# Patient Record
Sex: MALE | Race: BLACK OR AFRICAN AMERICAN | Employment: OTHER | URBAN - METROPOLITAN AREA
[De-identification: names, ages, dates, MRNs, and addresses within clinical notes are randomized per-mention and may not be internally consistent; named-entity substitution may affect disease eponyms.]

---

## 2017-02-03 PROBLEM — R56.9 NEW ONSET SEIZURE (HCC): Status: ACTIVE | Noted: 2017-02-03

## 2018-08-11 PROBLEM — E87.70 VOLUME OVERLOAD: Status: ACTIVE | Noted: 2018-08-11

## 2018-08-20 ENCOUNTER — TELEPHONE (OUTPATIENT)
Dept: OTHER | Age: 78
End: 2018-08-20

## 2018-08-21 ENCOUNTER — OFFICE VISIT (OUTPATIENT)
Dept: CARDIOLOGY CLINIC | Age: 78
End: 2018-08-21

## 2018-08-21 ENCOUNTER — HOSPITAL ENCOUNTER (OUTPATIENT)
Dept: OTHER | Age: 78
Discharge: HOME | End: 2018-08-21
Attending: CLINICAL NURSE SPECIALIST | Admitting: CLINICAL NURSE SPECIALIST

## 2018-08-21 VITALS
DIASTOLIC BLOOD PRESSURE: 60 MMHG | RESPIRATION RATE: 16 BRPM | SYSTOLIC BLOOD PRESSURE: 110 MMHG | WEIGHT: 211.7 LBS | HEIGHT: 76 IN | OXYGEN SATURATION: 94 % | HEART RATE: 48 BPM | BODY MASS INDEX: 25.78 KG/M2

## 2018-08-21 DIAGNOSIS — N28.9 RENAL INSUFFICIENCY: ICD-10-CM

## 2018-08-21 DIAGNOSIS — I50.22 CHRONIC SYSTOLIC HEART FAILURE (HCC): ICD-10-CM

## 2018-08-21 DIAGNOSIS — I10 ESSENTIAL HYPERTENSION: ICD-10-CM

## 2018-08-21 DIAGNOSIS — I50.42 CHRONIC COMBINED SYSTOLIC AND DIASTOLIC HEART FAILURE (HCC): Primary | ICD-10-CM

## 2018-08-21 LAB
ANION GAP SERPL CALCULATED.3IONS-SCNC: 8 MMOL/L (ref 3–16)
BUN BLDV-MCNC: 31 MG/DL (ref 7–20)
CALCIUM SERPL-MCNC: 8.4 MG/DL (ref 8.3–10.6)
CHLORIDE BLD-SCNC: 105 MMOL/L (ref 99–110)
CO2: 30 MMOL/L (ref 21–32)
CREAT SERPL-MCNC: 1.3 MG/DL (ref 0.8–1.3)
GFR AFRICAN AMERICAN: >60
GFR NON-AFRICAN AMERICAN: 53
GLUCOSE BLD-MCNC: 110 MG/DL (ref 70–99)
POTASSIUM SERPL-SCNC: 4.9 MMOL/L (ref 3.5–5.1)
PRO-BNP: 6430 PG/ML (ref 0–449)
SODIUM BLD-SCNC: 143 MMOL/L (ref 136–145)

## 2018-08-21 PROCEDURE — 1123F ACP DISCUSS/DSCN MKR DOCD: CPT | Performed by: CLINICAL NURSE SPECIALIST

## 2018-08-21 PROCEDURE — 1101F PT FALLS ASSESS-DOCD LE1/YR: CPT | Performed by: CLINICAL NURSE SPECIALIST

## 2018-08-21 PROCEDURE — 99214 OFFICE O/P EST MOD 30 MIN: CPT | Performed by: CLINICAL NURSE SPECIALIST

## 2018-08-21 PROCEDURE — G8427 DOCREV CUR MEDS BY ELIG CLIN: HCPCS | Performed by: CLINICAL NURSE SPECIALIST

## 2018-08-21 PROCEDURE — 1111F DSCHRG MED/CURRENT MED MERGE: CPT | Performed by: CLINICAL NURSE SPECIALIST

## 2018-08-21 PROCEDURE — 1036F TOBACCO NON-USER: CPT | Performed by: CLINICAL NURSE SPECIALIST

## 2018-08-21 PROCEDURE — 4040F PNEUMOC VAC/ADMIN/RCVD: CPT | Performed by: CLINICAL NURSE SPECIALIST

## 2018-08-21 PROCEDURE — G8419 CALC BMI OUT NRM PARAM NOF/U: HCPCS | Performed by: CLINICAL NURSE SPECIALIST

## 2018-08-21 NOTE — PATIENT INSTRUCTIONS
1. Please take all medications as prescribed  2. Check blood work (BNP and BMP) today  3. RTO in 1 month  4. Call if weight up 3 pounds in a day or 5 pounds in a week  5.  Continue low sodium (<4000 mg) a day, 64 ounces fluid day and cardiac diet

## 2018-08-21 NOTE — PROGRESS NOTES
sleep pattern, or activity level. · Eyes: No visual changes or diplopia. No scleral icterus. · ENT: No Headaches, hearing loss or vertigo. No mouth sores or sore throat. · Cardiovascular: Reviewed in HPI  · Respiratory: No cough or wheezing, no sputum production. No hematemesis. · Gastrointestinal: No abdominal pain, appetite loss, blood in stools. No change in bowel or bladder habits. · Genitourinary: No dysuria, trouble voiding, or hematuria. · Musculoskeletal:  No gait disturbance, weakness or joint complaints. · Integumentary: No rash or pruritis. · Neurological: No headache, diplopia, change in muscle strength, numbness or tingling. No change in gait, balance, coordination, mood, affect, memory, mentation, behavior. · Psychiatric: No anxiety, no depression. · Endocrine: No malaise, fatigue or temperature intolerance. No excessive thirst, fluid intake, or urination. No tremor. · Hematologic/Lymphatic: No abnormal bruising or bleeding, blood clots or swollen lymph nodes. · Allergic/Immunologic: No nasal congestion or hives. Physical Examination:    Vitals:    08/21/18 1011   BP: 110/60   Site: Right Arm   Position: Sitting   Cuff Size: Medium Adult   Pulse: (!) 48   Resp: 16   SpO2: 94%   Weight: 211 lb 11.2 oz (96 kg)   Height: 6' 4\" (1.93 m)        Constitutional and General Appearance: Warm and dry, no apparent distress, normal coloration  HEENT:  Normocephalic, atraumatic  Respiratory:  · Normal excursion and expansion without use of accessory muscles  · Resp Auscultation: Normal breath sounds without dullness  Cardiovascular:  · The apical impulses not displaced  · Heart tones are crisp and normal  · JVP normal cm H2O  · Regular rate and rhythm  · Peripheral pulses are symmetrical and full  · There is no clubbing, cyanosis of the extremities.   · maria c ankle to mid calf edema  · Pedal Pulses: 2+ and equal   Abdomen:  · No masses or tenderness  · Liver/Spleen: No Abnormalities Noted  Neurological/Psychiatric:  · Alert and oriented in all spheres  · Moves all extremities well  · Exhibits normal gait balance and coordination  · No abnormalities of mood, affect, memory, mentation, or behavior are noted    Lab Data:    CBC:   Lab Results   Component Value Date    WBC 3.5 08/15/2018    WBC 3.7 08/14/2018    WBC 4.6 08/13/2018    RBC 4.56 08/15/2018    RBC 4.90 08/14/2018    RBC 5.11 08/13/2018    HGB 12.9 08/15/2018    HGB 14.0 08/14/2018    HGB 14.5 08/13/2018    HCT 40.0 08/15/2018    HCT 43.3 08/14/2018    HCT 45.0 08/13/2018    MCV 87.7 08/15/2018    MCV 88.3 08/14/2018    MCV 88.1 08/13/2018    RDW 14.9 08/15/2018    RDW 15.3 08/14/2018    RDW 15.5 08/13/2018     08/15/2018     08/14/2018     08/13/2018     BMP:  Lab Results   Component Value Date     08/15/2018     08/14/2018     08/13/2018    K 4.5 08/15/2018    K 4.4 08/14/2018    K 4.6 08/13/2018    K 4.7 08/12/2018     08/15/2018     08/14/2018     08/13/2018    CO2 29 08/15/2018    CO2 33 08/14/2018    CO2 33 08/13/2018    PHOS 3.1 08/15/2018    PHOS 3.4 08/14/2018    PHOS 3.5 08/13/2018    BUN 27 08/15/2018    BUN 20 08/14/2018    BUN 20 08/13/2018    CREATININE 1.1 08/15/2018    CREATININE 1.1 08/14/2018    CREATININE 1.2 08/13/2018     BNP:   Lab Results   Component Value Date    PROBNP 10,917 08/14/2018    PROBNP 15,265 08/11/2018     Cardiac Imaging:  Echo 8/13/18  The left ventricle is mildly dilated.   -Severely reduced global systolic function with an ejection fraction   estimated at 15-20%.    -Severe global hypokinesis noted.   -RV systolic function appears to be reduced.   -Biatrial enlargement.   -Aortic valve appears sclerotic but opens adequately.   -Mild aortic regurgitation.   -Severe mitral regurgitation.   -There is severe tricuspid regurgitation with a RVSP estimation of 63 mmHg.   -Mild-to-moderate pulmonic regurgitation present.   -Mild circumferential

## 2018-08-22 ENCOUNTER — TELEPHONE (OUTPATIENT)
Dept: CARDIOLOGY CLINIC | Age: 78
End: 2018-08-22

## 2018-08-29 ENCOUNTER — OFFICE VISIT (OUTPATIENT)
Dept: FAMILY MEDICINE CLINIC | Age: 78
End: 2018-08-29

## 2018-08-29 VITALS
WEIGHT: 218.8 LBS | BODY MASS INDEX: 26.65 KG/M2 | SYSTOLIC BLOOD PRESSURE: 138 MMHG | DIASTOLIC BLOOD PRESSURE: 66 MMHG | HEIGHT: 76 IN | HEART RATE: 63 BPM | OXYGEN SATURATION: 93 %

## 2018-08-29 DIAGNOSIS — I10 HYPERTENSION, ESSENTIAL: ICD-10-CM

## 2018-08-29 DIAGNOSIS — Z23 NEED FOR TDAP VACCINATION: ICD-10-CM

## 2018-08-29 DIAGNOSIS — I48.91 ATRIAL FIBRILLATION, UNSPECIFIED TYPE (HCC): ICD-10-CM

## 2018-08-29 DIAGNOSIS — Z00.00 ANNUAL PHYSICAL EXAM: Primary | ICD-10-CM

## 2018-08-29 DIAGNOSIS — I49.9 IRREGULAR HEART BEAT: ICD-10-CM

## 2018-08-29 DIAGNOSIS — Z23 NEED FOR PNEUMOCOCCAL VACCINE: ICD-10-CM

## 2018-08-29 DIAGNOSIS — I50.41 ACUTE COMBINED SYSTOLIC AND DIASTOLIC (CONGESTIVE) HRT FAIL (HCC): ICD-10-CM

## 2018-08-29 PROCEDURE — G8427 DOCREV CUR MEDS BY ELIG CLIN: HCPCS | Performed by: FAMILY MEDICINE

## 2018-08-29 PROCEDURE — 1111F DSCHRG MED/CURRENT MED MERGE: CPT | Performed by: FAMILY MEDICINE

## 2018-08-29 PROCEDURE — 4040F PNEUMOC VAC/ADMIN/RCVD: CPT | Performed by: FAMILY MEDICINE

## 2018-08-29 PROCEDURE — 1123F ACP DISCUSS/DSCN MKR DOCD: CPT | Performed by: FAMILY MEDICINE

## 2018-08-29 PROCEDURE — 99204 OFFICE O/P NEW MOD 45 MIN: CPT | Performed by: FAMILY MEDICINE

## 2018-08-29 PROCEDURE — 1101F PT FALLS ASSESS-DOCD LE1/YR: CPT | Performed by: FAMILY MEDICINE

## 2018-08-29 PROCEDURE — 1036F TOBACCO NON-USER: CPT | Performed by: FAMILY MEDICINE

## 2018-08-29 PROCEDURE — G8419 CALC BMI OUT NRM PARAM NOF/U: HCPCS | Performed by: FAMILY MEDICINE

## 2018-08-29 PROCEDURE — 93000 ELECTROCARDIOGRAM COMPLETE: CPT | Performed by: FAMILY MEDICINE

## 2018-08-29 RX ORDER — ASPIRIN 81 MG/1
81 TABLET, CHEWABLE ORAL DAILY
Qty: 90 TABLET | Refills: 3 | Status: SHIPPED | OUTPATIENT
Start: 2018-08-29 | End: 2019-10-09 | Stop reason: SDUPTHER

## 2018-08-29 ASSESSMENT — PATIENT HEALTH QUESTIONNAIRE - PHQ9
1. LITTLE INTEREST OR PLEASURE IN DOING THINGS: 0
SUM OF ALL RESPONSES TO PHQ9 QUESTIONS 1 & 2: 0
SUM OF ALL RESPONSES TO PHQ QUESTIONS 1-9: 0
SUM OF ALL RESPONSES TO PHQ QUESTIONS 1-9: 0
2. FEELING DOWN, DEPRESSED OR HOPELESS: 0

## 2018-08-29 NOTE — PATIENT INSTRUCTIONS
Patient Education        Atrial Fibrillation: Care Instructions  Your Care Instructions    Atrial fibrillation is an irregular and often fast heartbeat. Treating this condition is important for several reasons. It can cause blood clots, which can travel from your heart to your brain and cause a stroke. If you have a fast heartbeat, you may feel lightheaded, dizzy, and weak. An irregular heartbeat can also increase your risk for heart failure. Atrial fibrillation is often the result of another heart condition, such as high blood pressure or coronary artery disease. Making changes to improve your heart condition will help you stay healthy and active. Follow-up care is a key part of your treatment and safety. Be sure to make and go to all appointments, and call your doctor if you are having problems. It's also a good idea to know your test results and keep a list of the medicines you take. How can you care for yourself at home? Medicines    · Take your medicines exactly as prescribed. Call your doctor if you think you are having a problem with your medicine. You will get more details on the specific medicines your doctor prescribes.     · If your doctor has given you a blood thinner to prevent a stroke, be sure you get instructions about how to take your medicine safely. Blood thinners can cause serious bleeding problems.     · Do not take any vitamins, over-the-counter drugs, or herbal products without talking to your doctor first.    Lifestyle changes    · Do not smoke. Smoking can increase your chance of a stroke and heart attack. If you need help quitting, talk to your doctor about stop-smoking programs and medicines. These can increase your chances of quitting for good.     · Eat a heart-healthy diet.     · Stay at a healthy weight. Lose weight if you need to.     · Limit alcohol to 2 drinks a day for men and 1 drink a day for women. Too much alcohol can cause health problems.     · Avoid colds and flu.  Get a pneumococcal vaccine shot. If you have had one before, ask your doctor whether you need another dose. Get a flu shot every year. If you must be around people with colds or flu, wash your hands often. Activity    · If your doctor recommends it, get more exercise. Walking is a good choice. Bit by bit, increase the amount you walk every day. Try for at least 30 minutes on most days of the week. You also may want to swim, bike, or do other activities. Your doctor may suggest that you join a cardiac rehabilitation program so that you can have help increasing your physical activity safely.     · Start light exercise if your doctor says it is okay. Even a small amount will help you get stronger, have more energy, and manage stress. Walking is an easy way to get exercise. Start out by walking a little more than you did in the hospital. Gradually increase the amount you walk.     · When you exercise, watch for signs that your heart is working too hard. You are pushing too hard if you cannot talk while you are exercising. If you become short of breath or dizzy or have chest pain, sit down and rest immediately.     · Check your pulse regularly. Place two fingers on the artery at the palm side of your wrist, in line with your thumb. If your heartbeat seems uneven or fast, talk to your doctor. When should you call for help? Call 911 anytime you think you may need emergency care. For example, call if:    · You have symptoms of a heart attack. These may include:  ¨ Chest pain or pressure, or a strange feeling in the chest.  ¨ Sweating. ¨ Shortness of breath. ¨ Nausea or vomiting. ¨ Pain, pressure, or a strange feeling in the back, neck, jaw, or upper belly or in one or both shoulders or arms. ¨ Lightheadedness or sudden weakness. ¨ A fast or irregular heartbeat. After you call 911, the  may tell you to chew 1 adult-strength or 2 to 4 low-dose aspirin. Wait for an ambulance.  Do not try to drive yourself.

## 2018-08-29 NOTE — PROGRESS NOTES
[address] Note    Date: 8/29/2018                                               Subjective/Objective:     Chief Complaint   Patient presents with    New Patient    Fatigue     in legs, no pain     Leg Swelling     Right       HPI   Pt here to establish care. Pt hadn't seen a doctor for 20 years until when he was in [facility] for 4 days for CHF and started on Lisinopril and Metoprolol and Lasix. Also takes 81mg ASA. Since leaving hospital pt feels well with no SOB or CP. No HA. Denies fatigue. Pt has seen cardiology for f/u. He will be followed monthly for now. Last tdap unknown. Has never had a pneumonia vaccine. 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 ([facility]     Essential hypertension     Volume overload 08/11/2018    New onset seizure ([facility] 02/03/2017    Delirium     Acute encephalopathy        Past Medical History:   Diagnosis Date    Hypertension     TB (pulmonary tuberculosis)     he says the doctors told him he had TB when he was 15 and he had to have surgery       Current Outpatient Prescriptions   Medication Sig Dispense Refill    aspirin 81 MG chewable tablet Take 1 tablet by mouth daily 90 tablet 3    lisinopril (PRINIVIL;ZESTRIL) 5 MG tablet Take 1 tablet by mouth daily 30 tablet 0    carvedilol (COREG) 6.25 MG tablet Take 1 tablet by mouth 2 times daily (with meals) 60 tablet 0    furosemide (LASIX) 40 MG tablet Take 1 tablet by mouth daily 30 tablet 0     No current facility-administered medications for this visit.         No Known Allergies    Review of Systems   No CP, no SOB, no rash, no bruise, no HA, no vision change, no ankle swelling, no hearing problems, no LAD      Vitals:  /66   Pulse 63   Ht 6' 4\" (1.93 m)   Wt 218 lb 12.8 oz (99.2 kg)   SpO2 93%   BMI 26.63 kg/m²     Physical Exam   General: Standing Status:   Future     Number of Occurrences:   1     Standing Expiration Date:   10/28/2018     Order Specific Question:   Reason for Exam?     Answer:   Irregular heart rate       No Follow-up on file.     Jroy De La Paz MD    8/29/2018  10:28 AM

## 2018-09-07 DIAGNOSIS — I10 ESSENTIAL HYPERTENSION: Primary | ICD-10-CM

## 2018-09-07 RX ORDER — LISINOPRIL 5 MG/1
5 TABLET ORAL DAILY
Qty: 30 TABLET | Refills: 5 | Status: SHIPPED | OUTPATIENT
Start: 2018-09-07 | End: 2018-12-21 | Stop reason: SDUPTHER

## 2018-09-07 RX ORDER — CARVEDILOL 6.25 MG/1
6.25 TABLET ORAL 2 TIMES DAILY WITH MEALS
Qty: 60 TABLET | Refills: 5 | Status: SHIPPED | OUTPATIENT
Start: 2018-09-07 | End: 2019-10-09 | Stop reason: SDUPTHER

## 2018-09-07 RX ORDER — FUROSEMIDE 40 MG/1
40 TABLET ORAL DAILY
Qty: 30 TABLET | Refills: 5 | Status: SHIPPED | OUTPATIENT
Start: 2018-09-07 | End: 2018-12-21 | Stop reason: SDUPTHER

## 2018-09-25 ENCOUNTER — APPOINTMENT (OUTPATIENT)
Dept: GENERAL RADIOLOGY | Age: 78
DRG: 208 | End: 2018-09-25
Payer: MEDICARE

## 2018-09-25 ENCOUNTER — APPOINTMENT (OUTPATIENT)
Dept: CT IMAGING | Age: 78
DRG: 208 | End: 2018-09-25
Payer: MEDICARE

## 2018-09-25 ENCOUNTER — HOSPITAL ENCOUNTER (INPATIENT)
Age: 78
LOS: 5 days | Discharge: HOME HEALTH | DRG: 208 | End: 2018-09-30
Attending: EMERGENCY MEDICINE | Admitting: HOSPITALIST
Payer: MEDICARE

## 2018-09-25 DIAGNOSIS — G40.901 STATUS EPILEPTICUS (HCC): Primary | ICD-10-CM

## 2018-09-25 PROBLEM — R56.9 SEIZURE (HCC): Status: ACTIVE | Noted: 2018-09-25

## 2018-09-25 LAB
A/G RATIO: 1.2 (ref 1.1–2.2)
ALBUMIN SERPL-MCNC: 3.5 G/DL (ref 3.4–5)
ALP BLD-CCNC: 76 U/L (ref 40–129)
ALT SERPL-CCNC: 15 U/L (ref 10–40)
ANION GAP SERPL CALCULATED.3IONS-SCNC: 16 MMOL/L (ref 3–16)
APTT: 22.4 SEC (ref 26–36)
AST SERPL-CCNC: 23 U/L (ref 15–37)
BASE EXCESS ARTERIAL: 5.9 MMOL/L (ref -3–3)
BASE EXCESS VENOUS: 4.5 MMOL/L (ref -3–3)
BILIRUB SERPL-MCNC: <0.2 MG/DL (ref 0–1)
BILIRUBIN URINE: NEGATIVE
BLOOD, URINE: NEGATIVE
BUN BLDV-MCNC: 35 MG/DL (ref 7–20)
CALCIUM SERPL-MCNC: 8.7 MG/DL (ref 8.3–10.6)
CARBOXYHEMOGLOBIN ARTERIAL: 0.6 % (ref 0–1.5)
CARBOXYHEMOGLOBIN: 2.2 % (ref 0–1.5)
CHLORIDE BLD-SCNC: 100 MMOL/L (ref 99–110)
CLARITY: CLEAR
CO2: 26 MMOL/L (ref 21–32)
COLOR: YELLOW
CREAT SERPL-MCNC: 1.5 MG/DL (ref 0.8–1.3)
GFR AFRICAN AMERICAN: 55
GFR NON-AFRICAN AMERICAN: 45
GLOBULIN: 2.9 G/DL
GLUCOSE BLD-MCNC: 139 MG/DL (ref 70–99)
GLUCOSE URINE: NEGATIVE MG/DL
HCO3 ARTERIAL: 31.3 MMOL/L (ref 21–29)
HCO3 VENOUS: 31.7 MMOL/L (ref 23–29)
HCT VFR BLD CALC: 36.9 % (ref 40.5–52.5)
HEMOGLOBIN, ART, EXTENDED: 12.6 G/DL (ref 13.5–17.5)
HEMOGLOBIN, VEN, REDUCED: 7 %
HEMOGLOBIN: 11.6 G/DL (ref 13.5–17.5)
INR BLD: 1.01 (ref 0.86–1.14)
KEPPRA DOSE AMT: NORMAL
KEPPRA: 10.5 UG/ML (ref 6–46)
KETONES, URINE: NEGATIVE MG/DL
LEUKOCYTE ESTERASE, URINE: NEGATIVE
LIPASE: 86 U/L (ref 13–60)
MCH RBC QN AUTO: 27.8 PG (ref 26–34)
MCHC RBC AUTO-ENTMCNC: 31.5 G/DL (ref 31–36)
MCV RBC AUTO: 88.3 FL (ref 80–100)
METHEMOGLOBIN ARTERIAL: 0.4 %
METHEMOGLOBIN VENOUS: 0.5 %
MICROSCOPIC EXAMINATION: NORMAL
NITRITE, URINE: NEGATIVE
O2 CONTENT ARTERIAL: 18 ML/DL
O2 CONTENT, VEN: 16 VOL %
O2 SAT, ARTERIAL: 99.8 %
O2 SAT, VEN: 93 %
O2 THERAPY: ABNORMAL
O2 THERAPY: ABNORMAL
PCO2 ARTERIAL: 47.8 MMHG (ref 35–45)
PCO2, VEN: 58.4 MMHG (ref 40–50)
PDW BLD-RTO: 15.2 % (ref 12.4–15.4)
PH ARTERIAL: 7.42 (ref 7.35–7.45)
PH UA: 6.5
PH VENOUS: 7.34 (ref 7.35–7.45)
PLATELET # BLD: 181 K/UL (ref 135–450)
PMV BLD AUTO: 9.7 FL (ref 5–10.5)
PO2 ARTERIAL: 184 MMHG (ref 75–108)
PO2, VEN: 69.1 MMHG (ref 25–40)
POTASSIUM SERPL-SCNC: 4.4 MMOL/L (ref 3.5–5.1)
PROTEIN UA: NEGATIVE MG/DL
PROTHROMBIN TIME: 11.5 SEC (ref 9.8–13)
RBC # BLD: 4.18 M/UL (ref 4.2–5.9)
SODIUM BLD-SCNC: 142 MMOL/L (ref 136–145)
SPECIFIC GRAVITY UA: 1.01
TCO2 ARTERIAL: 73.4 MMOL/L
TCO2 CALC VENOUS: 75 MMOL/L
TOTAL PROTEIN: 6.4 G/DL (ref 6.4–8.2)
TROPONIN: <0.01 NG/ML
URINE TYPE: NORMAL
UROBILINOGEN, URINE: 1 E.U./DL
WBC # BLD: 4.3 K/UL (ref 4–11)

## 2018-09-25 PROCEDURE — 94770 HC ETCO2 MONITOR DAILY: CPT

## 2018-09-25 PROCEDURE — 81003 URINALYSIS AUTO W/O SCOPE: CPT

## 2018-09-25 PROCEDURE — 85730 THROMBOPLASTIN TIME PARTIAL: CPT

## 2018-09-25 PROCEDURE — 2000000000 HC ICU R&B

## 2018-09-25 PROCEDURE — 93005 ELECTROCARDIOGRAM TRACING: CPT | Performed by: EMERGENCY MEDICINE

## 2018-09-25 PROCEDURE — 71045 X-RAY EXAM CHEST 1 VIEW: CPT

## 2018-09-25 PROCEDURE — 0BH17EZ INSERTION OF ENDOTRACHEAL AIRWAY INTO TRACHEA, VIA NATURAL OR ARTIFICIAL OPENING: ICD-10-PCS | Performed by: EMERGENCY MEDICINE

## 2018-09-25 PROCEDURE — 2580000003 HC RX 258: Performed by: HOSPITALIST

## 2018-09-25 PROCEDURE — 6360000002 HC RX W HCPCS: Performed by: EMERGENCY MEDICINE

## 2018-09-25 PROCEDURE — 82803 BLOOD GASES ANY COMBINATION: CPT

## 2018-09-25 PROCEDURE — 93010 ELECTROCARDIOGRAM REPORT: CPT | Performed by: INTERNAL MEDICINE

## 2018-09-25 PROCEDURE — 6360000002 HC RX W HCPCS: Performed by: HOSPITALIST

## 2018-09-25 PROCEDURE — 2580000003 HC RX 258: Performed by: EMERGENCY MEDICINE

## 2018-09-25 PROCEDURE — 96361 HYDRATE IV INFUSION ADD-ON: CPT

## 2018-09-25 PROCEDURE — 96365 THER/PROPH/DIAG IV INF INIT: CPT

## 2018-09-25 PROCEDURE — 85610 PROTHROMBIN TIME: CPT

## 2018-09-25 PROCEDURE — 84484 ASSAY OF TROPONIN QUANT: CPT

## 2018-09-25 PROCEDURE — 94002 VENT MGMT INPAT INIT DAY: CPT

## 2018-09-25 PROCEDURE — 99291 CRITICAL CARE FIRST HOUR: CPT

## 2018-09-25 PROCEDURE — 96375 TX/PRO/DX INJ NEW DRUG ADDON: CPT

## 2018-09-25 PROCEDURE — 2500000003 HC RX 250 WO HCPCS

## 2018-09-25 PROCEDURE — 80053 COMPREHEN METABOLIC PANEL: CPT

## 2018-09-25 PROCEDURE — 70450 CT HEAD/BRAIN W/O DYE: CPT

## 2018-09-25 PROCEDURE — 83690 ASSAY OF LIPASE: CPT

## 2018-09-25 PROCEDURE — 51702 INSERT TEMP BLADDER CATH: CPT

## 2018-09-25 PROCEDURE — 85027 COMPLETE CBC AUTOMATED: CPT

## 2018-09-25 PROCEDURE — 5A1945Z RESPIRATORY VENTILATION, 24-96 CONSECUTIVE HOURS: ICD-10-PCS | Performed by: INTERNAL MEDICINE

## 2018-09-25 PROCEDURE — 80177 DRUG SCRN QUAN LEVETIRACETAM: CPT

## 2018-09-25 PROCEDURE — 2700000000 HC OXYGEN THERAPY PER DAY

## 2018-09-25 PROCEDURE — 4500000026 HC ED CRITICAL CARE PROCEDURE

## 2018-09-25 PROCEDURE — 96372 THER/PROPH/DIAG INJ SC/IM: CPT

## 2018-09-25 PROCEDURE — 94761 N-INVAS EAR/PLS OXIMETRY MLT: CPT

## 2018-09-25 RX ORDER — LORAZEPAM 2 MG/ML
INJECTION INTRAMUSCULAR
Status: DISPENSED
Start: 2018-09-25 | End: 2018-09-25

## 2018-09-25 RX ORDER — LEVETIRACETAM 5 MG/ML
500 INJECTION INTRAVASCULAR EVERY 12 HOURS
Status: DISCONTINUED | OUTPATIENT
Start: 2018-09-26 | End: 2018-09-30 | Stop reason: HOSPADM

## 2018-09-25 RX ORDER — 0.9 % SODIUM CHLORIDE 0.9 %
1000 INTRAVENOUS SOLUTION INTRAVENOUS ONCE
Status: COMPLETED | OUTPATIENT
Start: 2018-09-25 | End: 2018-09-25

## 2018-09-25 RX ORDER — ETOMIDATE 2 MG/ML
30 INJECTION INTRAVENOUS ONCE
Status: COMPLETED | OUTPATIENT
Start: 2018-09-25 | End: 2018-09-25

## 2018-09-25 RX ORDER — LEVETIRACETAM 500 MG/5ML
1000 INJECTION, SOLUTION, CONCENTRATE INTRAVENOUS ONCE
Status: DISCONTINUED | OUTPATIENT
Start: 2018-09-25 | End: 2018-09-25 | Stop reason: SDUPTHER

## 2018-09-25 RX ORDER — ONDANSETRON 2 MG/ML
4 INJECTION INTRAMUSCULAR; INTRAVENOUS EVERY 6 HOURS PRN
Status: DISCONTINUED | OUTPATIENT
Start: 2018-09-25 | End: 2018-09-30 | Stop reason: HOSPADM

## 2018-09-25 RX ORDER — SUCCINYLCHOLINE/SOD CL,ISO/PF 200MG/10ML
120 SYRINGE (ML) INTRAVENOUS ONCE
Status: COMPLETED | OUTPATIENT
Start: 2018-09-25 | End: 2018-09-25

## 2018-09-25 RX ORDER — PROPOFOL 10 MG/ML
25 INJECTION, EMULSION INTRAVENOUS
Status: DISCONTINUED | OUTPATIENT
Start: 2018-09-25 | End: 2018-09-30 | Stop reason: HOSPADM

## 2018-09-25 RX ORDER — SODIUM CHLORIDE 9 MG/ML
INJECTION, SOLUTION INTRAVENOUS CONTINUOUS
Status: DISCONTINUED | OUTPATIENT
Start: 2018-09-25 | End: 2018-09-26

## 2018-09-25 RX ADMIN — SODIUM CHLORIDE 1.5 G: 900 INJECTION INTRAVENOUS at 20:05

## 2018-09-25 RX ADMIN — PROPOFOL 25 MCG/KG/MIN: 10 INJECTION, EMULSION INTRAVENOUS at 10:53

## 2018-09-25 RX ADMIN — SODIUM CHLORIDE: 9 INJECTION, SOLUTION INTRAVENOUS at 15:02

## 2018-09-25 RX ADMIN — Medication 120 MG: at 10:52

## 2018-09-25 RX ADMIN — PROPOFOL 30 MCG/KG/MIN: 10 INJECTION, EMULSION INTRAVENOUS at 14:57

## 2018-09-25 RX ADMIN — FENTANYL CITRATE 0.5 MCG/KG/HR: 50 INJECTION INTRAVENOUS at 20:05

## 2018-09-25 RX ADMIN — ETOMIDATE 30 MG: 20 INJECTION, SOLUTION INTRAVENOUS at 10:52

## 2018-09-25 RX ADMIN — SODIUM CHLORIDE 1.5 G: 900 INJECTION INTRAVENOUS at 15:28

## 2018-09-25 RX ADMIN — PROPOFOL 30 MCG/KG/MIN: 10 INJECTION, EMULSION INTRAVENOUS at 20:58

## 2018-09-25 RX ADMIN — LEVETIRACETAM 1000 MG: 10 INJECTION INTRAVENOUS at 11:14

## 2018-09-25 RX ADMIN — ENOXAPARIN SODIUM 40 MG: 40 INJECTION SUBCUTANEOUS at 15:02

## 2018-09-25 RX ADMIN — SODIUM CHLORIDE 1000 ML: 9 INJECTION, SOLUTION INTRAVENOUS at 11:38

## 2018-09-25 RX ADMIN — PROPOFOL 30 MCG/KG/MIN: 10 INJECTION, EMULSION INTRAVENOUS at 11:11

## 2018-09-25 ASSESSMENT — PAIN SCALES - GENERAL
PAINLEVEL_OUTOF10: 0

## 2018-09-25 ASSESSMENT — PULMONARY FUNCTION TESTS
PIF_VALUE: 20
PIF_VALUE: 18
PIF_VALUE: 15

## 2018-09-25 NOTE — ED NOTES
Seizure precautions on place with rails padded and suction and O2 available in room.      Mehrdad Vieyra, LAN  09/25/18 0126

## 2018-09-25 NOTE — ED PROVIDER NOTES
[address] ICU  eMERGENCY dEPARTMENT eNCOUnter      Pt Name: Gustavo García  MRN: [id number]  [place] 1940  Date of evaluation: 9/25/2018  Provider: Elsie Wiseman MD    CHIEF COMPLAINT       Chief Complaint   Patient presents with    Seizures     Pt to Er via [address] [place] [address] [place] from home. Pt has has seizure Hx, squad called for seizure. Squad arrived adn pt was having focal seizure but then went into full seizure. Pt given 5mg of versed by squad. Pt is unresponsive at this time, pt is breathing WNL, pt on O2 at 2L, 95%. HISTORY OF PRESENT ILLNESS   (Location/Symptom, Timing/Onset, Context/Setting, Quality, Duration, Modifying Factors, Severity)  Note limiting factors. Gustavo García is a 77 y.o. male who presents to the emergency department For seizure activity     HPI  79-year-old male presents after seizure activity. Per EMS the patient had a focal seizure followed by a generalized tonic-clonic seizure and was given 5 mg of Versed. History is limited secondary to patient's condition. Patient is postictal on arrival.    Nursing Notes were reviewed. REVIEW OF SYSTEMS    (2-9 systems for level 4, 10 or more for level 5)     Review of Systems   Unable to perform ROS: Acuity of condition       Except as noted above the remainder of the review of systems was reviewed and negative. PAST MEDICAL HISTORY     Past Medical History:   Diagnosis Date    Hypertension     TB (pulmonary tuberculosis)     he says the doctors told him he had TB when he was 15 and he had to have surgery         SURGICAL HISTORY     No past surgical history on file.       CURRENT MEDICATIONS       Discharge Medication List as of 10/1/2018  7:41 AM      CONTINUE these medications which have NOT CHANGED    Details   lisinopril (PRINIVIL;ZESTRIL) 5 MG tablet Take 1 tablet by mouth daily, Disp-30 tablet, R-5Normal      carvedilol (COREG) 6.25 MG tablet Take 1 tablet by mouth 2 times daily (with meals), Disp-60 tablet, R-5Normal Laboratory  [address] [facility], [facility] [place] [employer]   Phone [phone]   BASIC METABOLIC PANEL - Abnormal; Notable for the following:     BUN 21 (*)     All other components within normal limits    Narrative:     Performed at:  [facility] [address], [facility], [address] [place] [employer]   Phone [phone]   CBC - Abnormal; Notable for the following:     Hemoglobin 12.8 (*)     All other components within normal limits    Narrative:     Performed at:  [facility] [address], [facility], [address] [place] [employer]   Phone [phone]   POCT GLUCOSE - Abnormal; Notable for the following:     POC Glucose 58 (*)     All other components within normal limits    Narrative:     Performed at:  [facility] [address], [facility], [facility] "[employer]   Phone [phone]   POCT GLUCOSE - Abnormal; Notable for the following:     POC Glucose 53 (*)     All other components within normal limits    Narrative:     Performed at:  [facility] [address], [facility], [address] "[employer]   Phone [phone]   POCT GLUCOSE - Abnormal; Notable for the following:     POC Glucose 20 (*)     All other components within normal limits    Narrative:     Performed at:  [facility] [address], [facility], [address] "[employer]   Phone [phone]   POCT GLUCOSE - Abnormal; Notable for the following:     POC Glucose 56 (*)     All other components within normal limits    Narrative:     Performed at:  [facility] [address], [facility], [address] "[employer]   Phone [phone]   POCT GLUCOSE - Abnormal; Notable for the following:     POC Glucose 211 (*)     All other components within normal limits    Narrative:     Performed at:  [facility] [address], [facility], [address] "[employer]   Phone [phone]   TROPONIN    Narrative:     Performed at:  [facility] Admitted 09/25/2018 01:26:19 PM      PATIENT REFERRED TO:  Saran Monaco MD  [address]  [phone]    Schedule an appointment as soon as possible for a visit      [address] [facility] [address]  [facility] , [address] [address] [facility]  [phone]    Schedule an appointment as soon as possible for a visit in 1 month  Follow up with this Neurology doctor concerning your seizures within 1 month. Call this number!       DISCHARGE MEDICATIONS:  Discharge Medication List as of 10/1/2018  7:41 AM      START taking these medications    Details   levETIRAcetam (KEPPRA) 500 MG tablet Take 1 tablet by mouth 2 times daily, Disp-60 tablet, R-1Print      amoxicillin-clavulanate (AUGMENTIN) 875-125 MG per tablet Take 1 tablet by mouth 2 times daily for 7 days, Disp-14 tablet, R-0Print                (Please note that portions of this note were completed with a voice recognition program.  Efforts were made to edit the dictations but occasionally words are mis-transcribed.)    Elsie Wiseman MD (electronically signed)  Attending Emergency Physician         Elsie Wiseman MD  09/25/18 9048       Elsie Wiseman MD  09/25/18 1129 [facility]       Elsie Wiseman MD  10/06/18 1260

## 2018-09-25 NOTE — H&P
Hospital Medicine History & Physical      PCP: Colten Jeong MD    Date of Admission: 9/25/2018    Date of Service: Pt seen/examined on 9/25/2018 and Admitted to Inpatient with expected LOS greater than two midnights due to medical therapy. Chief Complaint:  Seizure      History Of Present Illness:     77 y.o. male who has history of acute combined systolic and diastolic heart failure, uncontrolled hypertension, chronic kidney disease presented to [facility] with seizure activity. According to wife the patient woke up early around 6 AM this morning watching However, wife feels like something wrong with him because TV sound very loud and this is unusual that's what brought her to check him, he was  staring at TV and not much response. She sees remote control on hand not making much movement, he just responded yes  to her question then  his head slumped over and he started to have shaking both hands and arms. She called 911 and patient brought to the emergency department. ER physician also witnessed to generalized tonic clonic seizure activity, actually the patient vomited at that time, he was unable to protect airway,  patient got intubated in the emergency room,  loading dose of Keppra given 1 g IV. On chart review, he seemed to be admitted on February 4, 2017 with new onset seizure and it was no infectious source identified at the time, head CT was negative for acute findings and no alcohol use noted however patient adamantly declined further workup,psych consulted with subsequent clearance for [place] discharge, despite this, pt chose to leave AMA. He admitted again on August 2018 with the bilateral leg swelling dizziness and having falls. He is found to have severe combined systolic and diastolic heart failure with ejection fraction 15-20% in cardiology sees him at that time, he said he will take only Lasix and then he was agreeable to other medications.   Per notes, he wishes to be a DNR [facility] questions or concerns please feel free to contact me at [phone].

## 2018-09-25 NOTE — ED NOTES
Pharmacy Medication History Note      List of current medications patient is taking is complete. Source of information: outpatient pharmacy    Changes made to medication list:  Medications flagged for removal (include reason, ex. noncompliance):  · N/A    Medications removed (include reason, ex. therapy complete or physician discontinued):  · N/A    Medications added/doses adjusted:  · N/A    Other notes (ex. Recent course of antibiotics, Coumadin dosing):  · Denies use of other OTC or herbal medications. Last dose times updated.    Yulisa

## 2018-09-25 NOTE — ED NOTES
Pt started seizing again coming back from CT. Seizure lasted about 2 min. Pt bit tongue, has secreations pt is having issues clearing, pt suctioned and dr. Mata Nur in room. Pt given 2mg of ativan and that stopped seizure. Decision made to intubate pt.      Leonor Jain RN  09/25/18 0643

## 2018-09-26 ENCOUNTER — APPOINTMENT (OUTPATIENT)
Dept: GENERAL RADIOLOGY | Age: 78
DRG: 208 | End: 2018-09-26
Payer: MEDICARE

## 2018-09-26 PROBLEM — T17.908A ASPIRATION INTO AIRWAY: Status: ACTIVE | Noted: 2018-09-26

## 2018-09-26 LAB
ANION GAP SERPL CALCULATED.3IONS-SCNC: 13 MMOL/L (ref 3–16)
BUN BLDV-MCNC: 24 MG/DL (ref 7–20)
CALCIUM SERPL-MCNC: 8.6 MG/DL (ref 8.3–10.6)
CHLORIDE BLD-SCNC: 104 MMOL/L (ref 99–110)
CO2: 27 MMOL/L (ref 21–32)
CREAT SERPL-MCNC: 1.1 MG/DL (ref 0.8–1.3)
GFR AFRICAN AMERICAN: >60
GFR NON-AFRICAN AMERICAN: >60
GLUCOSE BLD-MCNC: 20 MG/DL (ref 70–99)
GLUCOSE BLD-MCNC: 53 MG/DL (ref 70–99)
GLUCOSE BLD-MCNC: 58 MG/DL (ref 70–99)
GLUCOSE BLD-MCNC: 79 MG/DL (ref 70–99)
GLUCOSE BLD-MCNC: 82 MG/DL (ref 70–99)
GLUCOSE BLD-MCNC: 85 MG/DL (ref 70–99)
HCT VFR BLD CALC: 40.7 % (ref 40.5–52.5)
HEMOGLOBIN: 12.8 G/DL (ref 13.5–17.5)
MAGNESIUM: 1.9 MG/DL (ref 1.8–2.4)
MCH RBC QN AUTO: 27.8 PG (ref 26–34)
MCHC RBC AUTO-ENTMCNC: 31.3 G/DL (ref 31–36)
MCV RBC AUTO: 88.8 FL (ref 80–100)
PDW BLD-RTO: 15.3 % (ref 12.4–15.4)
PERFORMED ON: ABNORMAL
PERFORMED ON: NORMAL
PERFORMED ON: NORMAL
PHOSPHORUS: 3.2 MG/DL (ref 2.5–4.9)
PLATELET # BLD: 152 K/UL (ref 135–450)
PMV BLD AUTO: 9.3 FL (ref 5–10.5)
POTASSIUM SERPL-SCNC: 4 MMOL/L (ref 3.5–5.1)
RBC # BLD: 4.59 M/UL (ref 4.2–5.9)
SODIUM BLD-SCNC: 144 MMOL/L (ref 136–145)
WBC # BLD: 7.3 K/UL (ref 4–11)

## 2018-09-26 PROCEDURE — 84100 ASSAY OF PHOSPHORUS: CPT

## 2018-09-26 PROCEDURE — 99223 1ST HOSP IP/OBS HIGH 75: CPT | Performed by: PSYCHIATRY & NEUROLOGY

## 2018-09-26 PROCEDURE — 2000000000 HC ICU R&B

## 2018-09-26 PROCEDURE — 94750 HC PULMONARY COMPLIANCE STUDY: CPT

## 2018-09-26 PROCEDURE — 6360000002 HC RX W HCPCS: Performed by: EMERGENCY MEDICINE

## 2018-09-26 PROCEDURE — 6360000002 HC RX W HCPCS: Performed by: HOSPITALIST

## 2018-09-26 PROCEDURE — 99291 CRITICAL CARE FIRST HOUR: CPT | Performed by: INTERNAL MEDICINE

## 2018-09-26 PROCEDURE — 95819 EEG AWAKE AND ASLEEP: CPT

## 2018-09-26 PROCEDURE — 95822 EEG COMA OR SLEEP ONLY: CPT | Performed by: PSYCHIATRY & NEUROLOGY

## 2018-09-26 PROCEDURE — 2700000000 HC OXYGEN THERAPY PER DAY

## 2018-09-26 PROCEDURE — 83735 ASSAY OF MAGNESIUM: CPT

## 2018-09-26 PROCEDURE — 80048 BASIC METABOLIC PNL TOTAL CA: CPT

## 2018-09-26 PROCEDURE — 94762 N-INVAS EAR/PLS OXIMTRY CONT: CPT

## 2018-09-26 PROCEDURE — 94770 HC ETCO2 MONITOR DAILY: CPT

## 2018-09-26 PROCEDURE — 2580000003 HC RX 258

## 2018-09-26 PROCEDURE — 2580000003 HC RX 258: Performed by: HOSPITALIST

## 2018-09-26 PROCEDURE — 36600 WITHDRAWAL OF ARTERIAL BLOOD: CPT

## 2018-09-26 PROCEDURE — 94003 VENT MGMT INPAT SUBQ DAY: CPT

## 2018-09-26 PROCEDURE — 85027 COMPLETE CBC AUTOMATED: CPT

## 2018-09-26 PROCEDURE — 36415 COLL VENOUS BLD VENIPUNCTURE: CPT

## 2018-09-26 PROCEDURE — 71045 X-RAY EXAM CHEST 1 VIEW: CPT

## 2018-09-26 RX ORDER — DEXTROSE MONOHYDRATE 50 MG/ML
100 INJECTION, SOLUTION INTRAVENOUS PRN
Status: DISCONTINUED | OUTPATIENT
Start: 2018-09-26 | End: 2018-09-30 | Stop reason: HOSPADM

## 2018-09-26 RX ORDER — NICOTINE POLACRILEX 4 MG
15 LOZENGE BUCCAL PRN
Status: DISCONTINUED | OUTPATIENT
Start: 2018-09-26 | End: 2018-09-30 | Stop reason: HOSPADM

## 2018-09-26 RX ORDER — SODIUM CHLORIDE 0.9 % (FLUSH) 0.9 %
SYRINGE (ML) INJECTION
Status: COMPLETED
Start: 2018-09-26 | End: 2018-09-26

## 2018-09-26 RX ORDER — SODIUM CHLORIDE 9 MG/ML
INJECTION, SOLUTION INTRAVENOUS
Status: DISPENSED
Start: 2018-09-26 | End: 2018-09-26

## 2018-09-26 RX ORDER — DEXTROSE AND SODIUM CHLORIDE 5; .45 G/100ML; G/100ML
INJECTION, SOLUTION INTRAVENOUS CONTINUOUS
Status: DISCONTINUED | OUTPATIENT
Start: 2018-09-26 | End: 2018-09-27 | Stop reason: DRUGHIGH

## 2018-09-26 RX ORDER — DEXTROSE MONOHYDRATE 25 G/50ML
12.5 INJECTION, SOLUTION INTRAVENOUS PRN
Status: DISCONTINUED | OUTPATIENT
Start: 2018-09-26 | End: 2018-09-30 | Stop reason: HOSPADM

## 2018-09-26 RX ADMIN — LEVETIRACETAM 500 MG: 5 INJECTION INTRAVENOUS at 20:38

## 2018-09-26 RX ADMIN — DEXTROSE MONOHYDRATE 12.5 G: 25 INJECTION, SOLUTION INTRAVENOUS at 20:52

## 2018-09-26 RX ADMIN — PROPOFOL 15 MCG/KG/MIN: 10 INJECTION, EMULSION INTRAVENOUS at 15:30

## 2018-09-26 RX ADMIN — PROPOFOL 20 MCG/KG/MIN: 10 INJECTION, EMULSION INTRAVENOUS at 00:48

## 2018-09-26 RX ADMIN — SODIUM CHLORIDE 1.5 G: 900 INJECTION INTRAVENOUS at 20:56

## 2018-09-26 RX ADMIN — SODIUM CHLORIDE 1.5 G: 900 INJECTION INTRAVENOUS at 09:12

## 2018-09-26 RX ADMIN — SODIUM CHLORIDE 1.5 G: 900 INJECTION INTRAVENOUS at 15:29

## 2018-09-26 RX ADMIN — Medication 10 ML: at 20:57

## 2018-09-26 RX ADMIN — FENTANYL CITRATE 0.5 MCG/KG/HR: 50 INJECTION INTRAVENOUS at 13:56

## 2018-09-26 RX ADMIN — SODIUM CHLORIDE 1.5 G: 900 INJECTION INTRAVENOUS at 02:35

## 2018-09-26 RX ADMIN — ENOXAPARIN SODIUM 40 MG: 40 INJECTION SUBCUTANEOUS at 09:12

## 2018-09-26 RX ADMIN — DEXTROSE AND SODIUM CHLORIDE: 5; 450 INJECTION, SOLUTION INTRAVENOUS at 19:15

## 2018-09-26 RX ADMIN — LEVETIRACETAM 500 MG: 5 INJECTION INTRAVENOUS at 08:21

## 2018-09-26 ASSESSMENT — PAIN SCALES - GENERAL
PAINLEVEL_OUTOF10: 0

## 2018-09-26 ASSESSMENT — PULMONARY FUNCTION TESTS
PIF_VALUE: 15
PIF_VALUE: 17
PIF_VALUE: 22
PIF_VALUE: 16
PIF_VALUE: 21
PIF_VALUE: 21

## 2018-09-26 NOTE — PROGRESS NOTES
fluctuations in weight hx per EMR, however pt recently dx with CHF and suspect this is d/t fluid gains/losses. Dr. Valerie Mendez discussing possible extubation. · Nutrition-Focused Physical Findings: No edema noted   · Wound Type: None  · Current Nutrition Therapies:  · Oral Diet Orders: NPO   · Oral Diet intake: NPO  · Oral Nutrition Supplement (ONS) Orders: None  · ONS intake: NPO  · Anthropometric Measures:  · Ht: 6' 4\" (193 cm)   · Current Body Wt: 188 lb (85.3 kg)  · Admission Body Wt: 190 lb (86.2 kg)  · Ideal Body Wt: 202 lb (91.6 kg)   · BMI Classification: BMI 18.5 - 24.9 Normal Weight  · Comparative Standards (Estimated Nutrition Needs):  · Estimated Daily Total Kcal: 0854-1738  · Estimated Daily Protein (g): 103-172 grams    Estimated Intake vs Estimated Needs: Intake Less Than Needs    Nutrition Risk Level: High    Nutrition Interventions:   Continue NPO  Continued Inpatient Monitoring, Education not appropriate at this time    Nutrition Evaluation:   · Evaluation: Goals set   · Goals: Diet advancement vs nutrition support     · Monitoring: NPO Status, Weight, Comparative Standards, Pertinent Labs    See Adult Nutrition Doc Flowsheet for more detail.      Electronically signed by Akanksha Hatch RD, CHARLI on 9/26/18 at 9:19 AM    Contact Number: [phone]

## 2018-09-26 NOTE — PLAN OF CARE
Problem: Nutrition  Goal: Optimal nutrition therapy  Outcome: Ongoing  Nutrition Problem: Inadequate oral intake  Intervention: Food and/or Nutrient Delivery: Continue NPO  Nutritional Goals: Diet advancement vs nutrition support

## 2018-09-26 NOTE — CONSULTS
injection 0.5 mL, 0.5 mL, Intramuscular, Prior to discharge  fentaNYL (SUBLIMAZE) 1,000 mcg in sodium chloride 0.9 % 100 mL infusion, 0.5 mcg/kg/hr, Intravenous, Continuous  levetiracetam (KEPPRA) 500 mg/100 mL IVPB, 500 mg, Intravenous, Q12H    Ventilator Settings: Mode: A/C Rate: 20 VT: 500 FiO2: 50%    CBC:  Recent Labs      09/25/18   0944  09/26/18   0422   WBC  4.3  7.3   RBC  4.18*  4.59   HGB  11.6*  12.8*   HCT  36.9*  40.7   PLT  181  152   MCV  88.3  88.8   MCH  27.8  27.8   MCHC  31.5  31.3   RDW  15.2  15.3      BMP:  Recent Labs      09/25/18   0944  09/26/18 0422   NA  142  144   K  4.4  4.0   CL  100  104   CO2  26  27   BUN  35*  24*   CREATININE  1.5*  1.1   CALCIUM  8.7  8.6   GLUCOSE  139*  79      ABG:  Recent Labs      09/25/18 2015   PHART  7.425   LQY8THJ  47.8*   PO2ART  184.0*   [id number]  31.3*   [id number]  99.8   BEART  5.9*       Radiology Review:  Pertinent images / reports were reviewed as a part of this visit. Physical Exam   Constitutional: sedated  HENT: No oropharyngeal exudate. Eyes: Pupils are equal, round, and reactive to light. Neck: No JVD present. No tracheal deviation present. Cardiovascular: regular rhythm, S1&S2 and intact distal pulses. Pulmonary/Chest: bilateral breath sounds. No wheezes, rhonchi, rales or tenderness. Abdominal: Soft. Bowel sounds are normal, no distension and no tenderness to palpation. Musculoskeletal: No edema and no tenderness. Neurological: sedated    Assessment:   1. Status epilepticus  2. Respiratory failure  3. Aspiration pneumonia  4. CMP     Plan:     VS reviewed as above. Stress ulcer, DVT, and line protocols are being followed if not contraindicated.   EEG and MRI ordered  Change vent support to [facility] 15/5  Continue abx for aspiration pneumonia  Repeat CXR  Will start SBT once OK with neurology  Discussed with ICU team      Total critical care time caring for this patient with life threatening illness, including direct patient

## 2018-09-26 NOTE — PROGRESS NOTES
Sedation vacation performed. Sedation off for 5-10 minutes- pt became anxious, breathing over ventilator. Moves all extremities but unable to follow commands and does not open eyes. Not tolerating ventilator- sedation resumed for comfort. Assessment otherwise unchanged. Seizure precautions in place. ODILIA.  Hannah Courtney

## 2018-09-27 ENCOUNTER — APPOINTMENT (OUTPATIENT)
Dept: MRI IMAGING | Age: 78
DRG: 208 | End: 2018-09-27
Payer: MEDICARE

## 2018-09-27 ENCOUNTER — APPOINTMENT (OUTPATIENT)
Dept: GENERAL RADIOLOGY | Age: 78
DRG: 208 | End: 2018-09-27
Payer: MEDICARE

## 2018-09-27 LAB
ANION GAP SERPL CALCULATED.3IONS-SCNC: 13 MMOL/L (ref 3–16)
BASE EXCESS ARTERIAL: 5 MMOL/L (ref -3–3)
BUN BLDV-MCNC: 21 MG/DL (ref 7–20)
CALCIUM SERPL-MCNC: 8.4 MG/DL (ref 8.3–10.6)
CARBOXYHEMOGLOBIN ARTERIAL: 0.4 % (ref 0–1.5)
CHLORIDE BLD-SCNC: 106 MMOL/L (ref 99–110)
CO2: 24 MMOL/L (ref 21–32)
CREAT SERPL-MCNC: 1 MG/DL (ref 0.8–1.3)
GFR AFRICAN AMERICAN: >60
GFR NON-AFRICAN AMERICAN: >60
GLUCOSE BLD-MCNC: 56 MG/DL (ref 70–99)
GLUCOSE BLD-MCNC: 73 MG/DL (ref 70–99)
GLUCOSE BLD-MCNC: 79 MG/DL (ref 70–99)
GLUCOSE BLD-MCNC: 81 MG/DL (ref 70–99)
HCO3 ARTERIAL: 25.6 MMOL/L (ref 21–29)
HCT VFR BLD CALC: 40.5 % (ref 40.5–52.5)
HEMOGLOBIN, ART, EXTENDED: 12.4 G/DL (ref 13.5–17.5)
HEMOGLOBIN: 12.8 G/DL (ref 13.5–17.5)
MCH RBC QN AUTO: 28 PG (ref 26–34)
MCHC RBC AUTO-ENTMCNC: 31.7 G/DL (ref 31–36)
MCV RBC AUTO: 88.3 FL (ref 80–100)
METHEMOGLOBIN ARTERIAL: 0.3 %
O2 CONTENT ARTERIAL: 18 ML/DL
O2 SAT, ARTERIAL: 99.7 %
O2 THERAPY: ABNORMAL
PCO2 ARTERIAL: 25.6 MMHG (ref 35–45)
PDW BLD-RTO: 15 % (ref 12.4–15.4)
PERFORMED ON: ABNORMAL
PERFORMED ON: NORMAL
PERFORMED ON: NORMAL
PH ARTERIAL: 7.61 (ref 7.35–7.45)
PLATELET # BLD: 142 K/UL (ref 135–450)
PMV BLD AUTO: 9 FL (ref 5–10.5)
PO2 ARTERIAL: 172 MMHG (ref 75–108)
POTASSIUM SERPL-SCNC: 3.5 MMOL/L (ref 3.5–5.1)
RBC # BLD: 4.58 M/UL (ref 4.2–5.9)
SODIUM BLD-SCNC: 143 MMOL/L (ref 136–145)
TCO2 ARTERIAL: 59.1 MMOL/L
WBC # BLD: 6.9 K/UL (ref 4–11)

## 2018-09-27 PROCEDURE — 94770 HC ETCO2 MONITOR DAILY: CPT

## 2018-09-27 PROCEDURE — 80048 BASIC METABOLIC PNL TOTAL CA: CPT

## 2018-09-27 PROCEDURE — 6360000002 HC RX W HCPCS: Performed by: HOSPITALIST

## 2018-09-27 PROCEDURE — 71045 X-RAY EXAM CHEST 1 VIEW: CPT

## 2018-09-27 PROCEDURE — 94762 N-INVAS EAR/PLS OXIMTRY CONT: CPT

## 2018-09-27 PROCEDURE — 6360000002 HC RX W HCPCS: Performed by: EMERGENCY MEDICINE

## 2018-09-27 PROCEDURE — 36415 COLL VENOUS BLD VENIPUNCTURE: CPT

## 2018-09-27 PROCEDURE — 82803 BLOOD GASES ANY COMBINATION: CPT

## 2018-09-27 PROCEDURE — 99291 CRITICAL CARE FIRST HOUR: CPT | Performed by: INTERNAL MEDICINE

## 2018-09-27 PROCEDURE — 85027 COMPLETE CBC AUTOMATED: CPT

## 2018-09-27 PROCEDURE — 2700000000 HC OXYGEN THERAPY PER DAY

## 2018-09-27 PROCEDURE — 2580000003 HC RX 258: Performed by: INTERNAL MEDICINE

## 2018-09-27 PROCEDURE — 94003 VENT MGMT INPAT SUBQ DAY: CPT

## 2018-09-27 PROCEDURE — 2580000003 HC RX 258: Performed by: HOSPITALIST

## 2018-09-27 PROCEDURE — 94750 HC PULMONARY COMPLIANCE STUDY: CPT

## 2018-09-27 PROCEDURE — 36600 WITHDRAWAL OF ARTERIAL BLOOD: CPT

## 2018-09-27 PROCEDURE — 2000000000 HC ICU R&B

## 2018-09-27 PROCEDURE — 99233 SBSQ HOSP IP/OBS HIGH 50: CPT | Performed by: PSYCHIATRY & NEUROLOGY

## 2018-09-27 RX ORDER — POTASSIUM CHLORIDE 7.45 MG/ML
10 INJECTION INTRAVENOUS PRN
Status: DISCONTINUED | OUTPATIENT
Start: 2018-09-27 | End: 2018-09-30 | Stop reason: HOSPADM

## 2018-09-27 RX ORDER — DEXTROSE MONOHYDRATE 100 MG/ML
INJECTION, SOLUTION INTRAVENOUS CONTINUOUS
Status: DISCONTINUED | OUTPATIENT
Start: 2018-09-27 | End: 2018-09-30 | Stop reason: HOSPADM

## 2018-09-27 RX ORDER — POTASSIUM CHLORIDE 7.45 MG/ML
INJECTION INTRAVENOUS
Status: DISPENSED
Start: 2018-09-27 | End: 2018-09-27

## 2018-09-27 RX ADMIN — ENOXAPARIN SODIUM 40 MG: 40 INJECTION SUBCUTANEOUS at 08:23

## 2018-09-27 RX ADMIN — POTASSIUM CHLORIDE 10 MEQ: 10 INJECTION, SOLUTION INTRAVENOUS at 07:01

## 2018-09-27 RX ADMIN — SODIUM CHLORIDE 1.5 G: 900 INJECTION INTRAVENOUS at 03:02

## 2018-09-27 RX ADMIN — SODIUM CHLORIDE 1.5 G: 900 INJECTION INTRAVENOUS at 16:54

## 2018-09-27 RX ADMIN — LEVETIRACETAM 500 MG: 5 INJECTION INTRAVENOUS at 08:22

## 2018-09-27 RX ADMIN — POTASSIUM CHLORIDE 10 MEQ: 10 INJECTION, SOLUTION INTRAVENOUS at 05:48

## 2018-09-27 RX ADMIN — SODIUM CHLORIDE 1.5 G: 900 INJECTION INTRAVENOUS at 08:44

## 2018-09-27 RX ADMIN — DEXTROSE MONOHYDRATE: 100 INJECTION, SOLUTION INTRAVENOUS at 10:35

## 2018-09-27 RX ADMIN — DEXTROSE MONOHYDRATE 12.5 G: 25 INJECTION, SOLUTION INTRAVENOUS at 08:37

## 2018-09-27 RX ADMIN — PROPOFOL 20 MCG/KG/MIN: 10 INJECTION, EMULSION INTRAVENOUS at 00:22

## 2018-09-27 RX ADMIN — SODIUM CHLORIDE 1.5 G: 900 INJECTION INTRAVENOUS at 22:26

## 2018-09-27 RX ADMIN — LEVETIRACETAM 500 MG: 5 INJECTION INTRAVENOUS at 21:39

## 2018-09-27 RX ADMIN — PROPOFOL 15 MCG/KG/MIN: 10 INJECTION, EMULSION INTRAVENOUS at 06:59

## 2018-09-27 RX ADMIN — POTASSIUM CHLORIDE 10 MEQ: 10 INJECTION, SOLUTION INTRAVENOUS at 05:08

## 2018-09-27 ASSESSMENT — PULMONARY FUNCTION TESTS
PIF_VALUE: 26
PIF_VALUE: 16
PIF_VALUE: 22

## 2018-09-27 ASSESSMENT — PAIN SCALES - GENERAL
PAINLEVEL_OUTOF10: 0

## 2018-09-27 NOTE — PROGRESS NOTES
Dr. Keenan Bryant with return call- ventilator settings changed to PS 10/5. RT notified and changes made.  Marko Nam

## 2018-09-27 NOTE — PROGRESS NOTES
Prior to extubation, RT and RN at bedside. Pt had a RSBI of 18,  Audible leak test of 100 % ,sats were 100%. Pt was alert, and sitting up in bed. Pt now on 3L NC sats 100%. No adverse effects.  Will continue to monitor

## 2018-09-28 ENCOUNTER — APPOINTMENT (OUTPATIENT)
Dept: MRI IMAGING | Age: 78
DRG: 208 | End: 2018-09-28
Payer: MEDICARE

## 2018-09-28 PROCEDURE — G8987 SELF CARE CURRENT STATUS: HCPCS

## 2018-09-28 PROCEDURE — 6360000002 HC RX W HCPCS: Performed by: HOSPITALIST

## 2018-09-28 PROCEDURE — 97165 OT EVAL LOW COMPLEX 30 MIN: CPT

## 2018-09-28 PROCEDURE — 97116 GAIT TRAINING THERAPY: CPT

## 2018-09-28 PROCEDURE — 99232 SBSQ HOSP IP/OBS MODERATE 35: CPT | Performed by: PSYCHIATRY & NEUROLOGY

## 2018-09-28 PROCEDURE — 6360000002 HC RX W HCPCS: Performed by: PSYCHIATRY & NEUROLOGY

## 2018-09-28 PROCEDURE — 70551 MRI BRAIN STEM W/O DYE: CPT

## 2018-09-28 PROCEDURE — 97161 PT EVAL LOW COMPLEX 20 MIN: CPT

## 2018-09-28 PROCEDURE — G8979 MOBILITY GOAL STATUS: HCPCS

## 2018-09-28 PROCEDURE — 99233 SBSQ HOSP IP/OBS HIGH 50: CPT | Performed by: INTERNAL MEDICINE

## 2018-09-28 PROCEDURE — 94762 N-INVAS EAR/PLS OXIMTRY CONT: CPT

## 2018-09-28 PROCEDURE — G8988 SELF CARE GOAL STATUS: HCPCS

## 2018-09-28 PROCEDURE — 97530 THERAPEUTIC ACTIVITIES: CPT

## 2018-09-28 PROCEDURE — 2580000003 HC RX 258: Performed by: HOSPITALIST

## 2018-09-28 PROCEDURE — G8978 MOBILITY CURRENT STATUS: HCPCS

## 2018-09-28 PROCEDURE — 2000000000 HC ICU R&B

## 2018-09-28 RX ORDER — LORAZEPAM 2 MG/ML
1 INJECTION INTRAMUSCULAR ONCE
Status: COMPLETED | OUTPATIENT
Start: 2018-09-28 | End: 2018-09-28

## 2018-09-28 RX ORDER — SODIUM CHLORIDE 9 MG/ML
INJECTION, SOLUTION INTRAVENOUS
Status: DISPENSED
Start: 2018-09-28 | End: 2018-09-28

## 2018-09-28 RX ADMIN — ENOXAPARIN SODIUM 40 MG: 40 INJECTION SUBCUTANEOUS at 08:50

## 2018-09-28 RX ADMIN — LORAZEPAM 1 MG: 2 INJECTION INTRAMUSCULAR; INTRAVENOUS at 20:42

## 2018-09-28 RX ADMIN — LEVETIRACETAM 500 MG: 5 INJECTION INTRAVENOUS at 08:50

## 2018-09-28 RX ADMIN — LEVETIRACETAM 500 MG: 5 INJECTION INTRAVENOUS at 21:40

## 2018-09-28 RX ADMIN — SODIUM CHLORIDE 1.5 G: 900 INJECTION INTRAVENOUS at 03:52

## 2018-09-28 ASSESSMENT — PAIN SCALES - GENERAL
PAINLEVEL_OUTOF10: 0

## 2018-09-28 NOTE — PROGRESS NOTES
time;Oriented to situation    Social/Functional History  Social/Functional History  Lives With: Spouse  Type of Home: House  Home Layout: Two level, Bed/Bath upstairs (full bath on main floor)  Home Access: Stairs to enter with rails (~10 GERMAINE to enter)  Bathroom Shower/Tub: Tub/Shower unit  Bathroom Toilet: Standard  ADL Assistance: Independent  Homemaking Assistance: Independent  Ambulation Assistance: Independent  Transfer Assistance: Independent  Active : Yes  Occupation: Retired  Type of occupation: 31 years at the [employer] office  [address]: Riding bicycle  Additional Comments: pt reports no falls in the last 6 months  Objective     Observation/Palpation  Posture: Fair  Observation: cervical/thoraic flexion with functional mobility  Scar: R LE posterior wound-covered    AROM RLE (degrees)  RLE AROM: WFL  AROM LLE (degrees)  LLE AROM : WFL  Strength RLE  Strength RLE: WFL  Strength LLE  Strength LLE: WFL  Tone RLE  RLE Tone: Normotonic  Tone LLE  LLE Tone: Normotonic  Motor Control  Gross Motor?: WFL  Sensation  Overall Sensation Status: WFL  Bed mobility  Supine to Sit: Stand by assistance  Sit to Supine: Stand by assistance  Scooting: Modified independent  Comment: SBa for safety  Transfers  Sit to Stand: Contact guard assistance  Stand to sit: Contact guard assistance  Ambulation  Ambulation?: Yes  Ambulation 1  Surface: level tile  Device: Rolling Walker  Assistance: Minimal assistance (Min A for safety)  Quality of Gait: Pt ambulates with a rapid marcus, forward flexed posture, narrow DOMENICO, decreased step length, almost \"jogging\"-like quality to gait, despite cues for upright posture, and appropriate walker use, no LOB. Distance: ~540 ft  Comments: Pt appearing to ambulate as above with decreased awareness of safety. Pt may benefit from ambulation without RW, though initially appearing unsteady during inital stand without RW, leaning against bed with B knees bent.    Stairs/Curb  Stairs?: No Balance  Posture: Fair  Sitting - Static: Good;-  Sitting - Dynamic: Good;-  Standing - Static: Fair;+  Standing - Dynamic: Fair        Assessment   Body structures, Functions, Activity limitations: Decreased functional mobility ; Decreased ROM; Decreased ADL status; Decreased strength;Decreased safe awareness;Decreased endurance;Decreased balance  Assessment: Pt presents as below his baseline function, s/p hospitalization for seizure activity and subsequent intubation. Pt would benefit from skilled PT services to promote safe return to PLOF. Prognosis: Good  Decision Making: Low Complexity  Patient Education: POC, role of acute PT, discharge recommendations  REQUIRES PT FOLLOW UP: Yes  Activity Tolerance  Activity Tolerance: Patient Tolerated treatment well;Patient limited by cognitive status         Plan   Plan  Times per week: 2-5x  Times per day: Daily  Current Treatment Recommendations: Strengthening, ROM, Balance Training, Functional Mobility Training, Transfer Training, Gait Training, Endurance Training, Stair training, Home Exercise Program, Safety Education & Training, Patient/Caregiver Education & Training, Equipment Evaluation, Education, & procurement  Safety Devices  Type of devices: All fall risk precautions in place, Call light within reach, Bed alarm in place, Gait belt, Patient at risk for falls, Left in bed, Nurse notified  Restraints  Initially in place: No    G-Code  PT G-Codes  Functional Limitation: Mobility: Walking and moving around  Mobility: Walking and Moving Around Current Status (): At least 40 percent but less than 60 percent impaired, limited or restricted  Mobility: Walking and Moving Around Goal Status ():  At least 20 percent but less than 40 percent impaired, limited or restricted  OutComes Score                                           AM-PAC Score  AM-PAC Inpatient Mobility Raw Score : 18  AM-PAC Inpatient T-Scale Score : 43.63  Mobility Inpatient CMS 0-100% Score: 46.58  Mobility Inpatient CMS G-Code Modifier : CK          Goals  Short term goals  Time Frame for Short term goals:  To be met prior to discharge  Short term goal 1: Pt will complete bed mobility with mod I  Short term goal 2: Pt will complete sit to/from stand with mod I  Short term goal 3: Pt will ambulate 250 ft with LRAD and supervision  Short term goal 4: Pt will navigate up/down 5 steps with HR and supervision       Therapy Time   Individual Concurrent Group Co-treatment   Time In 1007         Time Out 1045         Minutes 38         Timed Code Treatment Minutes: [address], PT   Presley Daniels, PT, DPT, [id number]

## 2018-09-28 NOTE — PLAN OF CARE
Problem: Falls - Risk of:  Goal: Will remain free from falls  Will remain free from falls   Outcome: Ongoing    Goal: Absence of physical injury  Absence of physical injury   Outcome: Ongoing      Problem: Risk for Impaired Skin Integrity  Goal: Tissue integrity - skin and mucous membranes  Structural intactness and normal physiological function of skin and  mucous membranes.    Outcome: Ongoing      Problem: Physical Regulation:  Goal: Signs of adequate cerebral perfusion will increase  Signs of adequate cerebral perfusion will increase   Outcome: Ongoing    Goal: Ability to maintain a stable neurologic state will improve  Ability to maintain a stable neurologic state will improve   Outcome: Ongoing      Problem: OXYGENATION/RESPIRATORY FUNCTION  Goal: Patient will maintain patent airway  Outcome: Ongoing    Goal: Patient will achieve/maintain normal respiratory rate/effort  Respiratory rate and effort will be within normal limits for the patient   Outcome: Ongoing      Problem: Nutrition  Goal: Optimal nutrition therapy  Outcome: Ongoing

## 2018-09-28 NOTE — PROGRESS NOTES
tenderness to palpation. Musculoskeletal: No edema and no tenderness. Neurological: non focal    Assessment:   1. Status epilepticus  2. Respiratory failure  3. Aspiration pneumonia  4. CMP     Plan:     VS reviewed as above. Stress ulcer, DVT, and line protocols are being followed if not contraindicated.   He tolerated liberation from vent support  OOB, IS if OK with neurology  Change abx to oral Augmentin for 7 days  OK to transfer out of ICU service, will sign off

## 2018-09-28 NOTE — PROGRESS NOTES
[facility] PROGRESS NOTE    9/28/2018 1:07 PM        Name: Simi Espinoza . Admitted: 9/25/2018  Primary Care Provider: Diana Martínez MD (Tel: [phone])          CC:    Subjective: Fernando Martinez Pt extubated    Back to [facility]  Denies any new issues  Ambulating      Reviewed interval ancillary notes    Current Medications    sodium chloride 0.9 % infusion    potassium chloride 10 mEq/100 mL IVPB (Peripheral Line) PRN   dextrose 10 % injection Continuous   glucose (GLUTOSE) 40 % oral gel 15 g PRN   dextrose 50 % solution 12.5 g PRN   glucagon (rDNA) injection 1 mg PRN   dextrose 5 % solution PRN   propofol 1000 MG/100ML injection Titrated   magnesium hydroxide (MILK OF MAGNESIA) 400 MG/5ML suspension 30 mL Daily PRN   ondansetron (ZOFRAN) injection 4 mg Q6H PRN   enoxaparin (LOVENOX) injection 40 mg Daily   pneumococcal 13-valent conjugate (PREVNAR) injection 0.5 mL Prior to discharge   influenza quadrivalent split vaccine (FLUZONE;FLUARIX;FLULAVAL;AFLURIA) injection 0.5 mL Prior to discharge   fentaNYL (SUBLIMAZE) 1,000 mcg in sodium chloride 0.9 % 100 mL infusion Continuous   levetiracetam (KEPPRA) 500 mg/100 mL IVPB Q12H       Objective:  BP (!) 141/81   Pulse 83   Temp 98 °F (36.7 °C) (Temporal)   Resp 14   Ht 6' 4\" (1.93 m)   Wt 190 lb 7.6 oz (86.4 kg)   SpO2 93%   BMI 23.19 kg/m²     Intake/Output Summary (Last 24 hours) at 09/28/18 1307  Last data filed at 09/28/18 0630   Gross per 24 hour   Intake           1788.1 ml   Output             1750 ml   Net             38.1 ml           General appearance:  Appears comfortable  Eyes: Sclera clear. Pupils equal.  ENT: Moist oral mucosa. Trachea midline, no adenopathy. Cardiovascular: Regular rhythm, normal S1, S2. No murmur. No edema in lower extremities  Respiratory: Not using accessory muscles. Good inspiratory effort.  Clear to auscultation bilaterally, no

## 2018-09-29 LAB
GLUCOSE BLD-MCNC: 211 MG/DL (ref 70–99)
PERFORMED ON: ABNORMAL

## 2018-09-29 PROCEDURE — 99232 SBSQ HOSP IP/OBS MODERATE 35: CPT | Performed by: PSYCHIATRY & NEUROLOGY

## 2018-09-29 PROCEDURE — 6360000002 HC RX W HCPCS: Performed by: HOSPITALIST

## 2018-09-29 PROCEDURE — 6370000000 HC RX 637 (ALT 250 FOR IP): Performed by: INTERNAL MEDICINE

## 2018-09-29 PROCEDURE — 1200000000 HC SEMI PRIVATE

## 2018-09-29 RX ORDER — CARVEDILOL 6.25 MG/1
6.25 TABLET ORAL 2 TIMES DAILY WITH MEALS
Status: DISCONTINUED | OUTPATIENT
Start: 2018-09-29 | End: 2018-09-30 | Stop reason: HOSPADM

## 2018-09-29 RX ORDER — ASPIRIN 81 MG/1
81 TABLET, CHEWABLE ORAL DAILY
Status: DISCONTINUED | OUTPATIENT
Start: 2018-09-29 | End: 2018-09-30 | Stop reason: HOSPADM

## 2018-09-29 RX ORDER — FUROSEMIDE 40 MG/1
40 TABLET ORAL DAILY
Status: DISCONTINUED | OUTPATIENT
Start: 2018-09-29 | End: 2018-09-30 | Stop reason: HOSPADM

## 2018-09-29 RX ORDER — LISINOPRIL 5 MG/1
5 TABLET ORAL DAILY
Status: DISCONTINUED | OUTPATIENT
Start: 2018-09-29 | End: 2018-09-30 | Stop reason: HOSPADM

## 2018-09-29 RX ADMIN — LISINOPRIL 5 MG: 5 TABLET ORAL at 12:59

## 2018-09-29 RX ADMIN — ENOXAPARIN SODIUM 40 MG: 40 INJECTION SUBCUTANEOUS at 07:55

## 2018-09-29 RX ADMIN — LEVETIRACETAM 500 MG: 5 INJECTION INTRAVENOUS at 07:55

## 2018-09-29 RX ADMIN — ASPIRIN 81 MG 81 MG: 81 TABLET ORAL at 12:59

## 2018-09-29 RX ADMIN — FUROSEMIDE 40 MG: 40 TABLET ORAL at 12:59

## 2018-09-29 RX ADMIN — LEVETIRACETAM 500 MG: 5 INJECTION INTRAVENOUS at 20:45

## 2018-09-29 RX ADMIN — CARVEDILOL 6.25 MG: 6.25 TABLET, FILM COATED ORAL at 16:35

## 2018-09-29 ASSESSMENT — PAIN SCALES - GENERAL
PAINLEVEL_OUTOF10: 0

## 2018-09-29 NOTE — PROGRESS NOTES
[facility] PROGRESS NOTE    9/28/2018 1:07 PM        Name: Jewel Blackman . Admitted: 9/25/2018  Primary Care Provider: Cherise Tavera MD (Tel: [phone])          CC:    Subjective:  Pt is feeling better and has no new complaints. Reviewed interval ancillary notes    Current Medications    sodium chloride 0.9 % infusion    potassium chloride 10 mEq/100 mL IVPB (Peripheral Line) PRN   dextrose 10 % injection Continuous   glucose (GLUTOSE) 40 % oral gel 15 g PRN   dextrose 50 % solution 12.5 g PRN   glucagon (rDNA) injection 1 mg PRN   dextrose 5 % solution PRN   propofol 1000 MG/100ML injection Titrated   magnesium hydroxide (MILK OF MAGNESIA) 400 MG/5ML suspension 30 mL Daily PRN   ondansetron (ZOFRAN) injection 4 mg Q6H PRN   enoxaparin (LOVENOX) injection 40 mg Daily   pneumococcal 13-valent conjugate (PREVNAR) injection 0.5 mL Prior to discharge   influenza quadrivalent split vaccine (FLUZONE;FLUARIX;FLULAVAL;AFLURIA) injection 0.5 mL Prior to discharge   fentaNYL (SUBLIMAZE) 1,000 mcg in sodium chloride 0.9 % 100 mL infusion Continuous   levetiracetam (KEPPRA) 500 mg/100 mL IVPB Q12H       Objective:  Vitals: BP (!) 146/96   Pulse 97   Temp 98.1 °F (36.7 °C) (Temporal)   Resp 22   Ht 6' 4\" (1.93 m)   Wt 189 lb 6 oz (85.9 kg)   SpO2 92%   BMI 23.05 kg/m²   General appearance: WD/WN 77y.o. year-old AA male who is alert, appears stated age and is cooperative  HEENT: Head: Normocephalic, no lesions, without obvious abnormality. Eye: Normal external eye, conjunctiva, lids cornea, ZAY. Ears: Normal TM's bilaterally. Normal auditory canals and external ears. Non-tender. Nose: Normal external nose, mucus membranes and septum. Pharynx: Dental Hygiene adequate. Normal buccal mucosa. Normal pharynx.   Neck: no adenopathy, no carotid bruit, no JVD, supple, symmetrical, trachea midline and thyroid not

## 2018-09-29 NOTE — PROGRESS NOTES
Called to speak with  and asked if there was anything nursing needed to do to prepare pt for DC tomorrow concerning home care. Perfect serve sent to hosp about home care order needed.  Waiting for response

## 2018-09-30 VITALS
SYSTOLIC BLOOD PRESSURE: 116 MMHG | BODY MASS INDEX: 23.06 KG/M2 | DIASTOLIC BLOOD PRESSURE: 99 MMHG | RESPIRATION RATE: 18 BRPM | HEART RATE: 89 BPM | TEMPERATURE: 98 F | WEIGHT: 189.38 LBS | OXYGEN SATURATION: 97 % | HEIGHT: 76 IN

## 2018-09-30 PROCEDURE — 6370000000 HC RX 637 (ALT 250 FOR IP): Performed by: HOSPITALIST

## 2018-09-30 PROCEDURE — 6370000000 HC RX 637 (ALT 250 FOR IP): Performed by: INTERNAL MEDICINE

## 2018-09-30 PROCEDURE — 6360000002 HC RX W HCPCS: Performed by: HOSPITALIST

## 2018-09-30 RX ORDER — LACTULOSE 10 G/15ML
60 SOLUTION ORAL ONCE
Status: COMPLETED | OUTPATIENT
Start: 2018-09-30 | End: 2018-09-30

## 2018-09-30 RX ORDER — AMOXICILLIN AND CLAVULANATE POTASSIUM 875; 125 MG/1; MG/1
1 TABLET, FILM COATED ORAL 2 TIMES DAILY
Qty: 14 TABLET | Refills: 0 | Status: SHIPPED | OUTPATIENT
Start: 2018-09-30 | End: 2018-10-07

## 2018-09-30 RX ORDER — LEVETIRACETAM 500 MG/1
500 TABLET ORAL 2 TIMES DAILY
Qty: 60 TABLET | Refills: 1 | Status: SHIPPED | OUTPATIENT
Start: 2018-09-30 | End: 2018-11-09 | Stop reason: SDUPTHER

## 2018-09-30 RX ADMIN — LEVETIRACETAM 500 MG: 5 INJECTION INTRAVENOUS at 10:48

## 2018-09-30 RX ADMIN — ASPIRIN 81 MG 81 MG: 81 TABLET ORAL at 10:45

## 2018-09-30 RX ADMIN — LISINOPRIL 5 MG: 5 TABLET ORAL at 10:46

## 2018-09-30 RX ADMIN — LACTULOSE 60 G: 20 SOLUTION ORAL at 12:08

## 2018-09-30 RX ADMIN — FUROSEMIDE 40 MG: 40 TABLET ORAL at 10:45

## 2018-09-30 RX ADMIN — CARVEDILOL 6.25 MG: 6.25 TABLET, FILM COATED ORAL at 10:45

## 2018-09-30 RX ADMIN — MAGNESIUM HYDROXIDE 30 ML: 400 SUSPENSION ORAL at 02:07

## 2018-09-30 ASSESSMENT — PAIN SCALES - GENERAL
PAINLEVEL_OUTOF10: 0
PAINLEVEL_OUTOF10: 0

## 2018-09-30 NOTE — PROGRESS NOTES
Pt had large BM. Daughter and wife called. They are on the way to pick pt up for DC. Will talk about instructions with them at bedside.

## 2018-10-01 ENCOUNTER — TELEPHONE (OUTPATIENT)
Dept: FAMILY MEDICINE CLINIC | Age: 78
End: 2018-10-01

## 2018-10-01 NOTE — TELEPHONE ENCOUNTER
Will Dr. Sophia Surseh sign orders for [facility]? If so, they will need the patient to see Dr. Sophia Suresh for a face to face visit and will speak to him about scheduling that.

## 2018-10-01 NOTE — TELEPHONE ENCOUNTER
Left message on [address] wife not on HIPPA got verbal consent to speak to wife.  Wife informed appt made

## 2018-10-02 ENCOUNTER — TELEPHONE (OUTPATIENT)
Dept: FAMILY MEDICINE CLINIC | Age: 78
End: 2018-10-02

## 2018-10-02 LAB
EKG ATRIAL RATE: 89 BPM
EKG DIAGNOSIS: NORMAL
EKG P AXIS: 71 DEGREES
EKG P-R INTERVAL: 138 MS
EKG Q-T INTERVAL: 402 MS
EKG QRS DURATION: 100 MS
EKG QTC CALCULATION (BAZETT): 489 MS
EKG R AXIS: -34 DEGREES
EKG T AXIS: 125 DEGREES
EKG VENTRICULAR RATE: 89 BPM

## 2018-10-02 NOTE — TELEPHONE ENCOUNTER
Mala Lopez from [facility] states pt has severe cognitive issues and is continuing to drive. Mala Lopez recommends Dr. Hong Oliver submitting orders to have pt participate in a driving eval and OV may be needed to discuss with pt.

## 2018-10-04 NOTE — TELEPHONE ENCOUNTER
It is important for this patient to have a driving evaluation, as he has a history of acute encephalopathy in addition to atrial fibrillation and other heart problems. On top of this, he also has poor insight into his condition. Based on what his family has told us, it seems that he can be dangerous if he continues to drive.

## 2018-10-05 ENCOUNTER — TELEPHONE (OUTPATIENT)
Dept: FAMILY MEDICINE CLINIC | Age: 78
End: 2018-10-05

## 2018-10-08 ENCOUNTER — TELEPHONE (OUTPATIENT)
Dept: FAMILY MEDICINE CLINIC | Age: 78
End: 2018-10-08

## 2018-10-08 PROBLEM — F03.90 SENILE DEMENTIA WITHOUT BEHAVIORAL DISTURBANCE (HCC): Status: ACTIVE | Noted: 2018-10-08

## 2018-10-08 NOTE — TELEPHONE ENCOUNTER
Per Makenna order for driving eval not in epic just needs to be written on paper script and sent to [facility].  However I know Tierney Grossman does these evals as well

## 2018-11-09 ENCOUNTER — OFFICE VISIT (OUTPATIENT)
Dept: FAMILY MEDICINE CLINIC | Age: 78
End: 2018-11-09
Payer: MEDICARE

## 2018-11-09 VITALS
OXYGEN SATURATION: 92 % | DIASTOLIC BLOOD PRESSURE: 78 MMHG | BODY MASS INDEX: 23.25 KG/M2 | HEART RATE: 83 BPM | WEIGHT: 191 LBS | SYSTOLIC BLOOD PRESSURE: 140 MMHG

## 2018-11-09 DIAGNOSIS — R56.9 SEIZURE (HCC): Primary | ICD-10-CM

## 2018-11-09 DIAGNOSIS — I50.9 CONGESTIVE HEART FAILURE, UNSPECIFIED HF CHRONICITY, UNSPECIFIED HEART FAILURE TYPE (HCC): ICD-10-CM

## 2018-11-09 PROCEDURE — 1101F PT FALLS ASSESS-DOCD LE1/YR: CPT | Performed by: FAMILY MEDICINE

## 2018-11-09 PROCEDURE — G8427 DOCREV CUR MEDS BY ELIG CLIN: HCPCS | Performed by: FAMILY MEDICINE

## 2018-11-09 PROCEDURE — 1036F TOBACCO NON-USER: CPT | Performed by: FAMILY MEDICINE

## 2018-11-09 PROCEDURE — 1123F ACP DISCUSS/DSCN MKR DOCD: CPT | Performed by: FAMILY MEDICINE

## 2018-11-09 PROCEDURE — 99214 OFFICE O/P EST MOD 30 MIN: CPT | Performed by: FAMILY MEDICINE

## 2018-11-09 PROCEDURE — G8484 FLU IMMUNIZE NO ADMIN: HCPCS | Performed by: FAMILY MEDICINE

## 2018-11-09 PROCEDURE — G8420 CALC BMI NORM PARAMETERS: HCPCS | Performed by: FAMILY MEDICINE

## 2018-11-09 PROCEDURE — 4040F PNEUMOC VAC/ADMIN/RCVD: CPT | Performed by: FAMILY MEDICINE

## 2018-11-09 RX ORDER — LEVETIRACETAM 500 MG/1
500 TABLET ORAL 2 TIMES DAILY
Qty: 60 TABLET | Refills: 5 | Status: SHIPPED | OUTPATIENT
Start: 2018-11-09 | End: 2018-12-21 | Stop reason: SDUPTHER

## 2018-11-09 NOTE — PROGRESS NOTES
[address] Note    Date: 11/9/2018                                               Subjective/Objective:     Chief Complaint   Patient presents with    Medication Check     He needs his seziure medication. Kylie ZAPATA   Patient is here for med check. He was seen in the hospital about 6 weeks ago, admitted on 9/25/18 and discharged on 9/30/18. He initially presented to the ED via squad from home following a seizure. It was a tonic-clonic seizure. He was given 5 mg of Versed by the paramedics, and was postictal upon arriving in the ED. His EKG showed him to be in normal sinus rhythm with T-wave inversions in leads V5 and V6. This was unchanged from his previous EKG. He had an MRI of his brain that showed no evidence of acute intracranial abnormality. Patient was loaded with Keppra. He was seen by neurology, who performed an EEG, and continued Keppra. The EEG showed moderate diffuse encephalopathy. His home medications were resumed for CHF. After patient was extubated and taking by mouth, he was discharged home with driving restrictions. Since leaving the hospital, pt feels \"great\". Hasn't been to see neurology yet. Taking Keppra. Is also taking Lisinopril and Coreg and Lasix. No SOB. Hasn't seen cardiology. Pt declines all vaccines.          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

## 2018-12-21 DIAGNOSIS — R56.9 SEIZURE (HCC): ICD-10-CM

## 2018-12-21 DIAGNOSIS — I10 ESSENTIAL HYPERTENSION: ICD-10-CM

## 2018-12-21 RX ORDER — LEVETIRACETAM 500 MG/1
500 TABLET ORAL 2 TIMES DAILY
Qty: 180 TABLET | Refills: 3 | Status: SHIPPED | OUTPATIENT
Start: 2018-12-21 | End: 2019-10-09 | Stop reason: SDUPTHER

## 2018-12-21 RX ORDER — LISINOPRIL 5 MG/1
5 TABLET ORAL DAILY
Qty: 90 TABLET | Refills: 3 | Status: SHIPPED | OUTPATIENT
Start: 2018-12-21 | End: 2019-10-09 | Stop reason: SDUPTHER

## 2018-12-21 RX ORDER — FUROSEMIDE 40 MG/1
40 TABLET ORAL DAILY
Qty: 90 TABLET | Refills: 3 | Status: SHIPPED | OUTPATIENT
Start: 2018-12-21 | End: 2019-10-09 | Stop reason: SDUPTHER

## 2019-01-24 ENCOUNTER — CARE COORDINATION (OUTPATIENT)
Dept: FAMILY MEDICINE CLINIC | Age: 79
End: 2019-01-24

## 2019-02-07 ENCOUNTER — CARE COORDINATION (OUTPATIENT)
Dept: CARE COORDINATION | Age: 79
End: 2019-02-07

## 2019-02-17 ENCOUNTER — HOSPITAL ENCOUNTER (INPATIENT)
Age: 79
LOS: 1 days | Discharge: HOME | DRG: 101 | End: 2019-02-19
Attending: EMERGENCY MEDICINE | Admitting: INTERNAL MEDICINE
Payer: MEDICARE

## 2019-02-17 ENCOUNTER — APPOINTMENT (OUTPATIENT)
Dept: GENERAL RADIOLOGY | Age: 79
DRG: 101 | End: 2019-02-17
Payer: MEDICARE

## 2019-02-17 ENCOUNTER — APPOINTMENT (OUTPATIENT)
Dept: CT IMAGING | Age: 79
DRG: 101 | End: 2019-02-17
Payer: MEDICARE

## 2019-02-17 DIAGNOSIS — R77.8 ELEVATED TROPONIN: ICD-10-CM
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
## 2019-02-17 DIAGNOSIS — S09.90XA CLOSED HEAD INJURY, INITIAL ENCOUNTER: ICD-10-CM

## 2019-02-17 PROBLEM — R55 SYNCOPE AND COLLAPSE: Status: ACTIVE | Noted: 2019-02-17

## 2019-02-17 PROBLEM — R56.9 SEIZURES (HCC): Status: ACTIVE | Noted: 2019-02-17

## 2019-02-17 LAB
A/G RATIO: 1.3 (ref 1.1–2.2)
ALBUMIN SERPL-MCNC: 3.9 G/DL (ref 3.4–5)
ALP BLD-CCNC: 71 U/L (ref 40–129)
ALT SERPL-CCNC: 8 U/L (ref 10–40)
ANION GAP SERPL CALCULATED.3IONS-SCNC: 11 MMOL/L (ref 3–16)
AST SERPL-CCNC: 18 U/L (ref 15–37)
BASOPHILS ABSOLUTE: 0 K/UL (ref 0–0.2)
BASOPHILS RELATIVE PERCENT: 0.4 %
BILIRUB SERPL-MCNC: 0.3 MG/DL (ref 0–1)
BILIRUBIN URINE: NEGATIVE
BLOOD, URINE: ABNORMAL
BUN BLDV-MCNC: 15 MG/DL (ref 7–20)
CALCIUM SERPL-MCNC: 8.9 MG/DL (ref 8.3–10.6)
CHLORIDE BLD-SCNC: 103 MMOL/L (ref 99–110)
CLARITY: ABNORMAL
CO2: 26 MMOL/L (ref 21–32)
COLOR: YELLOW
CREAT SERPL-MCNC: 1 MG/DL (ref 0.8–1.3)
EOSINOPHILS ABSOLUTE: 0 K/UL (ref 0–0.6)
EOSINOPHILS RELATIVE PERCENT: 0 %
EPITHELIAL CELLS, UA: 0 /HPF (ref 0–5)
GFR AFRICAN AMERICAN: >60
GFR NON-AFRICAN AMERICAN: >60
GLOBULIN: 3.1 G/DL
GLUCOSE BLD-MCNC: 123 MG/DL (ref 70–99)
GLUCOSE URINE: NEGATIVE MG/DL
HCT VFR BLD CALC: 40.4 % (ref 40.5–52.5)
HEMOGLOBIN: 12.9 G/DL (ref 13.5–17.5)
HYALINE CASTS: 0 /LPF (ref 0–8)
INR BLD: 1.06 (ref 0.86–1.14)
KETONES, URINE: NEGATIVE MG/DL
LEUKOCYTE ESTERASE, URINE: NEGATIVE
LIPASE: 41 U/L (ref 13–60)
LYMPHOCYTES ABSOLUTE: 0.9 K/UL (ref 1–5.1)
LYMPHOCYTES RELATIVE PERCENT: 12.6 %
MCH RBC QN AUTO: 27.9 PG (ref 26–34)
MCHC RBC AUTO-ENTMCNC: 31.9 G/DL (ref 31–36)
MCV RBC AUTO: 87.5 FL (ref 80–100)
MICROSCOPIC EXAMINATION: YES
MONOCYTES ABSOLUTE: 0.7 K/UL (ref 0–1.3)
MONOCYTES RELATIVE PERCENT: 9.7 %
NEUTROPHILS ABSOLUTE: 5.6 K/UL (ref 1.7–7.7)
NEUTROPHILS RELATIVE PERCENT: 77.3 %
NITRITE, URINE: NEGATIVE
PDW BLD-RTO: 14.3 % (ref 12.4–15.4)
PH UA: 7
PLATELET # BLD: 159 K/UL (ref 135–450)
PMV BLD AUTO: 10 FL (ref 5–10.5)
POTASSIUM REFLEX MAGNESIUM: 4.4 MMOL/L (ref 3.5–5.1)
PRO-BNP: ABNORMAL PG/ML (ref 0–449)
PROTEIN UA: NEGATIVE MG/DL
PROTHROMBIN TIME: 12.1 SEC (ref 9.8–13)
RBC # BLD: 4.62 M/UL (ref 4.2–5.9)
RBC UA: 5 /HPF (ref 0–4)
SODIUM BLD-SCNC: 140 MMOL/L (ref 136–145)
SPECIFIC GRAVITY UA: 1.01
TOTAL PROTEIN: 7 G/DL (ref 6.4–8.2)
TROPONIN: 0.02 NG/ML
URINE TYPE: ABNORMAL
UROBILINOGEN, URINE: 0.2 E.U./DL
WBC # BLD: 7.2 K/UL (ref 4–11)
WBC UA: 1 /HPF (ref 0–5)

## 2019-02-17 PROCEDURE — 99285 EMERGENCY DEPT VISIT HI MDM: CPT

## 2019-02-17 PROCEDURE — 6360000002 HC RX W HCPCS: Performed by: EMERGENCY MEDICINE

## 2019-02-17 PROCEDURE — 85025 COMPLETE CBC W/AUTO DIFF WBC: CPT

## 2019-02-17 PROCEDURE — 83880 ASSAY OF NATRIURETIC PEPTIDE: CPT

## 2019-02-17 PROCEDURE — 96374 THER/PROPH/DIAG INJ IV PUSH: CPT

## 2019-02-17 PROCEDURE — 85610 PROTHROMBIN TIME: CPT

## 2019-02-17 PROCEDURE — 80053 COMPREHEN METABOLIC PANEL: CPT

## 2019-02-17 PROCEDURE — 71045 X-RAY EXAM CHEST 1 VIEW: CPT

## 2019-02-17 PROCEDURE — 70450 CT HEAD/BRAIN W/O DYE: CPT

## 2019-02-17 PROCEDURE — 81001 URINALYSIS AUTO W/SCOPE: CPT

## 2019-02-17 PROCEDURE — 83690 ASSAY OF LIPASE: CPT

## 2019-02-17 PROCEDURE — 84484 ASSAY OF TROPONIN QUANT: CPT

## 2019-02-17 PROCEDURE — 70486 CT MAXILLOFACIAL W/O DYE: CPT

## 2019-02-17 PROCEDURE — 72125 CT NECK SPINE W/O DYE: CPT

## 2019-02-17 PROCEDURE — 80177 DRUG SCRN QUAN LEVETIRACETAM: CPT

## 2019-02-17 PROCEDURE — 93005 ELECTROCARDIOGRAM TRACING: CPT | Performed by: EMERGENCY MEDICINE

## 2019-02-17 RX ORDER — LORAZEPAM 2 MG/ML
0.5 INJECTION INTRAMUSCULAR ONCE
Status: COMPLETED | OUTPATIENT
Start: 2019-02-17 | End: 2019-02-17

## 2019-02-17 RX ORDER — ONDANSETRON 2 MG/ML
4 INJECTION INTRAMUSCULAR; INTRAVENOUS EVERY 6 HOURS PRN
Status: DISCONTINUED | OUTPATIENT
Start: 2019-02-17 | End: 2019-02-17

## 2019-02-17 RX ORDER — POTASSIUM CHLORIDE 20 MEQ/1
40 TABLET, EXTENDED RELEASE ORAL PRN
Status: DISCONTINUED | OUTPATIENT
Start: 2019-02-17 | End: 2019-02-17

## 2019-02-17 RX ORDER — POTASSIUM CHLORIDE 20 MEQ/1
40 TABLET, EXTENDED RELEASE ORAL PRN
Status: DISCONTINUED | OUTPATIENT
Start: 2019-02-17 | End: 2019-02-19 | Stop reason: HOSPADM

## 2019-02-17 RX ORDER — POTASSIUM CHLORIDE 7.45 MG/ML
10 INJECTION INTRAVENOUS PRN
Status: DISCONTINUED | OUTPATIENT
Start: 2019-02-17 | End: 2019-02-17

## 2019-02-17 RX ORDER — SODIUM CHLORIDE 9 MG/ML
INJECTION, SOLUTION INTRAVENOUS CONTINUOUS
Status: DISCONTINUED | OUTPATIENT
Start: 2019-02-17 | End: 2019-02-17

## 2019-02-17 RX ORDER — LEVETIRACETAM 500 MG/5ML
500 INJECTION, SOLUTION, CONCENTRATE INTRAVENOUS ONCE
Status: DISCONTINUED | OUTPATIENT
Start: 2019-02-17 | End: 2019-02-17

## 2019-02-17 RX ORDER — SODIUM CHLORIDE 0.9 % (FLUSH) 0.9 %
10 SYRINGE (ML) INJECTION EVERY 12 HOURS SCHEDULED
Status: DISCONTINUED | OUTPATIENT
Start: 2019-02-17 | End: 2019-02-17

## 2019-02-17 RX ORDER — FAMOTIDINE 20 MG/1
20 TABLET, FILM COATED ORAL 2 TIMES DAILY PRN
Status: DISCONTINUED | OUTPATIENT
Start: 2019-02-17 | End: 2019-02-17

## 2019-02-17 RX ORDER — ONDANSETRON 2 MG/ML
4 INJECTION INTRAMUSCULAR; INTRAVENOUS EVERY 6 HOURS PRN
Status: DISCONTINUED | OUTPATIENT
Start: 2019-02-17 | End: 2019-02-19 | Stop reason: HOSPADM

## 2019-02-17 RX ORDER — POTASSIUM CHLORIDE 7.45 MG/ML
10 INJECTION INTRAVENOUS PRN
Status: DISCONTINUED | OUTPATIENT
Start: 2019-02-17 | End: 2019-02-19 | Stop reason: HOSPADM

## 2019-02-17 RX ORDER — LORAZEPAM 2 MG/ML
1 INJECTION INTRAMUSCULAR
Status: DISCONTINUED | OUTPATIENT
Start: 2019-02-17 | End: 2019-02-17

## 2019-02-17 RX ORDER — LORAZEPAM 2 MG/ML
1 INJECTION INTRAMUSCULAR
Status: DISCONTINUED | OUTPATIENT
Start: 2019-02-18 | End: 2019-02-18

## 2019-02-17 RX ORDER — LORAZEPAM 2 MG/ML
INJECTION INTRAMUSCULAR
Status: DISPENSED
Start: 2019-02-17 | End: 2019-02-18

## 2019-02-17 RX ORDER — POTASSIUM CHLORIDE 20MEQ/15ML
40 LIQUID (ML) ORAL PRN
Status: DISCONTINUED | OUTPATIENT
Start: 2019-02-17 | End: 2019-02-17

## 2019-02-17 RX ORDER — SODIUM CHLORIDE 9 MG/ML
INJECTION, SOLUTION INTRAVENOUS CONTINUOUS
Status: DISCONTINUED | OUTPATIENT
Start: 2019-02-17 | End: 2019-02-19 | Stop reason: HOSPADM

## 2019-02-17 RX ORDER — SODIUM CHLORIDE 0.9 % (FLUSH) 0.9 %
10 SYRINGE (ML) INJECTION PRN
Status: DISCONTINUED | OUTPATIENT
Start: 2019-02-17 | End: 2019-02-17

## 2019-02-17 RX ORDER — SODIUM CHLORIDE 0.9 % (FLUSH) 0.9 %
10 SYRINGE (ML) INJECTION PRN
Status: DISCONTINUED | OUTPATIENT
Start: 2019-02-17 | End: 2019-02-19 | Stop reason: HOSPADM

## 2019-02-17 RX ORDER — SODIUM CHLORIDE 0.9 % (FLUSH) 0.9 %
10 SYRINGE (ML) INJECTION EVERY 12 HOURS SCHEDULED
Status: DISCONTINUED | OUTPATIENT
Start: 2019-02-17 | End: 2019-02-19 | Stop reason: HOSPADM

## 2019-02-17 RX ADMIN — LORAZEPAM 0.5 MG: 2 INJECTION, SOLUTION INTRAMUSCULAR; INTRAVENOUS at 20:35

## 2019-02-18 PROBLEM — D64.9 ANEMIA: Status: ACTIVE | Noted: 2019-02-18

## 2019-02-18 LAB
BASOPHILS ABSOLUTE: 0 K/UL (ref 0–0.2)
BASOPHILS RELATIVE PERCENT: 0.4 %
EKG ATRIAL RATE: 119 BPM
EKG DIAGNOSIS: NORMAL
EKG P AXIS: 72 DEGREES
EKG P-R INTERVAL: 144 MS
EKG Q-T INTERVAL: 352 MS
EKG QRS DURATION: 96 MS
EKG QTC CALCULATION (BAZETT): 495 MS
EKG R AXIS: -8 DEGREES
EKG T AXIS: 87 DEGREES
EKG VENTRICULAR RATE: 119 BPM
EOSINOPHILS ABSOLUTE: 0 K/UL (ref 0–0.6)
EOSINOPHILS RELATIVE PERCENT: 0.1 %
HCT VFR BLD CALC: 41.5 % (ref 40.5–52.5)
HEMOGLOBIN: 13.1 G/DL (ref 13.5–17.5)
KEPPRA DOSE AMT: ABNORMAL
KEPPRA: <2 UG/ML (ref 6–46)
LYMPHOCYTES ABSOLUTE: 1.3 K/UL (ref 1–5.1)
LYMPHOCYTES RELATIVE PERCENT: 15.4 %
MCH RBC QN AUTO: 27.5 PG (ref 26–34)
MCHC RBC AUTO-ENTMCNC: 31.5 G/DL (ref 31–36)
MCV RBC AUTO: 87.5 FL (ref 80–100)
MONOCYTES ABSOLUTE: 0.7 K/UL (ref 0–1.3)
MONOCYTES RELATIVE PERCENT: 8.4 %
NEUTROPHILS ABSOLUTE: 6.2 K/UL (ref 1.7–7.7)
NEUTROPHILS RELATIVE PERCENT: 75.7 %
PDW BLD-RTO: 14.2 % (ref 12.4–15.4)
PLATELET # BLD: 165 K/UL (ref 135–450)
PMV BLD AUTO: 9.8 FL (ref 5–10.5)
RBC # BLD: 4.74 M/UL (ref 4.2–5.9)
WBC # BLD: 8.2 K/UL (ref 4–11)

## 2019-02-18 PROCEDURE — 2580000003 HC RX 258: Performed by: INTERNAL MEDICINE

## 2019-02-18 PROCEDURE — 6370000000 HC RX 637 (ALT 250 FOR IP): Performed by: INTERNAL MEDICINE

## 2019-02-18 PROCEDURE — 96372 THER/PROPH/DIAG INJ SC/IM: CPT

## 2019-02-18 PROCEDURE — 97161 PT EVAL LOW COMPLEX 20 MIN: CPT

## 2019-02-18 PROCEDURE — 97530 THERAPEUTIC ACTIVITIES: CPT

## 2019-02-18 PROCEDURE — 93010 ELECTROCARDIOGRAM REPORT: CPT | Performed by: INTERNAL MEDICINE

## 2019-02-18 PROCEDURE — 97165 OT EVAL LOW COMPLEX 30 MIN: CPT

## 2019-02-18 PROCEDURE — 96361 HYDRATE IV INFUSION ADD-ON: CPT

## 2019-02-18 PROCEDURE — 1200000000 HC SEMI PRIVATE

## 2019-02-18 PROCEDURE — 99222 1ST HOSP IP/OBS MODERATE 55: CPT | Performed by: PSYCHIATRY & NEUROLOGY

## 2019-02-18 PROCEDURE — 6360000002 HC RX W HCPCS: Performed by: INTERNAL MEDICINE

## 2019-02-18 PROCEDURE — 85025 COMPLETE CBC W/AUTO DIFF WBC: CPT

## 2019-02-18 RX ORDER — LORAZEPAM 2 MG/ML
1 INJECTION INTRAMUSCULAR
Status: DISCONTINUED | OUTPATIENT
Start: 2019-02-18 | End: 2019-02-19 | Stop reason: HOSPADM

## 2019-02-18 RX ORDER — LEVETIRACETAM 500 MG/1
500 TABLET ORAL 2 TIMES DAILY
Status: DISCONTINUED | OUTPATIENT
Start: 2019-02-18 | End: 2019-02-19

## 2019-02-18 RX ADMIN — SODIUM CHLORIDE 75 ML/HR: 9 INJECTION, SOLUTION INTRAVENOUS at 16:36

## 2019-02-18 RX ADMIN — SODIUM CHLORIDE: 9 INJECTION, SOLUTION INTRAVENOUS at 02:00

## 2019-02-18 RX ADMIN — LEVETIRACETAM 500 MG: 500 TABLET, FILM COATED ORAL at 21:38

## 2019-02-18 RX ADMIN — Medication 10 ML: at 21:38

## 2019-02-18 RX ADMIN — LEVETIRACETAM 500 MG: 500 TABLET, FILM COATED ORAL at 14:46

## 2019-02-18 RX ADMIN — ENOXAPARIN SODIUM 40 MG: 40 INJECTION SUBCUTANEOUS at 08:59

## 2019-02-18 RX ADMIN — Medication 10 ML: at 02:01

## 2019-02-18 ASSESSMENT — PAIN SCALES - GENERAL
PAINLEVEL_OUTOF10: 0
PAINLEVEL_OUTOF10: 0

## 2019-02-19 ENCOUNTER — HOSPITAL ENCOUNTER (EMERGENCY)
Age: 79
Discharge: LEFT BEFORE DISCHARGE | End: 2019-02-19
Payer: MEDICARE

## 2019-02-19 VITALS
OXYGEN SATURATION: 96 % | BODY MASS INDEX: 23.73 KG/M2 | TEMPERATURE: 97.7 F | HEART RATE: 78 BPM | WEIGHT: 194.89 LBS | RESPIRATION RATE: 18 BRPM | SYSTOLIC BLOOD PRESSURE: 154 MMHG | DIASTOLIC BLOOD PRESSURE: 92 MMHG | HEIGHT: 76 IN

## 2019-02-19 LAB
EKG ATRIAL RATE: 69 BPM
EKG DIAGNOSIS: NORMAL
EKG P AXIS: 72 DEGREES
EKG P-R INTERVAL: 138 MS
EKG Q-T INTERVAL: 512 MS
EKG QRS DURATION: 104 MS
EKG QTC CALCULATION (BAZETT): 548 MS
EKG R AXIS: -35 DEGREES
EKG T AXIS: -71 DEGREES
EKG VENTRICULAR RATE: 69 BPM
LEFT VENTRICULAR EJECTION FRACTION HIGH VALUE: 30 %
LEFT VENTRICULAR EJECTION FRACTION MODE: NORMAL
LV EF: 30 %
LVEF MODALITY: NORMAL

## 2019-02-19 PROCEDURE — 6370000000 HC RX 637 (ALT 250 FOR IP): Performed by: INTERNAL MEDICINE

## 2019-02-19 PROCEDURE — 2580000003 HC RX 258: Performed by: INTERNAL MEDICINE

## 2019-02-19 PROCEDURE — 93306 TTE W/DOPPLER COMPLETE: CPT

## 2019-02-19 PROCEDURE — 93005 ELECTROCARDIOGRAM TRACING: CPT | Performed by: INTERNAL MEDICINE

## 2019-02-19 PROCEDURE — 99232 SBSQ HOSP IP/OBS MODERATE 35: CPT | Performed by: PSYCHIATRY & NEUROLOGY

## 2019-02-19 PROCEDURE — 93010 ELECTROCARDIOGRAM REPORT: CPT | Performed by: INTERNAL MEDICINE

## 2019-02-19 PROCEDURE — 4500000002 HC ER NO CHARGE

## 2019-02-19 RX ORDER — LEVETIRACETAM 100 MG/ML
500 SOLUTION ORAL 2 TIMES DAILY
Qty: 1 BOTTLE | Refills: 3 | Status: SHIPPED | OUTPATIENT
Start: 2019-02-19 | End: 2019-10-09

## 2019-02-19 RX ORDER — LEVETIRACETAM 100 MG/ML
500 SOLUTION ORAL 2 TIMES DAILY
Status: DISCONTINUED | OUTPATIENT
Start: 2019-02-19 | End: 2019-02-19 | Stop reason: HOSPADM

## 2019-02-19 RX ADMIN — LEVETIRACETAM 500 MG: 500 TABLET, FILM COATED ORAL at 09:05

## 2019-02-19 RX ADMIN — SODIUM CHLORIDE: 9 INJECTION, SOLUTION INTRAVENOUS at 06:56

## 2019-02-19 ASSESSMENT — PAIN SCALES - GENERAL
PAINLEVEL_OUTOF10: 0

## 2019-02-20 ENCOUNTER — CARE COORDINATION (OUTPATIENT)
Dept: CASE MANAGEMENT | Age: 79
End: 2019-02-20

## 2019-02-20 ENCOUNTER — CARE COORDINATION (OUTPATIENT)
Dept: CARE COORDINATION | Age: 79
End: 2019-02-20

## 2019-02-22 ENCOUNTER — CARE COORDINATION (OUTPATIENT)
Dept: CARE COORDINATION | Age: 79
End: 2019-02-22

## 2019-06-25 ENCOUNTER — HOSPITAL ENCOUNTER (EMERGENCY)
Age: 79
Discharge: HOME | End: 2019-06-25
Attending: EMERGENCY MEDICINE
Payer: MEDICARE

## 2019-06-25 VITALS
HEIGHT: 76 IN | RESPIRATION RATE: 16 BRPM | BODY MASS INDEX: 23.14 KG/M2 | OXYGEN SATURATION: 100 % | HEART RATE: 80 BPM | TEMPERATURE: 99.1 F | SYSTOLIC BLOOD PRESSURE: 150 MMHG | WEIGHT: 190 LBS | DIASTOLIC BLOOD PRESSURE: 90 MMHG

## 2019-06-25 DIAGNOSIS — R56.9 SEIZURE (HCC): Primary | ICD-10-CM

## 2019-06-25 DIAGNOSIS — G40.919 BREAKTHROUGH SEIZURE (HCC): ICD-10-CM

## 2019-06-25 DIAGNOSIS — Z91.199 MEDICALLY NONCOMPLIANT: ICD-10-CM

## 2019-06-25 LAB
A/G RATIO: 1.6 (ref 1.1–2.2)
ALBUMIN SERPL-MCNC: 3.9 G/DL (ref 3.4–5)
ALP BLD-CCNC: 68 U/L (ref 40–129)
ALT SERPL-CCNC: 19 U/L (ref 10–40)
ANION GAP SERPL CALCULATED.3IONS-SCNC: 11 MMOL/L (ref 3–16)
AST SERPL-CCNC: 28 U/L (ref 15–37)
BASOPHILS ABSOLUTE: 0 K/UL (ref 0–0.2)
BASOPHILS RELATIVE PERCENT: 0.1 %
BILIRUB SERPL-MCNC: <0.2 MG/DL (ref 0–1)
BILIRUBIN URINE: NEGATIVE
BLOOD, URINE: NEGATIVE
BUN BLDV-MCNC: 15 MG/DL (ref 7–20)
CALCIUM SERPL-MCNC: 8.5 MG/DL (ref 8.3–10.6)
CHLORIDE BLD-SCNC: 105 MMOL/L (ref 99–110)
CLARITY: CLEAR
CO2: 24 MMOL/L (ref 21–32)
COLOR: YELLOW
CREAT SERPL-MCNC: 1.1 MG/DL (ref 0.8–1.3)
EOSINOPHILS ABSOLUTE: 0 K/UL (ref 0–0.6)
EOSINOPHILS RELATIVE PERCENT: 0 %
EPITHELIAL CELLS, UA: 1 /HPF (ref 0–5)
GFR AFRICAN AMERICAN: >60
GFR NON-AFRICAN AMERICAN: >60
GLOBULIN: 2.5 G/DL
GLUCOSE BLD-MCNC: 106 MG/DL (ref 70–99)
GLUCOSE URINE: NEGATIVE MG/DL
HCT VFR BLD CALC: 40.7 % (ref 40.5–52.5)
HEMOGLOBIN: 12.8 G/DL (ref 13.5–17.5)
HYALINE CASTS: 2 /LPF (ref 0–8)
KETONES, URINE: NEGATIVE MG/DL
LEUKOCYTE ESTERASE, URINE: NEGATIVE
LYMPHOCYTES ABSOLUTE: 1.2 K/UL (ref 1–5.1)
LYMPHOCYTES RELATIVE PERCENT: 17.8 %
MCH RBC QN AUTO: 27.7 PG (ref 26–34)
MCHC RBC AUTO-ENTMCNC: 31.4 G/DL (ref 31–36)
MCV RBC AUTO: 88.1 FL (ref 80–100)
MICROSCOPIC EXAMINATION: YES
MONOCYTES ABSOLUTE: 0.9 K/UL (ref 0–1.3)
MONOCYTES RELATIVE PERCENT: 14.3 %
NEUTROPHILS ABSOLUTE: 4.5 K/UL (ref 1.7–7.7)
NEUTROPHILS RELATIVE PERCENT: 67.8 %
NITRITE, URINE: NEGATIVE
PDW BLD-RTO: 15.4 % (ref 12.4–15.4)
PH UA: 7.5 (ref 5–8)
PLATELET # BLD: 135 K/UL (ref 135–450)
PMV BLD AUTO: 9.7 FL (ref 5–10.5)
POTASSIUM REFLEX MAGNESIUM: 4.1 MMOL/L (ref 3.5–5.1)
PROTEIN UA: 30 MG/DL
RBC # BLD: 4.62 M/UL (ref 4.2–5.9)
RBC UA: 4 /HPF (ref 0–4)
SODIUM BLD-SCNC: 140 MMOL/L (ref 136–145)
SPECIFIC GRAVITY UA: 1.01 (ref 1–1.03)
TOTAL PROTEIN: 6.4 G/DL (ref 6.4–8.2)
URINE REFLEX TO CULTURE: ABNORMAL
URINE TYPE: ABNORMAL
UROBILINOGEN, URINE: 0.2 E.U./DL
WBC # BLD: 6.6 K/UL (ref 4–11)
WBC UA: 2 /HPF (ref 0–5)

## 2019-06-25 PROCEDURE — 99284 EMERGENCY DEPT VISIT MOD MDM: CPT

## 2019-06-25 PROCEDURE — 85025 COMPLETE CBC W/AUTO DIFF WBC: CPT

## 2019-06-25 PROCEDURE — 96360 HYDRATION IV INFUSION INIT: CPT

## 2019-06-25 PROCEDURE — 6360000002 HC RX W HCPCS

## 2019-06-25 PROCEDURE — 96361 HYDRATE IV INFUSION ADD-ON: CPT

## 2019-06-25 PROCEDURE — 81001 URINALYSIS AUTO W/SCOPE: CPT

## 2019-06-25 PROCEDURE — 2580000003 HC RX 258: Performed by: PHYSICIAN ASSISTANT

## 2019-06-25 PROCEDURE — 80053 COMPREHEN METABOLIC PANEL: CPT

## 2019-06-25 RX ORDER — LEVETIRACETAM 10 MG/ML
INJECTION INTRAVASCULAR
Status: COMPLETED
Start: 2019-06-25 | End: 2019-06-25

## 2019-06-25 RX ORDER — 0.9 % SODIUM CHLORIDE 0.9 %
1000 INTRAVENOUS SOLUTION INTRAVENOUS ONCE
Status: COMPLETED | OUTPATIENT
Start: 2019-06-25 | End: 2019-06-25

## 2019-06-25 RX ORDER — LEVETIRACETAM 10 MG/ML
1000 INJECTION INTRAVASCULAR ONCE
Status: COMPLETED | OUTPATIENT
Start: 2019-06-25 | End: 2019-06-25

## 2019-06-25 RX ADMIN — LEVETIRACETAM 1000 MG: 10 INJECTION INTRAVASCULAR at 20:03

## 2019-06-25 RX ADMIN — SODIUM CHLORIDE 1000 ML: 9 INJECTION, SOLUTION INTRAVENOUS at 20:02

## 2019-06-25 RX ADMIN — LEVETIRACETAM 1000 MG: 10 INJECTION INTRAVENOUS at 20:03

## 2019-06-25 ASSESSMENT — ENCOUNTER SYMPTOMS
VOMITING: 0
BACK PAIN: 0
DIARRHEA: 0
SHORTNESS OF BREATH: 0
CONSTIPATION: 0
ABDOMINAL PAIN: 0
NAUSEA: 0

## 2019-06-25 NOTE — ED NOTES
Bed: 07  Expected date:   Expected time:   Means of arrival: [facility]  Comments:  DIRTY;  [facility]  06/25/19 9882

## 2019-06-26 NOTE — ED PROVIDER NOTES
I independently performed a history and physical on David Waters. All diagnostic, treatment, and disposition decisions were made by myself in conjunction with the advanced practice provider. Briefly, this is a [de-identified] y.o. male here for apparently a breakthrough seizure today. Patient is not the most compliant person when it comes to seizure but he has all medications at home. Denies any injury or trauma. Family is wondering about a UTI. He relates only mild tongue irritation at this time and no other pain or discomfort. See AJITH note      On exam:  Constitutional:  Well developed, well nourished, non-toxic appearance   Eyes:  PERRL, conjunctiva normal   HENT:  Atraumatic, external ears normal, nosenormal, oropharynx moist, no pharyngeal exudates. Abrasion to the left lateral tongue quite mild  Neck- normal range of motion, supple   Respiratory:  No respiratory distress, normal breath sounds, no rales, no wheezing   Cardiovascular:  Normal rate, normal rhythm, no murmurs,   GI:  Soft, nondistended, nontender, no obvious organomegaly, no mass, no rebound, no guarding   Musculoskeletal:  No tenderness, no deformities. Integument:  no rashes on exposed surfaces  Neurologic:  Alert & oriented x 3,  normalmotor function, normal sensory function, no focal deficits noted   Psychiatric:  Speech and behavior appropriate. No agitation. MDM    Patient was very ambitious on going home. He did not want to stay in the ER any longer. Work-up looks good today. He needs to be more compliant with his medications. Keppra given in the ER and recommended continuation of meds  SEE AJITH NOTE      Patient Referrals:  Nick Vora MD  [address]  [phone]    In 2 days        Discharge Medications:  New Prescriptions    No medications on file       FINAL IMPRESSION  1. Seizure ([address]    2. Medically noncompliant    3.  Breakthrough seizure ([address]        Blood pressure (!) 152/94,

## 2019-06-26 NOTE — ED PROVIDER NOTES
[address] [facility]  eMERGENCY dEPARTMENT eNCOUnter        Pt Name: Cecelia Pete  MRN: [id number]  [place] 1940  Date of evaluation: 6/25/2019  Provider: MALIKA Beard  PCP: Severa Amato, MD  ED Attending: Scotty Ogden DO    CHIEF COMPLAINT       Chief Complaint   Patient presents with    Seizures     pt brought in by [address] [facility] from home c/o pt had seizure, non compliant with seizure meds. pt alert and oriented at this time. small lac to tongue       HISTORY OF PRESENT ILLNESS   (Location/Symptom, Timing/Onset, Context/Setting, Quality, Duration, Modifying Factors, Severity)  Note limiting factors. Cecelia Pete is a [de-identified] y.o. male who presents to the emergency department with complaints of brought in for evaluation of seizure. Patient was found at home on his bed with blood on the left side of his mouth and a small laceration to the tongue. Patient does have history of seizure. Family states that this was not witnessed. Patient states that he is currently feeling fine. He does have prior history of seizure but is noncompliant with his  seizure medication. Patient states that he does not understand why he has to take this medication on most the times he feels fine. Currently alert and oriented to person  and place but does not know the year. Denies any headache, lightheadedness, dizziness, numbness, tingling or focal weakness. No chest pain, shortness of breath, fevers, chills or recent illnesses. Patient's wife is at bedside who states that patient does not want to take his home medications. This is happened in the past as well. Nursing Notes were all reviewed and agreed with or any disagreements were addressed  in the HPI. REVIEW OF SYSTEMS    (2-9 systems for level 4, 10 or more for level 5)     Review of Systems   Constitutional: Negative for chills and fever. Eyes: Negative for visual disturbance.    Respiratory: Negative for shortness of breath. Cardiovascular: Negative for chest pain. Gastrointestinal: Negative for abdominal pain, constipation, diarrhea, nausea and vomiting. Musculoskeletal: Negative for back pain and neck pain. Neurological: Positive for seizures. Negative for dizziness, syncope, speech difficulty, weakness, light-headedness and headaches. All other systems reviewed and are negative. Positives and pertinent negatives as per HPI. All other systems were reviewed and are negative. PHYSICAL EXAM    (up to 7 for level 4, 8 or more for level 5)     ED Triage Vitals [06/25/19 1906]   BP Temp Temp Source Pulse Resp SpO2 Height Weight   (!) 152/94 99.1 °F (37.3 °C) Oral 84 18 98 % 6' 4\" (1.93 m) 190 lb (86.2 kg)       Physical Exam   Constitutional: He appears well-developed and well-nourished. He is active and cooperative. Non-toxic appearance. HENT:   Head: Normocephalic. Right Ear: External ear normal.   Left Ear: External ear normal.   Nose: Nose normal.   Small abrasion to left side tongue   Eyes: Conjunctivae are normal. Right eye exhibits no discharge. Left eye exhibits no discharge. Neck: Normal range of motion. Neck supple. Cardiovascular: Normal rate, regular rhythm and normal heart sounds. Exam reveals no gallop and no friction rub. No murmur heard. Pulmonary/Chest: Effort normal and breath sounds normal. No stridor. No respiratory distress. He has no wheezes. He has no rales. Abdominal: Soft. Bowel sounds are normal. He exhibits no distension and no mass. There is no tenderness. There is no guarding. Musculoskeletal: Normal range of motion. Neurological: He is alert. No cranial nerve deficit. Coordination normal.   Skin: Skin is warm and dry. He is not diaphoretic. No pallor. Psychiatric: He has a normal mood and affect. His behavior is normal.   Nursing note and vitals reviewed.       PAST MEDICAL HISTORY     Past Medical History:   Diagnosis Date    CHF (congestive heart failure) ([facility]     Hypertension     Seizures ([facility]     TB (pulmonary tuberculosis)     he says the doctors told him he had TB when he was 15 and he had to have surgery       SURGICAL HISTORY     History reviewed. No pertinent surgical history. CURRENT MEDICATIONS       Previous Medications    ASPIRIN 81 MG CHEWABLE TABLET    Take 1 tablet by mouth daily    CARVEDILOL (COREG) 6.25 MG TABLET    Take 1 tablet by mouth 2 times daily (with meals)    FUROSEMIDE (LASIX) 40 MG TABLET    Take 1 tablet by mouth daily    LEVETIRACETAM (KEPPRA) 100 MG/ML SOLUTION    Take 5 mLs by mouth 2 times daily    LEVETIRACETAM (KEPPRA) 500 MG TABLET    Take 1 tablet by mouth 2 times daily    LISINOPRIL (PRINIVIL;ZESTRIL) 5 MG TABLET    Take 1 tablet by mouth daily       ALLERGIES     Patient has no known allergies. FAMILY HISTORY     History reviewed. No pertinent family history.      SOCIAL HISTORY       Social History     Socioeconomic History    Marital status:      Spouse name: Kishor Cali Number of children: None    Years of education: None    Highest education level: None   Occupational History    Occupation:       Comment: 32 years    Occupation: mail train   Social Needs    Financial resource strain: None    Food insecurity:     Worry: None     Inability: None    Transportation needs:     Medical: None     Non-medical: None   Tobacco Use    Smoking status: Never Smoker    Smokeless tobacco: Never Used   Substance and Sexual Activity    Alcohol use: Not Currently    Drug use: None    Sexual activity: Yes     Partners: Female   Lifestyle    Physical activity:     Days per week: None     Minutes per session: None    Stress: None   Relationships    Social connections:     Talks on phone: None     Gets together: None     Attends Sabianist service: None     Active member of club or organization: None     Attends meetings of clubs or organizations: None     Relationship status: None    Intimate partner violence:     Fear of current or ex partner: None     Emotionally abused: None     Physically abused: None     Forced sexual activity: None   Other Topics Concern    None   Social History Narrative    None       SCREENINGS           DIAGNOSTIC RESULTS   LABS:    Labs Reviewed   CBC WITH AUTO DIFFERENTIAL - Abnormal; Notable for the following components:       Result Value    Hemoglobin 12.8 (*)     All other components within normal limits    Narrative:     Performed at:  [facility]  [address],  [place], [address]   Phone [phone]   COMPREHENSIVE METABOLIC PANEL W/ REFLEX TO MG FOR LOW K - Abnormal; Notable for the following components:    Glucose 106 (*)     All other components within normal limits    Narrative:     Performed at:  [facility]  [address],  [place], [address]   Phone [phone]   URINE RT REFLEX TO CULTURE       All other labs were within normal range or not returned as of this dictation. EKG: All EKG's areinterpreted by the Emergency Department Physician who either signs or Co-signs this chart in the absence of a cardiologist.    RADIOLOGY:   Non-plain film images such as CT, Ultrasound and MRI are read by the radiologist. Chavo Irons radiographicimages are visualized and preliminarily interpreted by the  ED Provider with the below findings:    Interpretation per the Radiologist below, if available at the time of this note:    No orders to display     No results found.     PROCEDURES   Unless otherwisenoted below, none     Procedures    CRITICAL CARE TIME   N/A    CONSULTS:  None    EMERGENCY DEPARTMENT COURSE andDIFFERENTIAL DIAGNOSIS/MDM:   Vitals:    Vitals:    06/25/19 1906   BP: (!) 152/94   Pulse: 84   Resp: 18   Temp: 99.1 °F (37.3 °C)   TempSrc: Oral   SpO2: 98%   Weight: 190 lb (86.2 kg)   Height: 6' 4\" (1.93 m)       Patient wasgiven the following medications:  Medications   0.9 % sodium chloride bolus (1,000 mLs Intravenous New Bag 6/25/19 2002)   levetiracetam (KEPPRA) 1000 mg/100 mL IVPB (0 mg Intravenous Stopped 6/25/19 2017)   Robin Castro is a [de-identified] y.o. male who presents to the emergency department with complaints of brought in for evaluation of seizure. Patient was found at home on his bed with blood on the left side of his mouth and a small laceration to the tongue. Patient does have history of seizure. Family states that this was not witnessed. Patient states that he is currently feeling fine. He does have prior history of seizure but is noncompliant with his  seizure medication. Patient states that he does not understand why he has to take this medication on most the times he feels fine. Currently alert and oriented to person  and place but does not know the year. Denies any headache, lightheadedness, dizziness, numbness, tingling or focal weakness. No chest pain, shortness of breath, fevers, chills or recent illnesses. Patient's wife is at bedside who states that patient does not want to take his home medications. This is happened in the past as well. On exam patient is well-appearing. Patient is alert to person and place. Patient is not having any complaints at this time. He does have a small abrasion to the left side of the tongue and dried blood to the left side of the mouth. He is noncompliant with his home medications. He is given a gram of Keppra here in the ER and IV fluids. We will check CBC, chemistry and urinalysis. CBC and chemistry are normal, currently awaiting urinalysis. Please see attending physician note for final disposition of this patient. The patient tolerated their visit well. They were seen and evaluated by the attending physician, who agreed with the assessment and plan. I have discussed the findings of today's workup with the patient and addressed the patient's questions and concerns. FINAL IMPRESSION      1. Seizure ([address]    2. Medically noncompliant        DISPOSITION/PLAN   DISPOSITION        PATIENT REFERRED TO:  No follow-up provider specified.     DISCHARGE MEDICATIONS:  New Prescriptions    No medications on file       DISCONTINUED MEDICATIONS:  Discontinued Medications    No medications on file            (Please note that portions of this note were completed with a voice recognition program.  Efforts were made to edit the dictations but occasionally words aremis-transcribed.)    MALIKA Hagen (electronically signed)            MALIKA Arana  06/25/19 [id number]

## 2019-10-09 ENCOUNTER — OFFICE VISIT (OUTPATIENT)
Dept: FAMILY MEDICINE CLINIC | Age: 79
End: 2019-10-09
Payer: MEDICARE

## 2019-10-09 VITALS
OXYGEN SATURATION: 98 % | WEIGHT: 222.4 LBS | HEART RATE: 79 BPM | SYSTOLIC BLOOD PRESSURE: 130 MMHG | BODY MASS INDEX: 27.07 KG/M2 | DIASTOLIC BLOOD PRESSURE: 90 MMHG

## 2019-10-09 DIAGNOSIS — I10 HYPERTENSION, ESSENTIAL: ICD-10-CM

## 2019-10-09 DIAGNOSIS — I10 ESSENTIAL HYPERTENSION: ICD-10-CM

## 2019-10-09 DIAGNOSIS — I50.9 CONGESTIVE HEART FAILURE, UNSPECIFIED HF CHRONICITY, UNSPECIFIED HEART FAILURE TYPE (HCC): Primary | ICD-10-CM

## 2019-10-09 DIAGNOSIS — R56.9 SEIZURE (HCC): ICD-10-CM

## 2019-10-09 DIAGNOSIS — M79.89 LEG SWELLING: ICD-10-CM

## 2019-10-09 PROCEDURE — G8427 DOCREV CUR MEDS BY ELIG CLIN: HCPCS | Performed by: FAMILY MEDICINE

## 2019-10-09 PROCEDURE — 99214 OFFICE O/P EST MOD 30 MIN: CPT | Performed by: FAMILY MEDICINE

## 2019-10-09 PROCEDURE — G8419 CALC BMI OUT NRM PARAM NOF/U: HCPCS | Performed by: FAMILY MEDICINE

## 2019-10-09 PROCEDURE — 1036F TOBACCO NON-USER: CPT | Performed by: FAMILY MEDICINE

## 2019-10-09 PROCEDURE — 4040F PNEUMOC VAC/ADMIN/RCVD: CPT | Performed by: FAMILY MEDICINE

## 2019-10-09 PROCEDURE — G8484 FLU IMMUNIZE NO ADMIN: HCPCS | Performed by: FAMILY MEDICINE

## 2019-10-09 PROCEDURE — 1123F ACP DISCUSS/DSCN MKR DOCD: CPT | Performed by: FAMILY MEDICINE

## 2019-10-09 RX ORDER — CARVEDILOL 6.25 MG/1
6.25 TABLET ORAL 2 TIMES DAILY WITH MEALS
Qty: 60 TABLET | Refills: 5 | Status: SHIPPED | OUTPATIENT
Start: 2019-10-09 | End: 2020-07-02 | Stop reason: SDUPTHER

## 2019-10-09 RX ORDER — FUROSEMIDE 40 MG/1
40 TABLET ORAL DAILY
Qty: 90 TABLET | Refills: 3 | Status: SHIPPED | OUTPATIENT
Start: 2019-10-09 | End: 2020-06-24 | Stop reason: SDUPTHER

## 2019-10-09 RX ORDER — ASPIRIN 81 MG/1
81 TABLET, CHEWABLE ORAL DAILY
Qty: 90 TABLET | Refills: 3 | Status: SHIPPED | OUTPATIENT
Start: 2019-10-09 | End: 2020-08-05 | Stop reason: SDUPTHER

## 2019-10-09 RX ORDER — LEVETIRACETAM 500 MG/1
500 TABLET ORAL 2 TIMES DAILY
Qty: 180 TABLET | Refills: 3 | Status: SHIPPED | OUTPATIENT
Start: 2019-10-09 | End: 2020-08-05 | Stop reason: SDUPTHER

## 2019-10-09 RX ORDER — LISINOPRIL 5 MG/1
5 TABLET ORAL DAILY
Qty: 90 TABLET | Refills: 1 | Status: SHIPPED | OUTPATIENT
Start: 2019-10-09 | End: 2020-07-02 | Stop reason: SDUPTHER

## 2019-10-09 ASSESSMENT — PATIENT HEALTH QUESTIONNAIRE - PHQ9
1. LITTLE INTEREST OR PLEASURE IN DOING THINGS: 0
SUM OF ALL RESPONSES TO PHQ QUESTIONS 1-9: 0
2. FEELING DOWN, DEPRESSED OR HOPELESS: 0
SUM OF ALL RESPONSES TO PHQ QUESTIONS 1-9: 0
SUM OF ALL RESPONSES TO PHQ9 QUESTIONS 1 & 2: 0

## 2019-10-15 ENCOUNTER — HOSPITAL ENCOUNTER (OUTPATIENT)
Dept: VASCULAR LAB | Age: 79
Discharge: HOME | End: 2019-10-15
Payer: MEDICARE

## 2019-10-15 DIAGNOSIS — M79.89 LEG SWELLING: ICD-10-CM

## 2019-10-15 PROCEDURE — 93971 EXTREMITY STUDY: CPT

## 2019-11-11 ENCOUNTER — TELEPHONE (OUTPATIENT)
Dept: FAMILY MEDICINE CLINIC | Age: 79
End: 2019-11-11

## 2020-01-01 ENCOUNTER — TELEPHONE (OUTPATIENT)
Dept: FAMILY MEDICINE CLINIC | Age: 80
End: 2020-01-01

## 2020-01-01 ENCOUNTER — OFFICE VISIT (OUTPATIENT)
Dept: FAMILY MEDICINE CLINIC | Age: 80
End: 2020-01-01
Payer: MEDICARE

## 2020-01-01 ENCOUNTER — HOSPITAL ENCOUNTER (OUTPATIENT)
Dept: WOUND CARE | Age: 80
Discharge: HOME | End: 2020-12-08
Payer: MEDICARE

## 2020-01-01 VITALS
HEIGHT: 76 IN | OXYGEN SATURATION: 98 % | WEIGHT: 219 LBS | DIASTOLIC BLOOD PRESSURE: 82 MMHG | SYSTOLIC BLOOD PRESSURE: 130 MMHG | BODY MASS INDEX: 26.67 KG/M2 | HEART RATE: 100 BPM

## 2020-01-01 VITALS
DIASTOLIC BLOOD PRESSURE: 70 MMHG | OXYGEN SATURATION: 97 % | SYSTOLIC BLOOD PRESSURE: 110 MMHG | HEART RATE: 88 BPM | WEIGHT: 219 LBS | TEMPERATURE: 96.1 F | BODY MASS INDEX: 26.66 KG/M2

## 2020-01-01 VITALS
WEIGHT: 202 LBS | HEART RATE: 94 BPM | SYSTOLIC BLOOD PRESSURE: 108 MMHG | BODY MASS INDEX: 24.59 KG/M2 | DIASTOLIC BLOOD PRESSURE: 68 MMHG | OXYGEN SATURATION: 97 %

## 2020-01-01 DIAGNOSIS — L03.115 CELLULITIS OF RIGHT LOWER EXTREMITY: ICD-10-CM

## 2020-01-01 DIAGNOSIS — I50.41 ACUTE COMBINED SYSTOLIC AND DIASTOLIC (CONGESTIVE) HRT FAIL (HCC): ICD-10-CM

## 2020-01-01 DIAGNOSIS — N17.9 ACUTE RENAL FAILURE, UNSPECIFIED ACUTE RENAL FAILURE TYPE (HCC): ICD-10-CM

## 2020-01-01 LAB
ANION GAP SERPL CALCULATED.3IONS-SCNC: 9 MMOL/L (ref 3–16)
BASOPHILS ABSOLUTE: 0 K/UL (ref 0–0.2)
BASOPHILS RELATIVE PERCENT: 0.5 %
BUN BLDV-MCNC: 29 MG/DL (ref 7–20)
CALCIUM SERPL-MCNC: 8.8 MG/DL (ref 8.3–10.6)
CHLORIDE BLD-SCNC: 99 MMOL/L (ref 99–110)
CO2: 31 MMOL/L (ref 21–32)
CREAT SERPL-MCNC: 1.4 MG/DL (ref 0.8–1.3)
EOSINOPHILS ABSOLUTE: 0 K/UL (ref 0–0.6)
EOSINOPHILS RELATIVE PERCENT: 1 %
GFR AFRICAN AMERICAN: 59
GFR NON-AFRICAN AMERICAN: 49
GLUCOSE BLD-MCNC: 97 MG/DL (ref 70–99)
HCT VFR BLD CALC: 41.6 % (ref 40.5–52.5)
HEMOGLOBIN: 13 G/DL (ref 13.5–17.5)
LYMPHOCYTES ABSOLUTE: 1 K/UL (ref 1–5.1)
LYMPHOCYTES RELATIVE PERCENT: 27.2 %
MCH RBC QN AUTO: 27.8 PG (ref 26–34)
MCHC RBC AUTO-ENTMCNC: 31.3 G/DL (ref 31–36)
MCV RBC AUTO: 88.8 FL (ref 80–100)
MONOCYTES ABSOLUTE: 0.5 K/UL (ref 0–1.3)
MONOCYTES RELATIVE PERCENT: 13.8 %
NEUTROPHILS ABSOLUTE: 2.2 K/UL (ref 1.7–7.7)
NEUTROPHILS RELATIVE PERCENT: 57.5 %
PDW BLD-RTO: 16.1 % (ref 12.4–15.4)
PLATELET # BLD: 146 K/UL (ref 135–450)
PMV BLD AUTO: 11.1 FL (ref 5–10.5)
POTASSIUM SERPL-SCNC: 4.7 MMOL/L (ref 3.5–5.1)
PRO-BNP: ABNORMAL PG/ML (ref 0–449)
RBC # BLD: 4.68 M/UL (ref 4.2–5.9)
SODIUM BLD-SCNC: 139 MMOL/L (ref 136–145)
WBC # BLD: 3.8 K/UL (ref 4–11)

## 2020-01-01 PROCEDURE — 4040F PNEUMOC VAC/ADMIN/RCVD: CPT | Performed by: FAMILY MEDICINE

## 2020-01-01 PROCEDURE — G8484 FLU IMMUNIZE NO ADMIN: HCPCS | Performed by: NURSE PRACTITIONER

## 2020-01-01 PROCEDURE — 1123F ACP DISCUSS/DSCN MKR DOCD: CPT | Performed by: FAMILY MEDICINE

## 2020-01-01 PROCEDURE — 1036F TOBACCO NON-USER: CPT | Performed by: FAMILY MEDICINE

## 2020-01-01 PROCEDURE — 99213 OFFICE O/P EST LOW 20 MIN: CPT | Performed by: FAMILY MEDICINE

## 2020-01-01 PROCEDURE — G8510 SCR DEP NEG, NO PLAN REQD: HCPCS | Performed by: NURSE PRACTITIONER

## 2020-01-01 PROCEDURE — 1036F TOBACCO NON-USER: CPT | Performed by: NURSE PRACTITIONER

## 2020-01-01 PROCEDURE — G8417 CALC BMI ABV UP PARAM F/U: HCPCS | Performed by: FAMILY MEDICINE

## 2020-01-01 PROCEDURE — 4040F PNEUMOC VAC/ADMIN/RCVD: CPT | Performed by: NURSE PRACTITIONER

## 2020-01-01 PROCEDURE — G8484 FLU IMMUNIZE NO ADMIN: HCPCS | Performed by: FAMILY MEDICINE

## 2020-01-01 PROCEDURE — G0438 PPPS, INITIAL VISIT: HCPCS | Performed by: FAMILY MEDICINE

## 2020-01-01 PROCEDURE — G8427 DOCREV CUR MEDS BY ELIG CLIN: HCPCS | Performed by: FAMILY MEDICINE

## 2020-01-01 PROCEDURE — 1123F ACP DISCUSS/DSCN MKR DOCD: CPT | Performed by: NURSE PRACTITIONER

## 2020-01-01 PROCEDURE — G8427 DOCREV CUR MEDS BY ELIG CLIN: HCPCS | Performed by: NURSE PRACTITIONER

## 2020-01-01 PROCEDURE — 99214 OFFICE O/P EST MOD 30 MIN: CPT | Performed by: NURSE PRACTITIONER

## 2020-01-01 PROCEDURE — G8420 CALC BMI NORM PARAMETERS: HCPCS | Performed by: NURSE PRACTITIONER

## 2020-01-01 PROCEDURE — 3288F FALL RISK ASSESSMENT DOCD: CPT | Performed by: NURSE PRACTITIONER

## 2020-01-01 RX ORDER — CLINDAMYCIN HYDROCHLORIDE 300 MG/1
300 CAPSULE ORAL 3 TIMES DAILY
Qty: 30 CAPSULE | Refills: 0 | Status: SHIPPED | OUTPATIENT
Start: 2020-01-01 | End: 2020-01-01

## 2020-01-01 RX ORDER — FUROSEMIDE 40 MG/1
40 TABLET ORAL 2 TIMES DAILY
Qty: 180 TABLET | Refills: 0 | Status: SHIPPED | OUTPATIENT
Start: 2020-01-01 | End: 2020-01-01 | Stop reason: SDUPTHER

## 2020-01-01 RX ORDER — FUROSEMIDE 40 MG/1
TABLET ORAL
Qty: 90 TABLET | Refills: 0 | Status: SHIPPED | OUTPATIENT
Start: 2020-01-01 | End: 2020-01-01 | Stop reason: SDUPTHER

## 2020-01-01 RX ORDER — FUROSEMIDE 40 MG/1
40 TABLET ORAL 2 TIMES DAILY
Qty: 180 TABLET | Refills: 0 | Status: SHIPPED | OUTPATIENT
Start: 2020-01-01 | End: 2021-01-01

## 2020-01-01 RX ORDER — SULFAMETHOXAZOLE AND TRIMETHOPRIM 800; 160 MG/1; MG/1
1 TABLET ORAL 2 TIMES DAILY
Qty: 20 TABLET | Refills: 0 | Status: SHIPPED | OUTPATIENT
Start: 2020-01-01 | End: 2020-01-01 | Stop reason: SDUPTHER

## 2020-01-01 RX ORDER — SULFAMETHOXAZOLE AND TRIMETHOPRIM 800; 160 MG/1; MG/1
1 TABLET ORAL 2 TIMES DAILY
Qty: 20 TABLET | Refills: 0 | Status: SHIPPED | OUTPATIENT
Start: 2020-01-01 | End: 2020-01-01

## 2020-01-01 RX ORDER — CARVEDILOL 6.25 MG/1
6.25 TABLET ORAL 2 TIMES DAILY WITH MEALS
Qty: 60 TABLET | Refills: 5 | Status: SHIPPED | OUTPATIENT
Start: 2020-01-01 | End: 2021-01-01 | Stop reason: SDUPTHER

## 2020-01-01 RX ORDER — LISINOPRIL 5 MG/1
5 TABLET ORAL DAILY
Qty: 90 TABLET | Refills: 1 | Status: SHIPPED | OUTPATIENT
Start: 2020-01-01 | End: 2021-01-01

## 2020-01-01 ASSESSMENT — PATIENT HEALTH QUESTIONNAIRE - PHQ9
SUM OF ALL RESPONSES TO PHQ9 QUESTIONS 1 & 2: 0
SUM OF ALL RESPONSES TO PHQ9 QUESTIONS 1 & 2: 0
SUM OF ALL RESPONSES TO PHQ QUESTIONS 1-9: 0
1. LITTLE INTEREST OR PLEASURE IN DOING THINGS: 0
2. FEELING DOWN, DEPRESSED OR HOPELESS: 0
2. FEELING DOWN, DEPRESSED OR HOPELESS: 0
SUM OF ALL RESPONSES TO PHQ QUESTIONS 1-9: 0
1. LITTLE INTEREST OR PLEASURE IN DOING THINGS: 0

## 2020-01-01 ASSESSMENT — ENCOUNTER SYMPTOMS
COLOR CHANGE: 1
SHORTNESS OF BREATH: 0
COUGH: 0

## 2020-01-01 ASSESSMENT — LIFESTYLE VARIABLES: HOW OFTEN DO YOU HAVE A DRINK CONTAINING ALCOHOL: 0

## 2020-03-05 ENCOUNTER — TELEPHONE (OUTPATIENT)
Dept: FAMILY MEDICINE CLINIC | Age: 80
End: 2020-03-05

## 2020-03-05 NOTE — TELEPHONE ENCOUNTER
Called and notified pt that he is due for a bp check. . he said he feels good and does not want to come in at this time,.

## 2020-06-24 RX ORDER — FUROSEMIDE 40 MG/1
40 TABLET ORAL DAILY
Qty: 90 TABLET | Refills: 1 | Status: SHIPPED | OUTPATIENT
Start: 2020-06-24 | End: 2020-01-01

## 2020-06-29 ENCOUNTER — APPOINTMENT (OUTPATIENT)
Dept: GENERAL RADIOLOGY | Age: 80
End: 2020-06-29
Payer: MEDICARE

## 2020-06-29 ENCOUNTER — HOSPITAL ENCOUNTER (EMERGENCY)
Age: 80
Discharge: HOME | End: 2020-06-29
Attending: EMERGENCY MEDICINE
Payer: MEDICARE

## 2020-06-29 VITALS
BODY MASS INDEX: 26.18 KG/M2 | HEART RATE: 102 BPM | HEIGHT: 76 IN | OXYGEN SATURATION: 96 % | WEIGHT: 215 LBS | TEMPERATURE: 99.1 F | DIASTOLIC BLOOD PRESSURE: 87 MMHG | RESPIRATION RATE: 20 BRPM | SYSTOLIC BLOOD PRESSURE: 136 MMHG

## 2020-06-29 LAB
A/G RATIO: 1.2 (ref 1.1–2.2)
ALBUMIN SERPL-MCNC: 3.7 G/DL (ref 3.4–5)
ALP BLD-CCNC: 95 U/L (ref 40–129)
ALT SERPL-CCNC: 16 U/L (ref 10–40)
ANION GAP SERPL CALCULATED.3IONS-SCNC: 10 MMOL/L (ref 3–16)
APTT: 31.2 SEC (ref 24.2–36.2)
AST SERPL-CCNC: 29 U/L (ref 15–37)
BASOPHILS ABSOLUTE: 0 K/UL (ref 0–0.2)
BASOPHILS RELATIVE PERCENT: 0.5 %
BILIRUB SERPL-MCNC: 0.3 MG/DL (ref 0–1)
BUN BLDV-MCNC: 26 MG/DL (ref 7–20)
CALCIUM SERPL-MCNC: 9 MG/DL (ref 8.3–10.6)
CHLORIDE BLD-SCNC: 101 MMOL/L (ref 99–110)
CO2: 28 MMOL/L (ref 21–32)
CREAT SERPL-MCNC: 1.4 MG/DL (ref 0.8–1.3)
EOSINOPHILS ABSOLUTE: 0 K/UL (ref 0–0.6)
EOSINOPHILS RELATIVE PERCENT: 0.7 %
GFR AFRICAN AMERICAN: 59
GFR NON-AFRICAN AMERICAN: 49
GLOBULIN: 3 G/DL
GLUCOSE BLD-MCNC: 70 MG/DL (ref 70–99)
HCT VFR BLD CALC: 45.1 % (ref 40.5–52.5)
HEMOGLOBIN: 14.2 G/DL (ref 13.5–17.5)
INR BLD: 1 (ref 0.86–1.14)
LYMPHOCYTES ABSOLUTE: 1.2 K/UL (ref 1–5.1)
LYMPHOCYTES RELATIVE PERCENT: 28.6 %
MCH RBC QN AUTO: 28.2 PG (ref 26–34)
MCHC RBC AUTO-ENTMCNC: 31.4 G/DL (ref 31–36)
MCV RBC AUTO: 89.9 FL (ref 80–100)
MONOCYTES ABSOLUTE: 0.5 K/UL (ref 0–1.3)
MONOCYTES RELATIVE PERCENT: 11.9 %
NEUTROPHILS ABSOLUTE: 2.4 K/UL (ref 1.7–7.7)
NEUTROPHILS RELATIVE PERCENT: 58.3 %
PDW BLD-RTO: 16.5 % (ref 12.4–15.4)
PLATELET # BLD: 114 K/UL (ref 135–450)
PMV BLD AUTO: 9.8 FL (ref 5–10.5)
POTASSIUM SERPL-SCNC: 5.3 MMOL/L (ref 3.5–5.1)
PRO-BNP: ABNORMAL PG/ML (ref 0–449)
PROTHROMBIN TIME: 11.6 SEC (ref 10–13.2)
RBC # BLD: 5.02 M/UL (ref 4.2–5.9)
SODIUM BLD-SCNC: 139 MMOL/L (ref 136–145)
TOTAL PROTEIN: 6.7 G/DL (ref 6.4–8.2)
TROPONIN: 0.05 NG/ML
WBC # BLD: 4 K/UL (ref 4–11)

## 2020-06-29 PROCEDURE — 83880 ASSAY OF NATRIURETIC PEPTIDE: CPT

## 2020-06-29 PROCEDURE — 85025 COMPLETE CBC W/AUTO DIFF WBC: CPT

## 2020-06-29 PROCEDURE — 85730 THROMBOPLASTIN TIME PARTIAL: CPT

## 2020-06-29 PROCEDURE — 84484 ASSAY OF TROPONIN QUANT: CPT

## 2020-06-29 PROCEDURE — 93005 ELECTROCARDIOGRAM TRACING: CPT | Performed by: PHYSICIAN ASSISTANT

## 2020-06-29 PROCEDURE — 80053 COMPREHEN METABOLIC PANEL: CPT

## 2020-06-29 PROCEDURE — 85610 PROTHROMBIN TIME: CPT

## 2020-06-29 PROCEDURE — 6370000000 HC RX 637 (ALT 250 FOR IP): Performed by: PHYSICIAN ASSISTANT

## 2020-06-29 PROCEDURE — 36415 COLL VENOUS BLD VENIPUNCTURE: CPT

## 2020-06-29 PROCEDURE — 71045 X-RAY EXAM CHEST 1 VIEW: CPT

## 2020-06-29 PROCEDURE — 99284 EMERGENCY DEPT VISIT MOD MDM: CPT

## 2020-06-29 PROCEDURE — 93971 EXTREMITY STUDY: CPT

## 2020-06-29 RX ORDER — METOLAZONE 2.5 MG/1
2.5 TABLET ORAL ONCE
Status: COMPLETED | OUTPATIENT
Start: 2020-06-29 | End: 2020-06-29

## 2020-06-29 RX ADMIN — METOLAZONE 2.5 MG: 2.5 TABLET ORAL at 18:42

## 2020-06-29 ASSESSMENT — ENCOUNTER SYMPTOMS
VOMITING: 0
WHEEZING: 0
COUGH: 0
ABDOMINAL PAIN: 0
RESPIRATORY NEGATIVE: 1
SHORTNESS OF BREATH: 0
DIARRHEA: 0
BACK PAIN: 0
COLOR CHANGE: 0
NAUSEA: 0
STRIDOR: 0
CHEST TIGHTNESS: 0
CONSTIPATION: 0

## 2020-06-29 NOTE — ED PROVIDER NOTES
weight change. Respiratory: Negative. Negative for cough, chest tightness, shortness of breath, wheezing and stridor. Cardiovascular: Positive for leg swelling. Negative for chest pain and palpitations. Gastrointestinal: Negative for abdominal pain, constipation, diarrhea, nausea and vomiting. Genitourinary: Negative for decreased urine volume, difficulty urinating, dysuria, flank pain, frequency, hematuria and urgency. Musculoskeletal: Negative for arthralgias, back pain, myalgias, neck pain and neck stiffness. Skin: Negative for color change, pallor, rash and wound. Neurological: Negative for dizziness, light-headedness and headaches. Positives and Pertinent negatives as per HPI. Except as noted above in the ROS, all other systems were reviewed and negative. PAST MEDICAL HISTORY     Past Medical History:   Diagnosis Date    CHF (congestive heart failure) (HCC)     Hypertension     Seizures (HCC)     TB (pulmonary tuberculosis)     he says the doctors told him he had TB when he was 15 and he had to have surgery         SURGICAL HISTORY   History reviewed. No pertinent surgical history. [address]       Discharge Medication List as of 6/29/2020  6:57 PM      CONTINUE these medications which have NOT CHANGED    Details   furosemide (LASIX) 40 MG tablet Take 1 tablet by mouth daily, Disp-90 tablet, R-1Normal      lisinopril (PRINIVIL;ZESTRIL) 5 MG tablet Take 1 tablet by mouth daily, Disp-90 tablet, R-1Normal      aspirin 81 MG chewable tablet Take 1 tablet by mouth daily, Disp-90 tablet, R-3Normal      carvedilol (COREG) 6.25 MG tablet Take 1 tablet by mouth 2 times daily (with meals), Disp-60 tablet, R-5Normal      levETIRAcetam (KEPPRA) 500 MG tablet Take 1 tablet by mouth 2 times daily, Disp-180 tablet, R-3Normal               ALLERGIES     Patient has no known allergies. FAMILYHISTORY     History reviewed. No pertinent family history.        SOCIAL HISTORY Social History     Tobacco Use    Smoking status: Never Smoker    Smokeless tobacco: Never Used   Substance Use Topics    Alcohol use: Not Currently    Drug use: Not on file       SCREENINGS             PHYSICAL EXAM    (up to 7 for level 4, 8 or more for level 5)     ED Triage Vitals [06/29/20 1444]   BP Temp Temp Source Pulse Resp SpO2 Height Weight   136/87 99.1 °F (37.3 °C) Oral 102 20 96 % 6' 4\" (1.93 m) 215 lb (97.5 kg)       Physical Exam  Constitutional:       General: He is not in acute distress. Appearance: Normal appearance. He is well-developed. He is not ill-appearing, toxic-appearing or diaphoretic. HENT:      Head: Normocephalic and atraumatic. Right Ear: External ear normal.      Left Ear: External ear normal.   Eyes:      General:         Right eye: No discharge. Left eye: No discharge. Neck:      Musculoskeletal: Normal range of motion and neck supple. No neck rigidity or muscular tenderness. Cardiovascular:      Rate and Rhythm: Normal rate and regular rhythm. Pulses: Normal pulses. Heart sounds: Normal heart sounds. No murmur. No friction rub. No gallop. Comments: 2+ radial pulses bilaterally. No calf tenderness. No JVD. Patient does have 2+ pitting edema to the bilateral lower extremities with some slight slight asymmetric swelling to the right lower extremity with comparison to the left. No palpable cords. Negative Homans sign. No warmth. No rashes or lesions. No erythema. Pulmonary:      Effort: Pulmonary effort is normal. No respiratory distress. Breath sounds: Normal breath sounds. No stridor. No wheezing, rhonchi or rales. Chest:      Chest wall: No tenderness. Abdominal:      General: Abdomen is flat. Bowel sounds are normal. There is no distension. Palpations: Abdomen is soft. There is no mass. Tenderness: There is no abdominal tenderness. There is no right CVA tenderness, left CVA tenderness, guarding or rebound. Hernia: No hernia is present. Musculoskeletal: Normal range of motion. Lymphadenopathy:      Cervical: No cervical adenopathy. Skin:     General: Skin is warm and dry. Coloration: Skin is not pale. Findings: No erythema. Neurological:      Mental Status: He is alert and oriented to person, place, and time.    Psychiatric:         Behavior: Behavior normal.         DIAGNOSTIC RESULTS   LABS:    Labs Reviewed   CBC WITH AUTO DIFFERENTIAL - Abnormal; Notable for the following components:       Result Value    RDW 16.5 (*)     Platelets 774 (*)     All other components within normal limits    Narrative:     Performed at:  [facility] [address]   Phone [phone]   COMPREHENSIVE METABOLIC PANEL - Abnormal; Notable for the following components:    Potassium 5.3 (*)     BUN 26 (*)     CREATININE 1.4 (*)     GFR Non- 49 (*)     GFR  59 (*)     All other components within normal limits    Narrative:     Performed at:  [facility] [address]   Phone [phone]   TROPONIN - Abnormal; Notable for the following components:    Troponin 0.05 (*)     All other components within normal limits    Narrative:     Performed at:  [facility] [address], [address]   Phone 21  - Abnormal; Notable for the following components:    Pro-BNP 13,896 (*)     All other components within normal limits    Narrative:     Performed at:  [facility] [address], [address]   Phone [phone]   APTT    Narrative:     Performed at:  [facility] [address], [address]   Phone [phone]   PROTIME-INR    Narrative:     Performed at:  [facility]  [facility] [place] [facility] [address]   [address], [address]   Phone [phone]       All other labs were within normal range or not returned as of this dictation. EKG: All EKG's are interpreted by the Emergency Department Physician in the absence of a cardiologist.  Please see their note for interpretation of EKG. RADIOLOGY:   Non-plain film images such as CT, Ultrasound and MRI are read by the radiologist. Plain radiographic images are visualized and preliminarily interpreted by the ED Provider with the below findings:        Interpretation per the Radiologist below, if available at the time of this note:    XR CHEST PORTABLE   Final Result   Cardiomegaly with no evidence of pulmonary edema         VL Extremity Venous Right   Final Result        Xr Chest Portable    Result Date: 6/29/2020  EXAMINATION: ONE XRAY VIEW OF THE CHEST 6/29/2020 3:13 pm COMPARISON: 02/17/2019 HISTORY: ORDERING SYSTEM PROVIDED HISTORY: SOB TECHNOLOGIST PROVIDED HISTORY: Reason for exam:->SOB Reason for Exam: Leg Swelling (Pt to Er with c/o swelling in right leg, present for a week, denies injury. states was started on lasix, denies pain, states \"its hard to look at. and hard as a brick when you touch it. \" kelly blood clots. pt chatty) Acuity: Unknown Type of Exam: Unknown FINDINGS: Cardiomegaly. Pulmonary vasculature within normal limits. Lungs clear.  Costophrenic angles sharp     Cardiomegaly with no evidence of pulmonary edema     Vl Extremity Venous Right    Result Date: 6/29/2020  Lower Extremities DVT Study  Demographics   Patient Name       Kaylyn Dixon   Date of Study      06/29/2020         Gender              Male   Patient Number     [id number]         Date of Birth       1940   Visit Number       [id number]          Age                 79 year(s)   Accession Number   [id number]          Room Number         0016   Corporate ID       [id number]           Sonographer         Davion Méndez RVS   Ordering Physician Angy Parada      Interpreting        [facility]                     [facility]     Physician           Mell Taylor MD,                                                            [facility]  Procedure Type of Study:   Veins:Lower Extremities DVT Study, VL EXTREMITY VENOUS DUPLEX RIGHT. Vascular Sonographer Report  Additional Indications:Pain and swelling Impressions Right Impression No evidence of deep vein or superficial vein thrombosis involving the right lower extremity and the left common femoral vein. Conclusions   Signature   ------------------------------------------------------------------  Electronically signed by Mell Taylor MD, [facility] (Interpreting  physician) on 06/29/2020 at 04:36 PM  ------------------------------------------------------------------  Patient Status:Routine. [address] [place] [address] Vascular Lab. Technical Quality:Adequate visualization. Velocities are measured in cm/s ; Diameters are measured in mm Right Lower Extremities DVT Study Measurements Right 2D Measurements +------------------------+----------+---------------+----------+ ! Location                ! Visualized! Compressibility! Thrombosis! +------------------------+----------+---------------+----------+ ! Sapheno Femoral Junction! Yes       ! Yes            ! None      ! +------------------------+----------+---------------+----------+ ! GSV Thigh               ! Yes       ! Yes            ! None      ! +------------------------+----------+---------------+----------+ ! Common Femoral          !Yes       ! Yes            ! None      ! +------------------------+----------+---------------+----------+ ! Prox Femoral            !Yes       ! Yes            ! None      ! +------------------------+----------+---------------+----------+ ! Mid Femoral             !Yes       ! Yes            ! None      ! +------------------------+----------+---------------+----------+ ! Dist Femoral            !Yes !Yes            !None      ! +------------------------+----------+---------------+----------+ ! Deep Femoral            !Yes       ! Yes            ! None      ! +------------------------+----------+---------------+----------+ ! Popliteal               !Yes       ! Yes            ! None      ! +------------------------+----------+---------------+----------+ ! GSV Below Knee          ! Yes       ! Yes            ! None      ! +------------------------+----------+---------------+----------+ ! Gastroc                 ! Yes       ! Yes            ! None      ! +------------------------+----------+---------------+----------+ ! PTV                     ! Yes       ! Yes            ! None      ! +------------------------+----------+---------------+----------+ ! Peroneal                !Yes       ! Yes            ! None      ! +------------------------+----------+---------------+----------+ ! SSV                     ! Yes       ! Yes            ! None      ! +------------------------+----------+---------------+----------+ Right Doppler Measurements +------------------------+------+------+------------+ ! Location                ! Signal!Reflux! Reflux (sec)! +------------------------+------+------+------------+ ! Sapheno Femoral Junction! Phasic!      !            ! +------------------------+------+------+------------+ ! Common Femoral          !Phasic!      !            ! +------------------------+------+------+------------+ ! Prox Femoral            !Phasic!      !            ! +------------------------+------+------+------------+ ! Deep Femoral            !Phasic!      !            ! +------------------------+------+------+------------+ ! Popliteal               !Phasic!      !            ! +------------------------+------+------+------------+          PROCEDURES   Unless otherwise noted below, none     Procedures    CRITICAL CARE TIME   N/A    CONSULTS:  None      EMERGENCY DEPARTMENT COURSE and DIFFERENTIAL DIAGNOSIS/MDM:   Vitals:    Vitals:    06/29/20 1444   BP: 136/87   Pulse: 102   Resp: 20   Temp: 99.1 °F (37.3 °C)   TempSrc: Oral   SpO2: 96%   Weight: 215 lb (97.5 kg)   Height: 6' 4\" (1.93 m)       Patient was given the following medications:  Medications   metOLazone (ZAROXOLYN) tablet 2.5 mg (2.5 mg Oral Given 6/29/20 1842)           Patient is a 79-year-old male who presents the ED for leg swelling. Bilateral leg swelling with right worse than left. Patient has history of CHF. Appears to have pitting pedal edema. Patient states right leg is always more swollen than left. Apparently has prescription for Lasix at home that he has been taking. Worsening swelling so came to the ED. Not hypoxic. No tachypnea. No respiratory distress. Lungs clear to auscultation. CBC showed normal white count, hemoglobin and platelets 621. CMP showed potassium of 5.3 but was hemolyzed. Creatinine 1.4. Troponin 0 0.05. EKG interpreted by attending physician. Troponin has been elevated in the past.  Denies chest pain or shortness of breath and believe troponin elevated secondary to fluid overload status and CHF exacerbation at this time. BNP 13,000. Last echo reviewed and had ejection fraction of 30% back in 2019. Coags obtained. Chest x-ray showed cardiomegaly without evidence of pulmonary edema. Ultrasound the right lower extremity obtained given asymmetric swelling to the right lower extremity showed no evidence of DVT. Likely some from leg swelling secondary to mild CHF exacerbation at this time. Patient already has Lasix prescription for home. Will give dose of metolazone here in the emergency department and give close outpatient follow-up. Follow-up with PCP and cardiology. Return to ED for any worsening symptoms. Low suspicion for ACS, PE, dissection, AAA, pneumonia, pneumothorax, respiratory stress, GERD, anxiety, COPD, asthma, surgical abdomen or other emergent etiology at this time. FINAL IMPRESSION      1. Leg swelling    2.  Acute on chronic congestive heart failure, unspecified heart failure type [facility])          DISPOSITION/PLAN   DISPOSITION Decision To Discharge 06/29/2020 06:57:51 PM      PATIENT REFERREDTO:  Raf Mcneal MD  [address]  [phone]    Schedule an appointment as soon as possible for a visit   For a Re-check in 1-2      days.     [facility] Emergency Department  [address] [place] [address]  [phone]  Go to   As needed, If symptoms worsen      DISCHARGE MEDICATIONS:  Discharge Medication List as of 6/29/2020  6:57 PM          DISCONTINUED MEDICATIONS:  Discharge Medication List as of 6/29/2020  6:57 PM                 (Please note that portions of this note were completed with a voice recognition program.  Efforts were made to edit the dictations but occasionally words are mis-transcribed.)    MALIKA Jones (electronically signed)          MALIKA Posey  06/29/20 2028

## 2020-06-29 NOTE — ED NOTES
Bed: 16  Expected date:   Expected time:   Means of arrival:   Comments:  Lisa Jon when clean     Randa Hernadez RN  06/29/20 8435

## 2020-06-30 ENCOUNTER — CARE COORDINATION (OUTPATIENT)
Dept: CARE COORDINATION | Age: 80
End: 2020-06-30

## 2020-06-30 LAB
EKG ATRIAL RATE: 92 BPM
EKG DIAGNOSIS: NORMAL
EKG P AXIS: 61 DEGREES
EKG P-R INTERVAL: 152 MS
EKG Q-T INTERVAL: 402 MS
EKG QRS DURATION: 104 MS
EKG QTC CALCULATION (BAZETT): 497 MS
EKG R AXIS: -41 DEGREES
EKG T AXIS: 119 DEGREES
EKG VENTRICULAR RATE: 92 BPM

## 2020-06-30 PROCEDURE — 93010 ELECTROCARDIOGRAM REPORT: CPT | Performed by: INTERNAL MEDICINE

## 2020-06-30 NOTE — ED PROVIDER NOTES
As physician-in-triage, I performed a medical screening history and physical exam on David Waters. I also cared for and evaluated the patient in conjunction with the ED Advanced Practice Provider. All diagnostic, treatment, and disposition decisions were made by myself in conjunction with the advanced practice provider. For all further details of the patient's emergency department visit, please see the advanced practice provider's documentation. Patient presents the ER for evaluation of acute on subacute edema of his leg, there is no active acute DVT no pulse deficit, no evidence of arterial venous insufficiency mild asymmetric edema, lymphedema versus mild diastolic congestive heart failure continue diuretics single dose of diuretic was given here in the ER.   Outpatient follow-up with his PCP and is to return if he is worse or new symptoms    Impression: Leg edema      Tk Parra MD  05/93/65 5159

## 2020-07-02 RX ORDER — CARVEDILOL 6.25 MG/1
6.25 TABLET ORAL 2 TIMES DAILY WITH MEALS
Qty: 60 TABLET | Refills: 5 | Status: SHIPPED | OUTPATIENT
Start: 2020-07-02 | End: 2020-08-05 | Stop reason: SDUPTHER

## 2020-07-02 RX ORDER — LISINOPRIL 5 MG/1
5 TABLET ORAL DAILY
Qty: 90 TABLET | Refills: 1 | Status: SHIPPED | OUTPATIENT
Start: 2020-07-02 | End: 2020-08-05 | Stop reason: SDUPTHER

## 2020-07-02 NOTE — TELEPHONE ENCOUNTER
lov 10/19/19  lrf 90 1 10/9/19  60 5 10/9/19  Lab Results   Component Value Date     06/29/2020    K 5.3 (H) 06/29/2020     06/29/2020    CO2 28 06/29/2020    BUN 26 (H) 06/29/2020    CREATININE 1.4 (H) 06/29/2020    GLUCOSE 70 06/29/2020    CALCIUM 9.0 06/29/2020    PROT 6.7 06/29/2020    LABALBU 3.7 06/29/2020    BILITOT 0.3 06/29/2020    ALKPHOS 95 06/29/2020    AST 29 06/29/2020    ALT 16 06/29/2020    LABGLOM 49 (A) 06/29/2020    GFRAA 59 (A) 06/29/2020    AGRATIO 1.2 06/29/2020    GLOB 3.0 06/29/2020

## 2020-07-02 NOTE — TELEPHONE ENCOUNTER
Patient requesting a medication refill.   Medication: lisinopril (PRINIVIL;ZESTRIL) 5 MG tablet  carvedilol (COREG) 6.25 MG tablet  Pharmacy: [facility] [address] [facility] [phone] [facility] [phone]  Last office visit: 10/19/19  Next office visit: Lauren Fitzpatrick

## 2020-07-14 ENCOUNTER — CARE COORDINATION (OUTPATIENT)
Dept: CARE COORDINATION | Age: 80
End: 2020-07-14

## 2020-08-04 ENCOUNTER — TELEPHONE (OUTPATIENT)
Dept: FAMILY MEDICINE CLINIC | Age: 80
End: 2020-08-04

## 2020-08-04 NOTE — TELEPHONE ENCOUNTER
Pt wife is asking if pt can be seen before 8/10/20 for bilateral leg swelling.  Please contact pt wife at [phone]

## 2020-08-05 ENCOUNTER — OFFICE VISIT (OUTPATIENT)
Dept: FAMILY MEDICINE CLINIC | Age: 80
End: 2020-08-05
Payer: MEDICARE

## 2020-08-05 VITALS
BODY MASS INDEX: 24.59 KG/M2 | DIASTOLIC BLOOD PRESSURE: 88 MMHG | SYSTOLIC BLOOD PRESSURE: 138 MMHG | HEART RATE: 98 BPM | WEIGHT: 202 LBS | TEMPERATURE: 96.6 F | OXYGEN SATURATION: 98 %

## 2020-08-05 LAB
ALBUMIN SERPL-MCNC: 4.2 G/DL (ref 3.4–5)
ANION GAP SERPL CALCULATED.3IONS-SCNC: 14 MMOL/L (ref 3–16)
BUN BLDV-MCNC: 24 MG/DL (ref 7–20)
CALCIUM SERPL-MCNC: 9.3 MG/DL (ref 8.3–10.6)
CHLORIDE BLD-SCNC: 101 MMOL/L (ref 99–110)
CO2: 28 MMOL/L (ref 21–32)
CREAT SERPL-MCNC: 1.2 MG/DL (ref 0.8–1.3)
GFR AFRICAN AMERICAN: >60
GFR NON-AFRICAN AMERICAN: 58
GLUCOSE BLD-MCNC: 103 MG/DL (ref 70–99)
PHOSPHORUS: 3.8 MG/DL (ref 2.5–4.9)
POTASSIUM SERPL-SCNC: 4.2 MMOL/L (ref 3.5–5.1)
SODIUM BLD-SCNC: 143 MMOL/L (ref 136–145)

## 2020-08-05 PROCEDURE — 1123F ACP DISCUSS/DSCN MKR DOCD: CPT | Performed by: FAMILY MEDICINE

## 2020-08-05 PROCEDURE — 36415 COLL VENOUS BLD VENIPUNCTURE: CPT | Performed by: FAMILY MEDICINE

## 2020-08-05 PROCEDURE — 99214 OFFICE O/P EST MOD 30 MIN: CPT | Performed by: FAMILY MEDICINE

## 2020-08-05 PROCEDURE — 4040F PNEUMOC VAC/ADMIN/RCVD: CPT | Performed by: FAMILY MEDICINE

## 2020-08-05 PROCEDURE — G8427 DOCREV CUR MEDS BY ELIG CLIN: HCPCS | Performed by: FAMILY MEDICINE

## 2020-08-05 PROCEDURE — G8420 CALC BMI NORM PARAMETERS: HCPCS | Performed by: FAMILY MEDICINE

## 2020-08-05 PROCEDURE — 1036F TOBACCO NON-USER: CPT | Performed by: FAMILY MEDICINE

## 2020-08-05 RX ORDER — FUROSEMIDE 40 MG/1
40 TABLET ORAL DAILY
Qty: 90 TABLET | Refills: 1 | Status: CANCELLED | OUTPATIENT
Start: 2020-08-05

## 2020-08-05 RX ORDER — LISINOPRIL 5 MG/1
5 TABLET ORAL DAILY
Qty: 90 TABLET | Refills: 1 | Status: SHIPPED | OUTPATIENT
Start: 2020-08-05 | End: 2020-01-01 | Stop reason: SDUPTHER

## 2020-08-05 RX ORDER — LEVETIRACETAM 500 MG/1
500 TABLET ORAL 2 TIMES DAILY
Qty: 180 TABLET | Refills: 3 | Status: SHIPPED | OUTPATIENT
Start: 2020-08-05

## 2020-08-05 RX ORDER — CARVEDILOL 6.25 MG/1
6.25 TABLET ORAL 2 TIMES DAILY WITH MEALS
Qty: 60 TABLET | Refills: 5 | Status: SHIPPED | OUTPATIENT
Start: 2020-08-05 | End: 2020-01-01 | Stop reason: SDUPTHER

## 2020-08-05 RX ORDER — ASPIRIN 81 MG/1
81 TABLET, CHEWABLE ORAL DAILY
Qty: 90 TABLET | Refills: 3 | Status: SHIPPED | OUTPATIENT
Start: 2020-08-05 | End: 2021-01-01 | Stop reason: SDUPTHER

## 2020-08-05 NOTE — PATIENT INSTRUCTIONS
Patient Education        Lymphedema: Care Instructions  Your Care Instructions     Lymphedema is fluid that builds up in the arms or legs. It is often caused by surgery to remove lymph nodes during cancer treatment, especially breast cancer surgery, which can cause fluid to build up in the arm. It can happen after radiation treatment to an area that involves lymph nodes. It also can be caused by a fractured bone or surgery to fix a fracture. And some medicines also can cause lymphedema. Some people get it for unknown reasons. Normally, lymph nodes trap bacteria and other substances as fluid flows through them. Then, the white cells in the body's defense, or immune, system can destroy the substances. But if there are few or no lymph nodes--or if the lymph system in an arm or leg has been damaged--fluid can build up in the affected arm or leg. You can take simple steps at home to help treat or prevent fluid buildup. Treatment may include raising the arm or leg to let gravity drain the fluid. You also can wear compression stockings or sleeves. Follow-up care is a key part of your treatment and safety. Be sure to make and go to all appointments, and call your doctor if you are having problems. It's also a good idea to know your test results and keep a list of the medicines you take. How can you care for yourself at home? · Wear a compression stocking or sleeve as your doctor suggests. It can help keep fluid from pooling in an arm or leg. Wear it during air travel. · Prop up the swollen arm or leg on a pillow anytime you sit or lie down. Try to keep it above the level of your heart. This will help reduce swelling. · Avoid crossing your legs if your legs are swollen. · Get some exercise on most days of the week. Increase the intensity of exercise slowly. Water aerobics can help reduce swelling by helping fluid move around.  Wear your compression stocking or sleeve during exercise, but not during water [email or web address] and sign in to your [employer] account. Enter V398 in the [facility] box to learn more about \"Lymphedema: Care Instructions. \"     If you do not have an account, please click on the \"Sign Up Now\" link. Current as of: August 22, 2019               Content Version: 12.5  © 5103-3425 [employer]. Care instructions adapted under license by [facility] ([facility]). If you have questions about a medical condition or this instruction, always ask your healthcare professional. [address] any warranty or liability for your use of this information.

## 2020-08-05 NOTE — PROGRESS NOTES
[address] Note    Date: 8/5/2020                                               Subjective/Objective:     Chief Complaint   Patient presents with    Leg Swelling       HPI   Pt here for R leg swelling over the last few months. Patient states that his right leg is always more swollen than his left pt has hx of this in the past. Pt has hx of CHF. Last echo showed EF of 30%, this was last year. Has been taking Lasix for the most part but not Coreg or Lisinopril or baby ASA. No SOB. No pain in the leg. Patient was seen for this in the ED 2 months ago, had negative work-up including clear chest x-ray and normal labs, except that his creatinine bumped to 1.4. Patient reports good urine output. Also had a Doppler that showed no evidence of DVT at that time.          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 ([address]     Essential hypertension     Volume overload 08/11/2018    New onset seizure ([facility] 02/03/2017    Delirium     Acute encephalopathy        Past Medical History:   Diagnosis Date    CHF (congestive heart failure) (HCC)     Hypertension     Seizures (HCC)     TB (pulmonary tuberculosis)     he says the doctors told him he had TB when he was 15 and he had to have surgery       Current Outpatient Medications   Medication Sig Dispense Refill    lisinopril (PRINIVIL;ZESTRIL) 5 MG tablet Take 1 tablet by mouth daily 90 tablet 1    carvedilol (COREG) 6.25 MG tablet Take 1 tablet by mouth 2 times daily (with meals) 60 tablet 5    furosemide (LASIX) 40 MG tablet Take 1 tablet by mouth daily 90 tablet 1    aspirin 81 MG chewable tablet Take 1 tablet by mouth daily 90 tablet 3    levETIRAcetam (KEPPRA) 500 MG tablet Take 1 tablet by mouth 2 times daily 180 tablet 3     No current facility-administered medications for this visit. No Known Allergies    Review of Systems   No fever, no cough, no HA    Vitals:  /88   Pulse 98   Temp 96.6 °F (35.9 °C)   Wt 202 lb (91.6 kg)   SpO2 98%   BMI 24.59 kg/m²     Physical Exam   General:  Well-appearing, NAD, alert, non-toxic  HEENT:  Normocephalic, atraumatic. Pupils equal and round. CHEST/LUNGS: CTAB, no crackles, no wheeze, no rhonchi. Symmetric rise  CARDIOVASCULAR: RRR,  no murmur, no rub  EXTREMETIES: Normal movement of all extremities  SKIN:  No rash, no cellulitis, no bruising, no petechiae/purpura/vesicles/pustules/abscess  PSYCH:  A+O x 3; normal affect  NEURO:  GCS 15, CN2-12 grossly intact, no focal motor/sensory deficits, no cerebellar deficits, normal gait, normal speech      Assessment/Plan     79year-old male with right leg swelling. This seems to be chronic lymphedema. Less likely related to his CHF. His lungs are clear. His vital signs are stable. He has no dyspnea or orthopnea. Recommend continuing Lasix 40 mg daily, with the option to take a second pill occasionally if the swelling is worse. We will check his kidney function since he had a creatinine bump 2 months ago. May need to adjust Lasix accordingly. Recommend resuming his lisinopril and carvedilol. Follow-up in 1 month for blood pressure check and overall checkup.     Discussed with patient medication/s dosage, instructions, potential S/E, A/R and Drug Interaction  Educational material provided regarding patient's condition  Tylenol or Motrin as needed for pain or fever  Advise to return here if worse or go to nearest ER  Encourage fluids  Pt discharged in stable condition at 10:55      1. Essential hypertension    - furosemide (LASIX) 40 MG tablet; Take 1 tablet by mouth daily  Dispense: 90 tablet; Refill: 1  - lisinopril (PRINIVIL;ZESTRIL) 5 MG tablet; Take 1 tablet by mouth daily  Dispense: 90 tablet; Refill: 1  - carvedilol (COREG) 6.25 MG tablet; Take 1 tablet by mouth 2 times daily (with meals)  Dispense: 60 tablet; Refill: 5    2. Hypertension, essential    - aspirin 81 MG chewable tablet; Take 1 tablet by mouth daily  Dispense: 90 tablet; Refill: 3    3. Seizure ([address]    - levETIRAcetam (KEPPRA) 500 MG tablet; Take 1 tablet by mouth 2 times daily  Dispense: 180 tablet; Refill: 3       No orders of the defined types were placed in this encounter. No follow-ups on file.     Sindi Markham MD    8/5/2020  10:30 AM

## 2020-10-09 NOTE — LETTER
[address] [facility]  [address]  Dept: [phone]  Dept Fax: [phone]  Loc: [address] [place] [address] [address]         10/9/2020     Dear  Suzanne Lamp:  ITY:[phone]    We were unable to reach you by phone. Please call our office at your earliest convenience. Please have your medical record number (listed above) available when you call. This message is regarding: scheduling a Medicare Wellness visit with Dr. Zackary Burks. Your last visit with Dr. Zackary Burks was on 08/05/2020. We look forward to seeing you. Have a blessed day. Please call between the hours of 8:30am and 4:00pm Monday through Friday if possible. [phone]    Thank you.

## 2020-11-03 PROBLEM — I10 ESSENTIAL HYPERTENSION: Status: RESOLVED | Noted: 2020-01-01 | Resolved: 2020-01-01

## 2020-11-12 NOTE — TELEPHONE ENCOUNTER
11/23/20 Render Post-Care Instructions In Note?: yes Post-Care Instructions: I reviewed with the patient in detail post-care instructions. Patient is to wear sunprotection, and avoid picking at any of the treated lesions. Pt may apply Vaseline to crusted or scabbing areas. Detail Level: Detailed Medical Necessity Clause: This treatment was medically necessary because the lesion was: Add 52 Modifier (Optional): no Medical Necessity Information: It is in your best interest to select a reason for this procedure from the list below. All of these items fulfill various CMS LCD requirements except the new and changing color options. Number Of Freeze-Thaw Cycles: 1 freeze-thaw cycle Consent: The patient's consent was obtained including but not limited to risks of crusting, scabbing, blistering, scarring, darker or lighter pigmentary change, recurrence, incomplete removal and infection.

## 2020-11-16 NOTE — PATIENT INSTRUCTIONS
Patient Education        Cellulitis: Care Instructions  Your Care Instructions     Cellulitis is a skin infection caused by bacteria, most often strep or staph. It often occurs after a break in the skin from a scrape, cut, bite, or puncture, or after a rash. Cellulitis may be treated without doing tests to find out what caused it. But your doctor may do tests, if needed, to look for a specific bacteria, like methicillin-resistant Staphylococcus aureus (MRSA). The doctor has checked you carefully, but problems can develop later. If you notice any problems or new symptoms, get medical treatment right away. Follow-up care is a key part of your treatment and safety. Be sure to make and go to all appointments, and call your doctor if you are having problems. It's also a good idea to know your test results and keep a list of the medicines you take. How can you care for yourself at home? · Take your antibiotics as directed. Do not stop taking them just because you feel better. You need to take the full course of antibiotics. · Prop up the infected area on pillows to reduce pain and swelling. Try to keep the area above the level of your heart as often as you can. · If your doctor told you how to care for your wound, follow your doctor's instructions. If you did not get instructions, follow this general advice:  ? Wash the wound with clean water 2 times a day. Don't use hydrogen peroxide or alcohol, which can slow healing. ? You may cover the wound with a thin layer of petroleum jelly, such as Vaseline, and a nonstick bandage. ? Apply more petroleum jelly and replace the bandage as needed. · Be safe with medicines. Take pain medicines exactly as directed. ? If the doctor gave you a prescription medicine for pain, take it as prescribed. ? If you are not taking a prescription pain medicine, ask your doctor if you can take an over-the-counter medicine.   To prevent cellulitis in the future  · Try to prevent cuts, scrapes, or other injuries to your skin. Cellulitis most often occurs where there is a break in the skin. · If you get a scrape, cut, mild burn, or bite, wash the wound with clean water as soon as you can to help avoid infection. Don't use hydrogen peroxide or alcohol, which can slow healing. · If you have swelling in your legs (edema), support stockings and good skin care may help prevent leg sores and cellulitis. · Take care of your feet, especially if you have diabetes or other conditions that increase the risk of infection. Wear shoes and socks. Do not go barefoot. If you have athlete's foot or other skin problems on your feet, talk to your doctor about how to treat them. When should you call for help? Call your doctor now or seek immediate medical care if:    · You have signs that your infection is getting worse, such as:  ? Increased pain, swelling, warmth, or redness. ? Red streaks leading from the area. ? Pus draining from the area. ? A fever.     · You get a rash. Watch closely for changes in your health, and be sure to contact your doctor if:    · You do not get better as expected. Where can you learn more? Go to https://[employer] org and sign in to your Webymaster account. Enter N585 in the [facility] box to learn more about \"Cellulitis: Care Instructions. \"     If you do not have an account, please click on the \"Sign Up Now\" link. Current as of: July 2, 2020               Content Version: 12.6  © 2006-2020 [employer]. Care instructions adapted under license by [facility] ([facility]). If you have questions about a medical condition or this instruction, always ask your healthcare professional. [place] [address] any warranty or liability for your use of this information.          Patient Education        Heart Failure: Care Instructions  Your Care Instructions     Heart failure occurs when your heart does not pump as much blood as the body needs. Failure does not mean that the heart has stopped pumping but rather that it is not pumping as well as it should. Over time, this causes fluid buildup in your lungs and other parts of your body. Fluid buildup can cause shortness of breath, fatigue, swollen ankles, and other problems. By taking medicines regularly, reducing sodium (salt) in your diet, checking your weight every day, and making lifestyle changes, you can feel better and live longer. Follow-up care is a key part of your treatment and safety. Be sure to make and go to all appointments, and call your doctor if you are having problems. It's also a good idea to know your test results and keep a list of the medicines you take. How can you care for yourself at home? Medicines    · Be safe with medicines. Take your medicines exactly as prescribed. Call your doctor if you think you are having a problem with your medicine.     · Do not take any vitamins, over-the-counter medicine, or herbal products without talking to your doctor first. Bhupendra Zaidi not take ibuprofen (Advil or Motrin) and naproxen (Aleve) without talking to your doctor first. They could make your heart failure worse.     · You may take some of the following medicine. ? Angiotensin-converting enzyme inhibitors (ACEIs) or angiotensin II receptor blockers (ARBs) reduce the heart's workload, lower blood pressure, and reduce swelling. Taking an ACEI or ARB may lower your chance of needing to be hospitalized. ? Beta-blockers can slow heart rate, decrease blood pressure, and improve your condition. Taking a beta-blocker may lower your chance of needing to be hospitalized. ? Diuretics, also called water pills, reduce swelling. You will get more details on the specific medicines your doctor prescribes. Diet    · Your doctor may suggest that you limit sodium. Your doctor can tell you how much sodium is right for you. An example is less than 3,000 mg a day.  This includes all the salt you eat in cooking or in packaged foods. People get most of their sodium from processed foods. Fast food and restaurant meals also tend to be very high in sodium.     · Ask your doctor how much liquid you can drink each day. You may have to limit liquids. Weight    · Weigh yourself without clothing at the same time each day. Record your weight. Call your doctor if you have a sudden weight gain, such as more than 2 to 3 pounds in a day or 5 pounds in a week. (Your doctor may suggest a different range of weight gain.) A sudden weight gain may mean that your heart failure is getting worse. Activity level    · Start light exercise (if your doctor says it is okay). Even if you can only do a small amount, exercise will help you get stronger, have more energy, and manage your weight and your stress. Walking is an easy way to get exercise. Start out by walking a little more than you did before. Bit by bit, increase the amount you walk.     · When you exercise, watch for signs that your heart is working too hard. You are pushing yourself too hard if you cannot talk while you are exercising. If you become short of breath or dizzy or have chest pain, stop, sit down, and rest.     · If you feel \"wiped out\" the day after you exercise, walk slower or for a shorter distance until you can work up to a better pace.     · Get enough rest at night. Sleeping with 1 or 2 pillows under your upper body and head may help you breathe easier. Lifestyle changes    · Do not smoke. Smoking can make a heart condition worse. If you need help quitting, talk to your doctor about stop-smoking programs and medicines. These can increase your chances of quitting for good. Quitting smoking may be the most important step you can take to protect your heart.     · Limit alcohol to 2 drinks a day for men and 1 drink a day for women. Too much alcohol can cause health problems.     · Avoid getting sick from colds and the flu. Get a pneumococcal vaccine shot.  If you have had one before, ask your doctor whether you need another dose. Get a flu shot each year. If you must be around people with colds or the flu, wash your hands often. When should you call for help? Call 911 if you have symptoms of sudden heart failure such as:    · You have severe trouble breathing.     · You cough up pink, foamy mucus.     · You have a new irregular or rapid heartbeat. Call your doctor now or seek immediate medical care if:    · You have new or increased shortness of breath.     · You are dizzy or lightheaded, or you feel like you may faint.     · You have sudden weight gain, such as more than 2 to 3 pounds in a day or 5 pounds in a week. (Your doctor may suggest a different range of weight gain.)     · You have increased swelling in your legs, ankles, or feet.     · You are suddenly so tired or weak that you cannot do your usual activities. Watch closely for changes in your health, and be sure to contact your doctor if you develop new symptoms. Where can you learn more? Go to https://[employer]. org and sign in to your [employer] account. Enter C473 in the [employer] box to learn more about \"Heart Failure: Care Instructions. \"     If you do not have an account, please click on the \"Sign Up Now\" link. Current as of: December 16, 2019               Content Version: 12.6  © 4154-8865 [employer]. Care instructions adapted under license by [facility] ([facility]). If you have questions about a medical condition or this instruction, always ask your healthcare professional. [address] any warranty or liability for your use of this information.

## 2020-11-16 NOTE — PROGRESS NOTES
SUBJECTIVE:  Pt is a of 79 y.o. male comes in today with   Chief Complaint   Patient presents with    Leg Swelling     Pt is having swelling from waist down to toe.  Blister     Pt has blister right leg        Patient presenting today for evaluation of swelling in legs. Present for approx 1 month,worsening x 1 week. R>L. State right leg swollen to hip. Also with blister open and weeping. Has been using rubbing alcohol to clean. Still riding stationary bike. He denies CP, chest tightness, SOB, palpitations, dizziness or lightheadedness. No orthopnea or dyspnea with activity. Has not been compliant with Furosemide. Daughter now helping and taking daily for past 7-10 days. No fevers. Normal activity and appetite. Prior to Visit Medications    Medication Sig Taking? Authorizing Provider   furosemide (LASIX) 40 MG tablet TAKE ONE TABLET BY MOUTH DAILY Yes Chastity Acuna MD   lisinopril (PRINIVIL;ZESTRIL) 5 MG tablet Take 1 tablet by mouth daily Yes Leia Suarez MD   carvedilol (COREG) 6.25 MG tablet Take 1 tablet by mouth 2 times daily (with meals) Yes Leia Suarez MD   aspirin 81 MG chewable tablet Take 1 tablet by mouth daily Yes Leia Suarez MD   levETIRAcetam (KEPPRA) 500 MG tablet Take 1 tablet by mouth 2 times daily Yes Leia Suarez MD         Patient's allergies, past medical, surgical, social and family histories werereviewed and updated as appropriate. Review of Systems   Constitutional: Positive for fatigue (per wife). Negative for activity change, appetite change and fever. Respiratory: Negative for cough and shortness of breath. Cardiovascular: Positive for leg swelling. Negative for chest pain and palpitations. Skin: Positive for color change (redness and blisters to right lower leg). Neurological: Negative for dizziness, light-headedness and headaches. Physical Exam  Constitutional:       Appearance: Normal appearance. He is well-developed.    Cardiovascular: Rate and Rhythm: Normal rate and regular rhythm. Pulses:           Radial pulses are 2+ on the right side and 2+ on the left side. Heart sounds: Normal heart sounds. No murmur. Comments:  4 + pitting edema noted lower legs. Right leg edema extends up into thigh. Lower leg skin taut , vessicles present, open wound present as well. (+) warmth and erythema  Left lower le + pitting edema. Skin taut. No vesicles, erythema or warmth  No tenderness  Pulmonary:      Effort: Pulmonary effort is normal.      Breath sounds: Normal breath sounds. Skin:     General: Skin is warm and dry. Neurological:      Mental Status: He is alert and oriented to person, place, and time. Vitals:    20 1510   BP: 108/68   Pulse: 94   SpO2: 97%   Weight: 202 lb (91.6 kg)             ASSESSMENT:  1. 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 Acute renal failure, unspecified acute renal failure type ([address]    3. Cellulitis of right lower extremity        PLAN:  1. 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 Acute renal failure, unspecified acute renal failure type ([address]  -     Basic Metabolic Panel; Future    3. Cellulitis of right lower extremity  New problem  Stop using alcohol to clean. Warm water and soap  Referral to wound clinic  -     CBC Auto Differential; Future  -     clindamycin (CLEOCIN) 300 MG capsule; Take 1 capsule by mouth 3 times daily for 10 days  -     [facility]  See pt instructions  F/u 5-7 days if no improvement, sooner if symptomsworsen. Discussed use, benefit, and side effects of prescribed medications. All patient questions answered. Pt voiced understanding.          Time spent: 26 minutes, >50% of which was spent face to face with patient

## 2020-11-17 NOTE — TELEPHONE ENCOUNTER
Tell her results look normal, except his kidney function has declined slightly. Should recheck kidneys in 2-3 months.

## 2020-11-23 PROBLEM — I48.91 ATRIAL FIBRILLATION (HCC): Status: ACTIVE | Noted: 2020-01-01

## 2020-11-23 NOTE — PATIENT INSTRUCTIONS
Personalized Preventive Plan for Markus Hernandez - 11/23/2020  Medicare offers a range of preventive health benefits. Some of the tests and screenings are paid in full while other may be subject to a deductible, co-insurance, and/or copay. Some of these benefits include a comprehensive review of your medical history including lifestyle, illnesses that may run in your family, and various assessments and screenings as appropriate. After reviewing your medical record and screening and assessments performed today your provider may have ordered immunizations, labs, imaging, and/or referrals for you. A list of these orders (if applicable) as well as your Preventive Care list are included within your After Visit Summary for your review. Other Preventive Recommendations:    · A preventive eye exam performed by an eye specialist is recommended every 1-2 years to screen for glaucoma; cataracts, macular degeneration, and other eye disorders. · A preventive dental visit is recommended every 6 months. · Try to get at least 150 minutes of exercise per week or 10,000 steps per day on a pedometer . · Order or download the FREE \"Exercise & Physical Activity: Your Everyday Guide\" from [employer]. Call 6-457.793.3357 or search [employer] online. · You need 6967-6243 mg of calcium and 1725-9521 IU of vitamin D per day. It is possible to meet your calcium requirement with diet alone, but a vitamin D supplement is usually necessary to meet this goal.  · When exposed to the sun, use a sunscreen that protects against both UVA and UVB radiation with an SPF of 30 or greater. Reapply every 2 to 3 hours or after sweating, drying off with a towel, or swimming. · Always wear a seat belt when traveling in a car. Always wear a helmet when riding a bicycle or motorcycle.

## 2020-11-23 NOTE — PROGRESS NOTES
Medicare Annual Wellness Visit  Name: Benjamin Packer Date: 2020   MRN: [id number] Sex: Male   Age: 79 y.o. Ethnicity: Non-/Non    : 1940 Race: Padmini Collazo is here for Medicare AWV    Screenings for behavioral, psychosocial and functional/safety risks, and cognitive dysfunction are all negative except as indicated below. These results, as well as other patient data from the [address] form, are documented in Flowsheets linked to this Encounter. No Known Allergies    Prior to Visit Medications    Medication Sig Taking? Authorizing Provider   sulfamethoxazole-trimethoprim (BACTRIM DS;SEPTRA DS) 800-160 MG per tablet Take 1 tablet by mouth 2 times daily for 10 days Yes Caitlyn Kirkpatrick MD   furosemide (LASIX) 40 MG tablet Take 1 tablet by mouth 2 times daily Yes RODERICK Liu CNP   lisinopril (PRINIVIL;ZESTRIL) 5 MG tablet Take 1 tablet by mouth daily Yes Caitlyn Kirkpatrick MD   carvedilol (COREG) 6.25 MG tablet Take 1 tablet by mouth 2 times daily (with meals) Yes Caitlyn Kirkpatrick MD   aspirin 81 MG chewable tablet Take 1 tablet by mouth daily Yes Caitlyn Kirkpatrick MD   levETIRAcetam (KEPPRA) 500 MG tablet Take 1 tablet by mouth 2 times daily Yes Caitlyn Kirkpatrick MD       Past Medical History:   Diagnosis Date    [facility]estive [facility] failure) ([facility]     Hypertension     Seizures ([facility]     TB (pulmonary tuberculosis)     he says the doctors told him he had TB when he was 15 and he had to have surgery       No past surgical history on file. No family history on file.     CareTeam (Including outside providers/suppliers regularly involved in providing care):   Patient Care Team:  Caitlyn Kirkpatrick MD as PCP - General (Family Medicine)  Caitlyn Kirkpatrick MD as PCP - [facility]    Wt Readings from Last 3 Encounters:   20 219 lb (99.3 kg)   20 202 lb (91.6 kg)   20 202 lb (91.6 kg)     Vitals:    20 1410   BP: 130/82 Pulse: 100   SpO2: 98%   Weight: 219 lb (99.3 kg)   Height: 6' 4\" (1.93 m)     Body mass index is 26.66 kg/m². Based upon direct observation of the patient, evaluation of cognition reveals recent and remote memory intact. General Appearance: alert and oriented to person, place and time, well developed and well- nourished, in no acute distress  Head: normocephalic and atraumatic  Eyes: pupils equal, round, and reactive to light, extraocular eye movements intact, conjunctivae normal  ENT: tympanic membrane, external ear and ear canal normal bilaterally, nose without deformity, nasal mucosa and turbinates normal without polyps  Neck: supple and non-tender without mass, no thyromegaly or thyroid nodules, no cervical lymphadenopathy  Pulmonary/Chest: clear to auscultation bilaterally- no wheezes, rales or rhonchi, normal air movement, no respiratory distress  Cardiovascular: normal rate, regular rhythm, normal S1 and S2, no murmurs, rubs, clicks, or gallops, distal pulses intact, no carotid bruits  Abdomen: soft, non-tender, non-distended, normal bowel sounds, no masses or organomegaly  Extremities: + Mild edema and erythema of right lower leg.  + Warm to touch.  + Induration.  + Ulceration of anterior shin of right lower leg  Musculoskeletal: normal range of motion, no joint swelling, deformity or tenderness  Neurologic: reflexes normal and symmetric, no cranial nerve deficit, gait, coordination and speech normal    Patient's complete Health Risk Assessment and screening values have been reviewed and are found in Flowsheets. The following problems were reviewed today and where indicated follow up appointments were made and/or referrals ordered. Positive Risk Factor Screenings with Interventions:     Cognitive:   Words recalled: 0 Words Recalled  Clock Drawing Test (CDT) Score: Normal  Total Score Interpretation: Positive Mini-Cog  Did the patient refuse to take the cognition test?: No  Cognitive Impairment Interventions:  · Patient declines any further evaluation/treatment for cognitive impairment    General Health and ACP:  General  In general, how would you say your health is?: Good  In the past 7 days, have you experienced any of the following? New or Increased Pain, New or Increased Fatigue, Loneliness, Social Isolation, Stress or Anger?: None of These  Do you get the social and emotional support that you need?: Yes  Do you have a Living Will?: (!) No  Advance Directives     Power of [facility] Will ACP-Advance Directive ACP-Power of     Not on File Not on File Filed [address] [facility] Risk Interventions:  · No Living Will: Patient declines ACP discussion/assistance    Health Habits/Nutrition:  Health Habits/Nutrition  Do you exercise for at least 20 minutes 2-3 times per week?: Yes  Have you lost any weight without trying in the past 3 months?: No  Do you eat fewer than 2 meals per day?: No  Have you seen a dentist within the past year?: (!) No  Body mass index: (!) 26.65  Health Habits/Nutrition Interventions:  · Dental exam overdue:  patient encouraged to make appointment with his/her dentist    Hearing/Vision:  No exam data present  Hearing/Vision  Do you or your family notice any trouble with your hearing?: No  Do you have difficulty driving, watching TV, or doing any of your daily activities because of your eyesight?: No  Have you had an eye exam within the past year?: (!) No  Hearing/Vision Interventions:  · Encouraged to get eye exam    ADL:  ADLs  In the past 7 days, did you need help from others to perform any of the following everyday activities? Eating, dressing, grooming, bathing, toileting, or walking/balance?: None  In the past 7 days, did you need help from others to take care of any of the following?  Laundry, housekeeping, banking/finances, shopping, telephone use, food preparation, transportation, or taking medications?: (!) Taking Medications  ADL Interventions:  · Patient declines any further evaluation/treatment for this issue    Personalized Preventive Plan   Current Health Maintenance Status  There is no immunization history for the selected administration types on file for this patient. Health Maintenance   Topic Date Due    DTaP/Tdap/Td vaccine (1 - Tdap) 04/20/1958    Shingles Vaccine (1 of 2) 04/20/1989    Pneumococcal 65+ years Vaccine (1 of 1 - PPSV23) 04/20/2004    Flu vaccine (1) 09/01/2020    Annual Wellness Visit (AWV)  03/10/2022 (Originally 6/19/2019)    Potassium monitoring  11/16/2021    Creatinine monitoring  11/16/2021    Hepatitis A vaccine  Aged Out    Hepatitis B vaccine  Aged Out    Hib vaccine  Aged Out    Meningococcal (ACWY) vaccine  Aged Out     Recommendations for [employer] Due: see orders and patient instructions/AVS.  . Recommended screening schedule for the next 5-10 years is provided to the patient in written form: see Patient Instructions/AVS.    [place] was seen today for medicare awv. Diagnoses and all orders for this visit:    Cellulitis of right lower extremity  -     sulfamethoxazole-trimethoprim (BACTRIM DS;SEPTRA DS) 800-160 MG per tablet;  Take 1 tablet by mouth 2 times daily for 10 days    Alzheimer's disease, unspecified (CODE) ([facility]    Atrial fibrillation, unspecified type [facility])    Routine general medical examination at a health care facility

## 2020-11-30 NOTE — PROGRESS NOTES
[address] Note    Date: 11/30/2020                                               Subjective/Objective:     Chief Complaint   Patient presents with    Check-Up     cellulitis        HPI   Patient is here for follow-up on cellulitis right lower leg. Has been taking Bactrim for the past week. Denies nausea or vomiting. Denies fever. Reports that it has improved for the most part, ulcerations are healing. No pain.          Patient Active Problem List    Diagnosis Date Noted    Anasarca      Priority: High    Atrial fibrillation ([facility] 11/23/2020    Anemia 02/18/2019    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[facility]     HTN (hypertension), benign     Seizures ([facility] 02/17/2019    Syncope and collapse 02/17/2019    Senile dementia without behavioral disturbance ([facility] 10/08/2018    Aspiration into airway 09/26/2018    Acute respiratory failure with hypoxia (HCC)     Seizure (HCC) 09/25/2018    Acute renal failure (HCC)     Acute combined systolic and diastolic (congestive) hrt fail ([facility]     Volume overload 08/11/2018    New onset seizure ([facility] 02/03/2017    Delirium     Acute encephalopathy        Past Medical History:   Diagnosis Date    CHF (congestive heart failure) (HCC)     Hypertension     Seizures (HCC)     TB (pulmonary tuberculosis)     he says the doctors told him he had TB when he was 15 and he had to have surgery       Current Outpatient Medications   Medication Sig Dispense Refill    sulfamethoxazole-trimethoprim (BACTRIM DS;SEPTRA DS) 800-160 MG per tablet Take 1 tablet by mouth 2 times daily for 10 days 20 tablet 0    furosemide (LASIX) 40 MG tablet Take 1 tablet by mouth 2 times daily 180 tablet 0    lisinopril (PRINIVIL;ZESTRIL) 5 MG tablet Take 1 tablet by mouth daily 90 tablet 1    carvedilol (COREG) 6.25 MG tablet Take 1 tablet by mouth 2 times daily (with meals) 60 tablet 5    aspirin 81 MG chewable tablet Take 1 tablet by mouth daily 90 tablet 3    levETIRAcetam (KEPPRA) 500 MG tablet Take 1 tablet by mouth 2 times daily 180 tablet 3     No current facility-administered medications for this visit. No Known Allergies    Review of Systems   No cough, no seizure    Vitals:  /70   Pulse 88   Temp 96.1 °F (35.6 °C)   Wt 219 lb (99.3 kg)   SpO2 97%   BMI 26.66 kg/m²     Physical Exam   General:  Well-appearing, NAD, alert, non-toxic  HEENT:  Normocephalic, atraumatic. CHEST/LUNGS: No dyspnea  EXTREMETIES: No edema of R lower extremity. +healing ulcerations of lower R leg anteriorly. No erythema. +mild induration. SKIN:  No rash, no cellulitis, no bruising, no petechiae/purpura/vesicles/pustules/abscess  PSYCH:  A+O x 3; normal affect  NEURO:  GCS 15, CN2-12 grossly intact, no focal motor/sensory deficits, normal gait, normal speech      Assessment/Plan     80yo male with cellulitis of R lower leg. Overall is improving. Finish course of bactrim. Will refer to wound care for further evaluation and treatment. Discharged in stable condition at 10:57    1. Cellulitis of right lower extremity    - [place] 1898         Orders Placed This Encounter   Procedures   [place] [address]     Referral Priority:   Routine     Referral Type:   Eval and Treat     Referral Reason:   Specialty Services Required     Requested Specialty:   Wound Care     Number of Visits Requested:   1       No follow-ups on file.     Farooq Lopez MD    11/30/2020  10:55 AM

## 2020-12-15 NOTE — TELEPHONE ENCOUNTER
Medication and Quantity requested:   lisinopril (PRINIVIL;ZESTRIL) 5 MG tablet  #90     carvedilol (COREG) 6.25 MG tablet  #90      Last Visit  11/30/20    Pharmacy and phone number updated in [facility]:  Yes, [address]

## 2020-12-15 NOTE — TELEPHONE ENCOUNTER
Pt wife states pt was told to increase furosemide from 40MG daily to 80MG daily due to swelling in [place], didn't see anything in office notes, Please verify dose and sig    Pt needs 90 day supply of medication sent to [facility]

## 2021-01-01 ENCOUNTER — HOSPITAL ENCOUNTER (OUTPATIENT)
Dept: WOUND CARE | Age: 81
Discharge: HOME | End: 2021-06-16

## 2021-01-01 ENCOUNTER — TELEPHONE (OUTPATIENT)
Dept: VASCULAR SURGERY | Age: 81
End: 2021-01-01

## 2021-01-01 ENCOUNTER — APPOINTMENT (OUTPATIENT)
Dept: GENERAL RADIOLOGY | Age: 81
DRG: 853 | End: 2021-01-01
Payer: MEDICARE

## 2021-01-01 ENCOUNTER — HOSPITAL ENCOUNTER (OUTPATIENT)
Dept: NON INVASIVE DIAGNOSTICS | Age: 81
Discharge: HOME | End: 2021-06-08
Payer: MEDICARE

## 2021-01-01 ENCOUNTER — TELEPHONE (OUTPATIENT)
Dept: FAMILY MEDICINE CLINIC | Age: 81
End: 2021-01-01

## 2021-01-01 ENCOUNTER — HOSPITAL ENCOUNTER (OUTPATIENT)
Dept: WOUND CARE | Age: 81
Discharge: HOME | End: 2021-05-04
Payer: MEDICARE

## 2021-01-01 ENCOUNTER — OFFICE VISIT (OUTPATIENT)
Dept: FAMILY MEDICINE CLINIC | Age: 81
End: 2021-01-01
Payer: MEDICARE

## 2021-01-01 ENCOUNTER — HOSPITAL ENCOUNTER (OUTPATIENT)
Dept: WOUND CARE | Age: 81
Discharge: HOME | End: 2021-06-02
Payer: MEDICARE

## 2021-01-01 ENCOUNTER — HOSPITAL ENCOUNTER (OUTPATIENT)
Dept: VASCULAR LAB | Age: 81
Discharge: HOME | DRG: 853 | End: 2021-06-28
Payer: MEDICARE

## 2021-01-01 ENCOUNTER — CARE COORDINATION (OUTPATIENT)
Dept: CARE COORDINATION | Age: 81
End: 2021-01-01

## 2021-01-01 ENCOUNTER — HOSPITAL ENCOUNTER (OUTPATIENT)
Dept: WOUND CARE | Age: 81
Discharge: HOME | End: 2021-04-13
Payer: MEDICARE

## 2021-01-01 ENCOUNTER — HOSPITAL ENCOUNTER (OUTPATIENT)
Dept: WOUND CARE | Age: 81
Discharge: HOME | End: 2021-03-23
Payer: MEDICARE

## 2021-01-01 ENCOUNTER — HOSPITAL ENCOUNTER (OUTPATIENT)
Dept: WOUND CARE | Age: 81
Discharge: HOME | End: 2021-03-30
Payer: MEDICARE

## 2021-01-01 ENCOUNTER — HOSPITAL ENCOUNTER (OUTPATIENT)
Dept: WOUND CARE | Age: 81
Discharge: HOME | End: 2021-03-16
Payer: MEDICARE

## 2021-01-01 ENCOUNTER — HOSPITAL ENCOUNTER (OUTPATIENT)
Dept: WOUND CARE | Age: 81
Discharge: HOME | End: 2021-06-18
Payer: MEDICARE

## 2021-01-01 ENCOUNTER — HOSPITAL ENCOUNTER (OUTPATIENT)
Dept: WOUND CARE | Age: 81
Discharge: HOME | End: 2021-03-02
Payer: MEDICARE

## 2021-01-01 ENCOUNTER — APPOINTMENT (OUTPATIENT)
Dept: CT IMAGING | Age: 81
DRG: 853 | End: 2021-01-01
Payer: MEDICARE

## 2021-01-01 ENCOUNTER — HOSPITAL ENCOUNTER (OUTPATIENT)
Dept: WOUND CARE | Age: 81
Discharge: HOME | End: 2021-07-22
Payer: MEDICARE

## 2021-01-01 ENCOUNTER — HOSPITAL ENCOUNTER (OUTPATIENT)
Dept: VASCULAR LAB | Age: 81
Discharge: HOME | End: 2021-04-08
Payer: MEDICARE

## 2021-01-01 ENCOUNTER — HOSPITAL ENCOUNTER (INPATIENT)
Age: 81
LOS: 6 days | Discharge: NURSING, REHAB OR LONG-TERM CARE | DRG: 853 | End: 2021-07-07
Attending: EMERGENCY MEDICINE | Admitting: HOSPITALIST
Payer: MEDICARE

## 2021-01-01 ENCOUNTER — ANESTHESIA EVENT (OUTPATIENT)
Dept: INPATIENT UNIT | Age: 81
DRG: 853 | End: 2021-01-01
Payer: MEDICARE

## 2021-01-01 ENCOUNTER — HOSPITAL ENCOUNTER (OUTPATIENT)
Dept: WOUND CARE | Age: 81
Discharge: HOME | DRG: 853 | End: 2021-06-28
Payer: MEDICARE

## 2021-01-01 ENCOUNTER — ANESTHESIA (OUTPATIENT)
Dept: INPATIENT UNIT | Age: 81
DRG: 853 | End: 2021-01-01
Payer: MEDICARE

## 2021-01-01 ENCOUNTER — NURSE TRIAGE (OUTPATIENT)
Dept: OTHER | Facility: CLINIC | Age: 81
End: 2021-01-01

## 2021-01-01 ENCOUNTER — HOSPITAL ENCOUNTER (OUTPATIENT)
Dept: WOUND CARE | Age: 81
Discharge: HOME | End: 2021-07-15
Payer: MEDICARE

## 2021-01-01 ENCOUNTER — OFFICE VISIT (OUTPATIENT)
Dept: CARDIOLOGY CLINIC | Age: 81
End: 2021-01-01
Payer: MEDICARE

## 2021-01-01 ENCOUNTER — TELEPHONE (OUTPATIENT)
Dept: CARDIOLOGY CLINIC | Age: 81
End: 2021-01-01

## 2021-01-01 ENCOUNTER — APPOINTMENT (OUTPATIENT)
Dept: MRI IMAGING | Age: 81
DRG: 853 | End: 2021-01-01
Payer: MEDICARE

## 2021-01-01 ENCOUNTER — OFFICE VISIT (OUTPATIENT)
Dept: VASCULAR SURGERY | Age: 81
End: 2021-01-01
Payer: MEDICARE

## 2021-01-01 ENCOUNTER — APPOINTMENT (OUTPATIENT)
Dept: WOUND CARE | Age: 81
DRG: 853 | End: 2021-01-01
Payer: MEDICARE

## 2021-01-01 ENCOUNTER — HOSPITAL ENCOUNTER (OUTPATIENT)
Dept: WOUND CARE | Age: 81
Discharge: HOME | End: 2021-04-27
Payer: MEDICARE

## 2021-01-01 ENCOUNTER — HOSPITAL ENCOUNTER (OUTPATIENT)
Dept: WOUND CARE | Age: 81
Discharge: HOME | End: 2021-05-11
Payer: MEDICARE

## 2021-01-01 ENCOUNTER — HOSPITAL ENCOUNTER (OUTPATIENT)
Dept: WOUND CARE | Age: 81
Discharge: HOME | End: 2021-04-20
Payer: MEDICARE

## 2021-01-01 ENCOUNTER — HOSPITAL ENCOUNTER (OUTPATIENT)
Age: 81
Discharge: HOME | End: 2021-03-10
Payer: MEDICARE

## 2021-01-01 ENCOUNTER — HOSPITAL ENCOUNTER (INPATIENT)
Age: 81
LOS: 15 days | Discharge: HOSPICE | DRG: 853 | End: 2021-08-08
Attending: EMERGENCY MEDICINE | Admitting: INTERNAL MEDICINE
Payer: MEDICARE

## 2021-01-01 ENCOUNTER — HOSPITAL ENCOUNTER (OUTPATIENT)
Dept: WOUND CARE | Age: 81
Discharge: HOME | End: 2021-03-09
Payer: MEDICARE

## 2021-01-01 VITALS
SYSTOLIC BLOOD PRESSURE: 135 MMHG | HEART RATE: 70 BPM | WEIGHT: 225 LBS | TEMPERATURE: 96.6 F | BODY MASS INDEX: 27.03 KG/M2 | DIASTOLIC BLOOD PRESSURE: 90 MMHG

## 2021-01-01 VITALS
SYSTOLIC BLOOD PRESSURE: 130 MMHG | OXYGEN SATURATION: 97 % | BODY MASS INDEX: 27.75 KG/M2 | DIASTOLIC BLOOD PRESSURE: 80 MMHG | HEART RATE: 63 BPM | WEIGHT: 228 LBS

## 2021-01-01 VITALS — SYSTOLIC BLOOD PRESSURE: 152 MMHG | RESPIRATION RATE: 16 BRPM | HEART RATE: 89 BPM | DIASTOLIC BLOOD PRESSURE: 89 MMHG

## 2021-01-01 VITALS
SYSTOLIC BLOOD PRESSURE: 120 MMHG | WEIGHT: 229 LBS | HEART RATE: 75 BPM | TEMPERATURE: 97.2 F | OXYGEN SATURATION: 96 % | DIASTOLIC BLOOD PRESSURE: 78 MMHG | BODY MASS INDEX: 27.87 KG/M2

## 2021-01-01 VITALS
BODY MASS INDEX: 25.14 KG/M2 | OXYGEN SATURATION: 94 % | RESPIRATION RATE: 16 BRPM | SYSTOLIC BLOOD PRESSURE: 105 MMHG | HEART RATE: 86 BPM | WEIGHT: 206.44 LBS | TEMPERATURE: 97.2 F | HEIGHT: 76 IN | DIASTOLIC BLOOD PRESSURE: 69 MMHG

## 2021-01-01 VITALS
TEMPERATURE: 97.5 F | DIASTOLIC BLOOD PRESSURE: 89 MMHG | HEART RATE: 95 BPM | DIASTOLIC BLOOD PRESSURE: 90 MMHG | RESPIRATION RATE: 16 BRPM | SYSTOLIC BLOOD PRESSURE: 133 MMHG | SYSTOLIC BLOOD PRESSURE: 135 MMHG | RESPIRATION RATE: 16 BRPM | SYSTOLIC BLOOD PRESSURE: 124 MMHG | TEMPERATURE: 96.8 F | HEART RATE: 105 BPM | RESPIRATION RATE: 18 BRPM | TEMPERATURE: 97.8 F | HEART RATE: 96 BPM | DIASTOLIC BLOOD PRESSURE: 84 MMHG

## 2021-01-01 VITALS
SYSTOLIC BLOOD PRESSURE: 140 MMHG | HEIGHT: 76 IN | BODY MASS INDEX: 28.25 KG/M2 | HEART RATE: 75 BPM | DIASTOLIC BLOOD PRESSURE: 80 MMHG | OXYGEN SATURATION: 95 % | WEIGHT: 232 LBS

## 2021-01-01 VITALS
OXYGEN SATURATION: 93 % | TEMPERATURE: 97.9 F | BODY MASS INDEX: 27.53 KG/M2 | HEART RATE: 83 BPM | HEIGHT: 76 IN | DIASTOLIC BLOOD PRESSURE: 87 MMHG | SYSTOLIC BLOOD PRESSURE: 152 MMHG | WEIGHT: 226.1 LBS | RESPIRATION RATE: 18 BRPM

## 2021-01-01 VITALS
SYSTOLIC BLOOD PRESSURE: 118 MMHG | OXYGEN SATURATION: 98 % | WEIGHT: 206 LBS | HEART RATE: 83 BPM | DIASTOLIC BLOOD PRESSURE: 70 MMHG | BODY MASS INDEX: 24.75 KG/M2

## 2021-01-01 VITALS
SYSTOLIC BLOOD PRESSURE: 144 MMHG | HEART RATE: 88 BPM | RESPIRATION RATE: 16 BRPM | DIASTOLIC BLOOD PRESSURE: 87 MMHG | TEMPERATURE: 97.3 F

## 2021-01-01 VITALS
HEART RATE: 48 BPM | RESPIRATION RATE: 16 BRPM | TEMPERATURE: 96.8 F | DIASTOLIC BLOOD PRESSURE: 78 MMHG | SYSTOLIC BLOOD PRESSURE: 119 MMHG

## 2021-01-01 VITALS
RESPIRATION RATE: 18 BRPM | BODY MASS INDEX: 29.17 KG/M2 | HEART RATE: 96 BPM | SYSTOLIC BLOOD PRESSURE: 134 MMHG | WEIGHT: 239.6 LBS | DIASTOLIC BLOOD PRESSURE: 91 MMHG | TEMPERATURE: 97.3 F

## 2021-01-01 VITALS — WEIGHT: 225 LBS | HEIGHT: 77 IN | BODY MASS INDEX: 26.57 KG/M2

## 2021-01-01 VITALS
HEART RATE: 50 BPM | TEMPERATURE: 97.8 F | SYSTOLIC BLOOD PRESSURE: 144 MMHG | RESPIRATION RATE: 16 BRPM | DIASTOLIC BLOOD PRESSURE: 92 MMHG

## 2021-01-01 VITALS — SYSTOLIC BLOOD PRESSURE: 140 MMHG | DIASTOLIC BLOOD PRESSURE: 92 MMHG | RESPIRATION RATE: 16 BRPM | HEART RATE: 103 BPM

## 2021-01-01 VITALS — RESPIRATION RATE: 16 BRPM | HEART RATE: 108 BPM | DIASTOLIC BLOOD PRESSURE: 94 MMHG | SYSTOLIC BLOOD PRESSURE: 141 MMHG

## 2021-01-01 VITALS
BODY MASS INDEX: 26.9 KG/M2 | WEIGHT: 221 LBS | DIASTOLIC BLOOD PRESSURE: 78 MMHG | OXYGEN SATURATION: 98 % | HEART RATE: 100 BPM | SYSTOLIC BLOOD PRESSURE: 130 MMHG

## 2021-01-01 VITALS — HEART RATE: 112 BPM | DIASTOLIC BLOOD PRESSURE: 104 MMHG | SYSTOLIC BLOOD PRESSURE: 158 MMHG | RESPIRATION RATE: 16 BRPM

## 2021-01-01 DIAGNOSIS — R79.89 ELEVATED D-DIMER: Primary | ICD-10-CM

## 2021-01-01 DIAGNOSIS — I87.2 PERIPHERAL VENOUS INSUFFICIENCY: ICD-10-CM

## 2021-01-01 DIAGNOSIS — M79.89 RIGHT LEG SWELLING: ICD-10-CM

## 2021-01-01 DIAGNOSIS — L97.912 NON-PRESSURE CHRONIC ULCER OF RIGHT LOWER LEG WITH FAT LAYER EXPOSED (HCC): ICD-10-CM

## 2021-01-01 DIAGNOSIS — R63.4 UNINTENTIONAL WEIGHT LOSS: ICD-10-CM

## 2021-01-01 DIAGNOSIS — I73.9 PERIPHERAL VASCULAR DISEASE, UNSPECIFIED (HCC): Primary | ICD-10-CM

## 2021-01-01 DIAGNOSIS — L03.115 CELLULITIS OF RIGHT LOWER EXTREMITY: Primary | ICD-10-CM

## 2021-01-01 DIAGNOSIS — R60.0 LOWER EXTREMITY EDEMA: ICD-10-CM

## 2021-01-01 DIAGNOSIS — L97.912 NON-PRESSURE CHRONIC ULCER OF RIGHT LOWER LEG WITH FAT LAYER EXPOSED (HCC): Primary | ICD-10-CM

## 2021-01-01 DIAGNOSIS — I50.22 CHRONIC SYSTOLIC HEART FAILURE (HCC): Primary | ICD-10-CM

## 2021-01-01 DIAGNOSIS — R60.0 LOWER LEG EDEMA: ICD-10-CM

## 2021-01-01 DIAGNOSIS — L98.9 SKIN LESION OF LEFT LEG: Primary | ICD-10-CM

## 2021-01-01 DIAGNOSIS — R53.83 OTHER FATIGUE: ICD-10-CM

## 2021-01-01 DIAGNOSIS — I70.248 ATHEROSCLEROSIS OF NATIVE ARTERIES OF LEFT LEG WITH ULCERATION OF OTHER PART OF LOWER LEG (HCC): ICD-10-CM

## 2021-01-01 DIAGNOSIS — I70.232 ATHEROSCLEROSIS OF NATIVE ARTERIES OF RIGHT LEG WITH ULCERATION OF CALF (HCC): ICD-10-CM

## 2021-01-01 DIAGNOSIS — I10 HYPERTENSION, ESSENTIAL: ICD-10-CM

## 2021-01-01 DIAGNOSIS — R09.89 DECREASED PULSES IN FEET: ICD-10-CM

## 2021-01-01 DIAGNOSIS — R26.81 UNSTEADY GAIT: Primary | ICD-10-CM

## 2021-01-01 DIAGNOSIS — I10 ESSENTIAL HYPERTENSION: ICD-10-CM

## 2021-01-01 DIAGNOSIS — I50.22 CHRONIC SYSTOLIC HEART FAILURE (HCC): ICD-10-CM

## 2021-01-01 DIAGNOSIS — I48.91 ATRIAL FIBRILLATION WITH RVR (HCC): Primary | ICD-10-CM

## 2021-01-01 DIAGNOSIS — L03.115 CELLULITIS OF RIGHT LOWER EXTREMITY: ICD-10-CM

## 2021-01-01 DIAGNOSIS — L97.922 NON-PRESSURE CHRONIC ULCER OF LEFT LOWER LEG WITH FAT LAYER EXPOSED (HCC): ICD-10-CM

## 2021-01-01 DIAGNOSIS — A41.9 SEPTICEMIA (HCC): ICD-10-CM

## 2021-01-01 DIAGNOSIS — I82.4Y1 ACUTE DEEP VEIN THROMBOSIS (DVT) OF PROXIMAL VEIN OF RIGHT LOWER EXTREMITY (HCC): Primary | ICD-10-CM

## 2021-01-01 DIAGNOSIS — R09.89 DECREASED PULSES IN FEET: Primary | ICD-10-CM

## 2021-01-01 DIAGNOSIS — E55.9 HYPOVITAMINOSIS D: ICD-10-CM

## 2021-01-01 DIAGNOSIS — L02.419 CELLULITIS AND ABSCESS OF LEG: ICD-10-CM

## 2021-01-01 DIAGNOSIS — I82.4Y1 ACUTE DEEP VEIN THROMBOSIS (DVT) OF PROXIMAL VEIN OF RIGHT LOWER EXTREMITY (HCC): ICD-10-CM

## 2021-01-01 DIAGNOSIS — L03.116 CELLULITIS OF LEFT LOWER EXTREMITY: ICD-10-CM

## 2021-01-01 DIAGNOSIS — I50.41 ACUTE COMBINED SYSTOLIC AND DIASTOLIC (CONGESTIVE) HRT FAIL (HCC): ICD-10-CM

## 2021-01-01 DIAGNOSIS — L98.9 SKIN LESION OF LEFT LEG: ICD-10-CM

## 2021-01-01 DIAGNOSIS — N28.9 RENAL INSUFFICIENCY: ICD-10-CM

## 2021-01-01 DIAGNOSIS — I50.41 ACUTE COMBINED SYSTOLIC AND DIASTOLIC (CONGESTIVE) HRT FAIL (HCC): Primary | ICD-10-CM

## 2021-01-01 DIAGNOSIS — R41.0 DELIRIUM: ICD-10-CM

## 2021-01-01 DIAGNOSIS — R77.8 ELEVATED TROPONIN: ICD-10-CM

## 2021-01-01 DIAGNOSIS — L98.9 SKIN LESION OF RIGHT LEG: ICD-10-CM

## 2021-01-01 DIAGNOSIS — E03.9 HYPOTHYROIDISM, UNSPECIFIED TYPE: Primary | ICD-10-CM

## 2021-01-01 DIAGNOSIS — R60.0 LOWER LEG EDEMA: Primary | ICD-10-CM

## 2021-01-01 DIAGNOSIS — I96 GANGRENE OF RIGHT FOOT (HCC): Primary | ICD-10-CM

## 2021-01-01 DIAGNOSIS — R79.89 ELEVATED D-DIMER: ICD-10-CM

## 2021-01-01 DIAGNOSIS — R60.0 LOWER EXTREMITY EDEMA: Primary | ICD-10-CM

## 2021-01-01 DIAGNOSIS — I70.248 ATHEROSCLEROSIS OF NATIVE ARTERIES OF LEFT LEG WITH ULCERATION OF OTHER PART OF LOWER LEG (HCC): Primary | ICD-10-CM

## 2021-01-01 DIAGNOSIS — I87.2 PERIPHERAL VENOUS INSUFFICIENCY: Primary | ICD-10-CM

## 2021-01-01 DIAGNOSIS — R79.89 ELEVATED SERUM CREATININE: ICD-10-CM

## 2021-01-01 DIAGNOSIS — L03.119 CELLULITIS AND ABSCESS OF LEG: ICD-10-CM

## 2021-01-01 LAB
A/G RATIO: 0.8 (ref 1.1–2.2)
A/G RATIO: 1 (ref 1.1–2.2)
A/G RATIO: 1.4 (ref 1.1–2.2)
ALBUMIN SERPL-MCNC: 2.8 G/DL (ref 3.4–5)
ALBUMIN SERPL-MCNC: 3.4 G/DL (ref 3.4–5)
ALBUMIN SERPL-MCNC: 3.9 G/DL (ref 3.4–5)
ALP BLD-CCNC: 114 U/L (ref 40–129)
ALP BLD-CCNC: 76 U/L (ref 40–129)
ALP BLD-CCNC: 84 U/L (ref 40–129)
ALT SERPL-CCNC: 11 U/L (ref 10–40)
ALT SERPL-CCNC: 11 U/L (ref 10–40)
ALT SERPL-CCNC: 9 U/L (ref 10–40)
AMMONIA: <10 UMOL/L (ref 16–60)
ANION GAP SERPL CALCULATED.3IONS-SCNC: 10 MMOL/L (ref 3–16)
ANION GAP SERPL CALCULATED.3IONS-SCNC: 11 MMOL/L (ref 3–16)
ANION GAP SERPL CALCULATED.3IONS-SCNC: 15 MMOL/L (ref 3–16)
ANION GAP SERPL CALCULATED.3IONS-SCNC: 16 MMOL/L (ref 3–16)
ANION GAP SERPL CALCULATED.3IONS-SCNC: 19 MMOL/L (ref 3–16)
ANION GAP SERPL CALCULATED.3IONS-SCNC: 5 MMOL/L (ref 3–16)
ANION GAP SERPL CALCULATED.3IONS-SCNC: 7 MMOL/L (ref 3–16)
ANION GAP SERPL CALCULATED.3IONS-SCNC: 7 MMOL/L (ref 3–16)
ANION GAP SERPL CALCULATED.3IONS-SCNC: 8 MMOL/L (ref 3–16)
ANION GAP SERPL CALCULATED.3IONS-SCNC: 9 MMOL/L (ref 3–16)
ANION GAP SERPL CALCULATED.3IONS-SCNC: 9 MMOL/L (ref 3–16)
APTT: 43.7 SEC (ref 26.2–38.6)
AST SERPL-CCNC: 21 U/L (ref 15–37)
AST SERPL-CCNC: 24 U/L (ref 15–37)
AST SERPL-CCNC: 25 U/L (ref 15–37)
BASOPHILS ABSOLUTE: 0 K/UL (ref 0–0.2)
BASOPHILS RELATIVE PERCENT: 0.1 %
BASOPHILS RELATIVE PERCENT: 0.3 %
BASOPHILS RELATIVE PERCENT: 0.4 %
BASOPHILS RELATIVE PERCENT: 0.5 %
BASOPHILS RELATIVE PERCENT: 0.5 %
BASOPHILS RELATIVE PERCENT: 0.6 %
BASOPHILS RELATIVE PERCENT: 0.6 %
BASOPHILS RELATIVE PERCENT: 0.7 %
BILIRUB SERPL-MCNC: 1 MG/DL (ref 0–1)
BILIRUB SERPL-MCNC: 1.6 MG/DL (ref 0–1)
BILIRUB SERPL-MCNC: 2.8 MG/DL (ref 0–1)
BILIRUBIN URINE: ABNORMAL
BLOOD CULTURE, ROUTINE: ABNORMAL
BLOOD CULTURE, ROUTINE: ABNORMAL
BLOOD CULTURE, ROUTINE: NORMAL
BLOOD, URINE: NEGATIVE
BUN BLDV-MCNC: 16 MG/DL (ref 7–20)
BUN BLDV-MCNC: 17 MG/DL (ref 7–20)
BUN BLDV-MCNC: 19 MG/DL (ref 7–20)
BUN BLDV-MCNC: 21 MG/DL (ref 7–20)
BUN BLDV-MCNC: 22 MG/DL (ref 7–20)
BUN BLDV-MCNC: 24 MG/DL (ref 7–20)
BUN BLDV-MCNC: 29 MG/DL (ref 7–20)
BUN BLDV-MCNC: 30 MG/DL (ref 7–20)
BUN BLDV-MCNC: 30 MG/DL (ref 7–20)
BUN BLDV-MCNC: 31 MG/DL (ref 7–20)
BUN BLDV-MCNC: 31 MG/DL (ref 7–20)
BUN BLDV-MCNC: 32 MG/DL (ref 7–20)
BUN BLDV-MCNC: 32 MG/DL (ref 7–20)
BUN BLDV-MCNC: 37 MG/DL (ref 7–20)
BUN BLDV-MCNC: 41 MG/DL (ref 7–20)
BUN BLDV-MCNC: 43 MG/DL (ref 7–20)
BUN BLDV-MCNC: 43 MG/DL (ref 7–20)
C-REACTIVE PROTEIN: 152 MG/L (ref 0–5.1)
C-REACTIVE PROTEIN: 75 MG/L (ref 0–5.1)
CALCIUM SERPL-MCNC: 10 MG/DL (ref 8.3–10.6)
CALCIUM SERPL-MCNC: 7.7 MG/DL (ref 8.3–10.6)
CALCIUM SERPL-MCNC: 7.7 MG/DL (ref 8.3–10.6)
CALCIUM SERPL-MCNC: 7.8 MG/DL (ref 8.3–10.6)
CALCIUM SERPL-MCNC: 7.9 MG/DL (ref 8.3–10.6)
CALCIUM SERPL-MCNC: 8 MG/DL (ref 8.3–10.6)
CALCIUM SERPL-MCNC: 8 MG/DL (ref 8.3–10.6)
CALCIUM SERPL-MCNC: 8.1 MG/DL (ref 8.3–10.6)
CALCIUM SERPL-MCNC: 8.2 MG/DL (ref 8.3–10.6)
CALCIUM SERPL-MCNC: 8.3 MG/DL (ref 8.3–10.6)
CALCIUM SERPL-MCNC: 8.3 MG/DL (ref 8.3–10.6)
CALCIUM SERPL-MCNC: 8.5 MG/DL (ref 8.3–10.6)
CALCIUM SERPL-MCNC: 8.9 MG/DL (ref 8.3–10.6)
CALCIUM SERPL-MCNC: 9.4 MG/DL (ref 8.3–10.6)
CHLORIDE BLD-SCNC: 100 MMOL/L (ref 99–110)
CHLORIDE BLD-SCNC: 100 MMOL/L (ref 99–110)
CHLORIDE BLD-SCNC: 101 MMOL/L (ref 99–110)
CHLORIDE BLD-SCNC: 102 MMOL/L (ref 99–110)
CHLORIDE BLD-SCNC: 103 MMOL/L (ref 99–110)
CHLORIDE BLD-SCNC: 104 MMOL/L (ref 99–110)
CHLORIDE BLD-SCNC: 104 MMOL/L (ref 99–110)
CHLORIDE BLD-SCNC: 105 MMOL/L (ref 99–110)
CHLORIDE BLD-SCNC: 106 MMOL/L (ref 99–110)
CHLORIDE BLD-SCNC: 107 MMOL/L (ref 99–110)
CHLORIDE BLD-SCNC: 98 MMOL/L (ref 99–110)
CHLORIDE BLD-SCNC: 99 MMOL/L (ref 99–110)
CLARITY: CLEAR
CO2: 22 MMOL/L (ref 21–32)
CO2: 23 MMOL/L (ref 21–32)
CO2: 23 MMOL/L (ref 21–32)
CO2: 24 MMOL/L (ref 21–32)
CO2: 25 MMOL/L (ref 21–32)
CO2: 26 MMOL/L (ref 21–32)
CO2: 27 MMOL/L (ref 21–32)
CO2: 28 MMOL/L (ref 21–32)
CO2: 29 MMOL/L (ref 21–32)
CO2: 30 MMOL/L (ref 21–32)
CO2: 31 MMOL/L (ref 21–32)
CO2: 31 MMOL/L (ref 21–32)
CO2: 32 MMOL/L (ref 21–32)
COLOR: ABNORMAL
CORTISOL - AM: 13.3 UG/DL (ref 4.3–22.4)
CREAT SERPL-MCNC: 0.7 MG/DL (ref 0.8–1.3)
CREAT SERPL-MCNC: 0.8 MG/DL (ref 0.8–1.3)
CREAT SERPL-MCNC: 0.9 MG/DL (ref 0.8–1.3)
CREAT SERPL-MCNC: 1 MG/DL (ref 0.8–1.3)
CREAT SERPL-MCNC: 1.1 MG/DL (ref 0.8–1.3)
CREAT SERPL-MCNC: 1.1 MG/DL (ref 0.8–1.3)
CREAT SERPL-MCNC: 1.2 MG/DL (ref 0.8–1.3)
CREAT SERPL-MCNC: 1.4 MG/DL (ref 0.8–1.3)
CREAT SERPL-MCNC: 1.5 MG/DL (ref 0.8–1.3)
CRENATED RBC'S: ABNORMAL
CULTURE, BLOOD 2: ABNORMAL
CULTURE, BLOOD 2: NORMAL
D DIMER: 2134 NG/ML DDU (ref 0–229)
D DIMER: 4054 NG/ML DDU (ref 0–229)
EKG ATRIAL RATE: 141 BPM
EKG ATRIAL RATE: 41 BPM
EKG ATRIAL RATE: 75 BPM
EKG DIAGNOSIS: NORMAL
EKG P AXIS: 35 DEGREES
EKG P-R INTERVAL: 170 MS
EKG Q-T INTERVAL: 288 MS
EKG Q-T INTERVAL: 396 MS
EKG Q-T INTERVAL: 418 MS
EKG QRS DURATION: 118 MS
EKG QRS DURATION: 122 MS
EKG QRS DURATION: 126 MS
EKG QTC CALCULATION (BAZETT): 430 MS
EKG QTC CALCULATION (BAZETT): 473 MS
EKG QTC CALCULATION (BAZETT): 500 MS
EKG R AXIS: -46 DEGREES
EKG R AXIS: -47 DEGREES
EKG R AXIS: -66 DEGREES
EKG T AXIS: 104 DEGREES
EKG T AXIS: 111 DEGREES
EKG T AXIS: 124 DEGREES
EKG VENTRICULAR RATE: 134 BPM
EKG VENTRICULAR RATE: 86 BPM
EKG VENTRICULAR RATE: 86 BPM
EOSINOPHILS ABSOLUTE: 0 K/UL (ref 0–0.6)
EOSINOPHILS ABSOLUTE: 0.1 K/UL (ref 0–0.6)
EOSINOPHILS RELATIVE PERCENT: 0 %
EOSINOPHILS RELATIVE PERCENT: 0.3 %
EOSINOPHILS RELATIVE PERCENT: 0.4 %
EOSINOPHILS RELATIVE PERCENT: 0.5 %
EOSINOPHILS RELATIVE PERCENT: 0.5 %
EOSINOPHILS RELATIVE PERCENT: 0.6 %
EOSINOPHILS RELATIVE PERCENT: 0.7 %
EOSINOPHILS RELATIVE PERCENT: 0.8 %
EOSINOPHILS RELATIVE PERCENT: 0.8 %
EOSINOPHILS RELATIVE PERCENT: 1.1 %
EOSINOPHILS RELATIVE PERCENT: 1.2 %
EOSINOPHILS RELATIVE PERCENT: 1.3 %
EPITHELIAL CELLS, UA: 1 /HPF (ref 0–5)
ESTIMATED AVERAGE GLUCOSE: 137 MG/DL
FOLATE: 4.9 NG/ML (ref 4.78–24.2)
GFR AFRICAN AMERICAN: 54
GFR AFRICAN AMERICAN: 59
GFR AFRICAN AMERICAN: >60
GFR NON-AFRICAN AMERICAN: 45
GFR NON-AFRICAN AMERICAN: 48
GFR NON-AFRICAN AMERICAN: 58
GFR NON-AFRICAN AMERICAN: >60
GLOBULIN: 2.7 G/DL
GLOBULIN: 3.3 G/DL
GLOBULIN: 3.6 G/DL
GLUCOSE BLD-MCNC: 101 MG/DL (ref 70–99)
GLUCOSE BLD-MCNC: 101 MG/DL (ref 70–99)
GLUCOSE BLD-MCNC: 103 MG/DL (ref 70–99)
GLUCOSE BLD-MCNC: 104 MG/DL (ref 70–99)
GLUCOSE BLD-MCNC: 105 MG/DL (ref 70–99)
GLUCOSE BLD-MCNC: 107 MG/DL (ref 70–99)
GLUCOSE BLD-MCNC: 108 MG/DL (ref 70–99)
GLUCOSE BLD-MCNC: 109 MG/DL (ref 70–99)
GLUCOSE BLD-MCNC: 111 MG/DL (ref 70–99)
GLUCOSE BLD-MCNC: 112 MG/DL (ref 70–99)
GLUCOSE BLD-MCNC: 112 MG/DL (ref 70–99)
GLUCOSE BLD-MCNC: 113 MG/DL (ref 70–99)
GLUCOSE BLD-MCNC: 114 MG/DL (ref 70–99)
GLUCOSE BLD-MCNC: 116 MG/DL (ref 70–99)
GLUCOSE BLD-MCNC: 119 MG/DL (ref 70–99)
GLUCOSE BLD-MCNC: 122 MG/DL (ref 70–99)
GLUCOSE BLD-MCNC: 124 MG/DL (ref 70–99)
GLUCOSE BLD-MCNC: 125 MG/DL (ref 70–99)
GLUCOSE BLD-MCNC: 127 MG/DL (ref 70–99)
GLUCOSE BLD-MCNC: 128 MG/DL (ref 70–99)
GLUCOSE BLD-MCNC: 140 MG/DL (ref 70–99)
GLUCOSE BLD-MCNC: 154 MG/DL (ref 70–99)
GLUCOSE BLD-MCNC: 171 MG/DL (ref 70–99)
GLUCOSE BLD-MCNC: 172 MG/DL (ref 70–99)
GLUCOSE BLD-MCNC: 187 MG/DL (ref 70–99)
GLUCOSE BLD-MCNC: 190 MG/DL (ref 70–99)
GLUCOSE BLD-MCNC: 22 MG/DL (ref 70–99)
GLUCOSE BLD-MCNC: 294 MG/DL (ref 70–99)
GLUCOSE BLD-MCNC: 34 MG/DL (ref 70–99)
GLUCOSE BLD-MCNC: 35 MG/DL (ref 70–99)
GLUCOSE BLD-MCNC: 42 MG/DL (ref 70–99)
GLUCOSE BLD-MCNC: 43 MG/DL (ref 70–99)
GLUCOSE BLD-MCNC: 51 MG/DL (ref 70–99)
GLUCOSE BLD-MCNC: 53 MG/DL (ref 70–99)
GLUCOSE BLD-MCNC: 56 MG/DL (ref 70–99)
GLUCOSE BLD-MCNC: 58 MG/DL (ref 70–99)
GLUCOSE BLD-MCNC: 59 MG/DL (ref 70–99)
GLUCOSE BLD-MCNC: 61 MG/DL (ref 70–99)
GLUCOSE BLD-MCNC: 63 MG/DL (ref 70–99)
GLUCOSE BLD-MCNC: 66 MG/DL (ref 70–99)
GLUCOSE BLD-MCNC: 69 MG/DL (ref 70–99)
GLUCOSE BLD-MCNC: 69 MG/DL (ref 70–99)
GLUCOSE BLD-MCNC: 70 MG/DL (ref 70–99)
GLUCOSE BLD-MCNC: 73 MG/DL (ref 70–99)
GLUCOSE BLD-MCNC: 73 MG/DL (ref 70–99)
GLUCOSE BLD-MCNC: 75 MG/DL (ref 70–99)
GLUCOSE BLD-MCNC: 76 MG/DL (ref 70–99)
GLUCOSE BLD-MCNC: 76 MG/DL (ref 70–99)
GLUCOSE BLD-MCNC: 78 MG/DL (ref 70–99)
GLUCOSE BLD-MCNC: 79 MG/DL (ref 70–99)
GLUCOSE BLD-MCNC: 79 MG/DL (ref 70–99)
GLUCOSE BLD-MCNC: 81 MG/DL (ref 70–99)
GLUCOSE BLD-MCNC: 83 MG/DL (ref 70–99)
GLUCOSE BLD-MCNC: 83 MG/DL (ref 70–99)
GLUCOSE BLD-MCNC: 86 MG/DL (ref 70–99)
GLUCOSE BLD-MCNC: 86 MG/DL (ref 70–99)
GLUCOSE BLD-MCNC: 87 MG/DL (ref 70–99)
GLUCOSE BLD-MCNC: 88 MG/DL (ref 70–99)
GLUCOSE BLD-MCNC: 89 MG/DL (ref 70–99)
GLUCOSE BLD-MCNC: 90 MG/DL (ref 70–99)
GLUCOSE BLD-MCNC: 90 MG/DL (ref 70–99)
GLUCOSE BLD-MCNC: 91 MG/DL (ref 70–99)
GLUCOSE BLD-MCNC: 91 MG/DL (ref 70–99)
GLUCOSE BLD-MCNC: 92 MG/DL (ref 70–99)
GLUCOSE BLD-MCNC: 94 MG/DL (ref 70–99)
GLUCOSE BLD-MCNC: 94 MG/DL (ref 70–99)
GLUCOSE BLD-MCNC: 97 MG/DL (ref 70–99)
GLUCOSE BLD-MCNC: 98 MG/DL (ref 70–99)
GLUCOSE BLD-MCNC: 99 MG/DL (ref 70–99)
GLUCOSE URINE: NEGATIVE MG/DL
HBA1C MFR BLD: 6.4 %
HCT VFR BLD CALC: 32.1 % (ref 40.5–52.5)
HCT VFR BLD CALC: 32.8 % (ref 40.5–52.5)
HCT VFR BLD CALC: 33.1 % (ref 40.5–52.5)
HCT VFR BLD CALC: 33.5 % (ref 40.5–52.5)
HCT VFR BLD CALC: 33.6 % (ref 40.5–52.5)
HCT VFR BLD CALC: 34.5 % (ref 40.5–52.5)
HCT VFR BLD CALC: 35.4 % (ref 40.5–52.5)
HCT VFR BLD CALC: 36.8 % (ref 40.5–52.5)
HCT VFR BLD CALC: 38.5 % (ref 40.5–52.5)
HCT VFR BLD CALC: 38.5 % (ref 40.5–52.5)
HCT VFR BLD CALC: 39.6 % (ref 40.5–52.5)
HCT VFR BLD CALC: 40.5 % (ref 40.5–52.5)
HCT VFR BLD CALC: 42.7 % (ref 40.5–52.5)
HCT VFR BLD CALC: 43.1 % (ref 40.5–52.5)
HCT VFR BLD CALC: 43.6 % (ref 40.5–52.5)
HCT VFR BLD CALC: 44.8 % (ref 40.5–52.5)
HCT VFR BLD CALC: 45.1 % (ref 40.5–52.5)
HCT VFR BLD CALC: 46.1 % (ref 40.5–52.5)
HCT VFR BLD CALC: 50.2 % (ref 40.5–52.5)
HCT VFR BLD CALC: 52.7 % (ref 40.5–52.5)
HEMOGLOBIN: 10.5 G/DL (ref 13.5–17.5)
HEMOGLOBIN: 10.8 G/DL (ref 13.5–17.5)
HEMOGLOBIN: 10.8 G/DL (ref 13.5–17.5)
HEMOGLOBIN: 10.9 G/DL (ref 13.5–17.5)
HEMOGLOBIN: 10.9 G/DL (ref 13.5–17.5)
HEMOGLOBIN: 11.2 G/DL (ref 13.5–17.5)
HEMOGLOBIN: 11.3 G/DL (ref 13.5–17.5)
HEMOGLOBIN: 11.8 G/DL (ref 13.5–17.5)
HEMOGLOBIN: 12.3 G/DL (ref 13.5–17.5)
HEMOGLOBIN: 12.5 G/DL (ref 13.5–17.5)
HEMOGLOBIN: 12.5 G/DL (ref 13.5–17.5)
HEMOGLOBIN: 12.7 G/DL (ref 13.5–17.5)
HEMOGLOBIN: 13.8 G/DL (ref 13.5–17.5)
HEMOGLOBIN: 14 G/DL (ref 13.5–17.5)
HEMOGLOBIN: 14 G/DL (ref 13.5–17.5)
HEMOGLOBIN: 14.3 G/DL (ref 13.5–17.5)
HEMOGLOBIN: 14.6 G/DL (ref 13.5–17.5)
HEMOGLOBIN: 14.9 G/DL (ref 13.5–17.5)
HEMOGLOBIN: 16 G/DL (ref 13.5–17.5)
HEMOGLOBIN: 16.7 G/DL (ref 13.5–17.5)
HYALINE CASTS: 9 /LPF (ref 0–8)
IGF-1 (INSULIN-LIKE GROWTH I): 49 NG/ML (ref 16–177)
INR BLD: 1.94 (ref 0.88–1.12)
INSULIN COMMENT: NORMAL
INSULIN REFERENCE RANGE:: NORMAL
INSULIN-LIKE GROWTH FACTOR-1 Z-SCORE: -0.8
INSULIN: 29.7 MU/L
IRON SATURATION: 35 % (ref 20–50)
IRON: 39 UG/DL (ref 59–158)
KETONES, URINE: NEGATIVE MG/DL
LACTIC ACID, SEPSIS: 1.2 MMOL/L (ref 0.4–1.9)
LACTIC ACID, SEPSIS: 1.2 MMOL/L (ref 0.4–1.9)
LACTIC ACID, SEPSIS: 1.6 MMOL/L (ref 0.4–1.9)
LACTIC ACID, SEPSIS: 2.6 MMOL/L (ref 0.4–1.9)
LACTIC ACID, SEPSIS: 3.7 MMOL/L (ref 0.4–1.9)
LACTIC ACID: 2.4 MMOL/L (ref 0.4–2)
LACTIC ACID: 2.5 MMOL/L (ref 0.4–2)
LEUKOCYTE ESTERASE, URINE: NEGATIVE
LV EF: 18 %
LVEF MODALITY: NORMAL
LYMPHOCYTES ABSOLUTE: 0.6 K/UL (ref 1–5.1)
LYMPHOCYTES ABSOLUTE: 0.6 K/UL (ref 1–5.1)
LYMPHOCYTES ABSOLUTE: 0.8 K/UL (ref 1–5.1)
LYMPHOCYTES ABSOLUTE: 0.9 K/UL (ref 1–5.1)
LYMPHOCYTES ABSOLUTE: 1 K/UL (ref 1–5.1)
LYMPHOCYTES ABSOLUTE: 1.1 K/UL (ref 1–5.1)
LYMPHOCYTES ABSOLUTE: 1.3 K/UL (ref 1–5.1)
LYMPHOCYTES RELATIVE PERCENT: 11.4 %
LYMPHOCYTES RELATIVE PERCENT: 12.7 %
LYMPHOCYTES RELATIVE PERCENT: 13.9 %
LYMPHOCYTES RELATIVE PERCENT: 15.8 %
LYMPHOCYTES RELATIVE PERCENT: 16.2 %
LYMPHOCYTES RELATIVE PERCENT: 17 %
LYMPHOCYTES RELATIVE PERCENT: 17.1 %
LYMPHOCYTES RELATIVE PERCENT: 18 %
LYMPHOCYTES RELATIVE PERCENT: 18.3 %
LYMPHOCYTES RELATIVE PERCENT: 19 %
LYMPHOCYTES RELATIVE PERCENT: 19.3 %
LYMPHOCYTES RELATIVE PERCENT: 35.1 %
LYMPHOCYTES RELATIVE PERCENT: 8.4 %
LYMPHOCYTES RELATIVE PERCENT: 8.5 %
MAGNESIUM: 2 MG/DL (ref 1.8–2.4)
MCH RBC QN AUTO: 28.2 PG (ref 26–34)
MCH RBC QN AUTO: 28.4 PG (ref 26–34)
MCH RBC QN AUTO: 28.5 PG (ref 26–34)
MCH RBC QN AUTO: 28.6 PG (ref 26–34)
MCH RBC QN AUTO: 28.6 PG (ref 26–34)
MCH RBC QN AUTO: 28.7 PG (ref 26–34)
MCH RBC QN AUTO: 28.8 PG (ref 26–34)
MCH RBC QN AUTO: 28.8 PG (ref 26–34)
MCH RBC QN AUTO: 28.9 PG (ref 26–34)
MCH RBC QN AUTO: 29 PG (ref 26–34)
MCH RBC QN AUTO: 29.1 PG (ref 26–34)
MCH RBC QN AUTO: 29.1 PG (ref 26–34)
MCHC RBC AUTO-ENTMCNC: 31 G/DL (ref 31–36)
MCHC RBC AUTO-ENTMCNC: 31.7 G/DL (ref 31–36)
MCHC RBC AUTO-ENTMCNC: 31.8 G/DL (ref 31–36)
MCHC RBC AUTO-ENTMCNC: 31.9 G/DL (ref 31–36)
MCHC RBC AUTO-ENTMCNC: 32 G/DL (ref 31–36)
MCHC RBC AUTO-ENTMCNC: 32.1 G/DL (ref 31–36)
MCHC RBC AUTO-ENTMCNC: 32.1 G/DL (ref 31–36)
MCHC RBC AUTO-ENTMCNC: 32.2 G/DL (ref 31–36)
MCHC RBC AUTO-ENTMCNC: 32.3 G/DL (ref 31–36)
MCHC RBC AUTO-ENTMCNC: 32.3 G/DL (ref 31–36)
MCHC RBC AUTO-ENTMCNC: 32.4 G/DL (ref 31–36)
MCHC RBC AUTO-ENTMCNC: 32.5 G/DL (ref 31–36)
MCHC RBC AUTO-ENTMCNC: 32.7 G/DL (ref 31–36)
MCHC RBC AUTO-ENTMCNC: 32.9 G/DL (ref 31–36)
MCHC RBC AUTO-ENTMCNC: 33 G/DL (ref 31–36)
MCV RBC AUTO: 87.7 FL (ref 80–100)
MCV RBC AUTO: 88.1 FL (ref 80–100)
MCV RBC AUTO: 88.1 FL (ref 80–100)
MCV RBC AUTO: 88.3 FL (ref 80–100)
MCV RBC AUTO: 88.5 FL (ref 80–100)
MCV RBC AUTO: 88.5 FL (ref 80–100)
MCV RBC AUTO: 88.6 FL (ref 80–100)
MCV RBC AUTO: 88.9 FL (ref 80–100)
MCV RBC AUTO: 88.9 FL (ref 80–100)
MCV RBC AUTO: 89.2 FL (ref 80–100)
MCV RBC AUTO: 89.3 FL (ref 80–100)
MCV RBC AUTO: 89.4 FL (ref 80–100)
MCV RBC AUTO: 89.5 FL (ref 80–100)
MCV RBC AUTO: 89.6 FL (ref 80–100)
MCV RBC AUTO: 89.8 FL (ref 80–100)
MCV RBC AUTO: 90.1 FL (ref 80–100)
MCV RBC AUTO: 90.2 FL (ref 80–100)
MCV RBC AUTO: 90.4 FL (ref 80–100)
MCV RBC AUTO: 91.1 FL (ref 80–100)
MCV RBC AUTO: 92.3 FL (ref 80–100)
MICROSCOPIC EXAMINATION: YES
MONOCYTES ABSOLUTE: 0.4 K/UL (ref 0–1.3)
MONOCYTES ABSOLUTE: 0.5 K/UL (ref 0–1.3)
MONOCYTES ABSOLUTE: 0.5 K/UL (ref 0–1.3)
MONOCYTES ABSOLUTE: 0.6 K/UL (ref 0–1.3)
MONOCYTES ABSOLUTE: 0.7 K/UL (ref 0–1.3)
MONOCYTES ABSOLUTE: 0.8 K/UL (ref 0–1.3)
MONOCYTES RELATIVE PERCENT: 10.1 %
MONOCYTES RELATIVE PERCENT: 11 %
MONOCYTES RELATIVE PERCENT: 12.4 %
MONOCYTES RELATIVE PERCENT: 12.6 %
MONOCYTES RELATIVE PERCENT: 12.8 %
MONOCYTES RELATIVE PERCENT: 13 %
MONOCYTES RELATIVE PERCENT: 7 %
MONOCYTES RELATIVE PERCENT: 7.6 %
MONOCYTES RELATIVE PERCENT: 7.7 %
MONOCYTES RELATIVE PERCENT: 7.9 %
MONOCYTES RELATIVE PERCENT: 8 %
MONOCYTES RELATIVE PERCENT: 8.2 %
MONOCYTES RELATIVE PERCENT: 8.7 %
MONOCYTES RELATIVE PERCENT: 9.6 %
NEUTROPHILS ABSOLUTE: 2 K/UL (ref 1.7–7.7)
NEUTROPHILS ABSOLUTE: 3.5 K/UL (ref 1.7–7.7)
NEUTROPHILS ABSOLUTE: 3.8 K/UL (ref 1.7–7.7)
NEUTROPHILS ABSOLUTE: 3.9 K/UL (ref 1.7–7.7)
NEUTROPHILS ABSOLUTE: 3.9 K/UL (ref 1.7–7.7)
NEUTROPHILS ABSOLUTE: 4 K/UL (ref 1.7–7.7)
NEUTROPHILS ABSOLUTE: 4 K/UL (ref 1.7–7.7)
NEUTROPHILS ABSOLUTE: 4.1 K/UL (ref 1.7–7.7)
NEUTROPHILS ABSOLUTE: 4.3 K/UL (ref 1.7–7.7)
NEUTROPHILS ABSOLUTE: 5.5 K/UL (ref 1.7–7.7)
NEUTROPHILS ABSOLUTE: 5.5 K/UL (ref 1.7–7.7)
NEUTROPHILS ABSOLUTE: 5.6 K/UL (ref 1.7–7.7)
NEUTROPHILS ABSOLUTE: 5.9 K/UL (ref 1.7–7.7)
NEUTROPHILS ABSOLUTE: 5.9 K/UL (ref 1.7–7.7)
NEUTROPHILS RELATIVE PERCENT: 54.5 %
NEUTROPHILS RELATIVE PERCENT: 66.6 %
NEUTROPHILS RELATIVE PERCENT: 68.4 %
NEUTROPHILS RELATIVE PERCENT: 68.4 %
NEUTROPHILS RELATIVE PERCENT: 69.5 %
NEUTROPHILS RELATIVE PERCENT: 70.5 %
NEUTROPHILS RELATIVE PERCENT: 71.2 %
NEUTROPHILS RELATIVE PERCENT: 72.1 %
NEUTROPHILS RELATIVE PERCENT: 73.6 %
NEUTROPHILS RELATIVE PERCENT: 76.8 %
NEUTROPHILS RELATIVE PERCENT: 78.8 %
NEUTROPHILS RELATIVE PERCENT: 79.6 %
NEUTROPHILS RELATIVE PERCENT: 83.6 %
NEUTROPHILS RELATIVE PERCENT: 84.4 %
NITRITE, URINE: NEGATIVE
ORGANISM: ABNORMAL
PDW BLD-RTO: 15.5 % (ref 12.4–15.4)
PDW BLD-RTO: 15.6 % (ref 12.4–15.4)
PDW BLD-RTO: 15.6 % (ref 12.4–15.4)
PDW BLD-RTO: 15.7 % (ref 12.4–15.4)
PDW BLD-RTO: 15.8 % (ref 12.4–15.4)
PDW BLD-RTO: 15.8 % (ref 12.4–15.4)
PDW BLD-RTO: 15.9 % (ref 12.4–15.4)
PDW BLD-RTO: 16 % (ref 12.4–15.4)
PDW BLD-RTO: 16.4 % (ref 12.4–15.4)
PDW BLD-RTO: 16.7 % (ref 12.4–15.4)
PDW BLD-RTO: 16.7 % (ref 12.4–15.4)
PDW BLD-RTO: 17.1 % (ref 12.4–15.4)
PDW BLD-RTO: 17.2 % (ref 12.4–15.4)
PDW BLD-RTO: 17.2 % (ref 12.4–15.4)
PERFORMED ON: ABNORMAL
PERFORMED ON: NORMAL
PH UA: 5.5 (ref 5–8)
PLATELET # BLD: 125 K/UL (ref 135–450)
PLATELET # BLD: 148 K/UL (ref 135–450)
PLATELET # BLD: 170 K/UL (ref 135–450)
PLATELET # BLD: 176 K/UL (ref 135–450)
PLATELET # BLD: 181 K/UL (ref 135–450)
PLATELET # BLD: 192 K/UL (ref 135–450)
PLATELET # BLD: 196 K/UL (ref 135–450)
PLATELET # BLD: 197 K/UL (ref 135–450)
PLATELET # BLD: 206 K/UL (ref 135–450)
PLATELET # BLD: 207 K/UL (ref 135–450)
PLATELET # BLD: 210 K/UL (ref 135–450)
PLATELET # BLD: 218 K/UL (ref 135–450)
PLATELET # BLD: 224 K/UL (ref 135–450)
PLATELET # BLD: 244 K/UL (ref 135–450)
PLATELET # BLD: 73 K/UL (ref 135–450)
PLATELET # BLD: 75 K/UL (ref 135–450)
PLATELET # BLD: 87 K/UL (ref 135–450)
PLATELET # BLD: 87 K/UL (ref 135–450)
PLATELET # BLD: 94 K/UL (ref 135–450)
PLATELET # BLD: 94 K/UL (ref 135–450)
PLATELET SLIDE REVIEW: ABNORMAL
PMV BLD AUTO: 10.1 FL (ref 5–10.5)
PMV BLD AUTO: 10.4 FL (ref 5–10.5)
PMV BLD AUTO: 10.5 FL (ref 5–10.5)
PMV BLD AUTO: 10.8 FL (ref 5–10.5)
PMV BLD AUTO: 11 FL (ref 5–10.5)
PMV BLD AUTO: 11.4 FL (ref 5–10.5)
PMV BLD AUTO: 11.7 FL (ref 5–10.5)
PMV BLD AUTO: 12.7 FL (ref 5–10.5)
PMV BLD AUTO: 8.3 FL (ref 5–10.5)
PMV BLD AUTO: 8.5 FL (ref 5–10.5)
PMV BLD AUTO: 8.6 FL (ref 5–10.5)
PMV BLD AUTO: 8.7 FL (ref 5–10.5)
PMV BLD AUTO: 8.7 FL (ref 5–10.5)
PMV BLD AUTO: 8.8 FL (ref 5–10.5)
PMV BLD AUTO: 8.9 FL (ref 5–10.5)
PMV BLD AUTO: 9.5 FL (ref 5–10.5)
POC ACT LR: 200 SEC
POTASSIUM REFLEX MAGNESIUM: 3.9 MMOL/L (ref 3.5–5.1)
POTASSIUM REFLEX MAGNESIUM: 4 MMOL/L (ref 3.5–5.1)
POTASSIUM REFLEX MAGNESIUM: 4.2 MMOL/L (ref 3.5–5.1)
POTASSIUM REFLEX MAGNESIUM: 4.2 MMOL/L (ref 3.5–5.1)
POTASSIUM REFLEX MAGNESIUM: 4.4 MMOL/L (ref 3.5–5.1)
POTASSIUM REFLEX MAGNESIUM: 4.5 MMOL/L (ref 3.5–5.1)
POTASSIUM REFLEX MAGNESIUM: 4.5 MMOL/L (ref 3.5–5.1)
POTASSIUM REFLEX MAGNESIUM: 4.7 MMOL/L (ref 3.5–5.1)
POTASSIUM REFLEX MAGNESIUM: 4.8 MMOL/L (ref 3.5–5.1)
POTASSIUM REFLEX MAGNESIUM: 4.9 MMOL/L (ref 3.5–5.1)
POTASSIUM REFLEX MAGNESIUM: 5.2 MMOL/L (ref 3.5–5.1)
POTASSIUM SERPL-SCNC: 3.2 MMOL/L (ref 3.5–5.1)
POTASSIUM SERPL-SCNC: 3.4 MMOL/L (ref 3.5–5.1)
POTASSIUM SERPL-SCNC: 3.7 MMOL/L (ref 3.5–5.1)
POTASSIUM SERPL-SCNC: 3.7 MMOL/L (ref 3.5–5.1)
POTASSIUM SERPL-SCNC: 3.8 MMOL/L (ref 3.5–5.1)
POTASSIUM SERPL-SCNC: 4 MMOL/L (ref 3.5–5.1)
POTASSIUM SERPL-SCNC: 4 MMOL/L (ref 3.5–5.1)
POTASSIUM SERPL-SCNC: 4.4 MMOL/L (ref 3.5–5.1)
PRO-BNP: ABNORMAL PG/ML (ref 0–449)
PRO-BNP: ABNORMAL PG/ML (ref 0–449)
PROCALCITONIN: 0.46 NG/ML (ref 0–0.15)
PROINSULIN: 10.2 PMOL/L
PROTEIN UA: ABNORMAL MG/DL
PROTHROMBIN TIME: 22.5 SEC (ref 9.9–12.7)
RBC # BLD: 3.64 M/UL (ref 4.2–5.9)
RBC # BLD: 3.72 M/UL (ref 4.2–5.9)
RBC # BLD: 3.76 M/UL (ref 4.2–5.9)
RBC # BLD: 3.78 M/UL (ref 4.2–5.9)
RBC # BLD: 3.83 M/UL (ref 4.2–5.9)
RBC # BLD: 3.87 M/UL (ref 4.2–5.9)
RBC # BLD: 4 M/UL (ref 4.2–5.9)
RBC # BLD: 4.14 M/UL (ref 4.2–5.9)
RBC # BLD: 4.31 M/UL (ref 4.2–5.9)
RBC # BLD: 4.35 M/UL (ref 4.2–5.9)
RBC # BLD: 4.39 M/UL (ref 4.2–5.9)
RBC # BLD: 4.43 M/UL (ref 4.2–5.9)
RBC # BLD: 4.81 M/UL (ref 4.2–5.9)
RBC # BLD: 4.83 M/UL (ref 4.2–5.9)
RBC # BLD: 4.84 M/UL (ref 4.2–5.9)
RBC # BLD: 4.95 M/UL (ref 4.2–5.9)
RBC # BLD: 5.03 M/UL (ref 4.2–5.9)
RBC # BLD: 5.13 M/UL (ref 4.2–5.9)
RBC # BLD: 5.56 M/UL (ref 4.2–5.9)
RBC # BLD: 5.78 M/UL (ref 4.2–5.9)
RBC UA: 4 /HPF (ref 0–4)
REASON FOR REJECTION: NORMAL
REJECTED TEST: NORMAL
REPORT: NORMAL
SARS-COV-2, NAAT: ABNORMAL
SARS-COV-2, NAAT: NOT DETECTED
SEDIMENTATION RATE, ERYTHROCYTE: 11 MM/HR (ref 0–20)
SEDIMENTATION RATE, ERYTHROCYTE: 65 MM/HR (ref 0–20)
SLIDE REVIEW: ABNORMAL
SLIDE REVIEW: ABNORMAL
SODIUM BLD-SCNC: 133 MMOL/L (ref 136–145)
SODIUM BLD-SCNC: 135 MMOL/L (ref 136–145)
SODIUM BLD-SCNC: 135 MMOL/L (ref 136–145)
SODIUM BLD-SCNC: 136 MMOL/L (ref 136–145)
SODIUM BLD-SCNC: 136 MMOL/L (ref 136–145)
SODIUM BLD-SCNC: 137 MMOL/L (ref 136–145)
SODIUM BLD-SCNC: 138 MMOL/L (ref 136–145)
SODIUM BLD-SCNC: 139 MMOL/L (ref 136–145)
SODIUM BLD-SCNC: 140 MMOL/L (ref 136–145)
SODIUM BLD-SCNC: 140 MMOL/L (ref 136–145)
SODIUM BLD-SCNC: 141 MMOL/L (ref 136–145)
SODIUM BLD-SCNC: 143 MMOL/L (ref 136–145)
SODIUM BLD-SCNC: 144 MMOL/L (ref 136–145)
SODIUM BLD-SCNC: 144 MMOL/L (ref 136–145)
SODIUM BLD-SCNC: 145 MMOL/L (ref 136–145)
SODIUM BLD-SCNC: 147 MMOL/L (ref 136–145)
SPECIFIC GRAVITY UA: 1.02 (ref 1–1.03)
T4 FREE: 0.9 NG/DL (ref 0.9–1.8)
T4 FREE: 1.1 NG/DL (ref 0.9–1.8)
TOTAL CK: 170 U/L (ref 39–308)
TOTAL IRON BINDING CAPACITY: 111 UG/DL (ref 260–445)
TOTAL PROTEIN: 6.4 G/DL (ref 6.4–8.2)
TOTAL PROTEIN: 6.6 G/DL (ref 6.4–8.2)
TOTAL PROTEIN: 6.7 G/DL (ref 6.4–8.2)
TROPONIN: 0.04 NG/ML
TROPONIN: 0.07 NG/ML
TSH REFLEX: 13.11 UIU/ML (ref 0.27–4.2)
TSH REFLEX: 5.8 UIU/ML (ref 0.27–4.2)
TSH SERPL DL<=0.05 MIU/L-ACNC: 7.42 UIU/ML (ref 0.27–4.2)
URINE REFLEX TO CULTURE: ABNORMAL
URINE TYPE: ABNORMAL
UROBILINOGEN, URINE: 1 E.U./DL
VITAMIN B-12: 1588 PG/ML (ref 211–911)
VITAMIN D 25-HYDROXY: 6.6 NG/ML
WBC # BLD: 3.7 K/UL (ref 4–11)
WBC # BLD: 4.7 K/UL (ref 4–11)
WBC # BLD: 4.8 K/UL (ref 4–11)
WBC # BLD: 4.9 K/UL (ref 4–11)
WBC # BLD: 5.1 K/UL (ref 4–11)
WBC # BLD: 5.4 K/UL (ref 4–11)
WBC # BLD: 5.5 K/UL (ref 4–11)
WBC # BLD: 5.5 K/UL (ref 4–11)
WBC # BLD: 5.7 K/UL (ref 4–11)
WBC # BLD: 5.9 K/UL (ref 4–11)
WBC # BLD: 5.9 K/UL (ref 4–11)
WBC # BLD: 6 K/UL (ref 4–11)
WBC # BLD: 6.1 K/UL (ref 4–11)
WBC # BLD: 6.6 K/UL (ref 4–11)
WBC # BLD: 6.8 K/UL (ref 4–11)
WBC # BLD: 6.9 K/UL (ref 4–11)
WBC # BLD: 7.1 K/UL (ref 4–11)
WBC # BLD: 7.1 K/UL (ref 4–11)
WBC # BLD: 7.3 K/UL (ref 4–11)
WBC # BLD: 7.5 K/UL (ref 4–11)
WBC UA: 1 /HPF (ref 0–5)

## 2021-01-01 PROCEDURE — 84443 ASSAY THYROID STIM HORMONE: CPT

## 2021-01-01 PROCEDURE — 85025 COMPLETE CBC W/AUTO DIFF WBC: CPT

## 2021-01-01 PROCEDURE — 93925 LOWER EXTREMITY STUDY: CPT

## 2021-01-01 PROCEDURE — G8417 CALC BMI ABV UP PARAM F/U: HCPCS | Performed by: SURGERY

## 2021-01-01 PROCEDURE — 36415 COLL VENOUS BLD VENIPUNCTURE: CPT

## 2021-01-01 PROCEDURE — 93971 EXTREMITY STUDY: CPT

## 2021-01-01 PROCEDURE — 6370000000 HC RX 637 (ALT 250 FOR IP): Performed by: INTERNAL MEDICINE

## 2021-01-01 PROCEDURE — 6360000002 HC RX W HCPCS: Performed by: SURGERY

## 2021-01-01 PROCEDURE — 6370000000 HC RX 637 (ALT 250 FOR IP): Performed by: SURGERY

## 2021-01-01 PROCEDURE — 99222 1ST HOSP IP/OBS MODERATE 55: CPT | Performed by: SURGERY

## 2021-01-01 PROCEDURE — G8484 FLU IMMUNIZE NO ADMIN: HCPCS | Performed by: FAMILY MEDICINE

## 2021-01-01 PROCEDURE — 1200000000 HC SEMI PRIVATE

## 2021-01-01 PROCEDURE — 2580000003 HC RX 258: Performed by: SURGERY

## 2021-01-01 PROCEDURE — 99222 1ST HOSP IP/OBS MODERATE 55: CPT | Performed by: NURSE PRACTITIONER

## 2021-01-01 PROCEDURE — 6360000002 HC RX W HCPCS

## 2021-01-01 PROCEDURE — 75625 CONTRAST EXAM ABDOMINL AORTA: CPT | Performed by: SURGERY

## 2021-01-01 PROCEDURE — 80048 BASIC METABOLIC PNL TOTAL CA: CPT

## 2021-01-01 PROCEDURE — C1887 CATHETER, GUIDING: HCPCS

## 2021-01-01 PROCEDURE — 6370000000 HC RX 637 (ALT 250 FOR IP): Performed by: NURSE PRACTITIONER

## 2021-01-01 PROCEDURE — 6360000002 HC RX W HCPCS: Performed by: HOSPITALIST

## 2021-01-01 PROCEDURE — 6360000002 HC RX W HCPCS: Performed by: INTERNAL MEDICINE

## 2021-01-01 PROCEDURE — G8417 CALC BMI ABV UP PARAM F/U: HCPCS | Performed by: FAMILY MEDICINE

## 2021-01-01 PROCEDURE — 93005 ELECTROCARDIOGRAM TRACING: CPT | Performed by: EMERGENCY MEDICINE

## 2021-01-01 PROCEDURE — G8427 DOCREV CUR MEDS BY ELIG CLIN: HCPCS | Performed by: FAMILY MEDICINE

## 2021-01-01 PROCEDURE — 2000000000 HC ICU R&B

## 2021-01-01 PROCEDURE — 2500000003 HC RX 250 WO HCPCS: Performed by: PSYCHIATRY & NEUROLOGY

## 2021-01-01 PROCEDURE — 80053 COMPREHEN METABOLIC PANEL: CPT

## 2021-01-01 PROCEDURE — APPNB30 APP NON BILLABLE TIME 0-30 MINS: Performed by: NURSE PRACTITIONER

## 2021-01-01 PROCEDURE — 6360000004 HC RX CONTRAST MEDICATION: Performed by: SURGERY

## 2021-01-01 PROCEDURE — 2580000003 HC RX 258: Performed by: EMERGENCY MEDICINE

## 2021-01-01 PROCEDURE — 99213 OFFICE O/P EST LOW 20 MIN: CPT | Performed by: EMERGENCY MEDICINE

## 2021-01-01 PROCEDURE — 2060000000 HC ICU INTERMEDIATE R&B

## 2021-01-01 PROCEDURE — 83540 ASSAY OF IRON: CPT

## 2021-01-01 PROCEDURE — 99153 MOD SED SAME PHYS/QHP EA: CPT

## 2021-01-01 PROCEDURE — G8484 FLU IMMUNIZE NO ADMIN: HCPCS | Performed by: CLINICAL NURSE SPECIALIST

## 2021-01-01 PROCEDURE — 81001 URINALYSIS AUTO W/SCOPE: CPT

## 2021-01-01 PROCEDURE — 83735 ASSAY OF MAGNESIUM: CPT

## 2021-01-01 PROCEDURE — 97530 THERAPEUTIC ACTIVITIES: CPT

## 2021-01-01 PROCEDURE — 85027 COMPLETE CBC AUTOMATED: CPT

## 2021-01-01 PROCEDURE — 99232 SBSQ HOSP IP/OBS MODERATE 35: CPT | Performed by: INTERNAL MEDICINE

## 2021-01-01 PROCEDURE — 82746 ASSAY OF FOLIC ACID SERUM: CPT

## 2021-01-01 PROCEDURE — 99204 OFFICE O/P NEW MOD 45 MIN: CPT | Performed by: EMERGENCY MEDICINE

## 2021-01-01 PROCEDURE — 99212 OFFICE O/P EST SF 10 MIN: CPT

## 2021-01-01 PROCEDURE — 37229 PR REVSC OPN/PRQ TIB/PERO W/ATHRC/ANGIOP SM VSL: CPT | Performed by: SURGERY

## 2021-01-01 PROCEDURE — 99213 OFFICE O/P EST LOW 20 MIN: CPT

## 2021-01-01 PROCEDURE — 6370000000 HC RX 637 (ALT 250 FOR IP): Performed by: HOSPITALIST

## 2021-01-01 PROCEDURE — 83036 HEMOGLOBIN GLYCOSYLATED A1C: CPT

## 2021-01-01 PROCEDURE — 6370000000 HC RX 637 (ALT 250 FOR IP): Performed by: CLINICAL NURSE SPECIALIST

## 2021-01-01 PROCEDURE — 99232 SBSQ HOSP IP/OBS MODERATE 35: CPT | Performed by: CLINICAL NURSE SPECIALIST

## 2021-01-01 PROCEDURE — 1123F ACP DISCUSS/DSCN MKR DOCD: CPT | Performed by: SURGERY

## 2021-01-01 PROCEDURE — 90715 TDAP VACCINE 7 YRS/> IM: CPT | Performed by: INTERNAL MEDICINE

## 2021-01-01 PROCEDURE — 99223 1ST HOSP IP/OBS HIGH 75: CPT | Performed by: INTERNAL MEDICINE

## 2021-01-01 PROCEDURE — 11042 DBRDMT SUBQ TIS 1ST 20SQCM/<: CPT

## 2021-01-01 PROCEDURE — 97535 SELF CARE MNGMENT TRAINING: CPT

## 2021-01-01 PROCEDURE — 82947 ASSAY GLUCOSE BLOOD QUANT: CPT

## 2021-01-01 PROCEDURE — 93005 ELECTROCARDIOGRAM TRACING: CPT | Performed by: INTERNAL MEDICINE

## 2021-01-01 PROCEDURE — 4040F PNEUMOC VAC/ADMIN/RCVD: CPT | Performed by: CLINICAL NURSE SPECIALIST

## 2021-01-01 PROCEDURE — 99214 OFFICE O/P EST MOD 30 MIN: CPT | Performed by: CLINICAL NURSE SPECIALIST

## 2021-01-01 PROCEDURE — 83525 ASSAY OF INSULIN: CPT

## 2021-01-01 PROCEDURE — 97162 PT EVAL MOD COMPLEX 30 MIN: CPT

## 2021-01-01 PROCEDURE — 83550 IRON BINDING TEST: CPT

## 2021-01-01 PROCEDURE — 73600 X-RAY EXAM OF ANKLE: CPT

## 2021-01-01 PROCEDURE — 11042 DBRDMT SUBQ TIS 1ST 20SQCM/<: CPT | Performed by: NURSE PRACTITIONER

## 2021-01-01 PROCEDURE — 85652 RBC SED RATE AUTOMATED: CPT

## 2021-01-01 PROCEDURE — 4040F PNEUMOC VAC/ADMIN/RCVD: CPT | Performed by: FAMILY MEDICINE

## 2021-01-01 PROCEDURE — 1123F ACP DISCUSS/DSCN MKR DOCD: CPT | Performed by: FAMILY MEDICINE

## 2021-01-01 PROCEDURE — 87186 SC STD MICRODIL/AGAR DIL: CPT

## 2021-01-01 PROCEDURE — 6360000002 HC RX W HCPCS: Performed by: CLINICAL NURSE SPECIALIST

## 2021-01-01 PROCEDURE — 2580000003 HC RX 258: Performed by: CLINICAL NURSE SPECIALIST

## 2021-01-01 PROCEDURE — 2500000003 HC RX 250 WO HCPCS

## 2021-01-01 PROCEDURE — 6360000002 HC RX W HCPCS: Performed by: EMERGENCY MEDICINE

## 2021-01-01 PROCEDURE — C1725 CATH, TRANSLUMIN NON-LASER: HCPCS

## 2021-01-01 PROCEDURE — 82607 VITAMIN B-12: CPT

## 2021-01-01 PROCEDURE — 2500000003 HC RX 250 WO HCPCS: Performed by: EMERGENCY MEDICINE

## 2021-01-01 PROCEDURE — 2580000003 HC RX 258: Performed by: NURSE PRACTITIONER

## 2021-01-01 PROCEDURE — 99213 OFFICE O/P EST LOW 20 MIN: CPT | Performed by: FAMILY MEDICINE

## 2021-01-01 PROCEDURE — 99233 SBSQ HOSP IP/OBS HIGH 50: CPT | Performed by: INTERNAL MEDICINE

## 2021-01-01 PROCEDURE — 75625 CONTRAST EXAM ABDOMINL AORTA: CPT

## 2021-01-01 PROCEDURE — 2500000003 HC RX 250 WO HCPCS: Performed by: ANESTHESIOLOGY

## 2021-01-01 PROCEDURE — 2580000003 HC RX 258: Performed by: INTERNAL MEDICINE

## 2021-01-01 PROCEDURE — 83880 ASSAY OF NATRIURETIC PEPTIDE: CPT

## 2021-01-01 PROCEDURE — 11042 DBRDMT SUBQ TIS 1ST 20SQCM/<: CPT | Performed by: EMERGENCY MEDICINE

## 2021-01-01 PROCEDURE — 76937 US GUIDE VASCULAR ACCESS: CPT | Performed by: SURGERY

## 2021-01-01 PROCEDURE — 82306 VITAMIN D 25 HYDROXY: CPT

## 2021-01-01 PROCEDURE — APPSS30 APP SPLIT SHARED TIME 16-30 MINUTES: Performed by: NURSE PRACTITIONER

## 2021-01-01 PROCEDURE — 1036F TOBACCO NON-USER: CPT | Performed by: FAMILY MEDICINE

## 2021-01-01 PROCEDURE — 6370000000 HC RX 637 (ALT 250 FOR IP): Performed by: PODIATRIST

## 2021-01-01 PROCEDURE — 1036F TOBACCO NON-USER: CPT | Performed by: CLINICAL NURSE SPECIALIST

## 2021-01-01 PROCEDURE — 99212 OFFICE O/P EST SF 10 MIN: CPT | Performed by: EMERGENCY MEDICINE

## 2021-01-01 PROCEDURE — 2580000003 HC RX 258: Performed by: PHYSICIAN ASSISTANT

## 2021-01-01 PROCEDURE — 11045 DBRDMT SUBQ TISS EACH ADDL: CPT

## 2021-01-01 PROCEDURE — 90471 IMMUNIZATION ADMIN: CPT | Performed by: INTERNAL MEDICINE

## 2021-01-01 PROCEDURE — B4101ZZ FLUOROSCOPY OF ABDOMINAL AORTA USING LOW OSMOLAR CONTRAST: ICD-10-PCS | Performed by: SURGERY

## 2021-01-01 PROCEDURE — 70450 CT HEAD/BRAIN W/O DYE: CPT

## 2021-01-01 PROCEDURE — 75716 ARTERY X-RAYS ARMS/LEGS: CPT | Performed by: SURGERY

## 2021-01-01 PROCEDURE — 93010 ELECTROCARDIOGRAM REPORT: CPT | Performed by: INTERNAL MEDICINE

## 2021-01-01 PROCEDURE — 84439 ASSAY OF FREE THYROXINE: CPT

## 2021-01-01 PROCEDURE — 6370000000 HC RX 637 (ALT 250 FOR IP): Performed by: EMERGENCY MEDICINE

## 2021-01-01 PROCEDURE — 87040 BLOOD CULTURE FOR BACTERIA: CPT

## 2021-01-01 PROCEDURE — 84484 ASSAY OF TROPONIN QUANT: CPT

## 2021-01-01 PROCEDURE — 85730 THROMBOPLASTIN TIME PARTIAL: CPT

## 2021-01-01 PROCEDURE — 82533 TOTAL CORTISOL: CPT

## 2021-01-01 PROCEDURE — 99233 SBSQ HOSP IP/OBS HIGH 50: CPT | Performed by: NURSE PRACTITIONER

## 2021-01-01 PROCEDURE — 73620 X-RAY EXAM OF FOOT: CPT

## 2021-01-01 PROCEDURE — G8417 CALC BMI ABV UP PARAM F/U: HCPCS | Performed by: CLINICAL NURSE SPECIALIST

## 2021-01-01 PROCEDURE — 83605 ASSAY OF LACTIC ACID: CPT

## 2021-01-01 PROCEDURE — 85379 FIBRIN DEGRADATION QUANT: CPT

## 2021-01-01 PROCEDURE — 97166 OT EVAL MOD COMPLEX 45 MIN: CPT

## 2021-01-01 PROCEDURE — 84206 ASSAY OF PROINSULIN: CPT

## 2021-01-01 PROCEDURE — C1769 GUIDE WIRE: HCPCS

## 2021-01-01 PROCEDURE — 71045 X-RAY EXAM CHEST 1 VIEW: CPT

## 2021-01-01 PROCEDURE — 94761 N-INVAS EAR/PLS OXIMETRY MLT: CPT

## 2021-01-01 PROCEDURE — 99212 OFFICE O/P EST SF 10 MIN: CPT | Performed by: SURGERY

## 2021-01-01 PROCEDURE — 29580 STRAPPING UNNA BOOT: CPT

## 2021-01-01 PROCEDURE — 96374 THER/PROPH/DIAG INJ IV PUSH: CPT

## 2021-01-01 PROCEDURE — 93306 TTE W/DOPPLER COMPLETE: CPT

## 2021-01-01 PROCEDURE — 85610 PROTHROMBIN TIME: CPT

## 2021-01-01 PROCEDURE — G8427 DOCREV CUR MEDS BY ELIG CLIN: HCPCS | Performed by: SURGERY

## 2021-01-01 PROCEDURE — C1724 CATH, TRANS ATHEREC,ROTATION: HCPCS

## 2021-01-01 PROCEDURE — 0JBN0ZZ EXCISION OF RIGHT LOWER LEG SUBCUTANEOUS TISSUE AND FASCIA, OPEN APPROACH: ICD-10-PCS | Performed by: INTERNAL MEDICINE

## 2021-01-01 PROCEDURE — 99284 EMERGENCY DEPT VISIT MOD MDM: CPT

## 2021-01-01 PROCEDURE — 4040F PNEUMOC VAC/ADMIN/RCVD: CPT | Performed by: SURGERY

## 2021-01-01 PROCEDURE — 2500000003 HC RX 250 WO HCPCS: Performed by: PHYSICIAN ASSISTANT

## 2021-01-01 PROCEDURE — C1760 CLOSURE DEV, VASC: HCPCS

## 2021-01-01 PROCEDURE — G8427 DOCREV CUR MEDS BY ELIG CLIN: HCPCS | Performed by: CLINICAL NURSE SPECIALIST

## 2021-01-01 PROCEDURE — 87150 DNA/RNA AMPLIFIED PROBE: CPT

## 2021-01-01 PROCEDURE — 99221 1ST HOSP IP/OBS SF/LOW 40: CPT | Performed by: PSYCHIATRY & NEUROLOGY

## 2021-01-01 PROCEDURE — 11721 DEBRIDE NAIL 6 OR MORE: CPT

## 2021-01-01 PROCEDURE — 94760 N-INVAS EAR/PLS OXIMETRY 1: CPT

## 2021-01-01 PROCEDURE — 99152 MOD SED SAME PHYS/QHP 5/>YRS: CPT | Performed by: SURGERY

## 2021-01-01 PROCEDURE — 99203 OFFICE O/P NEW LOW 30 MIN: CPT | Performed by: SURGERY

## 2021-01-01 PROCEDURE — 99152 MOD SED SAME PHYS/QHP 5/>YRS: CPT

## 2021-01-01 PROCEDURE — B41D1ZZ FLUOROSCOPY OF AORTA AND BILATERAL LOWER EXTREMITY ARTERIES USING LOW OSMOLAR CONTRAST: ICD-10-PCS | Performed by: SURGERY

## 2021-01-01 PROCEDURE — G8420 CALC BMI NORM PARAMETERS: HCPCS | Performed by: FAMILY MEDICINE

## 2021-01-01 PROCEDURE — 75716 ARTERY X-RAYS ARMS/LEGS: CPT

## 2021-01-01 PROCEDURE — 04CR0ZZ EXTIRPATION OF MATTER FROM RIGHT POSTERIOR TIBIAL ARTERY, OPEN APPROACH: ICD-10-PCS | Performed by: SURGERY

## 2021-01-01 PROCEDURE — 2700000000 HC OXYGEN THERAPY PER DAY

## 2021-01-01 PROCEDURE — 37229 HC TIB PER TERRITORY ATHER: CPT

## 2021-01-01 PROCEDURE — 86140 C-REACTIVE PROTEIN: CPT

## 2021-01-01 PROCEDURE — 1036F TOBACCO NON-USER: CPT | Performed by: SURGERY

## 2021-01-01 PROCEDURE — 97116 GAIT TRAINING THERAPY: CPT

## 2021-01-01 PROCEDURE — 82140 ASSAY OF AMMONIA: CPT

## 2021-01-01 PROCEDURE — 85347 COAGULATION TIME ACTIVATED: CPT

## 2021-01-01 PROCEDURE — 6370000000 HC RX 637 (ALT 250 FOR IP)

## 2021-01-01 PROCEDURE — 84145 PROCALCITONIN (PCT): CPT

## 2021-01-01 PROCEDURE — 2580000003 HC RX 258

## 2021-01-01 PROCEDURE — 87635 SARS-COV-2 COVID-19 AMP PRB: CPT

## 2021-01-01 PROCEDURE — 99222 1ST HOSP IP/OBS MODERATE 55: CPT | Performed by: INTERNAL MEDICINE

## 2021-01-01 PROCEDURE — 84681 ASSAY OF C-PEPTIDE: CPT

## 2021-01-01 PROCEDURE — 82550 ASSAY OF CK (CPK): CPT

## 2021-01-01 PROCEDURE — 84305 ASSAY OF SOMATOMEDIN: CPT

## 2021-01-01 PROCEDURE — C1894 INTRO/SHEATH, NON-LASER: HCPCS

## 2021-01-01 PROCEDURE — 75774 ARTERY X-RAY EACH VESSEL: CPT

## 2021-01-01 PROCEDURE — 1123F ACP DISCUSS/DSCN MKR DOCD: CPT | Performed by: CLINICAL NURSE SPECIALIST

## 2021-01-01 PROCEDURE — 73718 MRI LOWER EXTREMITY W/O DYE: CPT

## 2021-01-01 RX ORDER — GENTAMICIN SULFATE 1 MG/G
OINTMENT TOPICAL ONCE
Status: CANCELLED | OUTPATIENT
Start: 2021-01-01 | End: 2021-01-01

## 2021-01-01 RX ORDER — BACITRACIN, NEOMYCIN, POLYMYXIN B 400; 3.5; 5 [USP'U]/G; MG/G; [USP'U]/G
OINTMENT TOPICAL ONCE
Status: CANCELLED | OUTPATIENT
Start: 2021-01-01 | End: 2021-01-01

## 2021-01-01 RX ORDER — POTASSIUM CHLORIDE 20 MEQ/1
40 TABLET, EXTENDED RELEASE ORAL ONCE
Status: COMPLETED | OUTPATIENT
Start: 2021-01-01 | End: 2021-01-01

## 2021-01-01 RX ORDER — CLOBETASOL PROPIONATE 0.5 MG/G
OINTMENT TOPICAL ONCE
Status: CANCELLED | OUTPATIENT
Start: 2021-01-01 | End: 2021-01-01

## 2021-01-01 RX ORDER — LIDOCAINE HYDROCHLORIDE 40 MG/ML
SOLUTION TOPICAL ONCE
Status: CANCELLED | OUTPATIENT
Start: 2021-01-01 | End: 2021-01-01

## 2021-01-01 RX ORDER — LIDOCAINE HYDROCHLORIDE 20 MG/ML
JELLY TOPICAL ONCE
Status: CANCELLED | OUTPATIENT
Start: 2021-01-01 | End: 2021-01-01

## 2021-01-01 RX ORDER — BACITRACIN ZINC AND POLYMYXIN B SULFATE 500; 1000 [USP'U]/G; [USP'U]/G
OINTMENT TOPICAL ONCE
Status: CANCELLED | OUTPATIENT
Start: 2021-01-01 | End: 2021-01-01

## 2021-01-01 RX ORDER — SODIUM HYPOCHLORITE 1.25 MG/ML
SOLUTION TOPICAL DAILY
Status: DISCONTINUED | OUTPATIENT
Start: 2021-01-01 | End: 2021-01-01

## 2021-01-01 RX ORDER — LIDOCAINE 50 MG/G
OINTMENT TOPICAL ONCE
Status: CANCELLED | OUTPATIENT
Start: 2021-01-01 | End: 2021-01-01

## 2021-01-01 RX ORDER — LIDOCAINE 40 MG/G
CREAM TOPICAL ONCE
Status: CANCELLED | OUTPATIENT
Start: 2021-01-01 | End: 2021-01-01

## 2021-01-01 RX ORDER — LIDOCAINE 40 MG/G
CREAM TOPICAL ONCE
Status: DISCONTINUED | OUTPATIENT
Start: 2021-01-01 | End: 2021-01-01 | Stop reason: HOSPADM

## 2021-01-01 RX ORDER — BETAMETHASONE DIPROPIONATE 0.05 %
OINTMENT (GRAM) TOPICAL ONCE
Status: CANCELLED | OUTPATIENT
Start: 2021-01-01 | End: 2021-01-01

## 2021-01-01 RX ORDER — DEXTROSE MONOHYDRATE 25 G/50ML
12.5 INJECTION, SOLUTION INTRAVENOUS PRN
Status: DISCONTINUED | OUTPATIENT
Start: 2021-01-01 | End: 2021-01-01 | Stop reason: HOSPADM

## 2021-01-01 RX ORDER — LEVOTHYROXINE SODIUM 0.03 MG/1
25 TABLET ORAL DAILY
Qty: 90 TABLET | Refills: 1 | Status: SHIPPED | OUTPATIENT
Start: 2021-01-01

## 2021-01-01 RX ORDER — OLANZAPINE 10 MG/1
2.5 INJECTION, POWDER, LYOPHILIZED, FOR SOLUTION INTRAMUSCULAR 3 TIMES DAILY PRN
Status: DISCONTINUED | OUTPATIENT
Start: 2021-01-01 | End: 2021-01-01 | Stop reason: HOSPADM

## 2021-01-01 RX ORDER — ZIPRASIDONE MESYLATE 20 MG/ML
20 INJECTION, POWDER, LYOPHILIZED, FOR SOLUTION INTRAMUSCULAR ONCE
Status: COMPLETED | OUTPATIENT
Start: 2021-01-01 | End: 2021-01-01

## 2021-01-01 RX ORDER — CLOPIDOGREL BISULFATE 75 MG/1
75 TABLET ORAL DAILY
Status: DISCONTINUED | OUTPATIENT
Start: 2021-01-01 | End: 2021-01-01 | Stop reason: HOSPADM

## 2021-01-01 RX ORDER — CARVEDILOL 6.25 MG/1
6.25 TABLET ORAL 2 TIMES DAILY WITH MEALS
Status: DISCONTINUED | OUTPATIENT
Start: 2021-01-01 | End: 2021-01-01

## 2021-01-01 RX ORDER — GINSENG 100 MG
CAPSULE ORAL ONCE
Status: CANCELLED | OUTPATIENT
Start: 2021-01-01 | End: 2021-01-01

## 2021-01-01 RX ORDER — SODIUM CHLORIDE 9 MG/ML
25 INJECTION, SOLUTION INTRAVENOUS PRN
Status: DISCONTINUED | OUTPATIENT
Start: 2021-01-01 | End: 2021-01-01 | Stop reason: HOSPADM

## 2021-01-01 RX ORDER — SODIUM CHLORIDE 0.9 % (FLUSH) 0.9 %
5-40 SYRINGE (ML) INJECTION EVERY 12 HOURS SCHEDULED
Status: DISCONTINUED | OUTPATIENT
Start: 2021-01-01 | End: 2021-01-01 | Stop reason: HOSPADM

## 2021-01-01 RX ORDER — LEVETIRACETAM 500 MG/1
500 TABLET ORAL 2 TIMES DAILY
Status: DISCONTINUED | OUTPATIENT
Start: 2021-01-01 | End: 2021-01-01 | Stop reason: HOSPADM

## 2021-01-01 RX ORDER — ERGOCALCIFEROL 1.25 MG/1
50000 CAPSULE ORAL WEEKLY
Qty: 4 CAPSULE | Refills: 5 | Status: SHIPPED | OUTPATIENT
Start: 2021-01-01

## 2021-01-01 RX ORDER — SULFAMETHOXAZOLE AND TRIMETHOPRIM 800; 160 MG/1; MG/1
1 TABLET ORAL 2 TIMES DAILY
Qty: 20 TABLET | Refills: 0 | Status: SHIPPED | OUTPATIENT
Start: 2021-01-01 | End: 2021-01-01 | Stop reason: SDUPTHER

## 2021-01-01 RX ORDER — ACETAMINOPHEN 325 MG/1
650 TABLET ORAL ONCE
Status: COMPLETED | OUTPATIENT
Start: 2021-01-01 | End: 2021-01-01

## 2021-01-01 RX ORDER — ASPIRIN 81 MG/1
81 TABLET ORAL DAILY
COMMUNITY

## 2021-01-01 RX ORDER — FUROSEMIDE 10 MG/ML
40 INJECTION INTRAMUSCULAR; INTRAVENOUS 2 TIMES DAILY
Status: DISCONTINUED | OUTPATIENT
Start: 2021-01-01 | End: 2021-01-01

## 2021-01-01 RX ORDER — POTASSIUM CHLORIDE 750 MG/1
40 TABLET, FILM COATED, EXTENDED RELEASE ORAL ONCE
Status: COMPLETED | OUTPATIENT
Start: 2021-01-01 | End: 2021-01-01

## 2021-01-01 RX ORDER — DEXTROSE MONOHYDRATE 100 MG/ML
INJECTION, SOLUTION INTRAVENOUS CONTINUOUS
Status: DISCONTINUED | OUTPATIENT
Start: 2021-01-01 | End: 2021-01-01

## 2021-01-01 RX ORDER — ACETAMINOPHEN 325 MG/1
650 TABLET ORAL EVERY 6 HOURS PRN
Status: DISCONTINUED | OUTPATIENT
Start: 2021-01-01 | End: 2021-01-01 | Stop reason: HOSPADM

## 2021-01-01 RX ORDER — LIDOCAINE HYDROCHLORIDE 40 MG/ML
SOLUTION TOPICAL ONCE
Status: DISCONTINUED | OUTPATIENT
Start: 2021-01-01 | End: 2021-01-01 | Stop reason: HOSPADM

## 2021-01-01 RX ORDER — DIGOXIN 250 MCG
250 TABLET ORAL ONCE
Status: COMPLETED | OUTPATIENT
Start: 2021-01-01 | End: 2021-01-01

## 2021-01-01 RX ORDER — LORAZEPAM 0.5 MG/1
0.5 TABLET ORAL EVERY 6 HOURS PRN
Status: DISCONTINUED | OUTPATIENT
Start: 2021-01-01 | End: 2021-01-01

## 2021-01-01 RX ORDER — IODIXANOL 320 MG/ML
83 INJECTION, SOLUTION INTRAVASCULAR ONCE
Status: COMPLETED | OUTPATIENT
Start: 2021-01-01 | End: 2021-01-01

## 2021-01-01 RX ORDER — LEVOFLOXACIN 500 MG/1
500 TABLET, FILM COATED ORAL DAILY
Status: DISCONTINUED | OUTPATIENT
Start: 2021-01-01 | End: 2021-01-01 | Stop reason: HOSPADM

## 2021-01-01 RX ORDER — CARVEDILOL 3.12 MG/1
3.12 TABLET ORAL 2 TIMES DAILY WITH MEALS
Status: DISCONTINUED | OUTPATIENT
Start: 2021-01-01 | End: 2021-01-01 | Stop reason: HOSPADM

## 2021-01-01 RX ORDER — 0.9 % SODIUM CHLORIDE 0.9 %
500 INTRAVENOUS SOLUTION INTRAVENOUS ONCE
Status: COMPLETED | OUTPATIENT
Start: 2021-01-01 | End: 2021-01-01

## 2021-01-01 RX ORDER — AMOXICILLIN AND CLAVULANATE POTASSIUM 500; 125 MG/1; MG/1
1 TABLET, FILM COATED ORAL EVERY 8 HOURS SCHEDULED
Status: DISCONTINUED | OUTPATIENT
Start: 2021-01-01 | End: 2021-01-01 | Stop reason: HOSPADM

## 2021-01-01 RX ORDER — 0.9 % SODIUM CHLORIDE 0.9 %
1000 INTRAVENOUS SOLUTION INTRAVENOUS ONCE
Status: COMPLETED | OUTPATIENT
Start: 2021-01-01 | End: 2021-01-01

## 2021-01-01 RX ORDER — CARVEDILOL 6.25 MG/1
6.25 TABLET ORAL 2 TIMES DAILY WITH MEALS
Qty: 60 TABLET | Refills: 5 | Status: ON HOLD
Start: 2021-01-01 | End: 2021-01-01 | Stop reason: HOSPADM

## 2021-01-01 RX ORDER — DEXTROSE MONOHYDRATE 25 G/50ML
12.5 INJECTION, SOLUTION INTRAVENOUS PRN
Status: DISCONTINUED | OUTPATIENT
Start: 2021-01-01 | End: 2021-01-01

## 2021-01-01 RX ORDER — LEVOFLOXACIN 500 MG/1
500 TABLET, FILM COATED ORAL DAILY
Qty: 10 TABLET | Refills: 0 | Status: SHIPPED | OUTPATIENT
Start: 2021-01-01 | End: 2021-01-01

## 2021-01-01 RX ORDER — SULFAMETHOXAZOLE AND TRIMETHOPRIM 800; 160 MG/1; MG/1
1 TABLET ORAL 2 TIMES DAILY
Qty: 20 TABLET | Refills: 0 | Status: SHIPPED | OUTPATIENT
Start: 2021-01-01 | End: 2021-01-01

## 2021-01-01 RX ORDER — ACETAMINOPHEN 325 MG/1
650 TABLET ORAL EVERY 4 HOURS PRN
Status: DISCONTINUED | OUTPATIENT
Start: 2021-01-01 | End: 2021-01-01 | Stop reason: SDUPTHER

## 2021-01-01 RX ORDER — LACTOBACILLUS RHAMNOSUS GG 10B CELL
1 CAPSULE ORAL 2 TIMES DAILY WITH MEALS
Status: DISCONTINUED | OUTPATIENT
Start: 2021-01-01 | End: 2021-01-01 | Stop reason: HOSPADM

## 2021-01-01 RX ORDER — DILTIAZEM HYDROCHLORIDE 5 MG/ML
10 INJECTION INTRAVENOUS ONCE
Status: COMPLETED | OUTPATIENT
Start: 2021-01-01 | End: 2021-01-01

## 2021-01-01 RX ORDER — RIVAROXABAN 15 MG-20MG
KIT ORAL
Qty: 1 PACKAGE | Refills: 0 | Status: SHIPPED | OUTPATIENT
Start: 2021-01-01 | End: 2021-01-01

## 2021-01-01 RX ORDER — SODIUM CHLORIDE 9 MG/ML
INJECTION, SOLUTION INTRAVENOUS CONTINUOUS
Status: DISCONTINUED | OUTPATIENT
Start: 2021-01-01 | End: 2021-01-01

## 2021-01-01 RX ORDER — LINEZOLID 600 MG/1
600 TABLET, FILM COATED ORAL EVERY 12 HOURS SCHEDULED
Status: DISCONTINUED | OUTPATIENT
Start: 2021-01-01 | End: 2021-01-01

## 2021-01-01 RX ORDER — ASPIRIN 81 MG/1
81 TABLET ORAL DAILY
Status: DISCONTINUED | OUTPATIENT
Start: 2021-01-01 | End: 2021-01-01 | Stop reason: HOSPADM

## 2021-01-01 RX ORDER — SODIUM HYPOCHLORITE 1.25 MG/ML
SOLUTION TOPICAL NIGHTLY
Status: DISCONTINUED | OUTPATIENT
Start: 2021-01-01 | End: 2021-01-01 | Stop reason: HOSPADM

## 2021-01-01 RX ORDER — DEXTROSE MONOHYDRATE 50 MG/ML
100 INJECTION, SOLUTION INTRAVENOUS PRN
Status: DISCONTINUED | OUTPATIENT
Start: 2021-01-01 | End: 2021-01-01 | Stop reason: HOSPADM

## 2021-01-01 RX ORDER — LISINOPRIL 5 MG/1
2.5 TABLET ORAL DAILY
Status: DISCONTINUED | OUTPATIENT
Start: 2021-01-01 | End: 2021-01-01 | Stop reason: HOSPADM

## 2021-01-01 RX ORDER — VANCOMYCIN HYDROCHLORIDE 1 G/200ML
1000 INJECTION, SOLUTION INTRAVENOUS ONCE
Status: DISCONTINUED | OUTPATIENT
Start: 2021-01-01 | End: 2021-01-01

## 2021-01-01 RX ORDER — LEVOTHYROXINE SODIUM 0.03 MG/1
25 TABLET ORAL DAILY
Status: DISCONTINUED | OUTPATIENT
Start: 2021-01-01 | End: 2021-01-01 | Stop reason: HOSPADM

## 2021-01-01 RX ORDER — CARVEDILOL 3.12 MG/1
3.12 TABLET ORAL 2 TIMES DAILY WITH MEALS
Qty: 60 TABLET | Refills: 3 | Status: SHIPPED | OUTPATIENT
Start: 2021-01-01

## 2021-01-01 RX ORDER — SODIUM CHLORIDE 9 MG/ML
25 INJECTION, SOLUTION INTRAVENOUS PRN
Status: DISCONTINUED | OUTPATIENT
Start: 2021-01-01 | End: 2021-01-01 | Stop reason: SDUPTHER

## 2021-01-01 RX ORDER — DEXTROSE MONOHYDRATE 50 MG/ML
INJECTION, SOLUTION INTRAVENOUS CONTINUOUS
Status: DISCONTINUED | OUTPATIENT
Start: 2021-01-01 | End: 2021-01-01

## 2021-01-01 RX ORDER — DIGOXIN 125 MCG
125 TABLET ORAL DAILY
Status: DISCONTINUED | OUTPATIENT
Start: 2021-01-01 | End: 2021-01-01 | Stop reason: HOSPADM

## 2021-01-01 RX ORDER — SODIUM CHLORIDE 0.9 % (FLUSH) 0.9 %
5-40 SYRINGE (ML) INJECTION PRN
Status: DISCONTINUED | OUTPATIENT
Start: 2021-01-01 | End: 2021-01-01 | Stop reason: SDUPTHER

## 2021-01-01 RX ORDER — FUROSEMIDE 40 MG/1
80 TABLET ORAL 2 TIMES DAILY
Qty: 360 TABLET | Refills: 0 | Status: ON HOLD
Start: 2021-01-01 | End: 2021-01-01 | Stop reason: HOSPADM

## 2021-01-01 RX ORDER — ASPIRIN 81 MG/1
81 TABLET, CHEWABLE ORAL DAILY
Qty: 90 TABLET | Refills: 3 | Status: SHIPPED | OUTPATIENT
Start: 2021-01-01 | End: 2021-01-01

## 2021-01-01 RX ORDER — AMOXICILLIN AND CLAVULANATE POTASSIUM 500; 125 MG/1; MG/1
1 TABLET, FILM COATED ORAL EVERY 8 HOURS SCHEDULED
Qty: 9 TABLET | Refills: 0 | Status: SHIPPED | OUTPATIENT
Start: 2021-01-01 | End: 2021-01-01

## 2021-01-01 RX ORDER — SODIUM CHLORIDE 0.9 % (FLUSH) 0.9 %
5-40 SYRINGE (ML) INJECTION PRN
Status: DISCONTINUED | OUTPATIENT
Start: 2021-01-01 | End: 2021-01-01 | Stop reason: HOSPADM

## 2021-01-01 RX ORDER — SODIUM CHLORIDE 0.9 % (FLUSH) 0.9 %
5-40 SYRINGE (ML) INJECTION EVERY 12 HOURS SCHEDULED
Status: DISCONTINUED | OUTPATIENT
Start: 2021-01-01 | End: 2021-01-01 | Stop reason: SDUPTHER

## 2021-01-01 RX ORDER — NICOTINE POLACRILEX 4 MG
15 LOZENGE BUCCAL PRN
Status: DISCONTINUED | OUTPATIENT
Start: 2021-01-01 | End: 2021-01-01 | Stop reason: HOSPADM

## 2021-01-01 RX ORDER — DEXTROSE AND SODIUM CHLORIDE 5; .45 G/100ML; G/100ML
INJECTION, SOLUTION INTRAVENOUS CONTINUOUS
Status: DISCONTINUED | OUTPATIENT
Start: 2021-01-01 | End: 2021-01-01

## 2021-01-01 RX ORDER — LEVOFLOXACIN 500 MG/1
500 TABLET, FILM COATED ORAL DAILY
Qty: 9 TABLET | Refills: 0 | Status: SHIPPED | OUTPATIENT
Start: 2021-01-01 | End: 2021-01-01

## 2021-01-01 RX ORDER — LIOTHYRONINE SODIUM 5 UG/1
5 TABLET ORAL DAILY
Status: DISCONTINUED | OUTPATIENT
Start: 2021-01-01 | End: 2021-01-01 | Stop reason: HOSPADM

## 2021-01-01 RX ORDER — ACETAMINOPHEN 650 MG/1
650 SUPPOSITORY RECTAL EVERY 6 HOURS PRN
Status: DISCONTINUED | OUTPATIENT
Start: 2021-01-01 | End: 2021-01-01 | Stop reason: HOSPADM

## 2021-01-01 RX ORDER — ZIPRASIDONE MESYLATE 20 MG/ML
INJECTION, POWDER, LYOPHILIZED, FOR SOLUTION INTRAMUSCULAR
Status: COMPLETED
Start: 2021-01-01 | End: 2021-01-01

## 2021-01-01 RX ADMIN — Medication 1 CAPSULE: at 09:22

## 2021-01-01 RX ADMIN — CLOPIDOGREL 75 MG: 75 TABLET, FILM COATED ORAL at 07:44

## 2021-01-01 RX ADMIN — LINEZOLID 600 MG: 600 TABLET ORAL at 09:50

## 2021-01-01 RX ADMIN — CARVEDILOL 6.25 MG: 6.25 TABLET, FILM COATED ORAL at 17:40

## 2021-01-01 RX ADMIN — ENOXAPARIN SODIUM 90 MG: 100 INJECTION SUBCUTANEOUS at 20:15

## 2021-01-01 RX ADMIN — LEVETIRACETAM 500 MG: 500 TABLET ORAL at 21:28

## 2021-01-01 RX ADMIN — DEXTROSE MONOHYDRATE 12.5 G: 25 INJECTION, SOLUTION INTRAVENOUS at 19:08

## 2021-01-01 RX ADMIN — LEVETIRACETAM 500 MG: 500 TABLET ORAL at 21:17

## 2021-01-01 RX ADMIN — RIVAROXABAN 15 MG: 15 TABLET, FILM COATED ORAL at 21:18

## 2021-01-01 RX ADMIN — TETANUS TOXOID, REDUCED DIPHTHERIA TOXOID AND ACELLULAR PERTUSSIS VACCINE, ADSORBED 0.5 ML: 5; 2.5; 8; 8; 2.5 SUSPENSION INTRAMUSCULAR at 12:00

## 2021-01-01 RX ADMIN — Medication 1 CAPSULE: at 09:44

## 2021-01-01 RX ADMIN — CARVEDILOL 3.12 MG: 3.12 TABLET, FILM COATED ORAL at 17:20

## 2021-01-01 RX ADMIN — CLOPIDOGREL 75 MG: 75 TABLET, FILM COATED ORAL at 08:03

## 2021-01-01 RX ADMIN — LEVOTHYROXINE SODIUM 25 MCG: 0.03 TABLET ORAL at 11:07

## 2021-01-01 RX ADMIN — MEROPENEM 1000 MG: 1 INJECTION, POWDER, FOR SOLUTION INTRAVENOUS at 03:33

## 2021-01-01 RX ADMIN — LISINOPRIL 2.5 MG: 5 TABLET ORAL at 09:47

## 2021-01-01 RX ADMIN — CARVEDILOL 6.25 MG: 6.25 TABLET, FILM COATED ORAL at 09:58

## 2021-01-01 RX ADMIN — LEVETIRACETAM 500 MG: 500 TABLET ORAL at 08:19

## 2021-01-01 RX ADMIN — AMOXICILLIN AND CLAVULANATE POTASSIUM 1 TABLET: 500; 125 TABLET, FILM COATED ORAL at 14:14

## 2021-01-01 RX ADMIN — LINEZOLID 600 MG: 600 TABLET ORAL at 20:29

## 2021-01-01 RX ADMIN — MUPIROCIN: 20 OINTMENT TOPICAL at 10:56

## 2021-01-01 RX ADMIN — Medication 1000 MG: at 06:05

## 2021-01-01 RX ADMIN — MEROPENEM 1000 MG: 1 INJECTION, POWDER, FOR SOLUTION INTRAVENOUS at 10:08

## 2021-01-01 RX ADMIN — DAKIN'S SOLUTION 0.125% (QUARTER STRENGTH): 0.12 SOLUTION at 10:00

## 2021-01-01 RX ADMIN — MEROPENEM 1000 MG: 1 INJECTION, POWDER, FOR SOLUTION INTRAVENOUS at 09:37

## 2021-01-01 RX ADMIN — Medication 10 ML: at 09:05

## 2021-01-01 RX ADMIN — LEVETIRACETAM 500 MG: 500 TABLET ORAL at 21:19

## 2021-01-01 RX ADMIN — CARVEDILOL 3.12 MG: 3.12 TABLET, FILM COATED ORAL at 09:04

## 2021-01-01 RX ADMIN — LEVETIRACETAM 500 MG: 500 TABLET ORAL at 20:15

## 2021-01-01 RX ADMIN — AMOXICILLIN AND CLAVULANATE POTASSIUM 1 TABLET: 500; 125 TABLET, FILM COATED ORAL at 20:15

## 2021-01-01 RX ADMIN — ENOXAPARIN SODIUM 90 MG: 100 INJECTION SUBCUTANEOUS at 21:23

## 2021-01-01 RX ADMIN — LEVETIRACETAM 500 MG: 500 TABLET ORAL at 20:29

## 2021-01-01 RX ADMIN — PIPERACILLIN AND TAZOBACTAM 3375 MG: 3; .375 INJECTION, POWDER, LYOPHILIZED, FOR SOLUTION INTRAVENOUS at 20:19

## 2021-01-01 RX ADMIN — AMOXICILLIN AND CLAVULANATE POTASSIUM 1 TABLET: 500; 125 TABLET, FILM COATED ORAL at 14:58

## 2021-01-01 RX ADMIN — PIPERACILLIN AND TAZOBACTAM 3375 MG: 3; .375 INJECTION, POWDER, LYOPHILIZED, FOR SOLUTION INTRAVENOUS at 08:38

## 2021-01-01 RX ADMIN — CARVEDILOL 6.25 MG: 6.25 TABLET, FILM COATED ORAL at 09:35

## 2021-01-01 RX ADMIN — ASPIRIN 81 MG: 81 TABLET, COATED ORAL at 09:04

## 2021-01-01 RX ADMIN — LINEZOLID 600 MG: 600 TABLET ORAL at 20:40

## 2021-01-01 RX ADMIN — CLOPIDOGREL 75 MG: 75 TABLET, FILM COATED ORAL at 09:45

## 2021-01-01 RX ADMIN — LEVETIRACETAM 500 MG: 500 TABLET ORAL at 20:21

## 2021-01-01 RX ADMIN — FUROSEMIDE 40 MG: 10 INJECTION, SOLUTION INTRAMUSCULAR; INTRAVENOUS at 22:09

## 2021-01-01 RX ADMIN — POTASSIUM CHLORIDE 40 MEQ: 750 TABLET, FILM COATED, EXTENDED RELEASE ORAL at 13:19

## 2021-01-01 RX ADMIN — AMOXICILLIN AND CLAVULANATE POTASSIUM 1 TABLET: 500; 125 TABLET, FILM COATED ORAL at 09:43

## 2021-01-01 RX ADMIN — LEVOFLOXACIN 500 MG: 500 TABLET, FILM COATED ORAL at 09:41

## 2021-01-01 RX ADMIN — IODIXANOL 83 ML: 320 INJECTION, SOLUTION INTRAVASCULAR at 14:58

## 2021-01-01 RX ADMIN — LEVETIRACETAM 500 MG: 500 TABLET ORAL at 20:31

## 2021-01-01 RX ADMIN — MUPIROCIN: 20 OINTMENT TOPICAL at 09:53

## 2021-01-01 RX ADMIN — RIVAROXABAN 15 MG: 15 TABLET, FILM COATED ORAL at 09:41

## 2021-01-01 RX ADMIN — LEVOFLOXACIN 500 MG: 500 TABLET, FILM COATED ORAL at 09:21

## 2021-01-01 RX ADMIN — AMOXICILLIN AND CLAVULANATE POTASSIUM 1 TABLET: 500; 125 TABLET, FILM COATED ORAL at 21:26

## 2021-01-01 RX ADMIN — DEXTROSE MONOHYDRATE 12.5 G: 25 INJECTION, SOLUTION INTRAVENOUS at 17:25

## 2021-01-01 RX ADMIN — DEXTROSE MONOHYDRATE: 100 INJECTION, SOLUTION INTRAVENOUS at 21:46

## 2021-01-01 RX ADMIN — DEXTROSE MONOHYDRATE 100 ML/HR: 50 INJECTION, SOLUTION INTRAVENOUS at 08:16

## 2021-01-01 RX ADMIN — Medication 1 CAPSULE: at 10:54

## 2021-01-01 RX ADMIN — DEXTROSE MONOHYDRATE 100 ML/HR: 50 INJECTION, SOLUTION INTRAVENOUS at 23:03

## 2021-01-01 RX ADMIN — AMOXICILLIN AND CLAVULANATE POTASSIUM 1 TABLET: 500; 125 TABLET, FILM COATED ORAL at 21:17

## 2021-01-01 RX ADMIN — PIPERACILLIN AND TAZOBACTAM 3375 MG: 3; .375 INJECTION, POWDER, LYOPHILIZED, FOR SOLUTION INTRAVENOUS at 03:00

## 2021-01-01 RX ADMIN — Medication 1 CAPSULE: at 18:58

## 2021-01-01 RX ADMIN — Medication 10 ML: at 08:37

## 2021-01-01 RX ADMIN — MUPIROCIN: 20 OINTMENT TOPICAL at 09:46

## 2021-01-01 RX ADMIN — DIGOXIN 125 MCG: 125 TABLET ORAL at 07:44

## 2021-01-01 RX ADMIN — PIPERACILLIN AND TAZOBACTAM 3375 MG: 3; .375 INJECTION, POWDER, LYOPHILIZED, FOR SOLUTION INTRAVENOUS at 18:58

## 2021-01-01 RX ADMIN — ZIPRASIDONE MESYLATE 20 MG: 20 INJECTION, POWDER, LYOPHILIZED, FOR SOLUTION INTRAMUSCULAR at 00:20

## 2021-01-01 RX ADMIN — Medication 1 CAPSULE: at 09:04

## 2021-01-01 RX ADMIN — AMOXICILLIN AND CLAVULANATE POTASSIUM 1 TABLET: 500; 125 TABLET, FILM COATED ORAL at 15:29

## 2021-01-01 RX ADMIN — PIPERACILLIN AND TAZOBACTAM 3375 MG: 3; .375 INJECTION, POWDER, LYOPHILIZED, FOR SOLUTION INTRAVENOUS at 08:50

## 2021-01-01 RX ADMIN — ENOXAPARIN SODIUM 90 MG: 100 INJECTION SUBCUTANEOUS at 12:49

## 2021-01-01 RX ADMIN — ENOXAPARIN SODIUM 90 MG: 100 INJECTION SUBCUTANEOUS at 09:05

## 2021-01-01 RX ADMIN — PIPERACILLIN AND TAZOBACTAM 3375 MG: 3; .375 INJECTION, POWDER, LYOPHILIZED, FOR SOLUTION INTRAVENOUS at 14:47

## 2021-01-01 RX ADMIN — DIGOXIN 125 MCG: 125 TABLET ORAL at 09:04

## 2021-01-01 RX ADMIN — LEVETIRACETAM 500 MG: 500 TABLET ORAL at 09:04

## 2021-01-01 RX ADMIN — IRON SUCROSE 200 MG: 20 INJECTION, SOLUTION INTRAVENOUS at 10:04

## 2021-01-01 RX ADMIN — PIPERACILLIN AND TAZOBACTAM 3375 MG: 3; .375 INJECTION, POWDER, LYOPHILIZED, FOR SOLUTION INTRAVENOUS at 21:00

## 2021-01-01 RX ADMIN — AMOXICILLIN AND CLAVULANATE POTASSIUM 1 TABLET: 500; 125 TABLET, FILM COATED ORAL at 20:56

## 2021-01-01 RX ADMIN — LEVETIRACETAM 500 MG: 500 TABLET ORAL at 21:00

## 2021-01-01 RX ADMIN — Medication 10 ML: at 20:38

## 2021-01-01 RX ADMIN — SODIUM CHLORIDE 1000 ML: 9 INJECTION, SOLUTION INTRAVENOUS at 08:06

## 2021-01-01 RX ADMIN — AMOXICILLIN AND CLAVULANATE POTASSIUM 1 TABLET: 500; 125 TABLET, FILM COATED ORAL at 17:47

## 2021-01-01 RX ADMIN — DIGOXIN 125 MCG: 125 TABLET ORAL at 08:48

## 2021-01-01 RX ADMIN — LEVOTHYROXINE SODIUM 25 MCG: 0.03 TABLET ORAL at 08:03

## 2021-01-01 RX ADMIN — CARVEDILOL 3.12 MG: 3.12 TABLET, FILM COATED ORAL at 17:07

## 2021-01-01 RX ADMIN — MEROPENEM 1000 MG: 1 INJECTION, POWDER, FOR SOLUTION INTRAVENOUS at 11:54

## 2021-01-01 RX ADMIN — CLOPIDOGREL 75 MG: 75 TABLET, FILM COATED ORAL at 09:44

## 2021-01-01 RX ADMIN — PIPERACILLIN AND TAZOBACTAM 3375 MG: 3; .375 INJECTION, POWDER, LYOPHILIZED, FOR SOLUTION INTRAVENOUS at 02:33

## 2021-01-01 RX ADMIN — PIPERACILLIN AND TAZOBACTAM 3375 MG: 3; .375 INJECTION, POWDER, LYOPHILIZED, FOR SOLUTION INTRAVENOUS at 14:34

## 2021-01-01 RX ADMIN — LINEZOLID 600 MG: 600 TABLET ORAL at 08:02

## 2021-01-01 RX ADMIN — SODIUM CHLORIDE 100 ML: 9 INJECTION, SOLUTION INTRAVENOUS at 11:38

## 2021-01-01 RX ADMIN — MUPIROCIN: 20 OINTMENT TOPICAL at 09:26

## 2021-01-01 RX ADMIN — ASPIRIN 81 MG: 81 TABLET, COATED ORAL at 09:44

## 2021-01-01 RX ADMIN — LEVETIRACETAM 500 MG: 500 TABLET ORAL at 20:55

## 2021-01-01 RX ADMIN — DIGOXIN 125 MCG: 125 TABLET ORAL at 09:18

## 2021-01-01 RX ADMIN — IRON SUCROSE 200 MG: 20 INJECTION, SOLUTION INTRAVENOUS at 12:59

## 2021-01-01 RX ADMIN — RIVAROXABAN 15 MG: 15 TABLET, FILM COATED ORAL at 18:21

## 2021-01-01 RX ADMIN — DEXTROSE MONOHYDRATE 12.5 G: 25 INJECTION, SOLUTION INTRAVENOUS at 04:40

## 2021-01-01 RX ADMIN — AMOXICILLIN AND CLAVULANATE POTASSIUM 1 TABLET: 500; 125 TABLET, FILM COATED ORAL at 05:34

## 2021-01-01 RX ADMIN — DEXTROSE MONOHYDRATE 12.5 G: 25 INJECTION, SOLUTION INTRAVENOUS at 17:36

## 2021-01-01 RX ADMIN — LINEZOLID 600 MG: 600 TABLET ORAL at 08:20

## 2021-01-01 RX ADMIN — CARVEDILOL 3.12 MG: 3.12 TABLET, FILM COATED ORAL at 09:44

## 2021-01-01 RX ADMIN — OLANZAPINE 2.5 MG: 10 INJECTION, POWDER, FOR SOLUTION INTRAMUSCULAR at 10:06

## 2021-01-01 RX ADMIN — SODIUM CHLORIDE 500 ML: 9 INJECTION, SOLUTION INTRAVENOUS at 14:44

## 2021-01-01 RX ADMIN — Medication 1 CAPSULE: at 21:02

## 2021-01-01 RX ADMIN — ENOXAPARIN SODIUM 90 MG: 100 INJECTION SUBCUTANEOUS at 20:54

## 2021-01-01 RX ADMIN — DEXTROSE MONOHYDRATE 100 ML/HR: 50 INJECTION, SOLUTION INTRAVENOUS at 12:00

## 2021-01-01 RX ADMIN — PIPERACILLIN AND TAZOBACTAM 3375 MG: 3; .375 INJECTION, POWDER, LYOPHILIZED, FOR SOLUTION INTRAVENOUS at 08:41

## 2021-01-01 RX ADMIN — CARVEDILOL 3.12 MG: 3.12 TABLET, FILM COATED ORAL at 08:20

## 2021-01-01 RX ADMIN — LEVOFLOXACIN 500 MG: 500 TABLET, FILM COATED ORAL at 14:30

## 2021-01-01 RX ADMIN — PIPERACILLIN AND TAZOBACTAM 3375 MG: 3; .375 INJECTION, POWDER, LYOPHILIZED, FOR SOLUTION INTRAVENOUS at 03:10

## 2021-01-01 RX ADMIN — Medication 1 CAPSULE: at 15:35

## 2021-01-01 RX ADMIN — ENOXAPARIN SODIUM 90 MG: 100 INJECTION SUBCUTANEOUS at 09:24

## 2021-01-01 RX ADMIN — SODIUM CHLORIDE: 9 INJECTION, SOLUTION INTRAVENOUS at 11:29

## 2021-01-01 RX ADMIN — ENOXAPARIN SODIUM 90 MG: 100 INJECTION SUBCUTANEOUS at 09:47

## 2021-01-01 RX ADMIN — MUPIROCIN: 20 OINTMENT TOPICAL at 08:37

## 2021-01-01 RX ADMIN — Medication 10 ML: at 08:20

## 2021-01-01 RX ADMIN — LEVETIRACETAM 500 MG: 500 TABLET ORAL at 08:03

## 2021-01-01 RX ADMIN — LINEZOLID 600 MG: 600 TABLET ORAL at 20:15

## 2021-01-01 RX ADMIN — Medication 1 CAPSULE: at 17:47

## 2021-01-01 RX ADMIN — CARVEDILOL 3.12 MG: 3.12 TABLET, FILM COATED ORAL at 20:56

## 2021-01-01 RX ADMIN — PIPERACILLIN AND TAZOBACTAM 3375 MG: 3; .375 INJECTION, POWDER, LYOPHILIZED, FOR SOLUTION INTRAVENOUS at 18:16

## 2021-01-01 RX ADMIN — Medication 10 ML: at 22:19

## 2021-01-01 RX ADMIN — RIVAROXABAN 15 MG: 15 TABLET, FILM COATED ORAL at 16:43

## 2021-01-01 RX ADMIN — RIVAROXABAN 15 MG: 15 TABLET, FILM COATED ORAL at 11:07

## 2021-01-01 RX ADMIN — AMOXICILLIN AND CLAVULANATE POTASSIUM 1 TABLET: 500; 125 TABLET, FILM COATED ORAL at 06:13

## 2021-01-01 RX ADMIN — RIVAROXABAN 15 MG: 15 TABLET, FILM COATED ORAL at 09:20

## 2021-01-01 RX ADMIN — CARVEDILOL 6.25 MG: 6.25 TABLET, FILM COATED ORAL at 19:36

## 2021-01-01 RX ADMIN — COLLAGENASE SANTYL: 250 OINTMENT TOPICAL at 10:00

## 2021-01-01 RX ADMIN — PIPERACILLIN AND TAZOBACTAM 3375 MG: 3; .375 INJECTION, POWDER, LYOPHILIZED, FOR SOLUTION INTRAVENOUS at 08:05

## 2021-01-01 RX ADMIN — PIPERACILLIN AND TAZOBACTAM 3375 MG: 3; .375 INJECTION, POWDER, LYOPHILIZED, FOR SOLUTION INTRAVENOUS at 09:59

## 2021-01-01 RX ADMIN — CLOPIDOGREL 75 MG: 75 TABLET, FILM COATED ORAL at 09:18

## 2021-01-01 RX ADMIN — LISINOPRIL 2.5 MG: 5 TABLET ORAL at 09:04

## 2021-01-01 RX ADMIN — RIVAROXABAN 15 MG: 15 TABLET, FILM COATED ORAL at 08:03

## 2021-01-01 RX ADMIN — Medication 1 CAPSULE: at 08:48

## 2021-01-01 RX ADMIN — Medication 10 ML: at 08:48

## 2021-01-01 RX ADMIN — CARVEDILOL 6.25 MG: 6.25 TABLET, FILM COATED ORAL at 11:06

## 2021-01-01 RX ADMIN — AMOXICILLIN AND CLAVULANATE POTASSIUM 1 TABLET: 500; 125 TABLET, FILM COATED ORAL at 20:34

## 2021-01-01 RX ADMIN — LEVETIRACETAM 500 MG: 500 TABLET ORAL at 09:18

## 2021-01-01 RX ADMIN — DIGOXIN 125 MCG: 125 TABLET ORAL at 10:53

## 2021-01-01 RX ADMIN — LEVETIRACETAM 500 MG: 500 TABLET ORAL at 09:41

## 2021-01-01 RX ADMIN — DEXTROSE MONOHYDRATE 12.5 G: 25 INJECTION, SOLUTION INTRAVENOUS at 20:45

## 2021-01-01 RX ADMIN — RIVAROXABAN 15 MG: 15 TABLET, FILM COATED ORAL at 19:35

## 2021-01-01 RX ADMIN — LEVETIRACETAM 500 MG: 500 TABLET ORAL at 20:34

## 2021-01-01 RX ADMIN — CARVEDILOL 3.12 MG: 3.12 TABLET, FILM COATED ORAL at 16:43

## 2021-01-01 RX ADMIN — ENOXAPARIN SODIUM 90 MG: 100 INJECTION SUBCUTANEOUS at 20:34

## 2021-01-01 RX ADMIN — ASPIRIN 81 MG: 81 TABLET, COATED ORAL at 08:03

## 2021-01-01 RX ADMIN — MUPIROCIN: 20 OINTMENT TOPICAL at 14:17

## 2021-01-01 RX ADMIN — LIOTHYRONINE SODIUM 5 MCG: 5 TABLET ORAL at 09:21

## 2021-01-01 RX ADMIN — LEVOTHYROXINE SODIUM 25 MCG: 0.03 TABLET ORAL at 09:41

## 2021-01-01 RX ADMIN — SODIUM CHLORIDE 25 ML: 9 INJECTION, SOLUTION INTRAVENOUS at 20:17

## 2021-01-01 RX ADMIN — Medication 10 ML: at 20:16

## 2021-01-01 RX ADMIN — ASPIRIN 81 MG: 81 TABLET, COATED ORAL at 08:20

## 2021-01-01 RX ADMIN — CARVEDILOL 3.12 MG: 3.12 TABLET, FILM COATED ORAL at 08:03

## 2021-01-01 RX ADMIN — LEVETIRACETAM 500 MG: 500 TABLET ORAL at 20:20

## 2021-01-01 RX ADMIN — Medication 10 ML: at 21:03

## 2021-01-01 RX ADMIN — AMOXICILLIN AND CLAVULANATE POTASSIUM 1 TABLET: 500; 125 TABLET, FILM COATED ORAL at 20:31

## 2021-01-01 RX ADMIN — IRON SUCROSE 200 MG: 20 INJECTION, SOLUTION INTRAVENOUS at 11:33

## 2021-01-01 RX ADMIN — CLOPIDOGREL 75 MG: 75 TABLET, FILM COATED ORAL at 10:53

## 2021-01-01 RX ADMIN — PIPERACILLIN AND TAZOBACTAM 3375 MG: 3; .375 INJECTION, POWDER, LYOPHILIZED, FOR SOLUTION INTRAVENOUS at 20:32

## 2021-01-01 RX ADMIN — LEVETIRACETAM 500 MG: 500 TABLET ORAL at 11:06

## 2021-01-01 RX ADMIN — DIGOXIN 125 MCG: 125 TABLET ORAL at 08:20

## 2021-01-01 RX ADMIN — Medication 1 CAPSULE: at 18:20

## 2021-01-01 RX ADMIN — LEVETIRACETAM 500 MG: 500 TABLET ORAL at 20:40

## 2021-01-01 RX ADMIN — DEXTROSE MONOHYDRATE 12.5 G: 25 INJECTION, SOLUTION INTRAVENOUS at 21:42

## 2021-01-01 RX ADMIN — LEVETIRACETAM 500 MG: 500 TABLET ORAL at 09:49

## 2021-01-01 RX ADMIN — PIPERACILLIN AND TAZOBACTAM 3375 MG: 3; .375 INJECTION, POWDER, LYOPHILIZED, FOR SOLUTION INTRAVENOUS at 20:35

## 2021-01-01 RX ADMIN — CARVEDILOL 6.25 MG: 6.25 TABLET, FILM COATED ORAL at 21:18

## 2021-01-01 RX ADMIN — CARVEDILOL 3.12 MG: 3.12 TABLET, FILM COATED ORAL at 07:44

## 2021-01-01 RX ADMIN — LINEZOLID 600 MG: 600 TABLET ORAL at 21:00

## 2021-01-01 RX ADMIN — LINEZOLID 600 MG: 600 TABLET ORAL at 21:02

## 2021-01-01 RX ADMIN — POTASSIUM CHLORIDE 40 MEQ: 20 TABLET, EXTENDED RELEASE ORAL at 10:05

## 2021-01-01 RX ADMIN — DEXTROSE MONOHYDRATE 100 ML/HR: 50 INJECTION, SOLUTION INTRAVENOUS at 11:44

## 2021-01-01 RX ADMIN — ZIPRASIDONE MESYLATE 20 MG: 20 INJECTION, POWDER, LYOPHILIZED, FOR SOLUTION INTRAMUSCULAR at 21:42

## 2021-01-01 RX ADMIN — CLOPIDOGREL 75 MG: 75 TABLET, FILM COATED ORAL at 08:48

## 2021-01-01 RX ADMIN — MEROPENEM 1000 MG: 1 INJECTION, POWDER, FOR SOLUTION INTRAVENOUS at 17:42

## 2021-01-01 RX ADMIN — DIGOXIN 125 MCG: 125 TABLET ORAL at 08:02

## 2021-01-01 RX ADMIN — RIVAROXABAN 15 MG: 15 TABLET, FILM COATED ORAL at 09:35

## 2021-01-01 RX ADMIN — ENOXAPARIN SODIUM 90 MG: 100 INJECTION SUBCUTANEOUS at 20:40

## 2021-01-01 RX ADMIN — PIPERACILLIN AND TAZOBACTAM 3375 MG: 3; .375 INJECTION, POWDER, LYOPHILIZED, FOR SOLUTION INTRAVENOUS at 14:17

## 2021-01-01 RX ADMIN — ASPIRIN 81 MG: 81 TABLET, COATED ORAL at 08:47

## 2021-01-01 RX ADMIN — ASPIRIN 81 MG: 81 TABLET, COATED ORAL at 09:18

## 2021-01-01 RX ADMIN — AMOXICILLIN AND CLAVULANATE POTASSIUM 1 TABLET: 500; 125 TABLET, FILM COATED ORAL at 14:51

## 2021-01-01 RX ADMIN — Medication 1 CAPSULE: at 17:36

## 2021-01-01 RX ADMIN — LEVETIRACETAM 500 MG: 500 TABLET ORAL at 07:44

## 2021-01-01 RX ADMIN — LEVOTHYROXINE SODIUM 25 MCG: 0.03 TABLET ORAL at 09:57

## 2021-01-01 RX ADMIN — CARVEDILOL 3.12 MG: 3.12 TABLET, FILM COATED ORAL at 17:42

## 2021-01-01 RX ADMIN — LEVETIRACETAM 500 MG: 500 TABLET ORAL at 09:21

## 2021-01-01 RX ADMIN — LISINOPRIL 2.5 MG: 5 TABLET ORAL at 10:53

## 2021-01-01 RX ADMIN — ENOXAPARIN SODIUM 90 MG: 100 INJECTION SUBCUTANEOUS at 17:27

## 2021-01-01 RX ADMIN — LEVETIRACETAM 500 MG: 500 TABLET ORAL at 09:44

## 2021-01-01 RX ADMIN — ENOXAPARIN SODIUM 90 MG: 100 INJECTION SUBCUTANEOUS at 08:03

## 2021-01-01 RX ADMIN — CARVEDILOL 3.12 MG: 3.12 TABLET, FILM COATED ORAL at 18:58

## 2021-01-01 RX ADMIN — DIGOXIN 125 MCG: 125 TABLET ORAL at 09:44

## 2021-01-01 RX ADMIN — MUPIROCIN: 20 OINTMENT TOPICAL at 10:45

## 2021-01-01 RX ADMIN — Medication 1 EACH: at 13:16

## 2021-01-01 RX ADMIN — ENOXAPARIN SODIUM 90 MG: 100 INJECTION SUBCUTANEOUS at 21:17

## 2021-01-01 RX ADMIN — LEVETIRACETAM 500 MG: 500 TABLET ORAL at 14:38

## 2021-01-01 RX ADMIN — LIOTHYRONINE SODIUM 5 MCG: 5 TABLET ORAL at 09:44

## 2021-01-01 RX ADMIN — DEXTROSE MONOHYDRATE 100 ML/HR: 50 INJECTION, SOLUTION INTRAVENOUS at 02:00

## 2021-01-01 RX ADMIN — CARVEDILOL 3.12 MG: 3.12 TABLET, FILM COATED ORAL at 18:15

## 2021-01-01 RX ADMIN — CARVEDILOL 3.12 MG: 3.12 TABLET, FILM COATED ORAL at 09:47

## 2021-01-01 RX ADMIN — LEVOTHYROXINE SODIUM 25 MCG: 0.03 TABLET ORAL at 21:18

## 2021-01-01 RX ADMIN — CARVEDILOL 3.12 MG: 3.12 TABLET, FILM COATED ORAL at 17:47

## 2021-01-01 RX ADMIN — CARVEDILOL 3.12 MG: 3.12 TABLET, FILM COATED ORAL at 09:41

## 2021-01-01 RX ADMIN — DEXTROSE MONOHYDRATE 100 ML/HR: 50 INJECTION, SOLUTION INTRAVENOUS at 13:49

## 2021-01-01 RX ADMIN — Medication 10 ML: at 10:55

## 2021-01-01 RX ADMIN — LEVETIRACETAM 500 MG: 500 TABLET ORAL at 21:02

## 2021-01-01 RX ADMIN — ENOXAPARIN SODIUM 90 MG: 100 INJECTION SUBCUTANEOUS at 20:32

## 2021-01-01 RX ADMIN — LEVETIRACETAM 500 MG: 500 TABLET ORAL at 08:47

## 2021-01-01 RX ADMIN — MEROPENEM 1000 MG: 1 INJECTION, POWDER, FOR SOLUTION INTRAVENOUS at 03:45

## 2021-01-01 RX ADMIN — OLANZAPINE 2.5 MG: 10 INJECTION, POWDER, FOR SOLUTION INTRAMUSCULAR at 22:50

## 2021-01-01 RX ADMIN — CLOPIDOGREL 75 MG: 75 TABLET, FILM COATED ORAL at 09:04

## 2021-01-01 RX ADMIN — ASPIRIN 81 MG: 81 TABLET, COATED ORAL at 09:50

## 2021-01-01 RX ADMIN — LEVETIRACETAM 500 MG: 500 TABLET ORAL at 10:53

## 2021-01-01 RX ADMIN — OLANZAPINE 2.5 MG: 10 INJECTION, POWDER, FOR SOLUTION INTRAMUSCULAR at 18:30

## 2021-01-01 RX ADMIN — MUPIROCIN: 20 OINTMENT TOPICAL at 10:04

## 2021-01-01 RX ADMIN — Medication 1 CAPSULE: at 17:42

## 2021-01-01 RX ADMIN — Medication 10 ML: at 20:29

## 2021-01-01 RX ADMIN — LEVOFLOXACIN 500 MG: 500 TABLET, FILM COATED ORAL at 08:03

## 2021-01-01 RX ADMIN — ACETAMINOPHEN 650 MG: 325 TABLET ORAL at 08:02

## 2021-01-01 RX ADMIN — LEVETIRACETAM 500 MG: 500 TABLET ORAL at 09:57

## 2021-01-01 RX ADMIN — PIPERACILLIN AND TAZOBACTAM 3375 MG: 3; .375 INJECTION, POWDER, LYOPHILIZED, FOR SOLUTION INTRAVENOUS at 02:31

## 2021-01-01 RX ADMIN — LISINOPRIL 2.5 MG: 5 TABLET ORAL at 09:35

## 2021-01-01 RX ADMIN — DEXTROSE MONOHYDRATE 12.5 G: 25 INJECTION, SOLUTION INTRAVENOUS at 04:05

## 2021-01-01 RX ADMIN — Medication 10 ML: at 08:03

## 2021-01-01 RX ADMIN — AMOXICILLIN AND CLAVULANATE POTASSIUM 1 TABLET: 500; 125 TABLET, FILM COATED ORAL at 17:20

## 2021-01-01 RX ADMIN — CARVEDILOL 3.12 MG: 3.12 TABLET, FILM COATED ORAL at 08:49

## 2021-01-01 RX ADMIN — DIGOXIN 250 MCG: 250 TABLET ORAL at 13:34

## 2021-01-01 RX ADMIN — PIPERACILLIN AND TAZOBACTAM 3375 MG: 3; .375 INJECTION, POWDER, LYOPHILIZED, FOR SOLUTION INTRAVENOUS at 21:04

## 2021-01-01 RX ADMIN — OLANZAPINE 2.5 MG: 10 INJECTION, POWDER, FOR SOLUTION INTRAMUSCULAR at 20:50

## 2021-01-01 RX ADMIN — LEVETIRACETAM 500 MG: 500 TABLET ORAL at 21:26

## 2021-01-01 RX ADMIN — VANCOMYCIN HYDROCHLORIDE 1500 MG: 10 INJECTION, POWDER, LYOPHILIZED, FOR SOLUTION INTRAVENOUS at 08:56

## 2021-01-01 RX ADMIN — LEVETIRACETAM 500 MG: 500 TABLET ORAL at 09:45

## 2021-01-01 RX ADMIN — ASPIRIN 81 MG: 81 TABLET, COATED ORAL at 17:27

## 2021-01-01 RX ADMIN — ENOXAPARIN SODIUM 90 MG: 100 INJECTION SUBCUTANEOUS at 20:16

## 2021-01-01 RX ADMIN — OLANZAPINE 2.5 MG: 10 INJECTION, POWDER, FOR SOLUTION INTRAMUSCULAR at 08:06

## 2021-01-01 RX ADMIN — Medication 1 CAPSULE: at 08:02

## 2021-01-01 RX ADMIN — AMOXICILLIN AND CLAVULANATE POTASSIUM 1 TABLET: 500; 125 TABLET, FILM COATED ORAL at 14:43

## 2021-01-01 RX ADMIN — CARVEDILOL 3.12 MG: 3.12 TABLET, FILM COATED ORAL at 10:53

## 2021-01-01 RX ADMIN — Medication 10 ML: at 09:54

## 2021-01-01 RX ADMIN — FUROSEMIDE 40 MG: 10 INJECTION, SOLUTION INTRAMUSCULAR; INTRAVENOUS at 11:07

## 2021-01-01 RX ADMIN — MUPIROCIN: 20 OINTMENT TOPICAL at 12:48

## 2021-01-01 RX ADMIN — LINEZOLID 600 MG: 600 TABLET ORAL at 08:48

## 2021-01-01 RX ADMIN — MEROPENEM 1000 MG: 1 INJECTION, POWDER, FOR SOLUTION INTRAVENOUS at 19:40

## 2021-01-01 RX ADMIN — LEVOTHYROXINE SODIUM 25 MCG: 0.03 TABLET ORAL at 09:35

## 2021-01-01 RX ADMIN — ENOXAPARIN SODIUM 90 MG: 100 INJECTION SUBCUTANEOUS at 09:49

## 2021-01-01 RX ADMIN — DEXTROSE MONOHYDRATE: 50 INJECTION, SOLUTION INTRAVENOUS at 23:27

## 2021-01-01 RX ADMIN — LISINOPRIL 2.5 MG: 5 TABLET ORAL at 09:23

## 2021-01-01 RX ADMIN — LISINOPRIL 2.5 MG: 5 TABLET ORAL at 09:44

## 2021-01-01 RX ADMIN — LEVETIRACETAM 500 MG: 500 TABLET ORAL at 09:35

## 2021-01-01 RX ADMIN — DEXTROSE MONOHYDRATE: 100 INJECTION, SOLUTION INTRAVENOUS at 04:11

## 2021-01-01 RX ADMIN — ENOXAPARIN SODIUM 90 MG: 100 INJECTION SUBCUTANEOUS at 10:52

## 2021-01-01 RX ADMIN — RIVAROXABAN 15 MG: 15 TABLET, FILM COATED ORAL at 17:19

## 2021-01-01 RX ADMIN — LEVOTHYROXINE SODIUM 25 MCG: 0.03 TABLET ORAL at 09:21

## 2021-01-01 RX ADMIN — RIVAROXABAN 15 MG: 15 TABLET, FILM COATED ORAL at 17:40

## 2021-01-01 RX ADMIN — DEXTROSE MONOHYDRATE 12.5 G: 25 INJECTION, SOLUTION INTRAVENOUS at 11:59

## 2021-01-01 RX ADMIN — DILTIAZEM HYDROCHLORIDE 10 MG: 5 INJECTION INTRAVENOUS at 15:22

## 2021-01-01 RX ADMIN — RIVAROXABAN 15 MG: 15 TABLET, FILM COATED ORAL at 09:58

## 2021-01-01 RX ADMIN — SODIUM CHLORIDE 25 ML: 9 INJECTION, SOLUTION INTRAVENOUS at 20:36

## 2021-01-01 RX ADMIN — CARVEDILOL 3.12 MG: 3.12 TABLET, FILM COATED ORAL at 17:36

## 2021-01-01 RX ADMIN — ASPIRIN 81 MG: 81 TABLET, COATED ORAL at 10:55

## 2021-01-01 ASSESSMENT — PAIN SCALES - GENERAL
PAINLEVEL_OUTOF10: 0
PAINLEVEL_OUTOF10: 6
PAINLEVEL_OUTOF10: 0

## 2021-01-01 ASSESSMENT — ENCOUNTER SYMPTOMS
SHORTNESS OF BREATH: 0
RHINORRHEA: 0
BACK PAIN: 0
EYE REDNESS: 0
EYE REDNESS: 0
COUGH: 0
WHEEZING: 0
EYE DISCHARGE: 0
NAUSEA: 0
WHEEZING: 0
CONSTIPATION: 0
BACK PAIN: 0
SHORTNESS OF BREATH: 0
BACK PAIN: 0
NAUSEA: 0
BACK PAIN: 0
TROUBLE SWALLOWING: 0
ABDOMINAL PAIN: 0
ABDOMINAL PAIN: 0
SHORTNESS OF BREATH: 0
EYE DISCHARGE: 0
NAUSEA: 0
NAUSEA: 0
SHORTNESS OF BREATH: 0
RHINORRHEA: 0
SORE THROAT: 0
BACK PAIN: 0
COUGH: 0
EYE REDNESS: 0
DIARRHEA: 0
TROUBLE SWALLOWING: 0
EYE REDNESS: 0
SHORTNESS OF BREATH: 0
BACK PAIN: 0
TROUBLE SWALLOWING: 0
RHINORRHEA: 0
EYE DISCHARGE: 0
WHEEZING: 0
EYE REDNESS: 0
COUGH: 0
ABDOMINAL PAIN: 0
DIARRHEA: 0
WHEEZING: 0
ROS SKIN COMMENTS: RIGHT LEG WOUND
ABDOMINAL PAIN: 0
EYE REDNESS: 0
CONSTIPATION: 0
CONSTIPATION: 0
COUGH: 0
BACK PAIN: 0
EYE REDNESS: 0
CONSTIPATION: 0
EYE DISCHARGE: 0
CONSTIPATION: 0
ABDOMINAL PAIN: 0
SHORTNESS OF BREATH: 0
EYE DISCHARGE: 0
TROUBLE SWALLOWING: 0
ROS SKIN COMMENTS: ONGOING RIGHT FOOT WOUNDS
ABDOMINAL PAIN: 0
BACK PAIN: 0
DIARRHEA: 0
SORE THROAT: 0
EYE DISCHARGE: 0
NAUSEA: 0
EYE REDNESS: 0
SHORTNESS OF BREATH: 0
RHINORRHEA: 0
EYE DISCHARGE: 0
ABDOMINAL PAIN: 0
RHINORRHEA: 0
WHEEZING: 0
WHEEZING: 0
CONSTIPATION: 0
SORE THROAT: 0
ROS SKIN COMMENTS: ONGOING RIGHT FOOT WOUND
SORE THROAT: 0
DIARRHEA: 0
SHORTNESS OF BREATH: 0
TROUBLE SWALLOWING: 0
TROUBLE SWALLOWING: 0
ABDOMINAL PAIN: 0
SORE THROAT: 0
COUGH: 0
RHINORRHEA: 0
SORE THROAT: 0
SORE THROAT: 0
TROUBLE SWALLOWING: 0
DIARRHEA: 0
DIARRHEA: 0
NAUSEA: 0
SORE THROAT: 0
CONSTIPATION: 0
NAUSEA: 0
WHEEZING: 0
CONSTIPATION: 0
RHINORRHEA: 0
DIARRHEA: 0
RHINORRHEA: 0
WHEEZING: 0
TROUBLE SWALLOWING: 0
DIARRHEA: 0
COUGH: 0
EYE DISCHARGE: 0
NAUSEA: 0

## 2021-01-01 ASSESSMENT — PAIN SCALES - WONG BAKER

## 2021-01-01 ASSESSMENT — PAIN DESCRIPTION - DESCRIPTORS: DESCRIPTORS: ACHING

## 2021-01-01 ASSESSMENT — PAIN DESCRIPTION - ORIENTATION: ORIENTATION: RIGHT

## 2021-01-01 ASSESSMENT — PAIN - FUNCTIONAL ASSESSMENT: PAIN_FUNCTIONAL_ASSESSMENT: 0-10

## 2021-01-01 ASSESSMENT — PAIN DESCRIPTION - LOCATION: LOCATION: SHOULDER

## 2021-01-01 ASSESSMENT — PAIN DESCRIPTION - PAIN TYPE: TYPE: ACUTE PAIN

## 2021-02-15 NOTE — TELEPHONE ENCOUNTER
----- Message from [facility] sent at 2/15/2021  9:30 AM EST -----  Subject: Refill Request    QUESTIONS  Name of Medication? aspirin 81 MG chewable tablet  Patient-reported dosage and instructions? Take 1 tablet by mouth daily  How many days do you have left? 7  Preferred Pharmacy? [facility] phone number (if available)? [phone]  ---------------------------------------------------------------------------  --------------    Name of Medication? sulfamethoxazole-trimethoprim (BACTRIM DS;SEPTRA DS)   800-160 MG per tablet  Patient-reported dosage and instructions? Take 1 tablet by mouth 2 times   daily for 10 days  How many days do you have left? 0  Preferred Pharmacy? [facility] phone number (if available)? [phone]  ---------------------------------------------------------------------------  --------------    Name of Medication? carvedilol (COREG) 6.25 MG tablet  Patient-reported dosage and instructions? Take 1 tablet by mouth 2 times   daily (with meals)  How many days do you have left? 0  Preferred Pharmacy? [facility] phone number (if available)? [phone]  ---------------------------------------------------------------------------  --------------    Name of Medication? lisinopril (PRINIVIL;ZESTRIL) 5 MG tablet  Patient-reported dosage and instructions? Take 1 tablet by mouth daily  How many days do you have left? 5  Preferred Pharmacy? [facility] phone number (if available)? [phone]  ---------------------------------------------------------------------------  --------------    Name of Medication? furosemide (LASIX) 40 MG tablet  Patient-reported dosage and instructions? Take 1 tablet by mouth 2 times   daily  How many days do you have left? 10  Preferred Pharmacy? [facility] phone number (if available)? [phone]  ---------------------------------------------------------------------------  --------------  Raul Major INFO  What is the best way for the office to contact you? OK to leave message on   voicemail  Preferred Call Back Phone Number?  [phone]

## 2021-02-18 NOTE — TELEPHONE ENCOUNTER
Patient's daughter, Fortunato Ybarra, is calling and was told by [facility] that the Bactrim was denied by Dr. Flaca Sosa. Was this denied or overlooked in the previous message? There were 3 medications being requested - Carvedilol, Levetiracetam, and Bactrim. Carvedilol was the only one that was refilled. She states that he needs the Bactrim for his leg wound. Please call patient's wife, Taylor Godwin, at [phone] asap to let her know if this medication will be refilled or if an appointment is needed to get the refill.

## 2021-02-18 NOTE — TELEPHONE ENCOUNTER
Reason for Disposition   [1] Caller is not with the adult (patient) AND [2] probable NON-URGENT symptoms    Protocols used: INFORMATION ONLY CALL - NO TRIAGE-ADULT-AH    Patient called Natalie Potter at [facility])  with red flag complaint. Unable to perform triage as pt is not with caller. Pt's daughter requesting RX Bactrim for her father's reoccurring leg sores. Advised daughter if she feels pt's s/sx are urgent to take him to ED; v/u.    Writer provided warm transfer to Yony Riley at [address] [place] [address] for appointment scheduling. Attention Provider: Thank you for allowing me to participate in the care of your patient. The patient was connected to triage in response to information provided to the E[facility]. Please do not respond through this encounter as the response is not directed to a shared pool.

## 2021-02-18 NOTE — TELEPHONE ENCOUNTER
----- Message from Analia Rodriguez sent at 2/18/2021 11:19 AM EST -----  Subject: Refill Request    QUESTIONS  Name of Medication? carvedilol (COREG) 6.25 MG tablet  Patient-reported dosage and instructions? Take 1 tablet by mouth 2 times   daily (with meals)  How many days do you have left? 0  Preferred Pharmacy? [facility] phone number (if available)? [phone]  ---------------------------------------------------------------------------  --------------    Name of Medication? levETIRAcetam (KEPPRA) 500 MG tablet  Patient-reported dosage and instructions? Take 1 tablet by mouth 2 times   daily  How many days do you have left? 0  Preferred Pharmacy? [id number] phone number (if available)? [phone]  ---------------------------------------------------------------------------  --------------    Name of Medication? sulfamethoxazole-trimethoprim (BACTRIM DS;SEPTRA DS)   800-160 MG per tablet  Patient-reported dosage and instructions? Take 1 tablet by mouth 2 times   daily for 10 days  How many days do you have left? 0  Preferred Pharmacy? [facility] phone number (if available)? [phone]  Additional Information for Provider? Please reach out to the patient or   his daughter if he needs a visit before this medication can be refilled. She can be reached at [phone].  ---------------------------------------------------------------------------  --------------  Jony PINON  What is the best way for the office to contact you? OK to leave message on   voicemail  Preferred Call Back Phone Number?  [phone]

## 2021-02-19 NOTE — TELEPHONE ENCOUNTER
Patient's wife informed of prescription but it went to [facility] in error. Please resubmit to [facility].

## 2021-02-19 NOTE — TELEPHONE ENCOUNTER
I went ahead and sent a prescription for Bactrim to the pharmacy. If the infection does not resolve, or if it returns, he should be seen in the office.

## 2021-02-22 NOTE — PATIENT INSTRUCTIONS
Patient Education        Cellulitis: Care Instructions  Your Care Instructions     Cellulitis is a skin infection caused by bacteria, most often strep or staph. It often occurs after a break in the skin from a scrape, cut, bite, or puncture, or after a rash. Cellulitis may be treated without doing tests to find out what caused it. But your doctor may do tests, if needed, to look for a specific bacteria, like methicillin-resistant Staphylococcus aureus (MRSA). The doctor has checked you carefully, but problems can develop later. If you notice any problems or new symptoms, get medical treatment right away. Follow-up care is a key part of your treatment and safety. Be sure to make and go to all appointments, and call your doctor if you are having problems. It's also a good idea to know your test results and keep a list of the medicines you take. How can you care for yourself at home? · Take your antibiotics as directed. Do not stop taking them just because you feel better. You need to take the full course of antibiotics. · Prop up the infected area on pillows to reduce pain and swelling. Try to keep the area above the level of your heart as often as you can. · If your doctor told you how to care for your wound, follow your doctor's instructions. If you did not get instructions, follow this general advice:  ? Wash the wound with clean water 2 times a day. Don't use hydrogen peroxide or alcohol, which can slow healing. ? You may cover the wound with a thin layer of petroleum jelly, such as Vaseline, and a nonstick bandage. ? Apply more petroleum jelly and replace the bandage as needed. · Be safe with medicines. Take pain medicines exactly as directed. ? If the doctor gave you a prescription medicine for pain, take it as prescribed. ? If you are not taking a prescription pain medicine, ask your doctor if you can take an over-the-counter medicine.   To prevent cellulitis in the future · Try to prevent cuts, scrapes, or other injuries to your skin. Cellulitis most often occurs where there is a break in the skin. · If you get a scrape, cut, mild burn, or bite, wash the wound with clean water as soon as you can to help avoid infection. Don't use hydrogen peroxide or alcohol, which can slow healing. · If you have swelling in your legs (edema), support stockings and good skin care may help prevent leg sores and cellulitis. · Take care of your feet, especially if you have diabetes or other conditions that increase the risk of infection. Wear shoes and socks. Do not go barefoot. If you have athlete's foot or other skin problems on your feet, talk to your doctor about how to treat them. When should you call for help? Call your doctor now or seek immediate medical care if:    · You have signs that your infection is getting worse, such as:  ? Increased pain, swelling, warmth, or redness. ? Red streaks leading from the area. ? Pus draining from the area. ? A fever.     · You get a rash. Watch closely for changes in your health, and be sure to contact your doctor if:    · You do not get better as expected. Where can you learn more? Go to https://[email or web address].Playtabase. org and sign in to your [employer] account. Enter G731 in the [facility] box to learn more about \"Cellulitis: Care Instructions. \"     If you do not have an account, please click on the \"Sign Up Now\" link. Current as of: July 2, 2020               Content Version: 12.6  © 2006-2020 [employer]. Care instructions adapted under license by [facility] ([facility]). If you have questions about a medical condition or this instruction, always ask your healthcare professional. [place] [address] any warranty or liability for your use of this information.

## 2021-02-22 NOTE — PROGRESS NOTES
[address] Note    Date: 2/22/2021                                               Subjective/Objective:     Chief Complaint   Patient presents with    Wound Check     leg       HPI   Pt is here for rash and drainage on BL lower legs that has been getting worse over the last few months. Pt was treated for cellulitis in November with Bactrim, which cleared it up but it returned about a month later. No fever. No vomiting. Pt started taking Bactrim 3 days ago and it's gotten a little better. +swelling of R lower leg which is chronic. Had negative dopplers 4 months ago.          Patient Active Problem List    Diagnosis Date Noted    Anasarca      Priority: High    Atrial fibrillation ([facility] 11/23/2020    Anemia 02/18/2019    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[address]     HTN (hypertension), benign     Seizures ([address] 02/17/2019    Syncope and collapse 02/17/2019    Senile dementia without behavioral disturbance ([address] 10/08/2018    Aspiration into airway 09/26/2018    Acute respiratory failure with hypoxia (HCC)     Seizure (HCC) 09/25/2018    Acute renal failure (HCC)     Acute combined systolic and diastolic (congestive) hrt fail ([address]     Volume overload 08/11/2018    New onset seizure ([address] 02/03/2017    Delirium     Acute encephalopathy        Past Medical History:   Diagnosis Date    CHF (congestive heart failure) (HCC)     Hypertension     Seizures (HCC)     TB (pulmonary tuberculosis)     he says the doctors told him he had TB when he was 15 and he had to have surgery       Current Outpatient Medications   Medication Sig Dispense Refill    sulfamethoxazole-trimethoprim (BACTRIM DS;SEPTRA DS) 800-160 MG per tablet Take 1 tablet by mouth 2 times daily for 10 days 20 tablet 0    carvedilol (COREG) 6.25 MG tablet Take 1 tablet by mouth 2 times daily (with meals) 60 tablet 5    lisinopril (PRINIVIL;ZESTRIL) 5 MG tablet TAKE ONE TABLET BY MOUTH DAILY 90 tablet 0  aspirin 81 MG chewable tablet Take 1 tablet by mouth daily 90 tablet 3    furosemide (LASIX) 40 MG tablet Take 1 tablet by mouth 2 times daily 180 tablet 0    levETIRAcetam (KEPPRA) 500 MG tablet Take 1 tablet by mouth 2 times daily 180 tablet 3     No current facility-administered medications for this visit. No Known Allergies    Review of Systems   No SOB, no wheeze    Vitals:  /78   Pulse 75   Temp 97.2 °F (36.2 °C)   Wt 229 lb (103.9 kg)   SpO2 96%   BMI 27.87 kg/m²     Physical Exam   General:  Well-appearing, NAD, alert, non-toxic  HEENT:  Normocephalic, atraumatic. CHEST/LUNGS: No dyspnea  EXTREMETIES: +edema of R lower extremity. +healing ulcerations of lower R leg anteriorly. No erythema. +mild induration. PSYCH:  A+O x 3; normal affect  NEURO:  GCS 15, CN2-12 grossly intact, no focal motor/sensory deficits, normal gait, normal speech      Assessment/Plan     2year-old male with skin infection in the bilateral lower legs as well as right leg swelling. DVT seems unlikely given lack of pain and chronic nature of the swelling. Likely lymphedema. Will treat for likely cellulitis with Bactrim. Will refer to the wound clinic as this may end up being a chronic wound. Discussed with patient medication/s dosage, instructions, potential S/E, A/R and Drug Interaction  Tylenol or Motrin as needed for pain or fever  Advise to return here if worse or go to nearest ER  Encourage fluids  Pt discharged in stable condition at 13:25      1. Cellulitis of right lower extremity    - Shelbi Kwon MD, [facility]    2.  Cellulitis of left lower extremity    - [facility] - Katerin Alcantara MD, [facility]         Orders Placed This Encounter   Procedures   Shelbi Kwon MD, [facility]     Referral Priority:   Routine     Referral Type:   Eval and Treat     Referral Reason:   Specialty Services Required Referred to Provider:   Charles Gonzalez MD     Requested Specialty:   General Surgery     Number of Visits Requested:   1       No follow-ups on file.     Haylee Pang MD    2/22/2021  2:33 PM

## 2021-03-02 PROBLEM — I70.232 ATHEROSCLEROSIS OF NATIVE ARTERIES OF RIGHT LEG WITH ULCERATION OF CALF (HCC): Status: ACTIVE | Noted: 2021-01-01

## 2021-03-02 PROBLEM — L97.922 NON-PRESSURE CHRONIC ULCER OF LEFT LOWER LEG WITH FAT LAYER EXPOSED (HCC): Status: ACTIVE | Noted: 2021-01-01

## 2021-03-02 PROBLEM — R60.0 LOWER EXTREMITY EDEMA: Status: ACTIVE | Noted: 2021-01-01

## 2021-03-02 PROBLEM — I87.2 PERIPHERAL VENOUS INSUFFICIENCY: Status: ACTIVE | Noted: 2021-01-01

## 2021-03-02 PROBLEM — I70.248 ATHEROSCLEROSIS OF NATIVE ARTERIES OF LEFT LEG WITH ULCERATION OF OTHER PART OF LOWER LEG (HCC): Status: ACTIVE | Noted: 2021-01-01

## 2021-03-02 PROBLEM — L97.912 NON-PRESSURE CHRONIC ULCER OF RIGHT LOWER LEG WITH FAT LAYER EXPOSED (HCC): Status: ACTIVE | Noted: 2021-01-01

## 2021-03-02 NOTE — PROGRESS NOTES
[address] [facility]:      [address] f: [phone] f: [phone] p: [phone] [email or web address]     Ordering Center: Marilyn Watt [address]  [facility] [address]  [phone]  Dept: [phone]   Fax# [phone]    Patient Information:      Ender Monson Costanercedric [address]   [phone]   : 1940  AGE: 79 y.o. GENDER: male   TODAYS DATE:  3/2/2021    Insurance:      PRIMARY INSURANCE:  Plan: MEDICARE PART A AND B  Coverage: MEDICARE  Effective Date: 2015  Group Number: [unfilled]  Subscriber Number: [id number] - (Medicare)    Payor/Plan Subscr  Sex Relation Sub. Ins. ID Effective Group Num   1. MEDICARE - Justino Manuelelda 1940 Male Self [id number] 1/1/15                                    [address]          Patient Wound Information:     Additional ICD-10 Codes: L97.811    Patient Active Problem List   Diagnosis Code    New onset seizure ([address] R56.9    Delirium R41.0    Acute encephalopathy G93.40    Volume overload E87.70    Acute renal failure ([address] N17.9    Acute combined systolic and diastolic (congestive) hrt fail ([facility]) I50.41    Anasarca R60.1    Seizure ([facility]) R56.9    Aspiration into airway T17.908A    Acute respiratory failure with hypoxia ([facility]) J96.01    Senile dementia without behavioral disturbance ([facility]) F03.90    Seizures ([facility]) R56.9    Syncope and collapse R55    Anemia D64.9    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, F02.80    HTN (hypertension), benign I10    Atrial fibrillation ([address] I48.91       WOUNDS REQUIRING DRESSING SUPPLIES:     Wound 21 Leg Left; Anterior #3 (Active)   Wound Image   21 1342   Wound Etiology Venous 21 1342   Wound Cleansed Cleansed with saline 21 1342   Wound Length (cm) 0.5 cm 21 1342   Wound Width (cm) 0.2 cm 21 1342   Wound Depth (cm) 0.1 cm 03/02/21 1342   Wound Surface Area (cm^2) 0.1 cm^2 03/02/21 1342   Wound Volume (cm^3) 0.01 cm^3 03/02/21 1342   Post-Procedure Length (cm) 0.5 cm 03/02/21 1406   Post-Procedure Width (cm) 0.2 cm 03/02/21 1406   Post-Procedure Depth (cm) 0.15 cm 03/02/21 1406   Post-Procedure Surface Area (cm^2) 0.1 cm^2 03/02/21 1406   Post-Procedure Volume (cm^3) 0.02 cm^3 03/02/21 1406   Wound Assessment Bleeding 03/02/21 1406   Drainage Amount Moderate 03/02/21 1406   Drainage Description Serosanguinous 03/02/21 1406   Odor None 03/02/21 1342   Jennifer-wound Assessment Intact;Dry/flaky 03/02/21 1342   Margins Attached edges; Defined edges 03/02/21 1342   Wound Thickness Description not for Pressure Injury Full thickness 03/02/21 1342   Number of days: 0       Wound 03/02/21 Leg Right; Lower;Medial #2 (Active)   Wound Image   03/02/21 1342   Wound Etiology Venous 03/02/21 1342   Wound Cleansed Cleansed with saline 03/02/21 1342   Wound Length (cm) 2.3 cm 03/02/21 1342   Wound Width (cm) 0.6 cm 03/02/21 1342   Wound Depth (cm) 0.1 cm 03/02/21 1342   Wound Surface Area (cm^2) 1.38 cm^2 03/02/21 1342   Wound Volume (cm^3) 0.14 cm^3 03/02/21 1342   Post-Procedure Length (cm) 2.3 cm 03/02/21 1410   Post-Procedure Width (cm) 0.6 cm 03/02/21 1410   Post-Procedure Depth (cm) 0.15 cm 03/02/21 1410   Post-Procedure Surface Area (cm^2) 1.38 cm^2 03/02/21 1410   Post-Procedure Volume (cm^3) 0.21 cm^3 03/02/21 1410   Wound Assessment Bleeding 03/02/21 1410   Drainage Amount Moderate 03/02/21 1410   Drainage Description Serosanguinous 03/02/21 1410   Jennifer-wound Assessment Intact;Dry/flaky 03/02/21 1342   Margins Attached edges 03/02/21 1342   Wound Thickness Description not for Pressure Injury Full thickness 03/02/21 1342   Number of days: 0       Wound 03/02/21 Leg Posterior;Right; Lower #1 (Active)   Wound Image   03/02/21 1342   Wound Etiology Venous 03/02/21 1342   Wound Cleansed Irrigated with saline 03/02/21 1342   Wound Length (cm) 2.8 cm 03/02/21 1342   Wound Width (cm) 2.4 cm 03/02/21 1342   Wound Depth (cm) 0.1 cm 03/02/21 1342   Wound Surface Area (cm^2) 6.72 cm^2 03/02/21 1342   Wound Volume (cm^3) 0.67 cm^3 03/02/21 1342   Post-Procedure Length (cm) 2.8 cm 03/02/21 1410   Post-Procedure Width (cm) 2.4 cm 03/02/21 1410   Post-Procedure Depth (cm) 0.15 cm 03/02/21 1410   Post-Procedure Surface Area (cm^2) 6.72 cm^2 03/02/21 1410   Post-Procedure Volume (cm^3) 1.01 cm^3 03/02/21 1410   Wound Assessment Bleeding 03/02/21 1410   Drainage Amount Moderate 03/02/21 1410   Drainage Description Serosanguinous 03/02/21 1410   Odor None 03/02/21 1342   Jennifer-wound Assessment Dry/flaky 03/02/21 1342   Margins Defined edges; Attached edges 03/02/21 1342   Wound Thickness Description not for Pressure Injury Full thickness 03/02/21 1342   Number of days: 0          Supplies Requested :      WOUND #: 1   PRIMARY DRESSING:    None   Cover and Secure with:  Other Mepilex Transfer     FREQUENCY OF DRESSING CHANGES:  Every other day    Wound Thickness [x] Full   []Partial                                     Patient Wound(s) Debrided: [x] Yes   [] No    Debridement Date: 3/2/2021    Debribement Type: Excisional/Sharp    ADDITIONAL ITEMS:  [] Gloves Small  [x] Gloves Medium [] Gloves Large [] Gloves Marvine Cho  [] Paper Tape 1\" [] Paper Tape 2\" [] Paper Tape 3\"  [] Medipore Tape 3\"  [] Saline  [] Skin Prep   [] Adhesive Remover   [] Cotton Tip Applicators  [] Tubular Stocking   [] Size E  [] Size G  [] Other:    Patient currently being seen by Home Health: [] Yes   [x] No    Duration for needed supplies:  [x]15  []30  []60  []90 Days    Provider Information:      PROVIDER'S NAME/NPI  Dr Betsy Guaman [id number]    I give permission to coordinate the care for this patient

## 2021-03-02 NOTE — H&P
[address]   History and Physical Note   Procedure Note    Demetrio Samuel  MEDICAL RECORD NUMBER:  [id number]  AGE: 79 y.o. GENDER: male  : 1940  EPISODE DATE:  3/2/2021    Subjective:     Chief Complaint   Patient presents with    Wound Check     Wounds BLE for >6 months, treted with Abx by PCP         HISTORY of PRESENT ILLNESS HPI     Demetrio Samuel is a 79 y.o. male who presents today as initial visit to the wound center for an evaluation of a wound/ulcer. Wound started early summer, 2020. History of Wound Context: 79-year-old male with a history most notable for congestive heart failure, bilateral lower extremity edema on Lasix, 80 mg a day who developed worsening edema over the course of several months with ulcerations as started at least 6 months prior to presentation today. Patient states wound became larger with more necrotic tissue. Patient therefore was referred here for ongoing wound care recommendations. No associated symptoms such as any fever, chills, chest pain, shortness of breath. Intermittently he will have some pain associated with the wound. Negative venous Dopplers approximately 4 months ago. He also states that he developed a rash and drainage from wounds and had treatment for cellulitis in 2020. Wound/Ulcer Pain Timing/Severity: intermittent  Quality of pain: dull  Severity:  1 / 10   Modifying Factors: None  Associated Signs/Symptoms: edema, erythema and drainage    Ulcer Identification:  Ulcer Type: venous and arterial    Contributing Factors: edema and venous stasis    Acute Wound: N/A not an acute wound    PAST MEDICAL HISTORY        Diagnosis Date    CHF (congestive heart failure) (HCC)     Hypertension     Seizures ([facility]     TB (pulmonary tuberculosis)     he says the doctors told him he had TB when he was 15 and he had to have surgery       PAST SURGICAL HISTORY    History reviewed.  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 Wound care: Debridement done (see note below). Please see attached Discharge Instructions for specific wound treatment plan  2. Offloading and edema control: Elevation and compression therapy to LLE. RLE in Tubigrip as ZENA could not be done in the office. Arterial Dopplers ordered. 3. Infection: No signs of acute infection. Continue to monitor  4. Circulation: ZENA of 1.19 on the left. Noncompressible on right. Arterial Dopplers ordered. Palpable pulses. 5. Nutrition: Encourage protein emphasis with meals. Discussed with patient and his daughter at the bedside at length. 45 minutes spent with patient and patient care related issues, educating patient on disease process    Debridement Procedure Note  Indications:  Based on my examination of this patient's wound(s)/ulcer(s) today, debridement is required to promote healing and evaluate the wound base. Performed by: Simi Person MD  Consent obtained:  Yes  Time out taken:  Yes  Pain Control: Anesthetic  Anesthetic: 4% Lidocaine Cream     Debridement: Excisional Debridement    Using curette the wound(s)/ulcer(s) was/were debrided down through and including the removal of subcutaneous tissue. Devitalized Tissue Debrided:  fibrin, biofilm, slough and necrotic/eschar    Pre Debridement Measurements:  Are located in the Wound/Ulcer Documentation Flow Sheet    Wound/Ulcer #: 1, 2 and 3    Post Debridement Measurements:  Wound/Ulcer Descriptions are Pre Debridement except measurements:    Wound 03/02/21 Leg Left; Anterior #3 (Active)   Wound Image   03/02/21 1342   Wound Etiology Venous 03/02/21 1342   Wound Cleansed Cleansed with saline 03/02/21 1342   Wound Length (cm) 0.5 cm 03/02/21 1342   Wound Width (cm) 0.2 cm 03/02/21 1342   Wound Depth (cm) 0.1 cm 03/02/21 1342   Wound Surface Area (cm^2) 0.1 cm^2 03/02/21 1342   Wound Volume (cm^3) 0.01 cm^3 03/02/21 1342   Post-Procedure Length (cm) 0.5 cm 03/02/21 1406   Post-Procedure Width (cm) 0.2 cm 03/02/21 1406   Post-Procedure Depth (cm) 0.15 cm 03/02/21 1406   Post-Procedure Surface Area (cm^2) 0.1 cm^2 03/02/21 1406   Post-Procedure Volume (cm^3) 0.02 cm^3 03/02/21 1406   Wound Assessment Bleeding 03/02/21 1406   Drainage Amount Moderate 03/02/21 1406   Drainage Description Serosanguinous 03/02/21 1406   Odor None 03/02/21 1342   Jennifer-wound Assessment Intact;Dry/flaky 03/02/21 1342   Margins Attached edges; Defined edges 03/02/21 1342   Wound Thickness Description not for Pressure Injury Full thickness 03/02/21 1342   Number of days: 0       Wound 03/02/21 Leg Right; Lower;Medial #2 (Active)   Wound Image   03/02/21 1342   Wound Etiology Venous 03/02/21 1342   Wound Cleansed Cleansed with saline 03/02/21 1342   Wound Length (cm) 2.3 cm 03/02/21 1342   Wound Width (cm) 0.6 cm 03/02/21 1342   Wound Depth (cm) 0.1 cm 03/02/21 1342   Wound Surface Area (cm^2) 1.38 cm^2 03/02/21 1342   Wound Volume (cm^3) 0.14 cm^3 03/02/21 1342   Post-Procedure Length (cm) 2.3 cm 03/02/21 1410   Post-Procedure Width (cm) 0.6 cm 03/02/21 1410   Post-Procedure Depth (cm) 0.15 cm 03/02/21 1410   Post-Procedure Surface Area (cm^2) 1.38 cm^2 03/02/21 1410   Post-Procedure Volume (cm^3) 0.21 cm^3 03/02/21 1410   Wound Assessment Bleeding 03/02/21 1410   Drainage Amount Moderate 03/02/21 1410   Drainage Description Serosanguinous 03/02/21 1410   Jennifer-wound Assessment Intact;Dry/flaky 03/02/21 1342   Margins Attached edges 03/02/21 1342   Wound Thickness Description not for Pressure Injury Full thickness 03/02/21 1342   Number of days: 0       Wound 03/02/21 Leg Posterior;Right; Lower #1 (Active)   Wound Image   03/02/21 1342   Wound Etiology Venous 03/02/21 1342   Wound Cleansed Irrigated with saline 03/02/21 1342   Wound Length (cm) 2.8 cm 03/02/21 1342   Wound Width (cm) 2.4 cm 03/02/21 1342   Wound Depth (cm) 0.1 cm prescription as ordered by MD do not stop taking until medicine is all gone. Exercise as tolerated. No Smoking. Smoking prohibits wound healing. If Vascular testing is ordered, please call [address] [place] [address] [phone]) to schedule. Vascular tests ordered by Wound Care Physicians may take up to 2 hours to complete. Please keep that in mind when scheduling. If Vascular testing is scheduled, please bring supplies to replace your dressing after testing is done. The vascular department does not stock supplies. Wound: Bilateral legs    With each dressing change, rinse wounds with 0.9% Saline. (May use wound wash or soft contact solution. Both can be purchased at a local drug store). If unable to obtain saline, may use a gentle soap and water. Dressing care: Left -Cutimed, Alginate, Dry dressing  Unna Boot. Change next visit . Right Leg- Mepilex Transfer and 2 tubigrip. Change every 2 days. Important dietary reminders:  1. Increase Protein intake for optimal wound healing  2. No added salt to reduce any swelling  3. If diabetic, good glucose control  4. If you smoke, smoking affects wound healing, we ask that you refrain from smoking. Follow up with Dr Azalea Benz In 1 week in the wound care center. Call [phone] for any questions or concerns. Your  is [address]: [address] Information: Should you experience any significant changes in your wound(s) or have questions about your wound care, please contact the [facility] at [phone] Monday  - Thursday 8:00 am - 4:00 pm and Friday 8:00 am - 1:00pm. If you need help with your wound outside these hours and cannot wait until we are again available, contact your PCP or go to the hospital emergency room. PLEASE NOTE: IF YOU ARE UNABLE TO OBTAIN WOUND SUPPLIES, CONTINUE TO USE THE SUPPLIES YOU HAVE AVAILABLE UNTIL YOU ARE ABLE TO REACH US.  IT IS MOST IMPORTANT TO KEEP THE WOUND COVERED AT ALL TIMES.                 Electronically signed by Virginia Montes MD on 3/2/2021 at 3:00 PM

## 2021-03-02 NOTE — PLAN OF CARE
Discharge instructions given. Patient verbalized understanding. Return to [facility] in 1 week.   Called/faxed orders to  [facility] Bilateral legs

## 2021-03-03 NOTE — PLAN OF CARE
Multilayer Compression Wrap   Below the Knee    NAME:  Nayely Lux  YOB: 1940  MEDICAL RECORD NUMBER:  [id number]  DATE:  3/2/2021    Multilayer compression wrap: Applied moisturizing agent to dry skin as needed. Applied primary and secondary dressing as ordered. Applied multilayered dressing below the knee to left lower leg.      Applied  [employer] wrap from toes to knee overlapping each time    Electronically signed by Sal Diallo RN on 3/3/2021 at 9:41 AM

## 2021-03-03 NOTE — TELEPHONE ENCOUNTER
Referral to Dr. Paty Leyva cardiology [place] has been sent, forward to the front office, let patient know

## 2021-03-03 NOTE — TELEPHONE ENCOUNTER
----- Message from Lidia Tiffany sent at 3/3/2021 11:37 AM EST -----  Subject: Referral Request    QUESTIONS   Reason for referral request? Referral for cardiologist   Has the physician seen you for this condition before? Yes  Select a date? 2021-02-28  Select the physician (PCP or Specialist)? Skylar Bryant   Preferred Specialist (if applicable)? Skylar Bryant  Do you already have an appointment scheduled? No  Additional Information for Provider? Patient needs referral to   cardiologist   ---------------------------------------------------------------------------  --------------  [address]  What is the best way for the office to contact you? OK to leave message on   voicemail  Preferred Call Back Phone Number?  [phone]

## 2021-03-09 NOTE — PROGRESS NOTES
[address]   Progress Note       [address] [facility] RECORD NUMBER:  [id number]  AGE: 79 y.o. GENDER: male  : 1940  EPISODE DATE:  3/9/2021    Subjective:     Chief Complaint   Patient presents with    Wound Check     Right lower leg, Left lower leg        Patient without any new wound care issues. Symptoms or pertinent wound history since last visit: none Denies any other constitutional symptoms otherwise. Has been tolerating wound care dressings without problems. Patient feels that wound is improving    Assessment:     19-year-old male with chronic nonpressure ulcers to bilateral lower extremity.   Present since summer 2020.     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     Problem List Items Addressed This Visit     Non-pressure chronic ulcer of right lower leg with fat layer exposed ([address] - Primary    Relevant Medications    lidocaine (LMX) 4 % cream (Start on 3/9/2021  3:30 PM)    Other Relevant Orders    Supply: Wound Cleanser    Non-pressure chronic ulcer of left lower leg with fat layer exposed ([address]    Relevant Medications    lidocaine (LMX) 4 % cream (Start on 3/9/2021  3:30 PM)    Other Relevant Orders    Supply: Wound Cleanser    Peripheral venous insufficiency    Relevant Medications    lidocaine (LMX) 4 % cream (Start on 3/9/2021  3:30 PM)    Other Relevant Orders    Supply: Wound Cleanser    Lower extremity edema    Relevant Medications    lidocaine (LMX) 4 % cream (Start on 3/9/2021  3:30 PM) 0 cm 03/09/21 1501   Wound Width (cm) 0 cm 03/09/21 1501   Wound Depth (cm) 0 cm 03/09/21 1501   Wound Surface Area (cm^2) 0 cm^2 03/09/21 1501   Change in Wound Size % (l*w) 100 03/09/21 1501   Wound Volume (cm^3) 0 cm^3 03/09/21 1501   Wound Healing % 100 03/09/21 1501   Post-Procedure Length (cm) 0.5 cm 03/02/21 1406   Post-Procedure Width (cm) 0.2 cm 03/02/21 1406   Post-Procedure Depth (cm) 0.15 cm 03/02/21 1406   Post-Procedure Surface Area (cm^2) 0.1 cm^2 03/02/21 1406   Post-Procedure Volume (cm^3) 0.02 cm^3 03/02/21 1406   Wound Assessment Epithelialization; Other (Comment) 03/09/21 1501   Drainage Amount None 03/09/21 1501   Drainage Description Serosanguinous 03/02/21 1406   Odor None 03/09/21 1501   Jennifer-wound Assessment Intact 03/09/21 1501   Margins Attached edges 03/09/21 1501   Wound Thickness Description not for Pressure Injury Full thickness 03/02/21 1342   Number of days: 7       Wound 03/02/21 Leg Right; Lower;Medial #2 (Active)   Wound Image   03/02/21 1342   Wound Etiology Venous 03/09/21 1501   Wound Cleansed Cleansed with saline 03/09/21 1501   Wound Length (cm) 0 cm 03/09/21 1501   Wound Width (cm) 0 cm 03/09/21 1501   Wound Depth (cm) 0 cm 03/09/21 1501   Wound Surface Area (cm^2) 0 cm^2 03/09/21 1501   Change in Wound Size % (l*w) 100 03/09/21 1501   Wound Volume (cm^3) 0 cm^3 03/09/21 1501   Wound Healing % 100 03/09/21 1501   Post-Procedure Length (cm) 2.3 cm 03/02/21 1410   Post-Procedure Width (cm) 0.6 cm 03/02/21 1410   Post-Procedure Depth (cm) 0.15 cm 03/02/21 1410   Post-Procedure Surface Area (cm^2) 1.38 cm^2 03/02/21 1410   Post-Procedure Volume (cm^3) 0.21 cm^3 03/02/21 1410   Wound Assessment Epithelialization; Other (Comment) 03/09/21 1501   Drainage Amount None 03/09/21 1501   Drainage Description Serosanguinous 03/02/21 1410   Odor None 03/09/21 1501   Jennifer-wound Assessment Dry/flaky 03/09/21 1501   Margins Attached edges 03/09/21 1501   Wound Thickness Description not for Pressure Injury Full thickness 03/02/21 1342   Number of days: 7       Wound 03/02/21 Leg Posterior;Right; Lower #1 (Active)   Wound Image   03/02/21 1342   Wound Etiology Venous 03/09/21 1501   Wound Cleansed Cleansed with saline 03/09/21 1501   Wound Length (cm) 0.5 cm 03/09/21 1501   Wound Width (cm) 0.5 cm 03/09/21 1501   Wound Depth (cm) 0.1 cm 03/09/21 1501   Wound Surface Area (cm^2) 0.25 cm^2 03/09/21 1501   Change in Wound Size % (l*w) 96.28 03/09/21 1501   Wound Volume (cm^3) 0.02 cm^3 03/09/21 1501   Wound Healing % 97 03/09/21 1501   Post-Procedure Length (cm) 2.8 cm 03/02/21 1410   Post-Procedure Width (cm) 2.4 cm 03/02/21 1410   Post-Procedure Depth (cm) 0.15 cm 03/02/21 1410   Post-Procedure Surface Area (cm^2) 6.72 cm^2 03/02/21 1410   Post-Procedure Volume (cm^3) 1.01 cm^3 03/02/21 1410   Wound Assessment Dry;Devitalized tissue;Pink/red 03/09/21 1501   Drainage Amount None 03/09/21 1501   Drainage Description Serosanguinous 03/02/21 1410   Odor None 03/09/21 1501   Jennifer-wound Assessment Dry/flaky 03/09/21 1501   Margins Defined edges 03/09/21 1501   Wound Thickness Description not for Pressure Injury Full thickness 03/02/21 1342   Number of days: 7          Total Surface Area Debrided:  0.25 sq cm   Diabetic/Pressure/Non Pressure Ulcers only:  Ulcer: Non-Pressure ulcer, fat layer exposed   Estimated Blood Loss:  None  Hemostasis Achieved:  not needed  Procedural Pain:  0  / 10   Post Procedural Pain:  0 / 10   Response to treatment:  Well tolerated by patient. HISTORY of PRESENT ILLNESS HPI     David Noel is a 79 y.o. male who presents today for wound/ulcer evaluation. History of Wound Context: Per original history and physical on this patient. Changes in history since last exam: none. Doing well with dressings.  No new wound care issues    Objective:    /84   Pulse 96   Temp 97.8 °F (36.6 °C) (Infrared)   Resp 16   Wt Readings from Last 3 Encounters:   03/02/21 239 lb 9.6 oz (108.7 kg)   02/22/21 229 lb (103.9 kg)   11/30/20 219 lb (99.3 kg)       PHYSICAL EXAM  General: alert and oriented to person, place and time, well-developed and well nourished, and in no acute distress  Skin: warm and dry, no rash  Head: normocephalic and atraumatic  Eyes: extraocular eye movements intact, conjunctivae normal, and sclera anicteric  ENT: hearing grossly normal bilaterally. Normal appearance  Respiratory: no chest wall tenderness. no respiratory distress  GI: abdomen soft, non-tender and non-distended, benign  Musculoskeletal: normal range of motion of joints. Nontender calves. No cyanosis. Edema 2-3+ right, 1-2+ left  Neurologic: Speech normal. No focal deficits. Mental status normal.     PAST MEDICAL HISTORY        Diagnosis Date    CHF (congestive heart failure) (HCC)     Hypertension     Seizures (HCC)     TB (pulmonary tuberculosis)     he says the doctors told him he had TB when he was 15 and he had to have surgery       PAST SURGICAL HISTORY    History reviewed. No pertinent surgical history. FAMILY HISTORY    History reviewed. No pertinent family history.     SOCIAL HISTORY    Social History     Tobacco Use    Smoking status: Never Smoker    Smokeless tobacco: Never Used   Substance Use Topics    Alcohol use: Not Currently    Drug use: Not on file       ALLERGIES    No Known Allergies    MEDICATIONS    Current Outpatient Medications on File Prior to Encounter   Medication Sig Dispense Refill    carvedilol (COREG) 6.25 MG tablet Take 1 tablet by mouth 2 times daily (with meals) 60 tablet 5    lisinopril (PRINIVIL;ZESTRIL) 5 MG tablet TAKE ONE TABLET BY MOUTH DAILY 90 tablet 0    aspirin 81 MG chewable tablet Take 1 tablet by mouth daily 90 tablet 3    furosemide (LASIX) 40 MG tablet Take 1 tablet by mouth 2 times daily 180 tablet 0    levETIRAcetam (KEPPRA) 500 MG tablet Take 1 tablet by mouth 2 times daily 180 tablet 3     No current facility-administered medications on file prior to encounter. REVIEW OF SYSTEMS  A comprehensive review of systems was negative except for: wound and edema, redness RLE    Written patient dismissal instructions given to patient and signed by patient or POA. Discharge Instructions       [facility], [address] [place] [address] Road  Telephone: [phone]     Discharge Instructions:  Keep weight off wounds and reposition every 2 hours if applicable. Avoid standing for long periods of time. If wound(s) is on your lower extremity, elevate legs to the level of the heart or above for 30 minutes 4-5 times a day and/or when sitting. Do not get wounds wet in bath or shower unless otherwise instructed by your physician. If your wound is on you foot or leg, you may purchase a cast bag. Please ask at the pharmacy. When taking antibiotics take entire prescription as ordered by MD do not stop taking until medicine is all gone. Exercise as tolerated. No Smoking. Smoking prohibits wound healing. If Vascular testing is ordered, please call [address] [place] [address] [phone]) to schedule. Vascular tests ordered by Wound Care Physicians may take up to 2 hours to complete. Please keep that in mind when scheduling. If Vascular testing is scheduled, please bring supplies to replace your dressing after testing is done. The vascular department does not stock supplies. Wound: Bilateral legs    With each dressing change, rinse wounds with 0.9% Saline. (May use wound wash or soft contact solution. Both can be purchased at a local drug store). If unable to obtain saline, may use a gentle soap and water. Dressing care: Left -Cutimed, Alginate, Dry dressing  Unna Boot. Change next visit . Right Leg- Mepilex Transfer and 2 tubigrip. Change every 2 days. Important dietary reminders:  1. Increase Protein intake for optimal wound healing  2. No added salt to reduce any swelling  3.  If diabetic, good glucose control  4. If you smoke, smoking affects wound healing, we ask that you refrain from smoking. Follow up with Dr Cristino Zelaya In 1 week in the wound care center. Call [phone] for any questions or concerns. Your  is [address]: [address] Information: Should you experience any significant changes in your wound(s) or have questions about your wound care, please [facility] at [phone] Monday  - Thursday 8:00 am - 4:00 pm and Friday 8:00 am - 1:00pm. If you need help with your wound outside these hours and cannot wait until we are again available, contact your PCP or go to the hospital emergency room. PLEASE NOTE: IF YOU ARE UNABLE TO OBTAIN WOUND SUPPLIES, CONTINUE TO USE THE SUPPLIES YOU HAVE AVAILABLE UNTIL YOU ARE ABLE TO REACH US. IT IS MOST IMPORTANT TO KEEP THE WOUND COVERED AT ALL TIMES.             Electronically signed by Erick Stanley MD on 3/9/2021 at 3:25 PM

## 2021-03-10 NOTE — PATIENT INSTRUCTIONS
1. Check blood work today including thyroid and vitamin d  2. Check echo  3.  <2000 mg sodium a day and <64 ounces fluid a day  4. Will call after I see labs regarding changing lasix  5.   RTO in 2 months

## 2021-03-10 NOTE — PROGRESS NOTES
[facility]  Progress Note    Primary Care Doctor:  Shane Figueroa MD    Chief Complaint   Patient presents with    Follow-up    Congestive Heart Failure    Edema        History of Present Illness:  79year old male with no medical history as he does not go to physicians  8/11-15/18 for leg swelling and new sHF. I had the pleasure of seeing An Sanchez in follow up for sHF. He is ambulatory and with his daughter, Alma Villela. He has not been in since 2018. His weight has gone from 202->232 and he has bilateral lower ankle to mid thigh edema. He is following with the wound clinic. He takes his lasix 80 mg in the morning. He loves to eat hotdogs and drink lots of fluids. His last echo done in 2019. His daughter states that he is compliant with his medications. Past Medical History:   has a past medical history of CHF (congestive heart failure) ([address] Hypertension, Seizures ([address] and TB (pulmonary tuberculosis). Surgical History:   has no past surgical history on file. Social History:   reports that he has never smoked. He has never used smokeless tobacco. He reports previous alcohol use. Family History:   History reviewed. No pertinent family history. Home Medications:  Prior to Admission medications    Medication Sig Start Date End Date Taking? Authorizing Provider   carvedilol (COREG) 6.25 MG tablet Take 1 tablet by mouth 2 times daily (with meals) 2/18/21  Yes Shruthi Strickland MD   lisinopril (PRINIVIL;ZESTRIL) 5 MG tablet TAKE ONE TABLET BY MOUTH DAILY 2/17/21  Yes Shruthi Strickland MD   aspirin 81 MG chewable tablet Take 1 tablet by mouth daily 2/15/21  Yes Shruthi Strickland MD   furosemide (LASIX) 40 MG tablet Take 1 tablet by mouth 2 times daily 12/15/20 3/15/21 Yes Shruthi Strickland MD   levETIRAcetam (KEPPRA) 500 MG tablet Take 1 tablet by mouth 2 times daily 8/5/20  Yes Shruthi Strickland MD        Allergies:  Patient has no known allergies.      Review of Systems:   · Constitutional: there has been no unanticipated weight loss. There's been no change in energy level, sleep pattern, or activity level. · Eyes: No visual changes or diplopia. No scleral icterus. · ENT: No Headaches, hearing loss or vertigo. No mouth sores or sore throat. · Cardiovascular: Reviewed in HPI  · Respiratory: No cough or wheezing, no sputum production. No hematemesis. · Gastrointestinal: No abdominal pain, appetite loss, blood in stools. No change in bowel or bladder habits. · Genitourinary: No dysuria, trouble voiding, or hematuria. · Musculoskeletal:  No gait disturbance, weakness or joint complaints. · Integumentary: No rash or pruritis. · Neurological: No headache, diplopia, change in muscle strength, numbness or tingling. No change in gait, balance, coordination, mood, affect, memory, mentation, behavior. · Psychiatric: No anxiety, no depression. · Endocrine: No malaise, fatigue or temperature intolerance. No excessive thirst, fluid intake, or urination. No tremor. · Hematologic/Lymphatic: No abnormal bruising or bleeding, blood clots or swollen lymph nodes. · Allergic/Immunologic: No nasal congestion or hives. Physical Examination:    Vitals:    03/10/21 1141   BP: (!) 140/80   Site: Left Upper Arm   Position: Sitting   Cuff Size: Medium Adult   Pulse: 75   SpO2: 95%   Weight: 232 lb (105.2 kg)   Height: 6' 4\" (1.93 m)        Constitutional and General Appearance: Warm and dry, no apparent distress, normal coloration  HEENT:  Normocephalic, atraumatic  Respiratory:  · Normal excursion and expansion without use of accessory muscles  · Resp Auscultation: Normal breath sounds without dullness  Cardiovascular:  · The apical impulses not displaced  · Heart tones are crisp and normal  · JVP normal cm H2O  · Regular rate and rhythm  · Peripheral pulses are symmetrical and full  · There is no clubbing, cyanosis of the extremities.   · maria c ankle to mid calf edema  · Pedal Pulses: 2+ and equal   Abdomen:  · No masses or tenderness  · Liver/Spleen: No Abnormalities Noted  Neurological/Psychiatric:  · Alert and oriented in all spheres  · Moves all extremities well  · Exhibits normal gait balance and coordination  · No abnormalities of mood, affect, memory, mentation, or behavior are noted    Lab Data:    CBC:   Lab Results   Component Value Date    WBC 3.8 11/16/2020    WBC 4.0 06/29/2020    WBC 6.6 06/25/2019    RBC 4.68 11/16/2020    RBC 5.02 06/29/2020    RBC 4.62 06/25/2019    HGB 13.0 11/16/2020    HGB 14.2 06/29/2020    HGB 12.8 06/25/2019    HCT 41.6 11/16/2020    HCT 45.1 06/29/2020    HCT 40.7 06/25/2019    MCV 88.8 11/16/2020    MCV 89.9 06/29/2020    MCV 88.1 06/25/2019    RDW 16.1 11/16/2020    RDW 16.5 06/29/2020    RDW 15.4 06/25/2019     11/16/2020     06/29/2020     06/25/2019     BMP:  Lab Results   Component Value Date     11/16/2020     08/05/2020     06/29/2020    K 4.7 11/16/2020    K 4.2 08/05/2020    K 5.3 06/29/2020    K 4.1 06/25/2019    K 4.4 02/17/2019    K 4.7 08/12/2018    CL 99 11/16/2020     08/05/2020     06/29/2020    CO2 31 11/16/2020    CO2 28 08/05/2020    CO2 28 06/29/2020    PHOS 3.8 08/05/2020    PHOS 3.2 09/26/2018    PHOS 3.1 08/15/2018    BUN 29 11/16/2020    BUN 24 08/05/2020    BUN 26 06/29/2020    CREATININE 1.4 11/16/2020    CREATININE 1.2 08/05/2020    CREATININE 1.4 06/29/2020     BNP:   Lab Results   Component Value Date    PROBNP 15,201 11/16/2020    PROBNP 13,896 06/29/2020    PROBNP 15,214 02/17/2019     Cardiac Imaging:  Echo 2/17/2019  Summary   -The left ventricle is mildly dilated. Mild concentric left ventricular   hypertrophy noted. -Severely reduced global systolic function with an ejection fraction   estimated at 30%. Severe global hypokinesis noted. -Diastolic filling parameters suggest grade I diastolic dysfunction. E/e'=14.46   -Aortic valve appears sclerotic but opens adequately. Mild aortic   regurgitation.   -Moderate mitral regurgitation.   -There is mild tricuspid regurgitation with a RVSP estimation of 37 mmHg. -Biatrial enlargement.   -Estimated pulmonary artery systolic pressure is normal at 37 mmHg assuming   a right atrial pressure of 3 mmHg. -There is a small-moderate localized near right ventricle pericardial   effusion noted. Echo 8/13/18  The left ventricle is mildly dilated.   -Severely reduced global systolic function with an ejection fraction   estimated at 15-20%.  -Severe global hypokinesis noted.   -RV systolic function appears to be reduced.   -Biatrial enlargement.   -Aortic valve appears sclerotic but opens adequately.   -Mild aortic regurgitation.   -Severe mitral regurgitation.   -There is severe tricuspid regurgitation with a RVSP estimation of 63 mmHg.   -Mild-to-moderate pulmonic regurgitation present.   -Mild circumferential pericardial effusion noted.   -Large pleural effusion noted measuring 8.91 cm.   -Diastolic filling parameters suggest grade III diastolic   dysfunction. E/e'=31.85   -The inferior vena cava is dilated. Assessment:    1. Chronic systolic heart failure (HCC)    2. Other fatigue    3. Hypovitaminosis D    4. Renal insufficiency    5. Hypertension, essential      Plan:   1. Check blood work today including thyroid and vitamin d  2. Check echo  3.  <2000 mg sodium a day and <64 ounces fluid a day  4. Will call after I see labs regarding changing lasix  5. RTO in 2 months    Would start aldactone to help with some of his edema but concerned about compliance. Last OV was in 2018    I appreciate the opportunity of cooperating in the care of this individual.    Nino Hodgson, CNS, 3/10/2021, 11:49 AM    QUALITY MEASURES  1. Tobacco Cessation Counseling: NA  2. Retake of BP if >140/90:   NA  3. Documentation to PCP/referring for new patient:  Sent to PCP at close of office visit  4. CAD patient on anti-platelet: NA  5.  CAD patient on STATIN therapy:  NA  6.  Patient with CHF and aFib on anticoagulation:  NA

## 2021-03-11 NOTE — TELEPHONE ENCOUNTER
Spoke with patients daughter Jose Elias Villar notifying them of results. She understood and will pick meds up and will have repeat labs drawn.

## 2021-03-16 NOTE — PROGRESS NOTES
[address]   Progress Note       [facility] RECORD NUMBER:  [id number]  AGE: 79 y.o. GENDER: male  : 1940  EPISODE DATE:  3/16/2021    Subjective:     Chief Complaint   Patient presents with    Wound Check     Follow up BLE        Patient without any new wound care issues. Symptoms or pertinent wound history since last visit: none Denies any other constitutional symptoms otherwise. Has been tolerating wound care dressings without problems. Patient feels that wound is improving    Assessment:     25-year-old male with chronic nonpressure ulcers to bilateral lower extremity.   Present since summer 2020.     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     Problem List Items Addressed This Visit     Non-pressure chronic ulcer of right lower leg with fat layer exposed ([address] - Primary    Relevant Medications    lidocaine (LMX) 4 % cream    Other Relevant Orders    Supply: Wound Cleanser    Supply: Jennifer Wound    Supply: Wound Dressings    Supply: Pack Wound    Supply: Cover and Secure    Supply: Edema Control    Non-pressure chronic ulcer of left lower leg with fat layer exposed ([address]    Relevant Medications    lidocaine (LMX) 4 % cream    Other Relevant Orders    Supply: Wound Cleanser    Supply: Jennifer Wound    Supply: Wound Dressings    Supply: Pack Wound    Supply: Cover and Secure    Supply: Edema Control    Peripheral venous insufficiency    Relevant Medications    lidocaine (LMX) 4 % cream    Other Relevant Orders    Supply: Wound Cleanser    Supply: Jennifer Wound    Supply: Wound Dressings    Supply: Pack Wound    Supply: Cover and Secure    Supply: Edema Control    Lower extremity edema    Relevant Medications    lidocaine (LMX) 4 % cream    Other Relevant Orders    Supply: Wound Cleanser    Supply: Jennifer Wound    Supply: Wound Dressings    Supply: Pack Wound    Supply: Cover and Secure    Supply: Edema Control        Wound evaluation: All wounds doing very well with increased epithelization. Acute inflammatory skin changes significantly improved. Still has significant edema to right lower extremity. Mild overlying fibrinous nonviable tissue noted to wound. Posterior calf wound measurements are deceiving as there is actually new thin fragile epithelium that is pink throughout. Only 1 small area open on examination. Plan:     1. Wound care: Debridement done (see note below). Please see attached Discharge Instructions for specific wound treatment plan  2. Offloading and edema control: Elevation and compression therapy to LLE. RLE in [facility] as ZENA could not be done in the office. Arterial Dopplers ordered. 3. Infection: No signs of acute infection. Continue to monitor  4. Circulation: ZENA of 1.19 on the left. Noncompressible on right. Arterial Dopplers ordered. Palpable pulses. 5. Nutrition: Encourage protein emphasis with meals. Discussed with patient and his daughter at the bedside at length. Procedure Note  Indications:  Based on my examination of this patient's wound(s)/ulcer(s) today, debridement is required to promote healing and evaluate the wound base. Performed by: Santiago Brewer MD  Consent obtained:  Yes  Time out taken:  Yes  Pain Control: Anesthetic  Anesthetic: 4% Lidocaine Cream     Debridement: Excisional Debridement  Using curette the wound(s)/ulcer(s) was/were debrided down through and including the removal of subcutaneous tissue.         Devitalized Tissue Debrided:  fibrin, biofilm and exudate    Pre Debridement Measurements:  Are located in the [place]  Documentation Flow Sheet    Wound/Ulcer #: 1    Post Debridement Measurements:  Wound/Ulcer Descriptions are Pre Debridement except measurements:    Wound 03/02/21 Leg Posterior;Right; Lower #1 (Active)   Wound Image   03/02/21 1342   Wound Etiology Venous 03/16/21 1500   Wound Cleansed Wound cleanser 03/16/21 1500   Wound Length (cm) 2.5 cm 03/16/21 1500   Wound Width (cm) 1 cm 03/16/21 1500   Wound Depth (cm) 0.1 cm 03/16/21 1500   Wound Surface Area (cm^2) 2.5 cm^2 03/16/21 1500   Change in Wound Size % (l*w) 62.8 03/16/21 1500   Wound Volume (cm^3) 0.25 cm^3 03/16/21 1500   Wound Healing % 63 03/16/21 1500   Post-Procedure Length (cm) 2.5 cm 03/16/21 1505   Post-Procedure Width (cm) 1 cm 03/16/21 1505   Post-Procedure Depth (cm) 0.15 cm 03/16/21 1505   Post-Procedure Surface Area (cm^2) 2.5 cm^2 03/16/21 1505   Post-Procedure Volume (cm^3) 0.38 cm^3 03/16/21 1505   Wound Assessment Bleeding 03/16/21 1505   Drainage Amount Moderate 03/16/21 1505   Drainage Description Serosanguinous 03/16/21 1505   Odor None 03/16/21 1500   Jennifer-wound Assessment Intact 03/16/21 1500   Margins Attached edges; Defined edges 03/16/21 1500   Wound Thickness Description not for Pressure Injury Full thickness 03/02/21 1342   Number of days: 14          Total Surface Area Debrided:  2.5 sq cm   Diabetic/Pressure/Non Pressure Ulcers only:  Ulcer: Non-Pressure ulcer, fat layer exposed   Estimated Blood Loss:  None  Hemostasis Achieved:  not needed  Procedural Pain:  0  / 10   Post Procedural Pain:  0 / 10   Response to treatment:  Well tolerated by patient. HISTORY of PRESENT ILLNESS HPI     David Avila is a 79 y.o. male who presents today for wound/ulcer evaluation. History of Wound Context: Per original history and physical on this patient. Changes in history since last exam: none. Doing well with dressings.  No new wound care issues    Objective:    BP (!) 158/104   Pulse 112   Resp 16   Wt Readings from Last 3 Encounters:   03/10/21 232 lb (105.2 kg)   03/02/21 239 lb 9.6 oz (108.7 kg)   02/22/21 229 lb (103.9 kg)       PHYSICAL EXAM  General: alert and oriented to person, place and time, well-developed and well nourished, and in no acute distress  Skin: warm and dry, no rash  Head: normocephalic and atraumatic  Eyes: extraocular eye movements intact, conjunctivae normal, and sclera anicteric  ENT: hearing grossly normal bilaterally. Normal appearance  Respiratory: no chest wall tenderness. no respiratory distress  GI: abdomen soft, non-tender and non-distended, benign  Musculoskeletal: normal range of motion of joints. Nontender calves. No cyanosis. Edema 2-3+ right, 1-2+ left  Neurologic: Speech normal. No focal deficits. Mental status normal.     PAST MEDICAL HISTORY        Diagnosis Date    CHF (congestive heart failure) (HCC)     Hypertension     Seizures (HCC)     TB (pulmonary tuberculosis)     he says the doctors told him he had TB when he was 15 and he had to have surgery       PAST SURGICAL HISTORY    History reviewed. No pertinent surgical history. FAMILY HISTORY    History reviewed. No pertinent family history.     SOCIAL HISTORY    Social History     Tobacco Use    Smoking status: Never Smoker    Smokeless tobacco: Never Used   Substance Use Topics    Alcohol use: Not Currently    Drug use: Not on file       ALLERGIES    No Known Allergies    MEDICATIONS    Current Outpatient Medications on File Prior to Encounter   Medication Sig Dispense Refill    levothyroxine (SYNTHROID) 25 MCG tablet Take 1 tablet by mouth daily 90 tablet 1    vitamin D (ERGOCALCIFEROL) 1.25 MG (99013 UT) CAPS capsule Take 1 capsule by mouth once a week 4 capsule 5    furosemide (LASIX) 40 MG tablet Take 2 tablets by mouth 2 times daily 360 tablet 0    carvedilol (COREG) 6.25 MG tablet Take 1 tablet by mouth 2 times daily (with meals) 60 tablet 5    lisinopril (PRINIVIL;ZESTRIL) 5 MG tablet TAKE ONE TABLET BY MOUTH DAILY 90 tablet 0    aspirin 81 MG chewable tablet Take 1 tablet by mouth daily 90 tablet 3    levETIRAcetam (KEPPRA) 500 MG tablet Take 1 tablet by mouth 2 times daily 180 tablet 3     No current facility-administered medications on file prior to encounter. REVIEW OF SYSTEMS  A comprehensive review of systems was negative except for: wound and edema, redness RLE    Written patient dismissal instructions given to patient and signed by patient or POA. Discharge Instructions       [facility], [address] [place] [address] Road  Telephone: [phone]     Discharge Instructions:  Keep weight off wounds and reposition every 2 hours if applicable. Avoid standing for long periods of time. If wound(s) is on your lower extremity, elevate legs to the level of the heart or above for 30 minutes 4-5 times a day and/or when sitting. Do not get wounds wet in bath or shower unless otherwise instructed by your physician. If your wound is on you foot or leg, you may purchase a cast bag. Please ask at the pharmacy. When taking antibiotics take entire prescription as ordered by MD do not stop taking until medicine is all gone. Exercise as tolerated. No Smoking. Smoking prohibits wound healing. If Vascular testing is ordered, please call [address] [place] [address] [phone]) to schedule. Vascular tests ordered by Wound Care Physicians may take up to 2 hours to complete. Please keep that in mind when scheduling. If Vascular testing is scheduled, please bring supplies to replace your dressing after testing is done. The vascular department does not stock supplies. Wound: Bilateral legs    With each dressing change, rinse wounds with 0.9% Saline. (May use wound wash or soft contact solution. Both can be purchased at a local drug store).  If unable to obtain saline, may use a gentle soap and water. Dressing care: Left Leg -2 Tubi  . Right Leg -Silver [place] and [place]. Change next visit. Schedule Arterial studies call [facility]. Important dietary reminders:  1. Increase Protein intake for optimal wound healing  2. No added salt to reduce any swelling  3. If diabetic, good glucose control  4. If you smoke, smoking affects wound healing, we ask that you refrain from smoking. Follow up with Dr Makayla Ren In 1 week in the wound care center. Call [phone] for any questions or concerns. Your  is [address]: [address] Information: Should you experience any significant changes in your wound(s) or have questions about your wound care, please contact the [facility] at [phone] Monday  - Thursday 8:00 am - 4:00 pm and Friday 8:00 am - 1:00pm. If you need help with your wound outside these hours and cannot wait until we are again available, contact your PCP or go to the hospital emergency room. PLEASE NOTE: IF YOU ARE UNABLE TO OBTAIN WOUND SUPPLIES, CONTINUE TO USE THE SUPPLIES YOU HAVE AVAILABLE UNTIL YOU ARE ABLE TO REACH US. IT IS MOST IMPORTANT TO KEEP THE WOUND COVERED AT ALL TIMES.             Electronically signed by Jenny Pantoja MD on 3/16/2021 at 4:18 PM

## 2021-03-16 NOTE — PLAN OF CARE
Discharge instructions given. Patient verbalized understanding. Return to [facility] in 1 week.   Continue [facility]

## 2021-03-23 NOTE — PLAN OF CARE
Discharge instructions given. Patient verbalized understanding. Return to [facility] in 1 week.   Continue [facility]

## 2021-03-23 NOTE — PLAN OF CARE
Multilayer Compression Wrap   Below the Knee    NAME:  Nona Henry  YOB: 1940  MEDICAL RECORD NUMBER:  [id number]  DATE:  3/23/2021    Multilayer compression wrap: Applied primary and secondary dressing as ordered. Applied multilayered dressing below the knee to right lower leg. Instructed patient/caregiver not to remove dressing and to keep it clean and dry. Instructed patient/caregiver on complications to report to provider, such as pain, numbness in toes, heavy drainage, and slippage of dressing. Instructed patient on purpose of compression dressing and on activity and exercise recommendations.      Applied  [place] wrap from toes to knee overlapping each time    Electronically signed by Amelia Albarran RN on 3/23/2021 at 4:08 PM

## 2021-03-25 NOTE — PROGRESS NOTES
[facility]  Progress Note and Procedure Note      David Kowalski Balaji  AGE: 79 y.o. GENDER: male  : 1940  TODAY'S DATE:  3/23/2021    Subjective:     Chief Complaint   Patient presents with    Wound Check     Right lower leg         HISTORY of PRESENT ILLNESS HPI     David Rhoades is a 79 y.o. male who presents today for wound evaluation. with chronic nonpressure ulcers to bilateral lower extremity.  Present since summer 2020. Patient without any new wound care issues. Symptoms or pertinent wound history since last visit: none Denies any other constitutional symptoms. Has been tolerating wound care dressings without problems. Patient feels that wound is improving. Scheduled for arterial studies on 2021. History of Wound: summer 2020, nonpressure  Wound Type:  venous and arterial  Modifying Factors:  edema, venous stasis and decreased tissue oxygenation  Associated Signs/Symptoms:  edema and drainage        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 ([address] 3/2/2021    CHF (congestive heart failure) ([address]     Hypertension     Lower extremity edema 3/2/2021    Non-pressure chronic ulcer of left lower leg with fat layer exposed ([address] 3/2/2021    Non-pressure chronic ulcer of right lower leg with fat layer exposed ([address] 3/2/2021    Peripheral venous insufficiency 3/2/2021    Seizures ([address]     TB (pulmonary tuberculosis)     he says the doctors told him he had TB when he was 15 and he had to have surgery       PAST SURGICAL HISTORY    History reviewed. No pertinent surgical history. FAMILY HISTORY    History reviewed. No pertinent family history.     SOCIAL HISTORY    Social History     Tobacco Use    Smoking status: Never Smoker    Smokeless tobacco: Never Used   Substance Use Topics    Alcohol use: Not Currently    Drug use: Not on file       ALLERGIES    No Known Allergies    MEDICATIONS    Current Outpatient Medications on File Prior to Encounter   Medication Sig Dispense Refill    levothyroxine (SYNTHROID) 25 MCG tablet Take 1 tablet by mouth daily 90 tablet 1    vitamin D (ERGOCALCIFEROL) 1.25 MG (75945 UT) CAPS capsule Take 1 capsule by mouth once a week 4 capsule 5    furosemide (LASIX) 40 MG tablet Take 2 tablets by mouth 2 times daily 360 tablet 0    carvedilol (COREG) 6.25 MG tablet Take 1 tablet by mouth 2 times daily (with meals) 60 tablet 5    lisinopril (PRINIVIL;ZESTRIL) 5 MG tablet TAKE ONE TABLET BY MOUTH DAILY 90 tablet 0    aspirin 81 MG chewable tablet Take 1 tablet by mouth daily 90 tablet 3    levETIRAcetam (KEPPRA) 500 MG tablet Take 1 tablet by mouth 2 times daily 180 tablet 3     No current facility-administered medications on file prior to encounter. REVIEW OF SYSTEMS    Pertinent items are noted in HPI. Objective:      BP (!) 144/87   Pulse 88   Temp 97.3 °F (36.3 °C) (Infrared)   Resp 16     PHYSICAL EXAM    General Appearance: alert and oriented to person, place and time, well-developed and well-nourished, in no acute distress  Skin: warm and dry  Head: normocephalic and atraumatic  Eyes: pupils equal, round, and reactive to light  Pulmonary/Chest:  normal air movement, no respiratory distress  Cardiovascular: normal rate, regular rhythm, distal pulses diminished. On 3/2/21:  ZENA of 1.19 on the left.  Noncompressible on right.  Arterial Dopplers ordered.  Palpable pulses. Extremities: no cyanosis, no clubbing and 1 + edema-  left lower leg and 1+ right lower leg. Wound:  Noting increased epithelialization in wounds. Edema  Still present but less.        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)    Seizures (HCC)    Syncope and collapse    Anemia    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[facility]    HTN (hypertension), benign    Atrial fibrillation (HCC)    Non-pressure chronic ulcer of right lower leg with fat layer exposed ([facility]    Non-pressure chronic ulcer of left lower leg with fat layer exposed ([facility]    Peripheral venous insufficiency    Lower extremity edema    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 with ulceration of other part of lower leg (HCC)    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 ([facility]       Procedure Note    Performed by: RODERICK Figueroa CNP    Consent obtained: Yes    Time out taken:  Yes    Pain Control: Anesthetic  Anesthetic: 4% Lidocaine Cream     Debridement:Excisional Debridement    Using curette the wound was sharply debrided    down through and including the removal of epidermis, dermis and subcutaneous tissue. Devitalized Tissue Debrided:  fibrin, biofilm and slough    Pre Debridement Measurements:  Are located in the Wound Documentation Flow Sheet    Wound #: 1     Post  Debridement Measurements:  Wound 03/02/21 Leg Posterior;Right; Lower #1 (Active)   Wound Image   03/02/21 1342   Wound Etiology Venous 03/23/21 1519   Wound Cleansed Wound cleanser 03/23/21 1519   Wound Length (cm) 0.5 cm 03/23/21 1519   Wound Width (cm) 0.7 cm 03/23/21 1519   Wound Depth (cm) 0.1 cm 03/23/21 1519   Wound Surface Area (cm^2) 0.35 cm^2 03/23/21 1519   Change in Wound Size % (l*w) 94.79 03/23/21 1519   Wound Volume (cm^3) 0.04 cm^3 03/23/21 1519   Wound Healing % 94 03/23/21 1519   Post-Procedure Length (cm) 0.6 cm 03/23/21 1549   Post-Procedure Width (cm) 0.8 cm 03/23/21 1549   Post-Procedure Depth (cm) 0.1 cm 03/23/21 1549

## 2021-03-30 NOTE — PROGRESS NOTES
[facility]   Progress Note       David Soliz  AGE: 79 y.o. GENDER: male  : 1940  TODAY'S DATE:  3/30/2021    Subjective:     Chief Complaint   Patient presents with    Wound Check     bilat legs         HISTORY of PRESENT ILLNESS HPI     Nayely Lux is a 79 y.o. male who presents today for wound evaluation. History of Wound: Venous ulcer    Wound Pain:  none  Severity:  0 / 10   Wound Type:  venous  Modifying Factors:  venous stasis  Associated Signs/Symptoms:  none        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 ([address] 3/2/2021    CHF (congestive heart failure) ([address]     Hypertension     Lower extremity edema 3/2/2021    Non-pressure chronic ulcer of left lower leg with fat layer exposed ([address] 3/2/2021    Non-pressure chronic ulcer of right lower leg with fat layer exposed ([address] 3/2/2021    Peripheral venous insufficiency 3/2/2021    Seizures ([address]     TB (pulmonary tuberculosis)     he says the doctors told him he had TB when he was 15 and he had to have surgery       PAST SURGICAL HISTORY    History reviewed. No pertinent surgical history. FAMILY HISTORY    History reviewed. No pertinent family history.     SOCIAL HISTORY    Social History     Tobacco Use    Smoking status: Never Smoker    Smokeless tobacco: Never Used   Substance Use Topics    Alcohol use: Not Currently    Drug use: Not on file       ALLERGIES    No Known Allergies    MEDICATIONS    Current Outpatient Medications on File Prior to Encounter   Medication Sig Dispense Refill    levothyroxine (SYNTHROID) 25 MCG tablet Take 1 tablet by mouth daily 90 tablet 1    vitamin D (ERGOCALCIFEROL) 1.25 MG (79903 UT) CAPS capsule Take 1 capsule by mouth once a week 4 capsule 5    furosemide (LASIX) 40 MG tablet Take 2 tablets by mouth 2 times daily 360 tablet 0    carvedilol (COREG) 6.25 MG tablet Take 1 tablet by mouth 2 times daily (with meals) 60 tablet 5    lisinopril (PRINIVIL;ZESTRIL) 5 MG tablet TAKE ONE TABLET BY MOUTH DAILY 90 tablet 0    aspirin 81 MG chewable tablet Take 1 tablet by mouth daily 90 tablet 3    levETIRAcetam (KEPPRA) 500 MG tablet Take 1 tablet by mouth 2 times daily 180 tablet 3     No current facility-administered medications on file prior to encounter.         REVIEW OF SYSTEMS    Constitutional: negative  Eyes: negative  Ears, nose, mouth, throat, and face: negative  Respiratory: negative  Gastrointestinal: negative      Objective:      BP (!) 152/89   Pulse 89   Resp 16     PHYSICAL EXAM    General Appearance: alert and oriented to person, place and time, well-developed and well-nourished, in no acute distress  Skin: warm and dry, no rash or erythema  Head: normocephalic and atraumatic  Neck: neck supple and non tender without mass, no thyromegaly or thyroid nodules, no cervical lymphadenopathy   Abdomen: soft, non-tender, non-distended, normal bowel sounds, no masses or organomegaly  Ulcers healed  Bilateral lower extremity swelling under good control  Assessment:     Patient Active Problem List   Diagnosis    New onset seizure ([address]    Delirium    Acute encephalopathy    Volume overload    Acute renal failure (HCC)    Acute combined systolic and diastolic (congestive) hrt fail ([address]    Anasarca    Seizure (HCC)    Aspiration into airway    Acute respiratory failure with hypoxia (HCC)    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)    Seizures (HCC)    Syncope and collapse    Anemia    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[address]    HTN (hypertension), benign    Atrial fibrillation ([address]    Non-pressure chronic ulcer of right lower leg with fat layer exposed ([address]    Non-pressure chronic ulcer of left lower leg with fat layer exposed ([address]    Peripheral venous insufficiency    Lower extremity edema    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 with ulceration of other part of lower leg (HCC)    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 (HCC)       Wound 03/02/21 Leg Posterior;Right; Lower #1 (Active)   Wound Image   03/30/21 0920   Wound Etiology Venous 03/30/21 0920   Wound Cleansed Wound cleanser 03/30/21 0920   Wound Length (cm) 0 cm 03/30/21 0920   Wound Width (cm) 0 cm 03/30/21 0920   Wound Depth (cm) 0 cm 03/30/21 0920   Wound Surface Area (cm^2) 0 cm^2 03/30/21 0920   Change in Wound Size % (l*w) 100 03/30/21 0920   Wound Volume (cm^3) 0 cm^3 03/30/21 0920   Wound Healing % 100 03/30/21 0920   Post-Procedure Length (cm) 0.6 cm 03/23/21 1549   Post-Procedure Width (cm) 0.8 cm 03/23/21 1549   Post-Procedure Depth (cm) 0.1 cm 03/23/21 1549   Post-Procedure Surface Area (cm^2) 0.48 cm^2 03/23/21 1549   Post-Procedure Volume (cm^3) 0.05 cm^3 03/23/21 1549   Wound Assessment Pale granulation tissue 03/30/21 0920   Drainage Amount None 03/30/21 0920   Drainage Description Serosanguinous 03/23/21 1549   Odor None 03/23/21 1519   Jennifer-wound Assessment Dry/flaky; Intact 03/30/21 0920   Margins Defined edges 03/23/21 1519   Wound Thickness Description not for Pressure Injury Full thickness 03/02/21 1342   Number of days: 32    79year-old male who typically sees  and is seen in follow-up today for a venous ulcer of the right lower leg. The left lower extremity venous ulcers have previously healed. At this point time, all of his ulcers have healed. His swelling is under excellent control. He was placed in bilateral double layer Tubigrips. Plan:     The patient was given a prescription for 20-30mmHg bilateral compression stockings. Follow-up as needed.     Treatment Plan          Written Patient Discharge Instructions Given            Electronically signed by Erminia Primrose, MD on 3/30/2021 at 10:26 AM

## 2021-03-30 NOTE — PLAN OF CARE
Discharge instructions given. Patient verbalized understanding. Return to [address] [place] [address],[address] as needed  Wound healed  Prescription for compression stockings 20-30 mmHg given. instructed to go to [facility] or [facility] for stockings

## 2021-04-09 NOTE — TELEPHONE ENCOUNTER
----- Message from Claritza Emerald sent at 4/9/2021  1:23 PM EDT -----  Subject: Message to Provider    QUESTIONS  Information for Provider? Patient called in because his wounds on his leg   are back and worse than they were two weeks ago when he saw Dr. Beverley Peter and   the Specialist. Please advise.   ---------------------------------------------------------------------------  --------------  [address]  What is the best way for the office to contact you? OK to leave message on   voicemail  Preferred Call Back Phone Number? [phone]  ---------------------------------------------------------------------------  --------------  SCRIPT ANSWERS  Relationship to Patient? Other  Representative Name? Mandy  Additional information verified (besides Name and Date of Birth)? Address  Appointment reason? Well Care/Follow Ups  Select a Well Care/Follow Ups appointment reason? Adult Existing Condition   Follow Up [Diabetes   CHF   COPD   Hypertension/Blood Pressure Check]  Are you having swelling in your throat or face? No  Are you having difficulty breathing? No  Have the symptoms worsened or spread in the last day? Yes  Are you having pain with your symptoms? No  Are you having fevers (100.4)   chills or sweats? No  (Is the patient requesting to be seen urgently for their symptoms?)? No  Is this follow up request related to routine Diabetes Management? No  Are you having any new concerns about your existing condition?  Yes

## 2021-04-12 NOTE — TELEPHONE ENCOUNTER
Received call from [address] at [facility] with Red Flag Complaint. Brief description of triage: Latoya Romero is pt's daughter Remi Bermeo and writer able to conference in the pt's wife Sudeep, who is with the patient. Worsening bilat lower leg redness and now has 3 open weeping wounds since 4/7. Has been seen by wound care and sent for arterial studies on 4/8, but the wounds are new and concerned for infection. Weeping clear fluid. Triage indicates for patient to go to ED or PCP with approval.      2nd level triage performed with Tania Zimmer NP, after discussing pt's reason for call and assessment, Tania Zimmer NP states pt should be seen by wound care provider today. Writer called [facility]) and spoke to Salinas Arrieta, who states there are no apt available today, but she could schedule for tomorrow. Writer discussed with pt's daughter and wife that  if unable to be seen today, then they would need to go to an [facility] or ER and Salinas Arrieta from the [facility] did discuss that with the family as well. Care advice provided, patient verbalizes understanding; denies any other questions or concerns; instructed to call back for any new or worsening symptoms. Writer provided warm transfer to Salinas Arrieta at [facility] for appointment scheduling. Attention Provider: Thank you for allowing me to participate in the care of your patient. The patient was connected to triage in response to information provided to the ECC. Please do not respond through this encounter as the response is not directed to a shared pool. Reason for Disposition   Black (necrotic) or blisters develop in wound    Answer Assessment - Initial Assessment Questions  1. ONSET: \"When did the swelling start? \" (e.g., minutes, hours, days)      *No Answer*  2. LOCATION: \"What part of the leg is swollen? \"  \"Are both legs swollen or just one leg? \"      *No Answer*  3. SEVERITY: \"How bad is the swelling? \" (e.g., localized; mild, moderate, severe)   - Localized - small area of swelling localized to one leg   - MILD pedal edema - swelling limited to foot and ankle, pitting edema < 1/4 inch (6 mm) deep, rest and elevation eliminate most or all swelling   - MODERATE edema - swelling of lower leg to knee, pitting edema > 1/4 inch (6 mm) deep, rest and elevation only partially reduce swelling   - SEVERE edema - swelling extends above knee, facial or hand swelling present       *No Answer*  4. REDNESS: \"Does the swelling look red or infected? \"      *No Answer*  5. PAIN: \"Is the swelling painful to touch? \" If so, ask: \"How painful is it? \"   (Scale 1-10; mild, moderate or severe)      *No Answer*  6. FEVER: \"Do you have a fever? \" If so, ask: \"What is it, how was it measured, and when did it start? \"       *No Answer*  7. CAUSE: \"What do you think is causing the leg swelling? \"      *No Answer*  8. MEDICAL HISTORY: \"Do you have a history of heart failure, kidney disease, liver failure, or cancer? \"      *No Answer*  9. RECURRENT SYMPTOM: \"Have you had leg swelling before? \" If so, ask: \"When was the last time? \" \"What happened that time? \"      *No Answer*  10. OTHER SYMPTOMS: \"Do you have any other symptoms? \" (e.g., chest pain, difficulty breathing)        *No Answer*  11. PREGNANCY: \"Is there any chance you are pregnant? \" \"When was your last menstrual period? \"        *No Answer*    Answer Assessment - Initial Assessment Questions  1. LOCATION: \"Where is the wound located? \"       Right lower leg     2. WOUND APPEARANCE: \"What does the wound look like? \"       He has 3 open wounds on his right lower leg. He has surrounding redness around the wounds. They are draining watery/red fluid. He does have a lot of swelling. 3. SIZE: If redness is present, ask: \"What is the size of the red area? \" (Inches, centimeters, or compare to size of a coin)       The wounds are open and silver dollar size. They do look black in the wound.       4. SPREAD: \"What's changed in the last day? \"  \"Do you see any red streaks coming from the wound? \"      It's about the same as last Wednesday. 5. ONSET: \"When did it start to look infected? \"       April 7th     6. MECHANISM: \"How did the wound start, what was the cause?\"          7. PAIN: \"Is there any pain? \" If so, ask: \"How bad is the pain? \"   (Scale 1-10; or mild, moderate, severe)      He denies pain     8. FEVER: \"Do you have a fever? \" If so, ask: \"What is your temperature, how was it measured, and when did it start? \"      No fevers     9. OTHER SYMPTOMS: \"Do you have any other symptoms? \" (e.g., shaking chills, weakness, rash elsewhere on body)      He did have an ultrasound on 4/8, but did not have the open wounds at that time    10. PREGNANCY: \"Is there any chance you are pregnant? \" \"When was your last menstrual period? \"    Protocols used: WOUND INFECTION-ADULT-[place], LEG SWELLING AND EDEMA-ADULT-[place]

## 2021-04-13 NOTE — PROGRESS NOTES
[address]   Progress Note       [address] [facility] RECORD NUMBER:  [id number]  AGE: 79 y.o. GENDER: male  : 1940  EPISODE DATE:  2021    Subjective:     Chief Complaint   Patient presents with    Wound Check     right lower leg        Patient is returning for care to the wound center due to recurrent ulcers on right lower extremity. No associated pain. No other complaints otherwise. Assessment:     39-year-old male with chronic nonpressure ulcers to right lower extremity. Apparently the patient achieved healing of these wounds recently, at the end of March but now recurrent again to right lower extremity.     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     Problem List Items Addressed This Visit     Non-pressure chronic ulcer of right lower leg with fat layer exposed ([address]    Non-pressure chronic ulcer of left lower leg with fat layer exposed ([address]    Peripheral venous insufficiency    Lower extremity edema        Wound evaluation: All wounds pretty superficial.  Acute inflammatory skin changes significantly improved. Still has significant edema to right lower extremity. Mild overlying fibrinous nonviable tissue and biofilm noted to wound. Plan:     1. Wound care: Debridement done (see note below). Please see attached Discharge Instructions for specific wound treatment plan  2. Offloading and edema control: Elevation and compression therapy to LLE. RLE in [employer] as ZENA could not be done in the office. Arterial Dopplers ordered. 3. Infection: No signs of acute infection. Continue to monitor  4. Circulation: ZENA of 1.19 on the left. Noncompressible on right. Arterial Dopplers ordered. Palpable pulses. 5. Nutrition: Encourage protein emphasis with meals. Discussed with patient and his daughter at the bedside at length. Procedure Note  Indications:  Based on my examination of this patient's wound(s)/ulcer(s) today, debridement is required to promote healing and evaluate the wound base. Performed by: Gorge Moreira MD  Consent obtained:  Yes  Time out taken:  Yes  Pain Control: Anesthetic  Anesthetic: 4% Lidocaine Cream     Debridement: Excisional Debridement  Using curette the wound(s)/ulcer(s) was/were debrided down through and including the removal of subcutaneous tissue. Devitalized Tissue Debrided:  fibrin, biofilm and exudate    Pre Debridement Measurements:  Are located in the [place]  Documentation Flow Sheet    Wound/Ulcer #: 1    Post Debridement Measurements:  Wound/Ulcer Descriptions are Pre Debridement except measurements:    Wound 03/02/21 Leg Posterior;Right; Lower #1 (Active)   Wound Image   03/30/21 0920   Wound Etiology Venous 04/13/21 1329   Wound Cleansed Cleansed with saline 04/13/21 1329   Wound Length (cm) 2.5 cm 04/13/21 1329   Wound Width (cm) 1.5 cm 04/13/21 1329   Wound Depth (cm) 0.1 cm 04/13/21 1329   Wound Surface Area (cm^2) 3.75 cm^2 04/13/21 1329   Change in Wound Size % (l*w) 44.2 04/13/21 1329   Wound Volume (cm^3) 0.38 cm^3 04/13/21 1329   Wound Healing % 43 04/13/21 1329   Post-Procedure Length (cm) 0.6 cm 03/23/21 1549   Post-Procedure Width (cm) 0.8 cm 03/23/21 1549   Post-Procedure Depth (cm) 0.1 cm 03/23/21 1549   Post-Procedure Surface Area (cm^2) 0.48 cm^2 03/23/21 1549   Post-Procedure Volume (cm^3) 0.05 cm^3 03/23/21 1549   Wound Assessment Pale granulation tissue 04/13/21 1329   Drainage Amount None 04/13/21 1329   Drainage Description Other (Comment) 04/13/21 1329   Odor None 03/23/21 1519   Jennifer-wound Assessment Dry/flaky; Intact 04/13/21 1329   Margins Defined edges 04/13/21 1329   Wound Thickness Description not for Pressure Injury Full thickness 03/02/21 1342   Number of days: 41       Wound 04/13/21 Leg Posterior; Upper #2 (Active)   Wound Etiology Venous 04/13/21 1340   Wound Length (cm) 3.2 cm 04/13/21 1340   Wound Width (cm) 1.5 cm 04/13/21 1340   Wound Depth (cm) 0.1 cm 04/13/21 1340   Wound Surface Area (cm^2) 4.8 cm^2 04/13/21 1340   Wound Volume (cm^3) 0.48 cm^3 04/13/21 1340   Wound Assessment Granulation tissue 04/13/21 1340   Drainage Amount Other (Comment) 04/13/21 1340   Drainage Description Other (Comment) 04/13/21 1340   Odor None 04/13/21 1340   Jennifer-wound Assessment Dry/flaky; Intact 04/13/21 1340   Number of days: 0       Wound 04/13/21 Leg Anterior #3 (Active)   Wound Length (cm) 1.2 cm 04/13/21 1340   Wound Width (cm) 1.2 cm 04/13/21 1340   Wound Depth (cm) 0 cm 04/13/21 1340   Wound Surface Area (cm^2) 1.44 cm^2 04/13/21 1340   Wound Volume (cm^3) 0 cm^3 04/13/21 1340   Wound Assessment Granulation tissue 04/13/21 1340   Drainage Amount Other (Comment) 04/13/21 1340   Drainage Description Other (Comment) 04/13/21 1340   Odor None 04/13/21 1340   Jennifer-wound Assessment Dry/flaky; Intact 04/13/21 1340   Margins Defined edges 04/13/21 1340   Number of days: 0          Total Surface Area Debrided:  3.75, 4.8, 1.44sq cm   Diabetic/Pressure/Non Pressure Ulcers only:  Ulcer: Non-Pressure ulcer, fat layer exposed   Estimated Blood Loss:  None  Hemostasis Achieved:  not needed  Procedural Pain:  0  / 10   Post Procedural Pain:  0 / 10   Response to treatment:  Well tolerated by patient. HISTORY of PRESENT ILLNESS HPI     David Herbert is a 79 y.o. male who presents today for wound/ulcer evaluation. History of Wound Context: Per original history and physical on this patient. Changes in history since last exam: none.  Doing well with dressings. No new wound care issues    Objective:    BP (!) 144/92   Pulse 50   Temp 97.8 °F (36.6 °C) (Temporal)   Resp 16   Wt Readings from Last 3 Encounters:   03/10/21 232 lb (105.2 kg)   03/02/21 239 lb 9.6 oz (108.7 kg)   02/22/21 229 lb (103.9 kg)       PHYSICAL EXAM  General: alert and oriented to person, place and time, well-developed and well nourished, and in no acute distress  Skin: warm and dry, no rash  Head: normocephalic and atraumatic  Eyes: extraocular eye movements intact, conjunctivae normal, and sclera anicteric  ENT: hearing grossly normal bilaterally. Normal appearance  Respiratory: no chest wall tenderness. no respiratory distress  GI: abdomen soft, non-tender and non-distended, benign  Musculoskeletal: normal range of motion of joints. Nontender calves. No cyanosis. Edema 2-3+ right, 1-2+ left  Neurologic: Speech normal. No focal deficits. Mental status normal.     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 ([address] 3/2/2021    CHF (congestive heart failure) ([address]     Hypertension     Lower extremity edema 3/2/2021    Non-pressure chronic ulcer of left lower leg with fat layer exposed ([address] 3/2/2021    Non-pressure chronic ulcer of right lower leg with fat layer exposed ([address] 3/2/2021    Peripheral venous insufficiency 3/2/2021    Seizures ([address]     TB (pulmonary tuberculosis)     he says the doctors told him he had TB when he was 15 and he had to have surgery       PAST SURGICAL HISTORY    History reviewed. No pertinent surgical history. FAMILY HISTORY    History reviewed. No pertinent family history.     SOCIAL HISTORY    Social History     Tobacco Use    Smoking status: Never Smoker    Smokeless tobacco: Never Used   Substance Use Topics    Alcohol use: Not Currently    Drug use: Not on file ALLERGIES    No Known Allergies    MEDICATIONS    Current Outpatient Medications on File Prior to Encounter   Medication Sig Dispense Refill    Compression Stockings MISC by Does not apply route Bilateral below knee compression stockings 20-30 mmHg 1 each 0    levothyroxine (SYNTHROID) 25 MCG tablet Take 1 tablet by mouth daily 90 tablet 1    vitamin D (ERGOCALCIFEROL) 1.25 MG (73055 UT) CAPS capsule Take 1 capsule by mouth once a week 4 capsule 5    furosemide (LASIX) 40 MG tablet Take 2 tablets by mouth 2 times daily 360 tablet 0    carvedilol (COREG) 6.25 MG tablet Take 1 tablet by mouth 2 times daily (with meals) 60 tablet 5    lisinopril (PRINIVIL;ZESTRIL) 5 MG tablet TAKE ONE TABLET BY MOUTH DAILY 90 tablet 0    aspirin 81 MG chewable tablet Take 1 tablet by mouth daily 90 tablet 3    levETIRAcetam (KEPPRA) 500 MG tablet Take 1 tablet by mouth 2 times daily 180 tablet 3     No current facility-administered medications on file prior to encounter. REVIEW OF SYSTEMS  A comprehensive review of systems was negative except for: wound and edema, redness RLE    Written patient dismissal instructions given to patient and signed by patient or POA. Discharge [address] [facility], [address] [place] [address]  Telephone: [phone]      Discharge Instructions:  Keep weight off wounds and reposition every 2 hours if applicable. Avoid standing for long periods of time.      If wound(s) is on your lower extremity, elevate legs to the level of the heart or above for 30 minutes 4-5 times a day and/or when sitting.      Do not get wounds wet in bath or shower unless otherwise instructed by your physician. If your wound is on you foot or leg, you may purchase a cast bag. Please ask at the pharmacy.     When taking antibiotics take entire prescription as ordered by MD do not stop taking until medicine is all gone. Exercise as tolerated. No Smoking. Smoking prohibits wound healing.     If Vascular testing is ordered, please call [address] [place] [address] [phone]) to schedule.     Vascular tests ordered by Wound Care Physicians may take up to 2 hours to complete. Please keep that in mind when scheduling.      If Vascular testing is scheduled, please bring supplies to replace your dressing after testing is done. The vascular department does not stock supplies.      Wound: Bilateral legs     With each dressing change, rinse wounds with 0.9% Saline. (May use wound wash or soft contact solution. Both can be purchased at a local drug store). If unable to obtain saline, may use a gentle soap and water.     Dressing care: Left Leg -2 Tubi  . Right Leg -Keramax pad and [employer]. Change next visit. Arterial studies- 4/8/2021      Important dietary reminders:  1. Increase Protein intake for optimal wound healing  2. No added salt to reduce any swelling  3. If diabetic, good glucose control  4. If you smoke, smoking affects wound healing, we ask that you refrain from smoking.     Follow up with Dr Adam Hameed In 1 week in the wound care center.      Call [phone] for any questions or concerns.      Your  is [employer] [address]: [address] Information: Should you experience any significant changes in your wound(s) or have questions about your wound care, please contact the [facility] at [phone] Monday  - Thursday 8:00 am - 4:00 pm and Friday 8:00 am - 1:00pm. If you need help with your wound outside these hours and cannot wait until we are again available, contact your PCP or go to the hospital emergency room.      PLEASE NOTE: IF YOU ARE UNABLE TO OBTAIN WOUND SUPPLIES, CONTINUE TO USE THE SUPPLIES YOU HAVE AVAILABLE UNTIL YOU ARE ABLE TO [address].  IT IS MOST IMPORTANT TO KEEP THE WOUND COVERED AT ALL TIMES.              Electronically signed by Paola Contreras MD on 4/13/2021 at 1:45 PM

## 2021-04-13 NOTE — TELEPHONE ENCOUNTER
I spoke to daughter and she said he is seein wound center today. Frank SAMUELS she will call back to see what they larry

## 2021-04-13 NOTE — PLAN OF CARE
Discharge instructions given. Patient verbalized understanding. Return to [address] [place] [address],[address] in 1 week.   Called/faxed orders to  [facility]

## 2021-04-19 NOTE — PROGRESS NOTES
[address] Note    Date: 4/19/2021                                               Subjective/Objective:     Chief Complaint   Patient presents with    Leg Pain     wound/ ulcer check        HPI   Pt is here for f/u on wounds on BL legs. Pt is now seeing wound care and has an unna boot on R lower leg, is changed once a week. Pt finished another course of Bactrim 2 months ago, which helped. Pt denies any pain. No fever.           Patient Active Problem List    Diagnosis Date Noted    Anasarca      Priority: High    Non-pressure chronic ulcer of right lower leg with fat layer exposed ([address] 03/02/2021    Non-pressure chronic ulcer of left lower leg with fat layer exposed ([address] 03/02/2021    Peripheral venous insufficiency 03/02/2021    Lower extremity edema 03/02/2021    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 with ulceration of other part of lower leg ([address] 03/02/2021    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 (HCC) 03/02/2021    Atrial fibrillation ([address] 11/23/2020    Anemia 02/18/2019    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[address]     HTN (hypertension), benign     Seizures ([address] 02/17/2019    Syncope and collapse 02/17/2019    Senile dementia without behavioral disturbance ([address] 10/08/2018    Aspiration into airway 09/26/2018    Acute respiratory failure with hypoxia (HCC)     Seizure ([address] 09/25/2018    Acute renal failure (HCC)     Acute combined systolic and diastolic (congestive) hrt fail ([address]     Volume overload 08/11/2018    New onset seizure ([address] 02/03/2017    Delirium     Acute encephalopathy        Past Medical History:   Diagnosis Date    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 with ulceration of other part of lower leg ([address] 3/2/2021    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 ([address] 3/2/2021    CHF (congestive heart failure) (HCC)     Hypertension     Lower extremity edema 3/2/2021    Non-pressure chronic ulcer of left lower leg with fat layer exposed ([facility] 3/2/2021    Non-pressure chronic ulcer of right lower leg with fat layer exposed ([facility] 3/2/2021    Peripheral venous insufficiency 3/2/2021    Seizures ([facility]     TB (pulmonary tuberculosis)     he says the doctors told him he had TB when he was 15 and he had to have surgery       Current Outpatient Medications   Medication Sig Dispense Refill    Compression Stockings MISC by Does not apply route Bilateral below knee compression stockings 20-30 mmHg 1 each 0    levothyroxine (SYNTHROID) 25 MCG tablet Take 1 tablet by mouth daily 90 tablet 1    vitamin D (ERGOCALCIFEROL) 1.25 MG (43153 UT) CAPS capsule Take 1 capsule by mouth once a week 4 capsule 5    furosemide (LASIX) 40 MG tablet Take 2 tablets by mouth 2 times daily 360 tablet 0    carvedilol (COREG) 6.25 MG tablet Take 1 tablet by mouth 2 times daily (with meals) 60 tablet 5    lisinopril (PRINIVIL;ZESTRIL) 5 MG tablet TAKE ONE TABLET BY MOUTH DAILY 90 tablet 0    aspirin 81 MG chewable tablet Take 1 tablet by mouth daily 90 tablet 3    levETIRAcetam (KEPPRA) 500 MG tablet Take 1 tablet by mouth 2 times daily 180 tablet 3     No current facility-administered medications for this visit. No Known Allergies    Review of Systems   No vomiting, no SOB    Vitals:  /78   Pulse 100   Wt 221 lb (100.2 kg)   SpO2 98%   BMI 26.90 kg/m²     Physical Exam   General:  Well-appearing, NAD, alert, non-toxic  HEENT:  Normocephalic, atraumatic. CHEST/LUNGS: No dyspnea  EXTREMETIES: +BL lower legs wrapped with unna boots. No significant swelling  SKIN:  No rash, no cellulitis, no bruising, no petechiae/purpura/vesicles/pustules/abscess  PSYCH:  A+O x 3; normal affect  NEURO:  GCS 15, CN2-12 grossly intact, no focal motor/sensory deficits, normal gait, normal speech      Assessment/Plan     80yo male with chronic leg wounds. No sign of acute infection.  Recommend f/u with wound care for wraps and other evaluation/ treatments. F/u in 6 months for AWV. Discharged in stable condition at 12:06    1. Lower leg edema      2. Skin lesion of left leg      3. Skin lesion of right leg         No orders of the defined types were placed in this encounter. No follow-ups on file.     Cleavon Lanes, MD    4/19/2021  12:01 PM

## 2021-04-20 NOTE — PROGRESS NOTES
Multilayer Compression Wrap   Below the Knee    NAME:  Candice Meyer  YOB: 1940  MEDICAL RECORD NUMBER:  [id number]  DATE:  4/20/2021    Multilayer compression wrap: Removed old Multilayer wrap if indicated and wash leg with mild soap/water. Applied moisturizing agent to dry skin as needed. Applied primary and secondary dressing as ordered. Applied multilayered dressing below the knee to right lower leg. Instructed patient/caregiver not to remove dressing and to keep it clean and dry. Instructed patient/caregiver on complications to report to provider, such as pain, numbness in toes, heavy drainage, and slippage of dressing. Instructed patient on purpose of compression dressing and on activity and exercise recommendations.      Applied  [employer] wrap from toes to knee overlapping each time    Electronically signed by Samantha Mortensen LPN on 1/56/2504 at 1:56 PM

## 2021-04-20 NOTE — PLAN OF CARE
Discharge instructions given. Patient verbalized understanding. Return to [address] [place] [address],[address] in 1 week.   Continue [facility]

## 2021-04-20 NOTE — PROGRESS NOTES
[address]   Progress Note       [facility] RECORD NUMBER:  [id number]  AGE: 79 y.o. GENDER: male  : 1940  EPISODE DATE:  2021    Subjective:     Chief Complaint   Patient presents with    Wound Check     BLE wounds/edema        Patient is returning for care to the wound center due to recurrent ulcers on right lower extremity. No associated pain. No other complaints otherwise. Assessment:     79-year-old male with chronic nonpressure ulcers to right lower extremity. The patient achieved healing of these wounds at the end of March but now recurrent again to right lower extremity.     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  Acute inflammatory skin changes significantly improved.   Still has significant edema to right lower extremity. Mild overlying fibrinous nonviable tissue and biofilm noted to wound. Plan:     1. Wound care: Debridement done (see note below). Please see attached Discharge Instructions for specific wound treatment plan  2. Offloading and edema control: Elevation and compression therapy to LLE. RLE in [facility] as ZENA could not be done in the office. Arterial Dopplers ordered. 3. Infection: No signs of acute infection. Continue to monitor  4. Circulation: ZENA of 1.19 on the left. Noncompressible on right. Arterial Dopplers ordered. Palpable pulses. 5. Nutrition: Encourage protein emphasis with meals. Discussed with patient and his daughter at the bedside at length. Procedure Note  Indications:  Based on my examination of this patient's wound(s)/ulcer(s) today, debridement is required to promote healing and evaluate the wound base. Performed by: Lorraine Recio MD  Consent obtained:  Yes  Time out taken:  Yes  Pain Control: Anesthetic  Anesthetic: 4% Lidocaine Cream     Debridement: Excisional Debridement  Using curette the wound(s)/ulcer(s) was/were debrided down through and including the removal of subcutaneous tissue. Devitalized Tissue Debrided:  fibrin, biofilm and exudate    Pre Debridement Measurements:  Are located in the [place]  Documentation Flow Sheet    Wound/Ulcer #: 1    Post Debridement Measurements:  Wound/Ulcer Descriptions are Pre Debridement except measurements:    Wound 03/02/21 Leg Posterior;Right; Lower #1 (Active)   Wound Image   04/13/21 1329   Wound Etiology Venous 04/20/21 1516   Wound Cleansed Cleansed with saline 04/20/21 1516   Wound Length (cm) 2.7 cm 04/20/21 1516   Wound Width (cm) 1.2 cm 04/20/21 1516   Wound Depth (cm) 0.1 cm 04/20/21 1516   Wound Surface Area (cm^2) 3.24 cm^2 04/20/21 1516   Change in Wound Size % (l*w) 51.79 04/20/21 1516   Wound Volume (cm^3) 0.32 cm^3 04/20/21 1516   Wound Healing % 52 04/20/21 1516   Post-Procedure Length (cm) 0.6 cm 03/23/21 1549   Post-Procedure Width (cm) 0.8 cm 03/23/21 1549   Post-Procedure Depth (cm) 0.1 cm 03/23/21 1549   Post-Procedure Surface Area (cm^2) 0.48 cm^2 03/23/21 1549   Post-Procedure Volume (cm^3) 0.05 cm^3 03/23/21 1549   Wound Assessment Pale granulation tissue 04/20/21 1516   Drainage Amount Small 04/20/21 1516   Drainage Description Serosanguinous 04/20/21 1516   Odor None 04/20/21 1516   Jennifer-wound Assessment Intact 04/20/21 1516   Margins Defined edges 04/20/21 1516   Wound Thickness Description not for Pressure Injury Full thickness 03/02/21 1342   Number of days: 49       Wound 04/13/21 Leg Posterior; Upper #2 (Active)   Wound Image   04/13/21 1340   Wound Etiology Venous 04/20/21 1519   Wound Cleansed Wound cleanser 04/20/21 1519   Wound Length (cm) 1.4 cm 04/20/21 1519   Wound Width (cm) 1 cm 04/20/21 1519   Wound Depth (cm) 0.1 cm 04/20/21 1519   Wound Surface Area (cm^2) 1.4 cm^2 04/20/21 1519   Change in Wound Size % (l*w) 70.83 04/20/21 1519   Wound Volume (cm^3) 0.14 cm^3 04/20/21 1519   Wound Healing % 71 04/20/21 1519   Post-Procedure Length (cm) 1.4 cm 04/20/21 1534   Post-Procedure Width (cm) 1 cm 04/20/21 1534   Post-Procedure Depth (cm) 0.15 cm 04/20/21 1534   Post-Procedure Surface Area (cm^2) 1.4 cm^2 04/20/21 1534   Post-Procedure Volume (cm^3) 0.21 cm^3 04/20/21 1534   Wound Assessment Bleeding 04/20/21 1534   Drainage Amount Moderate 04/20/21 1534   Drainage Description Serosanguinous 04/20/21 1534   Odor None 04/20/21 1519   Jennifer-wound Assessment Intact 04/20/21 1519   Margins Defined edges 04/20/21 1519   Number of days: 7       Wound 04/13/21 Leg Anterior #3 (Active)   Wound Image   04/13/21 1340   Wound Etiology Venous 04/20/21 1519   Wound Cleansed Wound cleanser 04/20/21 1519   Wound Length (cm) 2 cm 04/20/21 1519   Wound Width (cm) 1 cm 04/20/21 1519   Wound Depth (cm) 0.1 cm 04/20/21 1519   Wound Surface Area (cm^2) 2 cm^2 04/20/21 1519   Change in Wound Size % (l*w) -38.89 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 (HCC) 3/2/2021    CHF (congestive heart failure) ([facility]     Hypertension     Lower extremity edema 3/2/2021    Non-pressure chronic ulcer of left lower leg with [facility] 3/2/2021    Non-pressure chronic ulcer of right lower leg with fat layer exposed ([facility] 3/2/2021    Peripheral venous insufficiency 3/2/2021    Seizures ([facility]     TB (pulmonary tuberculosis)     he says the doctors told him he had TB when he was 15 and he had to have surgery       PAST SURGICAL HISTORY    History reviewed. No pertinent surgical history. FAMILY HISTORY    History reviewed. No pertinent family history. SOCIAL HISTORY    Social History     Tobacco Use    Smoking status: Never Smoker    Smokeless tobacco: Never Used   Substance Use Topics    Alcohol use: Not Currently    Drug use: Not on file       ALLERGIES    No Known Allergies    MEDICATIONS    Current Outpatient Medications on File Prior to Encounter   Medication Sig Dispense Refill    Compression Stockings MISC by Does not apply route Bilateral below knee compression stockings 20-30 mmHg 1 each 0    levothyroxine (SYNTHROID) 25 MCG tablet Take 1 tablet by mouth daily 90 tablet 1    vitamin D (ERGOCALCIFEROL) 1.25 MG (41249 UT) CAPS capsule Take 1 capsule by mouth once a week 4 capsule 5    furosemide (LASIX) 40 MG tablet Take 2 tablets by mouth 2 times daily 360 tablet 0    carvedilol (COREG) 6.25 MG tablet Take 1 tablet by mouth 2 times daily (with meals) 60 tablet 5    lisinopril (PRINIVIL;ZESTRIL) 5 MG tablet TAKE ONE TABLET BY MOUTH DAILY 90 tablet 0    aspirin 81 MG chewable tablet Take 1 tablet by mouth daily 90 tablet 3    levETIRAcetam (KEPPRA) 500 MG tablet Take 1 tablet by mouth 2 times daily 180 tablet 3     No current facility-administered medications on file prior to encounter.         REVIEW OF SYSTEMS  A comprehensive review of systems was negative except for: wound and edema, redness RLE    Written patient dismissal instructions given to patient and signed by patient or POA. Discharge [address] [facility], [address] [place] [address] Road  Telephone: [phone]      Discharge Instructions:  Keep weight off wounds and reposition every 2 hours if applicable. Avoid standing for long periods of time.      If wound(s) is on your lower extremity, elevate legs to the level of the heart or above for 30 minutes 4-5 times a day and/or when sitting.      Do not get wounds wet in bath or shower unless otherwise instructed by your physician. If your wound is on you foot or leg, you may purchase a cast bag. Please ask at the pharmacy.     When taking antibiotics take entire prescription as ordered by MD do not stop taking until medicine is all gone. Exercise as tolerated. No Smoking. Smoking prohibits wound healing.     If Vascular testing is ordered, please call [address] [place] [address] [phone]) to schedule.     Vascular tests ordered by Wound Care Physicians may take up to 2 hours to complete. Please keep that in mind when scheduling.      If Vascular testing is scheduled, please bring supplies to replace your dressing after testing is done. The vascular department does not stock supplies.      Wound: Bilateral legs     With each dressing change, rinse wounds with 0.9% Saline. (May use wound wash or soft contact solution. Both can be purchased at a local drug store). If unable to obtain saline, may use a gentle soap and water.     Dressing care: Left Leg -2 Tubi  . Right Leg -Silver Alginate, Keramax pad and [employer]. Change next visit. Referral to Dr Monique Chapman, Vascular Surgeon  [address] [place] [address], [address]  Phone# [phone]  Fax# [phone]    Arterial studies done- 4/8/2021      Important dietary reminders:  1. Increase Protein intake for optimal wound healing  2. No added salt to reduce any swelling  3.  If diabetic, good glucose control  4. If you smoke, smoking affects wound healing, we ask that you refrain from smoking.     Follow up with Dr Gurpreet Sy In 1 week in the wound care center.      Call [phone] for any questions or concerns.      Your  is [address] [facility] [address]: [facility] Information: Should you experience any significant changes in your wound(s) or have questions about your wound care, please [facility]  at [phone] Monday  - Thursday 8:00 am - 4:00 pm and Friday 8:00 am - 1:00pm. If you need help with your wound outside these hours and cannot wait until we are again available, contact your PCP or go to the hospital emergency room.      PLEASE NOTE: IF YOU ARE UNABLE TO OBTAIN WOUND SUPPLIES, CONTINUE TO USE THE SUPPLIES YOU HAVE AVAILABLE UNTIL YOU ARE ABLE TO [facility].  IT IS MOST IMPORTANT TO KEEP THE WOUND COVERED AT ALL TIMES.              Electronically signed by Bradly Ricci MD on 4/20/2021 at 3:55 PM

## 2021-04-27 NOTE — PROGRESS NOTES
Multilayer Compression Wrap   Below the Knee    NAME:  Jen Winkler  YOB: 1940  MEDICAL RECORD NUMBER:  [id number]  DATE:  4/27/2021    Multilayer compression wrap: Removed old Multilayer wrap if indicated and wash leg with mild soap/water. Applied moisturizing agent to dry skin as needed. Applied primary and secondary dressing as ordered. Applied multilayered dressing below the knee to right lower leg. Instructed patient/caregiver not to remove dressing and to keep it clean and dry. Instructed patient/caregiver on complications to report to provider, such as pain, numbness in toes, heavy drainage, and slippage of dressing. Instructed patient on purpose of compression dressing and on activity and exercise recommendations.      Applied  [employer] wrap from toes to knee overlapping each time    Electronically signed by Marybeth Alcantara LPN on 5/14/1103 at 7:76 PM

## 2021-04-27 NOTE — PROGRESS NOTES
[address]   Progress Note       [address] [facility] RECORD NUMBER:  [id number]  AGE: 79 y.o. GENDER: male  : 1940  EPISODE DATE:  2021    Subjective:     Chief Complaint   Patient presents with    Wound Check     right leg wound        Patient is returning for care to the wound center due to recurrent ulcers on right lower extremity. No associated pain. No other complaints otherwise. Assessment:     70-year-old male with chronic nonpressure ulcers to right lower extremity.   The patient achieved healing of these wounds at the end of March but now recurrent again to right lower extremity.     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     Problem List Items Addressed This Visit     Non-pressure chronic ulcer of right lower leg with fat layer exposed ([address] - Primary    Relevant Medications    lidocaine (LMX) 4 % cream    Other Relevant Orders    Initiate Outpatient Wound Care Protocol    Non-pressure chronic ulcer of left lower leg with fat layer exposed ([address]    Peripheral venous insufficiency    Relevant Medications    lidocaine (LMX) 4 % cream    Other Relevant Orders    Initiate Outpatient Wound Care Protocol    Lower extremity edema    Relevant Medications    lidocaine (LMX) 4 % cream    Other Relevant Orders    Initiate Outpatient Wound Care Protocol    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 with ulceration of other part of lower leg ([address]    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 Plan:     1. Wound care:  Please see attached Discharge Instructions for specific wound treatment plan. Will need to have compression therapy 1 more week as skin is fragile and he does not have any compression stockings. Juxta light was ordered for him last week but the patient did not fill this apparently. 2. Offloading and edema control: Elevation and compression therapy to LLE. RLE in [employer]ig[employer] as ZENA could not be done in the office. Arterial Dopplers ordered. 3. Infection: No signs of acute infection. Continue to monitor  4. Circulation: ZENA of 1.19 on the left. Noncompressible on right. Arterial Dopplers images reviewed by me. Results 4/8/2021: Abnormal but the patient is pretty adamant against any aggressive treatment or referral to vascular surgery again. 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 Proximal    posterior tibial and distal anterior tibial arteries were not visualized.        ZENA was not available due to noncompressible tibial vessels . Palpable pulses. 5. Nutrition: Encourage protein emphasis with meals. Discussed with patient and his daughter at the bedside at length. Wound/Ulcer Descriptions are Pre Debridement except measurements:    Wound 03/02/21 Leg Posterior;Right; Lower #1 (Active)   Wound Image   04/13/21 1329   Wound Etiology Venous 04/27/21 1522   Wound Cleansed Cleansed with saline 04/20/21 1516   Wound Length (cm) 0 cm 04/27/21 1522   Wound Width (cm) 0 cm 04/27/21 1522   Wound Depth (cm) 0 cm 04/27/21 1522   Wound Surface Area (cm^2) 0 cm^2 04/27/21 1522   Change in Wound Size % (l*w) 100 04/27/21 1522   Wound Volume (cm^3) 0 cm^3 04/27/21 1522   Wound Healing % 100 04/27/21 1522   Post-Procedure Length (cm) 0.6 cm 03/23/21 1549   Post-Procedure Width (cm) 0.8 cm 03/23/21 1549   Post-Procedure Depth (cm) 0.1 cm 03/23/21 1549   Post-Procedure Surface Area (cm^2) 0.48 cm^2 03/23/21 1549   Post-Procedure Volume (cm^3) 0.05 cm^3 03/23/21 1549   Wound Assessment Epithelialization 04/27/21 1522   Drainage Amount Small 04/20/21 1516   Drainage Description Serosanguinous 04/20/21 1516   Odor None 04/20/21 1516   Jennifer-wound Assessment Intact 04/20/21 1516   Margins Defined edges 04/20/21 1516   Wound Thickness Description not for Pressure Injury Full thickness 03/02/21 1342   Number of days: 56       Wound 04/13/21 Leg Posterior; Upper #2 (Active)   Wound Image   04/13/21 1340   Wound Etiology Venous 04/27/21 1522   Wound Cleansed Wound cleanser 04/20/21 1519   Wound Length (cm) 0 cm 04/27/21 1522   Wound Width (cm) 0 cm 04/27/21 1522   Wound Depth (cm) 0 cm 04/27/21 1522   Wound Surface Area (cm^2) 0 cm^2 04/27/21 1522   Change in Wound Size % (l*w) 100 04/27/21 1522   Wound Volume (cm^3) 0 cm^3 04/27/21 1522   Wound Healing % 100 04/27/21 1522   Post-Procedure Length (cm) 1.4 cm 04/20/21 1534   Post-Procedure Width (cm) 1 cm 04/20/21 1534   Post-Procedure Depth (cm) 0.15 cm 04/20/21 1534   Post-Procedure Surface Area (cm^2) 1.4 cm^2 04/20/21 1534   Post-Procedure Volume (cm^3) 0.21 cm^3 04/20/21 1534   Wound Assessment Epithelialization 04/27/21 1522   Drainage Amount Moderate 04/20/21 1534   Drainage Description Serosanguinous 04/20/21 1534   Odor None 04/20/21 1519   Jennifer-wound Assessment Intact 04/20/21 1519   Margins Defined edges 04/20/21 1519   Number of days: 14       Wound 04/13/21 Leg Anterior #3 (Active)   Wound Image   04/13/21 1340   Wound Etiology Venous 04/27/21 1522   Wound Cleansed Wound cleanser 04/20/21 1519   Wound Length (cm) 0 cm 04/27/21 1522   Wound Width (cm) 0 cm 04/27/21 1522   Wound Depth (cm) 0 cm 04/27/21 1522   Wound Surface Area (cm^2) 0 cm^2 04/27/21 1522   Change in Wound Size % (l*w) 100 04/27/21 1522   Wound Volume (cm^3) 0 cm^3 04/27/21 1522   Wound Assessment Epithelialization 04/27/21 1522   Drainage Amount Small 04/20/21 1519   Drainage Description Serosanguinous 04/20/21 1519   Odor None 04/20/21 1519   Jennifer-wound Assessment Intact 04/20/21 1519   Margins Undefined edges 04/20/21 1519   Number of days: 14        HISTORY of PRESENT ILLNESS HPI     David Liang is a 79 y.o. male who presents today for wound/ulcer evaluation. History of Wound Context: Per original history and physical on this patient. Changes in history since last exam: none. Doing well with dressings. No new wound care issues    Objective:    BP (!) 141/94   Pulse 108   Resp 16   Wt Readings from Last 3 Encounters:   04/19/21 221 lb (100.2 kg)   03/10/21 232 lb (105.2 kg)   03/02/21 239 lb 9.6 oz (108.7 kg)       PHYSICAL EXAM  General: alert and oriented to person, place and time, well-developed and well nourished, and in no acute distress  Skin: warm and dry, no rash  Head: normocephalic and atraumatic  Eyes: extraocular eye movements intact, conjunctivae normal, and sclera anicteric  ENT: hearing grossly normal bilaterally. Normal appearance  Respiratory: no chest wall tenderness. no respiratory distress  GI: abdomen soft, non-tender and non-distended, benign  Musculoskeletal: normal range of motion of joints. Nontender calves. No cyanosis. Edema 2-3+ right, 1-2+ left  Neurologic: Speech normal. No focal deficits.  Mental status normal.     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 ([address] 3/2/2021    CHF (congestive heart failure) ([address]     Hypertension     Lower extremity edema 3/2/2021    Non-pressure chronic ulcer of left lower leg with fat layer exposed ([address] 3/2/2021    Non-pressure chronic ulcer of right lower leg with fat layer exposed [facility] 3/2/2021    Peripheral venous insufficiency 3/2/2021    Seizures ([facility]     TB (pulmonary tuberculosis)     he says the doctors told him he had TB when he was 15 and he had to have surgery       PAST SURGICAL HISTORY    History reviewed. No pertinent surgical history. FAMILY HISTORY    History reviewed. No pertinent family history. SOCIAL HISTORY    Social History     Tobacco Use    Smoking status: Never Smoker    Smokeless tobacco: Never Used   Substance Use Topics    Alcohol use: Not Currently    Drug use: Not on file       ALLERGIES    No Known Allergies    MEDICATIONS    Current Outpatient Medications on File Prior to Encounter   Medication Sig Dispense Refill    Compression Stockings MISC by Does not apply route Bilateral below knee compression stockings 20-30 mmHg 1 each 0    levothyroxine (SYNTHROID) 25 MCG tablet Take 1 tablet by mouth daily 90 tablet 1    vitamin D (ERGOCALCIFEROL) 1.25 MG (91645 UT) CAPS capsule Take 1 capsule by mouth once a week 4 capsule 5    furosemide (LASIX) 40 MG tablet Take 2 tablets by mouth 2 times daily 360 tablet 0    carvedilol (COREG) 6.25 MG tablet Take 1 tablet by mouth 2 times daily (with meals) 60 tablet 5    lisinopril (PRINIVIL;ZESTRIL) 5 MG tablet TAKE ONE TABLET BY MOUTH DAILY 90 tablet 0    aspirin 81 MG chewable tablet Take 1 tablet by mouth daily 90 tablet 3    levETIRAcetam (KEPPRA) 500 MG tablet Take 1 tablet by mouth 2 times daily 180 tablet 3     No current facility-administered medications on file prior to encounter. REVIEW OF SYSTEMS  A comprehensive review of systems was negative except for: wound and edema, redness RLE    Written patient dismissal instructions given to patient and signed by patient or POA.          Discharge Instructions       [place] [address] [address] [facility], [address]  Telephone: [phone] FAX [phone]      Discharge Instructions:  Keep weight off wounds and reposition every 2 hours if applicable. Avoid standing for long periods of time.      If wound(s) is on your lower extremity, elevate legs to the level of the heart or above for 30 minutes 4-5 times a day and/or when sitting.      Do not get wounds wet in bath or shower unless otherwise instructed by your physician. If your wound is on you foot or leg, you may purchase a cast bag. Please ask at the pharmacy.     When taking antibiotics take entire prescription as ordered by MD do not stop taking until medicine is all gone. Exercise as tolerated. No Smoking. Smoking prohibits wound healing.     If Vascular testing is ordered, please call [address] [place] [address] [phone]) to schedule.     Vascular tests ordered by Wound Care Physicians may take up to 2 hours to complete. Please keep that in mind when scheduling.      If Vascular testing is scheduled, please bring supplies to replace your dressing after testing is done. The vascular department does not stock supplies.      Wound: Bilateral legs     With each dressing change, rinse wounds with 0.9% Saline. (May use wound wash or soft contact solution. Both can be purchased at a local drug store). If unable to obtain saline, may use a gentle soap and water.     Dressing care: Left Leg -2 Tubi  . Right Leg -Silver Alginate,  Unna Boot. Change next visit. To Order Juxta Lite compression Stockings/Bilateral  30-40 mm hg of compression   Referral to Dr Angélica Jiménez, Vascular Surgeon  [address] [facility], [address]  Phone# [phone]  Fax# [phone]    Arterial studies done- 4/8/2021      Important dietary reminders:  1. Increase Protein intake for optimal wound healing  2. No added salt to reduce any swelling  3. If diabetic, good glucose control  4.  If you smoke, smoking affects wound healing, we ask that you refrain from smoking.     Follow up with Dr Marina Gonzalez In 1 week in the wound care center.      Call [phone] for any questions or concerns.      Your  is Monisha Gonzales Information: Should you experience any significant changes in your wound(s) or have questions about your wound care, please contact the [place] [address] at [phone] Monday  - Thursday 8:00 am - 4:00 pm and Friday 8:00 am - 1:00pm. If you need help with your wound outside these hours and cannot wait until we are again available, contact your PCP or go to the hospital emergency room.      PLEASE NOTE: IF YOU ARE UNABLE TO OBTAIN WOUND SUPPLIES, CONTINUE TO USE THE SUPPLIES YOU HAVE AVAILABLE UNTIL YOU ARE ABLE TO [facility].  IT IS MOST IMPORTANT TO KEEP THE WOUND COVERED AT ALL TIMES.             Electronically signed by Tanisha Preciado MD on 4/27/2021 at 4:00 PM

## 2021-04-27 NOTE — PROGRESS NOTES
(cm) 0.6 cm 03/23/21 1549   Post-Procedure Width (cm) 0.8 cm 03/23/21 1549   Post-Procedure Depth (cm) 0.1 cm 03/23/21 1549   Post-Procedure Surface Area (cm^2) 0.48 cm^2 03/23/21 1549   Post-Procedure Volume (cm^3) 0.05 cm^3 03/23/21 1549   Wound Assessment Epithelialization 04/27/21 1522   Drainage Amount Small 04/20/21 1516   Drainage Description Serosanguinous 04/20/21 1516   Odor None 04/20/21 1516   Jennifer-wound Assessment Intact 04/20/21 1516   Margins Defined edges 04/20/21 1516   Wound Thickness Description not for Pressure Injury Full thickness 03/02/21 1342   Number of days: 56       Wound 04/13/21 Leg Posterior; Upper #2 (Active)   Wound Image   04/13/21 1340   Wound Etiology Venous 04/27/21 1522   Wound Cleansed Wound cleanser 04/20/21 1519   Wound Length (cm) 0 cm 04/27/21 1522   Wound Width (cm) 0 cm 04/27/21 1522   Wound Depth (cm) 0 cm 04/27/21 1522   Wound Surface Area (cm^2) 0 cm^2 04/27/21 1522   Change in Wound Size % (l*w) 100 04/27/21 1522   Wound Volume (cm^3) 0 cm^3 04/27/21 1522   Wound Healing % 100 04/27/21 1522   Post-Procedure Length (cm) 1.4 cm 04/20/21 1534   Post-Procedure Width (cm) 1 cm 04/20/21 1534   Post-Procedure Depth (cm) 0.15 cm 04/20/21 1534   Post-Procedure Surface Area (cm^2) 1.4 cm^2 04/20/21 1534   Post-Procedure Volume (cm^3) 0.21 cm^3 04/20/21 1534   Wound Assessment Epithelialization 04/27/21 1522   Drainage Amount Moderate 04/20/21 1534   Drainage Description Serosanguinous 04/20/21 1534   Odor None 04/20/21 1519   Jennifer-wound Assessment Intact 04/20/21 1519   Margins Defined edges 04/20/21 1519   Number of days: 14       Wound 04/13/21 Leg Anterior #3 (Active)   Wound Image   04/13/21 1340   Wound Etiology Venous 04/27/21 1522   Wound Cleansed Wound cleanser 04/20/21 1519   Wound Length (cm) 0 cm 04/27/21 1522   Wound Width (cm) 0 cm 04/27/21 1522   Wound Depth (cm) 0 cm 04/27/21 1522   Wound Surface Area (cm^2) 0 cm^2 04/27/21 1522   Change in Wound Size % (l*w) 100

## 2021-05-24 NOTE — TELEPHONE ENCOUNTER
----- Message from Nisha Germain sent at 5/24/2021  1:00 PM EDT -----  Subject: Message to Provider    QUESTIONS  Information for Provider? He has drainage on his right leg, swollen. Needs   to be seen today or tomorrow. ---------------------------------------------------------------------------  --------------  Janet Faes INFO  What is the best way for the office to contact you? OK to leave message on   voicemail  Preferred Call Back Phone Number? [phone]  ---------------------------------------------------------------------------  --------------  SCRIPT ANSWERS  Relationship to Patient? Other  Representative Name? daughter, mayra - on [employer]  Additional information verified (besides Name and Date of Birth)? Address  Appointment reason? Symptomatic  Select script based on patient symptoms? Adult Skin Problems [Rash, Hives,   Blisters, Lumps, Bumps, Sores, Bite]   Are you having swelling in your throat or face? No  Are you having difficulty breathing? No  Have the symptoms worsened or spread in the last day? No  Are you having pain with your symptoms? No  Are you having fevers (100.4), chills or sweats? No  Have you previously seen a provider for this?  Yes

## 2021-05-25 NOTE — PROGRESS NOTES
[address] Note    Date: 5/25/2021                                               Subjective/Objective:     Chief Complaint   Patient presents with    Leg Swelling     left wound. . hurts to walk        HPI   Patient is here for worsening swelling and skin changes on his right leg. Is also having some drainage from the skin as well as ulcerations. Patient sees wound care for chronic venous stasis changes and PAD. Was last seen on 5/11/2021. Patient's PAD is worse on the right leg, but patient has to see vascular surgery again. Pt says he doesn't have much pain but it does hurt when it gets rubbed by clothing or anything else. Denies fever or vomiting.           Patient Active Problem List    Diagnosis Date Noted    Anasarca     Non-pressure chronic ulcer of right lower leg with fat layer exposed ([address] 03/02/2021    Non-pressure chronic ulcer of left lower leg with fat layer exposed ([address] 03/02/2021    Peripheral venous insufficiency 03/02/2021    Lower extremity edema 03/02/2021    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 with ulceration of other part of lower leg ([address] 03/02/2021    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 (HCC) 03/02/2021    Atrial fibrillation ([address] 11/23/2020    Anemia 02/18/2019    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[address]     HTN (hypertension), benign     Seizures ([address] 02/17/2019    Syncope and collapse 02/17/2019    Senile dementia without behavioral disturbance ([address] 10/08/2018    Aspiration into airway 09/26/2018    Acute respiratory failure with hypoxia (HCC)     Seizure ([address] 09/25/2018    Acute renal failure (HCC)     Acute combined systolic and diastolic (congestive) hrt fail ([address]     Volume overload 08/11/2018    New onset seizure ([address] 02/03/2017    Delirium     Acute encephalopathy        Past Medical History:   Diagnosis Date    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 with ulceration of

## 2021-05-25 NOTE — TELEPHONE ENCOUNTER
Patient called pre-service center [facility]) Daniel with red flag complaint. Brief description of triage: Daughter calling to report that her fathers leg is very swollen and leaking a large amount of fluid. He has Kidney failure, on Lasix and also has many related disease processes. Please see triage below for more detailed information. Triage indicates for patient to go to ER or office with approval  Call to PCP office and spoke with provider. Explained the triage and PCP does approve an office visit this afternoon. Care advice provided, patient verbalizes understanding; denies any other questions or concerns; instructed to call back for any new or worsening symptoms. Writer provided warm transfer to Edilberto Aquino at [facility] for appointment scheduling. Attention Provider: Thank you for allowing me to participate in the care of your patient. The patient was connected to triage in response to information provided to the ECC. Please do not respond through this encounter as the response is not directed to a shared pool. Reason for Disposition   SEVERE swelling (e.g., swelling extends above knee, entire leg is swollen, weeping fluid)    Answer Assessment - Initial Assessment Questions  1. ONSET: \"When did the swelling start? \" (e.g., minutes, hours, days)      7 days ago    2. LOCATION: \"What part of the leg is swollen? \"  \"Are both legs swollen or just one leg? \"      Right leg calf area    3. SEVERITY: \"How bad is the swelling? \" (e.g., localized; mild, moderate, severe)   - Localized - small area of swelling localized to one leg   - MILD pedal edema - swelling limited to foot and ankle, pitting edema < 1/4 inch (6 mm) deep, rest and elevation eliminate most or all swelling   - MODERATE edema - swelling of lower leg to knee, pitting edema > 1/4 inch (6 mm) deep, rest and elevation only partially reduce swelling   - SEVERE edema - swelling extends above knee, facial or hand swelling present       Severe in the fact that it is leaking fluid    4. REDNESS: \"Does the swelling look red or infected? \"      Red    5. PAIN: \"Is the swelling painful to touch? \" If so, ask: \"How painful is it? \"   (Scale 1-10; mild, moderate or severe)      No pain    6. FEVER: \"Do you have a fever? \" If so, ask: \"What is it, how was it measured, and when did it start? \"       No    7. CAUSE: \"What do you think is causing the leg swelling? \"      Periferal vascular disease    8. MEDICAL HISTORY: \"Do you have a history of heart failure, kidney disease, liver failure, or cancer? \"      Kidney failure, ? CHF    9. RECURRENT SYMPTOM: \"Have you had leg swelling before? \" If so, ask: \"When was the last time? \" \"What happened that time? \"      Common for David    10. OTHER SYMPTOMS: \"Do you have any other symptoms? \" (e.g., chest pain, difficulty breathing)        No    11. PREGNANCY: \"Is there any chance you are pregnant? \" \"When was your last menstrual period? \"        NA    **On Lasix    Protocols used: LEG SWELLING AND EDEMA-ADULT-OH

## 2021-06-02 NOTE — PROGRESS NOTES
[facility]   Progress Note and Procedure Note      David Ellsworht  AGE: 79 y.o. GENDER: male  : 1940  TODAY'S DATE:  2021    Subjective:     Chief Complaint   Patient presents with    Wound Check     Right lower leg, Left lower leg         HISTORY of PRESENT ILLNESS HPI     David Razo is a 79 y.o. male who presents today for wound evaluation. History of Wound: Has a long history of leg ulcerations of the right leg. He follows up for reulceration of the leg. He is here today with his daughter. He states that he would like to have the leg amputated because he does not like to have constant treatment for the wounds. However he admits that he does not keep the leg elevated or keep a bandage when the wound starts. Wound Pain:  none  Severity:  0 / 10   Wound Type:  venous and arterial  Modifying Factors:  edema, venous stasis, lymphedema, decreased mobility and non-adherence  Associated Signs/Symptoms:  edema and drainage        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 ([address] 3/2/2021    CHF (congestive heart failure) ([address]     Hypertension     Lower extremity edema 3/2/2021    Non-pressure chronic ulcer of left lower leg with fat layer exposed ([address] 3/2/2021    Non-pressure chronic ulcer of right lower leg with fat layer exposed ([address] 3/2/2021    Peripheral venous insufficiency 3/2/2021    Seizures ([address]     TB (pulmonary tuberculosis)     he says the doctors told him he had TB when he was 15 and he had to have surgery       PAST SURGICAL HISTORY    History reviewed. No pertinent surgical history. FAMILY HISTORY    History reviewed. No pertinent family history.     SOCIAL HISTORY    Social History     Tobacco Use    Smoking status: Never Smoker    Smokeless tobacco: Never exam bilateral feet/ankles. Derm: Ulceration to right leg. Ecchymosis Absent  bilateral feet/foot. Musculoskeletal: No pain with debridement of wounds. 5/5 muscle strength in/eversion and dorsi/plantarflexion bilateral feet. No gross instability noted. Assessment:     Problem List Items Addressed This Visit     Non-pressure chronic ulcer of right lower leg with fat layer exposed ([address] - Primary    Relevant Medications    lidocaine (LMX) 4 % cream    Other Relevant Orders    Initiate Outpatient Wound Care Protocol    Peripheral venous insufficiency    Relevant Medications    lidocaine (LMX) 4 % cream    Other Relevant Orders    Initiate Outpatient Wound Care Protocol    Lower extremity edema    Relevant Medications    lidocaine (LMX) 4 % cream    Other Relevant Orders    Initiate Outpatient Wound Care Protocol          Procedure Note    Performed by: Marj Hernandez DPM    Consent obtained: Yes    Time out taken:  Yes    Pain Control: Anesthetic  Anesthetic: 4% Lidocaine Cream     Debridement:Excisional Debridement    Using curette the wound was sharply debrided    down through and including the removal of epidermis, dermis and subcutaneous tissue. Devitalized Tissue Debrided:  fibrin, biofilm, slough, necrotic/eschar and exudate    Pre Debridement Measurements:  Are located in the Wound Documentation Flow Sheet      Post  Debridement Measurements:  Wound 06/02/21 Leg Right; Lower;Medial;Distal #1 (Active)   Wound Image   06/02/21 1501   Wound Etiology Venous 06/02/21 1501   Wound Cleansed Cleansed with saline 06/02/21 1501   Dressing/Treatment Cutimed/Sorbact 06/02/21 1625   Wound Length (cm) 6.5 cm 06/02/21 1501   Wound Width (cm) 5 cm 06/02/21 1501   Wound Depth (cm) 0.1 cm 06/02/21 1501   Wound Surface Area (cm^2) 32.5 cm^2 06/02/21 1501   Wound Volume (cm^3) 3.25 cm^3 06/02/21 1501   Post-Procedure Length (cm) 6.5 cm 06/02/21 1532   Post-Procedure Width (cm) 5 cm 06/02/21 1532 Post-Procedure Surface Area (cm^2) 3.3 cm^2 06/02/21 1532   Post-Procedure Volume (cm^3) 0.33 cm^3 06/02/21 1532   Wound Assessment Bleeding 06/02/21 1532   Drainage Amount Moderate 06/02/21 1501   Drainage Description Serous 06/02/21 1501   Odor None 06/02/21 1501   Jennifer-wound Assessment Hemosiderin staining (brown yellow) 06/02/21 1501   Margins Unattached edges 06/02/21 1501   Number of days: 0       Wound 06/02/21 Leg Right; Lower; Posterior #4 (Active)   Wound Image   06/02/21 1501   Wound Etiology Venous 06/02/21 1501   Wound Cleansed Cleansed with saline 06/02/21 1501   Wound Length (cm) 7.2 cm 06/02/21 1501   Wound Width (cm) 6.5 cm 06/02/21 1501   Wound Depth (cm) 0.1 cm 06/02/21 1501   Wound Surface Area (cm^2) 46.8 cm^2 06/02/21 1501   Wound Volume (cm^3) 4.68 cm^3 06/02/21 1501   Post-Procedure Length (cm) 7.2 cm 06/02/21 1532   Post-Procedure Width (cm) 6.5 cm 06/02/21 1532   Post-Procedure Depth (cm) 0.1 cm 06/02/21 1532   Post-Procedure Surface Area (cm^2) 46.8 cm^2 06/02/21 1532   Post-Procedure Volume (cm^3) 4.68 cm^3 06/02/21 1532   Wound Assessment Bleeding 06/02/21 1532   Drainage Amount Moderate 06/02/21 1501   Drainage Description Serous 06/02/21 1501   Odor None 06/02/21 1501   Jennifer-wound Assessment Hemosiderin staining (brown yellow) 06/02/21 1501   Margins Unattached edges 06/02/21 1501   Number of days: 0           Total Surface Area Debrided:  145.6 sq cm     Percentage of wound debrided 100%    Bleeding:  Minimal    Hemostasis Achieved:  by pressure    Procedural Pain:  3  / 10     Post Procedural Pain:  0 / 10     Response to treatment:  Well tolerated by patient. Plan:   Patient examined and evaluated  Wounds debrided without incident  Discussed appropriate offloading and elevation  Multilayer compression wrap  Follow-up in 1 week  The nature of the patient's condition was explained in depth.  The patient was informed that their compliance to the treatment plan is paramount to successful healing and prevention of further ulceration and/or infection     Discharge Treatment Wound 06/02/21 Leg Right; Lower;Medial;Distal #1-Dressing/Treatment: Cutimed/Sorbact (transfer,unna boot)    Written Patient Discharge Instructions Given            Electronically signed by Tanisha Espinoza DPM on 6/2/2021 at 4:45 PM

## 2021-06-18 NOTE — PROGRESS NOTES
Allergies    MEDICATIONS    Current Outpatient Medications on File Prior to Encounter   Medication Sig Dispense Refill    lisinopril (PRINIVIL;ZESTRIL) 5 MG tablet TAKE ONE TABLET BY MOUTH DAILY 90 tablet 1    Compression Stockings MISC by Does not apply route Bilateral below knee compression stockings 20-30 mmHg 1 each 0    levothyroxine (SYNTHROID) 25 MCG tablet Take 1 tablet by mouth daily 90 tablet 1    vitamin D (ERGOCALCIFEROL) 1.25 MG (78950 UT) CAPS capsule Take 1 capsule by mouth once a week 4 capsule 5    furosemide (LASIX) 40 MG tablet Take 2 tablets by mouth 2 times daily 360 tablet 0    carvedilol (COREG) 6.25 MG tablet Take 1 tablet by mouth 2 times daily (with meals) 60 tablet 5    aspirin 81 MG chewable tablet Take 1 tablet by mouth daily 90 tablet 3    levETIRAcetam (KEPPRA) 500 MG tablet Take 1 tablet by mouth 2 times daily 180 tablet 3     No current facility-administered medications on file prior to encounter. REVIEW OF SYSTEMS    Pertinent items are noted in HPI. Objective:      BP (!) 135/90   Pulse 70   Temp 96.6 °F (35.9 °C) (Tympanic)   Wt 225 lb (102.1 kg)   BMI 27.03 kg/m²     PHYSICAL EXAM    Vascular: Vascular status Impaired  palpable pedal pulses, right DP1/4 and PT1/4, left DP1/4 and PT1/4. C Hair growthAbsent  both lower extremities and feet. Skin temperature is warm to warm from pretibial area to distal digits bilateral.  Exam is negative for rubor, pallor, cyanosis or signs of acute vascular compromise bilaterally. Exam is positive for edema bilateral lower extremity. Varicosities present bilateral lower extremity. Neuro: Neurologic status normal bilateral with epicritic Present , proprioceptive Present, vibratory sensationPresent and protopathicPresent. DTRs Present bilateral Achilles. There were no reproducible neuritic symptoms on exam bilateral feet/ankles. Derm: Ulceration to right leg. Ecchymosis Absent  bilateral feet/foot. Musculoskeletal: No pain with debridement of wounds. 5/5 muscle strength in/eversion and dorsi/plantarflexion bilateral feet. No gross instability noted. Assessment:     Problem List Items Addressed This Visit     Non-pressure chronic ulcer of right lower leg with fat layer exposed ([address] - Primary    Relevant Medications    lidocaine (LMX) 4 % cream    Other Relevant Orders    Initiate Outpatient Wound Care Protocol    Peripheral venous insufficiency    Relevant Medications    lidocaine (LMX) 4 % cream    Other Relevant Orders    Initiate Outpatient Wound Care Protocol    Lower extremity edema    Relevant Medications    lidocaine (LMX) 4 % cream    Other Relevant Orders    Initiate Outpatient Wound Care Protocol          Procedure Note    Performed by: Florecita Mcgovern DPM    Consent obtained: Yes    Time out taken:  Yes    Pain Control: Anesthetic  Anesthetic: 4% Lidocaine Cream     Debridement:Excisional Debridement    Using curette the wound was sharply debrided    down through and including the removal of epidermis, dermis and subcutaneous tissue. Devitalized Tissue Debrided:  fibrin, biofilm, slough, necrotic/eschar and exudate    Pre Debridement Measurements:  Are located in the Wound Documentation Flow Sheet      Wound Care Documentation:  Wound 06/02/21 Leg Right; Lower; Posterior #4 (Active)   Wound Image   06/02/21 1501   Wound Etiology Venous 06/18/21 0822   Wound Cleansed Wound cleanser 06/18/21 0822   Wound Length (cm) 4 cm 06/18/21 0822   Wound Width (cm) 2 cm 06/18/21 0822   Wound Depth (cm) 0.1 cm 06/18/21 0822   Wound Surface Area (cm^2) 8 cm^2 06/18/21 0822   Change in Wound Size % (l*w) 82.91 06/18/21 0822   Wound Volume (cm^3) 0.8 cm^3 06/18/21 0822   Wound Healing % 83 06/18/21 0822   Post-Procedure Length (cm) 4 cm 06/18/21 0849   Post-Procedure Width (cm) 2 cm 06/18/21 0849   Post-Procedure Depth (cm) 0.1 cm 06/18/21 0849   Post-Procedure Surface Area (cm^2) 8 cm^2 06/18/21 0849   Post-Procedure Volume (cm^3) 0.8 cm^3 06/18/21 0849   Wound Assessment Pink/red 06/18/21 0849   Drainage Amount Moderate 06/18/21 0822   Drainage Description Serosanguinous 06/18/21 0822   Odor None 06/18/21 0822   Jennifer-wound Assessment Hemosiderin staining (brown yellow) 06/18/21 0822   Margins Attached edges 06/18/21 0822   Number of days: 15         Total Surface Area Debrided:  8 sq cm     Percentage of wound debrided 100%    Bleeding:  Minimal    Hemostasis Achieved:  by pressure    Procedural Pain:  3  / 10     Post Procedural Pain:  0 / 10     Response to treatment:  Well tolerated by patient. Plan:   Patient examined and evaluated  Wounds debrided without incident  Discussed appropriate offloading and elevation  Multilayer compression wrap  Follow-up in 1 week  The nature of the patient's condition was explained in depth. The patient was informed that their compliance to the treatment plan is paramount to successful healing and prevention of further ulceration and/or infection     Discharge Treatment   Keep legs elevated, leave dressing intact.     Written Patient Discharge Instructions Given            Electronically signed by Adore Mccloud DPM on 6/18/2021 at 12:55 PM

## 2021-06-18 NOTE — PROGRESS NOTES
Multilayer Compression Wrap   Below the Knee    NAME:  Guero Stahl  YOB: 1940  MEDICAL RECORD NUMBER:  [id number]  DATE:  6/18/2021    Multilayer compression wrap: Applied primary and secondary dressing as ordered. Applied multilayered dressing below the knee to right lower leg. Instructed patient/caregiver not to remove dressing and to keep it clean and dry. Instructed patient/caregiver on complications to report to provider, such as pain, numbness in toes, heavy drainage, and slippage of dressing. Instructed patient on purpose of compression dressing and on activity and exercise recommendations.      Applied  [employer] wrap from toes to knee overlapping each time    Electronically signed by Wilma Rose RN on 6/18/2021 at 9:21 AM

## 2021-06-18 NOTE — PLAN OF CARE
Discharge instructions given. Patient verbalized understanding. Return to [address] [place] [address],[address] in 1 week.

## 2021-06-23 NOTE — PROGRESS NOTES
[address] Note    Date: 6/23/2021                                               Subjective/Objective:     Chief Complaint   Patient presents with    Hip Pain     Hard to walk. . MRI?  Weight Loss     not eating        HPI   Concerns for unsteady gait that has gotten worse over the past few months. Has started to use a walker. Pt also has decreased appetite and is sleeping more. Has lost 20 lbs in the last 3 weeks. Pt denies abdominal pain. No n/v. Pt has hx of CHF, last echo one month ago shows EF of 15-20%. Patient has ongoing swelling of his legs, right greater than left, though this is baseline for him. Patient denies any shortness of breath.          Patient Active Problem List    Diagnosis Date Noted    Anasarca     Non-pressure chronic ulcer of right lower leg with fat layer exposed ([address] 03/02/2021    Non-pressure chronic ulcer of left lower leg with fat layer exposed ([address] 03/02/2021    Peripheral venous insufficiency 03/02/2021    Lower extremity edema 03/02/2021    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 with ulceration of other part of lower leg ([address] 03/02/2021    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 (HCC) 03/02/2021    Atrial fibrillation ([address] 11/23/2020    Anemia 02/18/2019    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[address]     HTN (hypertension), benign     Seizures ([address] 02/17/2019    Syncope and collapse 02/17/2019    Senile dementia without behavioral disturbance ([address] 10/08/2018    Aspiration into airway 09/26/2018    Acute respiratory failure with hypoxia (HCC)     Seizure ([address] 09/25/2018    Acute renal failure (HCC)     Acute combined systolic and diastolic (congestive) hrt fail ([address]     Volume overload 08/11/2018    New onset seizure ([address] 02/03/2017    Delirium     Acute encephalopathy        Past Medical History:   Diagnosis Date    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 with ulceration of other part of lower leg ([facility] 3/2/2021    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 ([facility] 3/2/2021    CHF (congestive heart failure) ([facility]     Hypertension     Lower extremity edema 3/2/2021    Non-pressure chronic ulcer of left lower leg with fat layer exposed ([facility] 3/2/2021    Non-pressure chronic ulcer of right lower leg with fat layer exposed ([facility] 3/2/2021    Peripheral venous insufficiency 3/2/2021    Seizures ([facility]     TB (pulmonary tuberculosis)     he says the doctors told him he had TB when he was 15 and he had to have surgery       Current Outpatient Medications   Medication Sig Dispense Refill    lisinopril (PRINIVIL;ZESTRIL) 5 MG tablet TAKE ONE TABLET BY MOUTH DAILY 90 tablet 1    Compression Stockings MISC by Does not apply route Bilateral below knee compression stockings 20-30 mmHg 1 each 0    levothyroxine (SYNTHROID) 25 MCG tablet Take 1 tablet by mouth daily 90 tablet 1    vitamin D (ERGOCALCIFEROL) 1.25 MG (84225 UT) CAPS capsule Take 1 capsule by mouth once a week 4 capsule 5    carvedilol (COREG) 6.25 MG tablet Take 1 tablet by mouth 2 times daily (with meals) 60 tablet 5    aspirin 81 MG chewable tablet Take 1 tablet by mouth daily 90 tablet 3    levETIRAcetam (KEPPRA) 500 MG tablet Take 1 tablet by mouth 2 times daily 180 tablet 3    furosemide (LASIX) 40 MG tablet Take 2 tablets by mouth 2 times daily 360 tablet 0     No current facility-administered medications for this visit. No Known Allergies    Review of Systems   No cough, no HA    Vitals:  /70   Pulse 83   Wt 206 lb (93.4 kg)   SpO2 98%   BMI 24.75 kg/m²     Physical Exam   General:  Well-appearing, NAD, alert, non-toxic  HEENT:  Normocephalic, atraumatic. CHEST/LUNGS: No dyspnea  EXTREMETIES: + Swelling of right lower leg up to the thigh.  + Bilateral lower extremities wrapped.   PSYCH:  A+O x 3; normal affect  NEURO:  GCS 15, CN2-12 grossly intact, no focal motor/sensory deficits, + unsteady gait, normal speech      Assessment/Plan     79year-old male with unintentional weight loss and unsteady gait. Could be dementia versus neoplasm versus worsening CHF. Will check labs and have patient see cardiology. Continue Lasix. If labs normal, may check CT of the head chest and abdomen pelvis to rule out neoplasm. Check D-dimer to rule out DVT, even though patient's right leg is at baseline. Discharged in fair condition at 1:40 PM.    1. Unsteady gait      2. Unintentional weight loss    - CBC Auto Differential; Future  - COMPREHENSIVE METABOLIC PANEL; Future  - TSH with Reflex; Future    3. Right leg swelling    - D-DIMER, QUANTITATIVE; Future    4. Acute combined systolic and diastolic (congestive) hrt fail ([facility]           Orders Placed This Encounter   Procedures    CBC Auto Differential     Standing Status:   Future     Standing Expiration Date:   6/23/2022    COMPREHENSIVE METABOLIC PANEL     Standing Status:   Future     Standing Expiration Date:   6/23/2022    TSH with Reflex     Standing Status:   Future     Standing Expiration Date:   6/23/2022    D-DIMER, QUANTITATIVE     Standing Status:   Future     Standing Expiration Date:   6/23/2022       No follow-ups on file.     Suzie Huerta MD, MD    6/23/2021  1:40 PM

## 2021-06-28 NOTE — PROGRESS NOTES
Multilayer Compression Wrap   Below the Knee    NAME:  Elvira Fatima  YOB: 1940  MEDICAL RECORD NUMBER:  [id number]  DATE:  6/28/2021    Multilayer compression wrap: Removed old Multilayer wrap if indicated and wash leg with mild soap/water. Applied primary and secondary dressing as ordered. Applied multilayered dressing below the knee to right lower leg. Instructed patient/caregiver not to remove dressing and to keep it clean and dry. Instructed patient/caregiver on complications to report to provider, such as pain, numbness in toes, heavy drainage, and slippage of dressing. Instructed patient on purpose of compression dressing and on activity and exercise recommendations.      Applied  [employer] wrap from toes to knee overlapping each time    Electronically signed by Juan Ramon Centeno RN on 6/28/2021 at 11:06 AM

## 2021-06-28 NOTE — PLAN OF CARE
Discharge instructions given. Patient verbalized understanding. Return to [address] [place] [address],[address] in 1 week.   Continue [facility]

## 2021-06-28 NOTE — TELEPHONE ENCOUNTER
I sent a new prescription for Xarelto. Tell patient's wife to get this started as soon as she can. If is too expensive, she should call the office right away, we can give some samples.

## 2021-06-29 NOTE — PROGRESS NOTES
Morales [facility]  Progress Note and Procedure Note      David Dillon  AGE: 79 y.o. GENDER: male  : 1940  TODAY'S DATE:  2021    Subjective:     Chief Complaint   Patient presents with    Wound Check     Right lower leg, Left lower leg         HISTORY of PRESENT ILLNESS HPI  David Mcclendon is a 79 y.o. male who presents today for wound evaluation. History of Wound: Has a long history of leg ulcerations of the right leg. He follows up for reulceration of the leg.       He is here today with his daughter. He is keeping the dressing on but not keeping the leg elevated.        Wound Pain:  none  Severity:  0 / 10   Wound Type:  venous and arterial  Modifying Factors:  edema, venous stasis, lymphedema, decreased mobility and non-adherence  Associated Signs/Symptoms:  edema and drainage  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 ([address] 3/2/2021    CHF (congestive heart failure) ([address]     Hypertension     Lower extremity edema 3/2/2021    Non-pressure chronic ulcer of left lower leg with fat layer exposed ([address] 3/2/2021    Non-pressure chronic ulcer of right lower leg with fat layer exposed ([address] 3/2/2021    Peripheral venous insufficiency 3/2/2021    Seizures ([address]     TB (pulmonary tuberculosis)     he says the doctors told him he had TB when he was 15 and he had to have surgery       PAST SURGICAL HISTORY    History reviewed. No pertinent surgical history. FAMILY HISTORY    History reviewed. No pertinent family history.     SOCIAL HISTORY    Social History     Tobacco Use    Smoking status: Never Smoker    Smokeless tobacco: Never Used   Vaping Use    Vaping Use: Never used   Substance Use Topics    Alcohol use: Not Currently    Drug use: Not on file       ALLERGIES    No Known Allergies    MEDICATIONS    Current Outpatient Medications on File Prior to Encounter   Medication Sig Dispense Refill    lisinopril (PRINIVIL;ZESTRIL) 5 MG tablet TAKE ONE TABLET BY MOUTH DAILY 90 tablet 1    Compression Stockings MISC by Does not apply route Bilateral below knee compression stockings 20-30 mmHg 1 each 0    levothyroxine (SYNTHROID) 25 MCG tablet Take 1 tablet by mouth daily 90 tablet 1    vitamin D (ERGOCALCIFEROL) 1.25 MG (99975 UT) CAPS capsule Take 1 capsule by mouth once a week 4 capsule 5    furosemide (LASIX) 40 MG tablet Take 2 tablets by mouth 2 times daily 360 tablet 0    carvedilol (COREG) 6.25 MG tablet Take 1 tablet by mouth 2 times daily (with meals) 60 tablet 5    levETIRAcetam (KEPPRA) 500 MG tablet Take 1 tablet by mouth 2 times daily 180 tablet 3     No current facility-administered medications on file prior to encounter. REVIEW OF SYSTEMS    Pertinent items are noted in HPI. Objective:      /78   Pulse (!) 48   Temp 96.8 °F (36 °C) (Infrared)   Resp 16     PHYSICAL EXAM    General Appearance: alert and oriented to person, place and time, frail-appearing and talkative  Skin: warm and dry, no rash or erythema  Head: normocephalic and atraumatic  Eyes: pupils equal, round, and reactive to light  Pulmonary/Chest:  normal air movement, no respiratory distress  Cardiovascular: normal rate, regular rhythm and distal pulses diminished  Extremities: no cyanosis, no clubbing and venous stasis dermatosis noted  Wounds: wound base dry, pale no overt signs of infection.         Assessment:     Patient Active Problem List   Diagnosis    New onset seizure ([address]    Delirium    Acute encephalopathy    Volume overload    Acute renal failure (HCC)    Acute combined systolic and diastolic (congestive) hrt fail ([address]    Anasarca    [facility]    Aspiration into airway    Acute respiratory failure with hypoxia (HCC)    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)    Seizures (HCC)    Syncope and collapse    Anemia    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[facility]    HTN (hypertension), benign    Atrial fibrillation (HCC)    Non-pressure chronic ulcer of right lower leg with [facility] layer exposed [facility]    Non-pressure chronic ulcer of left lower leg with fat layer exposed ([facility]    Peripheral venous insufficiency    Lower extremity edema    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 with ulceration of other part of lower leg ([facility]    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 ([facility]       Procedure Note    Performed by: RODERICK Beavers CNP    Consent obtained: Yes    Time out taken:  Yes    Pain Control: Anesthetic  Anesthetic: 4% Lidocaine Cream     Debridement:Excisional Debridement    Using curette and forceps the wound was sharply debrided    down through and including the removal of epidermis, dermis and subcutaneous tissue. Devitalized Tissue Debrided:  fibrin, biofilm and slough    Pre Debridement Measurements:  Are located in the Wound Documentation Flow Sheet    Wound #: 4     Post  Debridement Measurements:  Wound 06/02/21 Leg Right; Lower; Posterior #4 (Active)   Wound Image   06/02/21 1501   Wound Etiology Venous 06/28/21 1026   Wound Cleansed Wound cleanser 06/28/21 1026   Wound Length (cm) 2.4 cm 06/28/21 1026   Wound Width (cm) 0.7 cm 06/28/21 1026   Wound Depth (cm) 0.1 cm 06/28/21 1026   Wound Surface Area (cm^2) 1.68 cm^2 06/28/21 1026   Change in Wound Size % (l*w) 96.41 06/28/21 1026   Wound Volume (cm^3) 0.168 cm^3 06/28/21 1026   Wound Healing % 96 06/28/21 1026   Post-Procedure Length (cm) 2.4 cm 06/28/21 1040   Post-Procedure Width (cm) 0.7 cm 06/28/21 1040   Post-Procedure Depth (cm) 0.15 cm 06/28/21 1040   Post-Procedure Surface Area (cm^2) 1.68 cm^2 06/28/21 1040   Post-Procedure Volume (cm^3) 0.252 cm^3 06/28/21 1040   Wound Assessment Bleeding 06/28/21 1040   Drainage Amount Moderate 06/28/21 1040   Drainage Description Serosanguinous 06/28/21 1040   Odor None 06/28/21 1026   Jennifer-wound Assessment Hemosiderin staining (brown yellow) 06/28/21 1026   Margins Attached edges 06/28/21 1026   Number of days: 26           Total Surface Area Debrided:  1.68 sq cm     Percentage of wound debrided 100%    Bleeding:  Minimal    Hemostasis Achieved:  not needed    Procedural Pain:  0  / 10     Post Procedural Pain:  0 / 10     Response to treatment:  Well tolerated by patient. Plan:     The nature of the patient's condition was explained in depth. The patient was informed that their compliance to the treatment plan is paramount to successful healing and prevention of further ulceration and/or infection     Discharge TreatmeDressing care: Keep leg elevated when sitting. Keep bandage dry. Right leg - Cutimed, transfers, unna boot. We will change the dressings at your next visit.  Left leg - 2 tubi   nt      Written Patient Discharge Instructions Given            Electronically signed by RODERICK Stephen CNP on 6/29/2021 at 10:06 AM

## 2021-06-29 NOTE — RESULT ENCOUNTER NOTE
Patient's daughter was notified of positive study for DVT. They state the Eliquis and Xarelto were too expensive to , but they are working on getting it approved. I gave a 3-week supply of Xarelto to patient's daughter to start immediately.

## 2021-07-01 PROBLEM — G93.41 ACUTE METABOLIC ENCEPHALOPATHY: Status: ACTIVE | Noted: 2021-01-01

## 2021-07-01 NOTE — PROGRESS NOTES
Message sent to Dr. Pamela Galvez: Patient does not have code status or diet order. Also, per patient's daughter, patient has a blood clot in right lower extremity and was started on Xarelto. Would you like to order dopplers to confirm? Please advise. 1947 message received: No dont need Doppler . Can have regular diet .  Resume xarelto

## 2021-07-01 NOTE — H&P
HOSPITALISTS HISTORY AND PHYSICAL    7/1/2021 4:40 PM    Patient Information:  Paul Medeiros is a 79 y.o. male [id number]  PCP:  Sierra Blankenship MD (Tel: [phone] )    Chief complaint:    Chief Complaint   Patient presents with    Altered Mental Status        History of Present Illness:  Roxanne Clayton is a 79 y.o. male who presented with increased confusion fatigue. Per daughter. Patient with history of CHF chronic wound chronic kidney disease A. fib on blood thinner who has been doing well. Apparently was seen at wound care clinic last week. Patient has chronic wound in the legs as well. Patient smiling was not acting herself. No loss of conscious no fevers chills poor historian started to get all the history. Patient has not been doing well for past few weeks. Was seen by PCP of 3 weeks with unsteady gait weight loss patient had some basic labs ordered at the time. Patient denies any focal deficit. REVIEW OF SYSTEMS:   Constitutional: Negative for fever,chills or night sweats  ENT: Negative for rhinorrhea, epistaxis, hoarseness, sore throat. Respiratory: Negative for shortness of breath,wheezing  Cardiovascular: Negative for chest pain, palpitations   Gastrointestinal: Negative for nausea, vomiting, diarrhea  Genitourinary: Negative for polyuria, dysuria   Hematologic/Lymphatic: Negative for bleeding tendency, easy bruising  Musculoskeletal: Negative for myalgias and arthralgias  Neurologic: Negative for confusion,dysarthria. Skin: Negative for itching,rash, good capillary refill. Psychiatric: Negative for depression,anxiety, agitation. Endocrine: Negative for polydipsia,polyuria,heat /cold intolerance.     Past Medical History:   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 with ulceration of other part of lower leg ([address] 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 ([address] CHF (congestive heart failure) ([facility] Hypertension, Lower extremity edema, Non-pressure chronic ulcer of left lower leg with fat layer exposed ([facility] Non-pressure chronic ulcer of right lower leg with fat layer exposed ([facility] Peripheral venous insufficiency, Seizures ([facility] and TB (pulmonary tuberculosis). Past Surgical History:  Surgical-left foot debridement    Medications:  No current facility-administered medications on file prior to encounter. Current Outpatient Medications on File Prior to Encounter   Medication Sig Dispense Refill    rivaroxaban (XARELTO) 15 MG TABS tablet Take 1 tablet by mouth 2 times daily (with meals) for 21 days 42 tablet 0    lisinopril (PRINIVIL;ZESTRIL) 5 MG tablet TAKE ONE TABLET BY MOUTH DAILY 90 tablet 1    Compression Stockings MISC by Does not apply route Bilateral below knee compression stockings 20-30 mmHg 1 each 0    levothyroxine (SYNTHROID) 25 MCG tablet Take 1 tablet by mouth daily 90 tablet 1    vitamin D (ERGOCALCIFEROL) 1.25 MG (18462 UT) CAPS capsule Take 1 capsule by mouth once a week 4 capsule 5    furosemide (LASIX) 40 MG tablet Take 2 tablets by mouth 2 times daily 360 tablet 0    carvedilol (COREG) 6.25 MG tablet Take 1 tablet by mouth 2 times daily (with meals) 60 tablet 5    levETIRAcetam (KEPPRA) 500 MG tablet Take 1 tablet by mouth 2 times daily 180 tablet 3       Allergies:  No Known Allergies     Social History:   reports that he has never smoked. He has never used smokeless tobacco. He reports previous alcohol use. Family History:  family history I  Brother CAD    Physical Exam:  /87   Pulse 79   Resp 18   SpO2 99%     General appearance:  Appears comfortable. Well nourished  Eyes: Sclera clear, pupils equal  ENT: Moist mucus membranes, no thrush. Trachea midline. Cardiovascular: Regular rhythm, normal S1, S2. No murmur, gallop, rub.  No edema in lower extremities  Respiratory: Irregular decreased at baseline with some crackles around the lower  Gastrointestinal: Abdomen soft, non-tender, not distended, normal bowel sounds  Musculoskeletal: No cyanosis in digits, neck supple  Neurology: Cranial nerves grossly intact. Alert and oriented in time, place and person. No speech or motor deficits  Psychiatry: Appropriate affect. Not agitated  Skin: Warm, dry, normal turgor, no rash  Bilateral leg with edema. Worse in late appearance. Ulceration noted on the leg. Right leg. Labs:  CBC:   Lab Results   Component Value Date    WBC 6.6 07/01/2021    RBC 5.78 07/01/2021    HGB 16.7 07/01/2021    HCT 52.7 07/01/2021    MCV 91.1 07/01/2021    MCH 28.9 07/01/2021    MCHC 31.7 07/01/2021    RDW 17.2 07/01/2021    PLT 87 07/01/2021    MPV 11.7 07/01/2021     BMP:    Lab Results   Component Value Date     07/01/2021    K 3.9 07/01/2021     07/01/2021    CO2 26 07/01/2021    BUN 31 07/01/2021    CREATININE 1.4 07/01/2021    CALCIUM 9.4 07/01/2021    GFRAA 59 07/01/2021    LABGLOM 48 07/01/2021    GLUCOSE 122 07/01/2021       Chest Xray:   EKG:    I visualized CXR images and EKG strips     Discussed  with      Problem List  Principal Problem:    Acute encephalopathy  Active Problems:    Anasarca    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HCC)    HTN (hypertension), benign    Atrial fibrillation (HCC)    Non-pressure chronic ulcer of left lower leg with fat layer exposed ([address]    Peripheral venous insufficiency    Lower extremity edema  Resolved Problems:    * No resolved hospital problems. *        Assessment/Plan:   Acute encephalopathy  -With unclear etiology.  -Patient with history of dementia with chronic wound. In A. Fib.  -This could be secondary to uncontrolled A. fib. Patient presented heart rate was elevated.   Needed IV Cardizem bolus  -CT head pending at the time of admission  -We will check some basic/current B12 folate thyroid ammonia level  -We will admit for further evaluation monitor closely    Acute on chronic CHF  -Patient

## 2021-07-01 NOTE — ED NOTES
Bed: 16  Expected date:   Expected time:   Means of arrival: [facility]  Comments:  Guillermina Acevedo  07/01/21 0844

## 2021-07-01 NOTE — ED PROVIDER NOTES
[address]  EMERGENCY DEPARTMENTENCOUNTER      Pt Name: Cyndi Haddad  MRN: [id number]  [place] 1940  Date ofevaluation: 7/1/2021  Provider: Yris Aquino MD    CHIEF COMPLAINT       Chief Complaint   Patient presents with    Altered Mental Status       HPI    HISTORY OF PRESENT ILLNESS   (Location/Symptom, Timing/Onset,Context/Setting, Quality, Duration, Modifying Factors, Severity)  Note limiting factors. Cyndi Haddad is a 79 y.o. male who presents to the emergency department with \"confusion\". This is an 65-year-old male with a history of dementia and atrial fibrillation who presents with increased confusion. The patient appears to have a history of chronic lower extremity cellulitis and sees wound care for this. Initially the patient did not appear to be septic and was having normal speech. NursingNotes were reviewed. Review of Systems    REVIEW OF SYSTEMS    (2-9 systems for level 4, 10 or more for level 5)     Review of Systems   Constitutional: Negative for fever. HENT: Negative for rhinorrhea and sore throat. Eyes: Negative for redness. Respiratory: Negative for shortness of breath. Cardiovascular: Negative for chest pain. Gastrointestinal: Negative for abdominal pain. Genitourinary: Negative for flank pain. Neurological: Negative for headaches. Hematological: Negative for adenopathy. Psychiatric/Behavioral: Positive for confusion. Except as noted above the remainder of the review of systems was reviewed and negative.        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 of other part of lower leg ([address] 3/2/2021    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 ([address] 3/2/2021    CHF (congestive heart failure) ([address]     Hypertension     Lower extremity edema 3/2/2021    Non-pressure chronic ulcer of left lower leg with fat layer exposed ([facility] 3/2/2021    Non-pressure chronic ulcer of right lower leg with fat layer exposed ([facility] 3/2/2021    Peripheral venous insufficiency 3/2/2021    Seizures ([facility]     TB (pulmonary tuberculosis)     he says the doctors told him he had TB when he was 15 and he had to have surgery         SURGICALHISTORY     No past surgical history on file. CURRENT MEDICATIONS       Previous Medications    CARVEDILOL (COREG) 6.25 MG TABLET    Take 1 tablet by mouth 2 times daily (with meals)    COMPRESSION STOCKINGS MISC    by Does not apply route Bilateral below knee compression stockings 20-30 mmHg    FUROSEMIDE (LASIX) 40 MG TABLET    Take 2 tablets by mouth 2 times daily    LEVETIRACETAM (KEPPRA) 500 MG TABLET    Take 1 tablet by mouth 2 times daily    LEVOTHYROXINE (SYNTHROID) 25 MCG TABLET    Take 1 tablet by mouth daily    LISINOPRIL (PRINIVIL;ZESTRIL) 5 MG TABLET    TAKE ONE TABLET BY MOUTH DAILY    RIVAROXABAN (XARELTO) 15 MG TABS TABLET    Take 1 tablet by mouth 2 times daily (with meals) for 21 days    VITAMIN D (ERGOCALCIFEROL) 1.25 MG (65075 UT) CAPS CAPSULE    Take 1 capsule by mouth once a week       ALLERGIES     Patient has no known allergies. FAMILY HISTORY     No family history on file.        SOCIAL HISTORY       Social History     Socioeconomic History    Marital status:      Spouse name: Chetna Romero Number of children: Not on file    Years of education: Not on file    Highest education level: Not on file   Occupational History    Occupation:       Comment: 32 years    Occupation: mail train   Tobacco Use    Smoking status: Never Smoker    Smokeless tobacco: Never Used   Vaping Use    Vaping Use: Never used   Substance and Sexual Activity    Alcohol use: Not Currently    Drug use: Not on file    Sexual activity: Yes     Partners: Female   Other Topics Concern    Not on file   Social History Narrative    Not on file     Social oriented X 1. DIAGNOSTIC RESULTS     EKG: All EKG's are interpreted by the Emergency Department Physician who either signs or Co-signsthis chart in the absence of a cardiologist.    Atrial fibrillation at a rate of 134 beats a minute with no acute ST elevations or depressions or pathologic Q waves. RADIOLOGY:   Non-plain filmimages such as CT, Ultrasound and MRI are read by the radiologist. Plain radiographic images are visualized and preliminarily interpreted by the emergency physician with the below findings:    See below    Interpretation per the Radiologist below, if available at the time ofthis note: All incidental findings were discussed with the patient. XR CHEST PORTABLE   Final Result   1. Pulmonary vascular congestion. 2. Stable cardiomegaly.                ED BEDSIDE ULTRASOUND:   Performed by ED Physician - none    LABS:  Labs Reviewed   CBC WITH AUTO DIFFERENTIAL - Abnormal; Notable for the following components:       Result Value    Hematocrit 52.7 (*)     RDW 17.2 (*)     Platelets 87 (*)     MPV 11.7 (*)     Lymphocytes Absolute 0.6 (*)     All other components within normal limits    Narrative:     Performed at:  [facility] [address], [place], [facility] [place]   Phone [phone]   COMPREHENSIVE METABOLIC PANEL W/ REFLEX TO MG FOR LOW K - Abnormal; Notable for the following components:    Glucose 122 (*)     BUN 31 (*)     CREATININE 1.4 (*)     GFR Non- 48 (*)     GFR African American 59 (*)     Albumin/Globulin Ratio 1.0 (*)     Total Bilirubin 2.8 (*)     All other components within normal limits    Narrative:     Performed at:  [facility] [address], [place], [facility] [place]   Phone [phone]   LACTATE, SEPSIS - Abnormal; Notable for the following components:    Lactic Acid, Sepsis 3.7 (*)     All other components within normal limits    Narrative:     Performed at:  [facility] [address] [facility] unclear what his normal baseline is and appears to be alert to person here and is talking normally. He is not alert to place or time. Upon presentation the patient was given some Cardizem for his atrial fibrillation and now has a rate of around 80 beats a minute and still in atrial fibrillation. Urinalysis is pending and the patient's work-up shows an elevated troponin, chronic renal insufficiency with an elevated D-dimer. The patient be made for further care, evaluation and testing as needed. He is currently in stable condition with a rate around 80 beats a minute. REASSESSMENT          CRITICAL CARE TIME   Total Critical Care time was 50 minutes, excluding separatelyreportable procedures. There was a high probability ofclinically significant/life threatening deterioration in the patient's condition which required my urgent intervention. CONSULTS:  IP CONSULT TO HOSPITALIST    PROCEDURES:  Unless otherwise noted below, none     Procedures    FINAL IMPRESSION      1. Atrial fibrillation with RVR ([address]    2. Delirium    3. Elevated troponin    4. Cellulitis and abscess of leg          DISPOSITION/PLAN   DISPOSITION Decision To Admit 07/01/2021 04:20:27 PM      PATIENT REFERREDTO:  No follow-up provider specified. DISCHARGEMEDICATIONS:  New Prescriptions    No medications on file     Controlled Substances Monitoring:     No flowsheet data found.     (Please note that portions of this note were completed with a voice recognition program.  Efforts were made to edit the dictations but occasionally words are mis-transcribed.)    Leti St MD (electronically signed)  Attending Emergency Physician          Leti St MD  07/01/21 4959

## 2021-07-02 PROBLEM — E44.0 MODERATE MALNUTRITION (HCC): Status: ACTIVE | Noted: 2021-01-01

## 2021-07-02 NOTE — PROGRESS NOTES
Message sent to on-call hospitalist: Patient admitted with wound on right lower extremity. Charge nurse and I attempted to remove dressing that patient was admitted with. Looks like a cast-like dressing was placed on the leg last time patient was seen at wound care center. We were hesitant to take off cast-like dressing. Ace bandage placed over top. Would you like to place consult for wound care? Please advise. 0899 message received: Yes, I agree. Wound care evaluation order placed.

## 2021-07-02 NOTE — PLAN OF CARE
Nutrition Problem #1: Increased nutrient needs  Intervention: Food and/or Nutrient Delivery: Continue Current Diet, Start Oral Nutrition Supplement  Nutritional Goals: PO greater than 75% at meals and supp

## 2021-07-02 NOTE — CONSULTS
Infectious Diseases   Consult Note        Admission Date: 7/1/2021  Hospital Day: Hospital Day: 2   Attending: Sallie Peter MD  Date of service: 7/2/21     Reason for admission: Acute metabolic encephalopathy [W90.08]    Chief complaint/ Reason for consult: *Sepsis, gram-negative bacteremia    Microbiology:        I have reviewed allavailable micro lab data and cultures    Blood culture (2/2) - collected on 7/1/2021: Enterobacterales    Antibiotics and immunizations:       Current antibiotics: All antibiotics and their doses were reviewed by me    Recent Abx Admin                   cefTRIAXone (ROCEPHIN) 1000 mg in sterile water 10 mL IV syringe (mg) 1,000 mg Given 07/02/21 1803                  Immunization History: All immunization history was reviewed by me today. There is no immunization history for the selected administration types on file for this patient. Known drug allergies: All allergies were reviewed and updated    No Known Allergies    Social history:     Social History:  All social andepidemiologic history was reviewed and updated by me today as needed. · Tobacco use:   reports that he has never smoked. He has never used smokeless tobacco.  · Alcohol use:   reports previous alcohol use. · Currently lives in: [address] [place] [address]  ·  has no history on file for drug use.      COVID VACCINATION AND LAB RESULT RECORDS:     Internal Administration   First Dose      Second Dose           Last COVID Lab No results found for: SARS-COV-2, SARS-COV-2 RNA, SARS-COV-2, SARS-COV-2, SARS-COV-2 BY PCR, SARS-COV-2, SARS-COV-2, SARS-COV-2         Assessment:     The patient is a 79 y.o. old male who  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 with ulceration of other part of lower leg ([facility] (3/2/2021), 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 ([facility] (3/2/2021), CHF (congestive heart failure) ([facility] Hypertension, Lower extremity edema (3/2/2021), Non-pressure chronic ulcer of left lower leg with fat layer exposed ([facility] (3/2/2021), Non-pressure chronic ulcer of right lower leg with fat layer exposed ([facility] [facility] (3/2/2021), Peripheral venous insufficiency (3/2/2021), Seizures ([address] and TB (pulmonary tuberculosis). with following problems:    · Sepsis with ltachypnea and tachycardia and hypotension  · Metabolic encephalopathy on admission  · Gram-negative bacteremia with Enterobacterales, further education and speciation pending  · Lactic acidosis on admission  · Leukopenia and thrombocytopenia  · Bilateral leg wounds with infection  · Elevated D-dimer  · Pulmonary vascular congestion on x-ray  · Need for Tdap vaccination      Discussion: This is an 34-year-old man admitted with lactic acidosis, bilateral infected leg wounds. Both sets of blood cultures are growing gram-negative bacteria, further information and susceptibilities are pending      Plan:     Diagnostic Workup:    · Follow-up on identification and susceptibilities of Enterobacterales isolated from the blood culture  · Plan to repeat blood culture tomorrow  · We will order bilateral leg wound cultures  · Continue to follow fever curve, WBC count and blood cultures  · Follow up on liverand renal functions closely  · We will order sed rate and CRP to be added to morning labs    Antimicrobials:    · Will stop IV ceftriaxone today  · Will order IV meropenem 1 g every 8 hours  · Bilateral leg wound care  · We will follow up on the culture results and clinical progress and will make further recommendations accordingly. · No record of tetanus booster within past decade. Will order one today  · Continue close vitals monitoring. · Maintain good glycemic control. · Fall precautions. Aspiration precautions. · Continue to watch for new fever or diarrhea. · DVT prophylaxis. · Discussed all above with patient and RN.       Drug Monitoring:    · Continue serial monitoring for antibiotic toxicity as follows: CBC, CMP  · Continue to watch for following: new or worsening fever, hypotension, hives, lip swelling and redness or purulence at vascular access sites. I/v access Management:    · Continue to monitor i.v access sites for erythema, induration, discharge or tenderness. · As always, continue efforts to minimizetubes/lines/drains as clinically appropriate to reduce chances of line associated infections. Current isolation precautions: There are no current isolations documented for this patient. Level of complexity of consult: High     Risk of Complications/Morbidity: High     · Illness(es)/ Infection present that pose threat to life/bodily function. · There is potential for severe exacerbation of infection/side effects of treatment. · Therapy requires intensive monitoring for antimicrobial agent toxicity. Thank you for involving me in the care of your patient. I will continue to follow. If you have any additional questions, please do not hesitate to contact me. Subjective:     Presenting complaint in ER:     Chief Complaint   Patient presents with    Altered Mental Status        HPI: Remigio Agosto is a 79 y.o. male patient, who was seen at the request of Dr. Bobby Hill MD.    History was obtained from chart review and the patient. The patient was admitted on 7/1/2021. I have been consulted to see the patient for above mentioned reason(s). The patient has multiple medical comorbidities, and presented to the ER for increasing fatigue and confusion. The patient has chronic bilateral leg wounds. In the ER, she was afebrile. She had a low-grade temperature of 99.1 earlier today. Blood cultures were sent. 2 sets of blood cultures have come back positive for Enterobacterales    I have been asked for my opinion for management for this patient. Past Medical History: All past medical history reviewed today.     Past Medical History: rivaroxaban  15 mg Oral BID WC    furosemide  40 mg Intravenous BID          REVIEW OF SYSTEMS:       Review of Systems   Constitutional: Positive for fatigue. Negative for chills, diaphoresis and fever. HENT: Negative for ear discharge, ear pain, rhinorrhea, sore throat and trouble swallowing. Eyes: Negative for discharge and redness. Respiratory: Negative for cough, shortness of breath and wheezing. Cardiovascular: Negative for chest pain and leg swelling. Gastrointestinal: Negative for abdominal pain, constipation, diarrhea and nausea. Endocrine: Negative for polyuria. Genitourinary: Negative for dysuria, flank pain, frequency, hematuria and urgency. Musculoskeletal: Negative for back pain and myalgias. Skin: Negative for rash. Right leg wound   Neurological: Negative for dizziness, seizures and headaches. Hematological: Does not bruise/bleed easily. Psychiatric/Behavioral: Negative for hallucinations and suicidal ideas. All other systems reviewed and are negative. Objective:       PHYSICAL EXAM:      Vitals:   Vitals:    07/02/21 0043 07/02/21 0106 07/02/21 0351 07/02/21 0815   BP:   111/71 109/66   Pulse: 79  87 76   Resp:   18 18   Temp:   98.2 °F (36.8 °C) 99.1 °F (37.3 °C)   TempSrc:   Oral Axillary   SpO2:   97% 99%   Weight:  190 lb 8 oz (86.4 kg)     Height:  6' 4\" (1.93 m)         Physical Exam  Vitals and nursing note reviewed. Constitutional:       Appearance: Normal appearance. He is well-developed. HENT:      Head: Normocephalic and atraumatic. Right Ear: External ear normal.      Left Ear: External ear normal.      Nose: Nose normal. No congestion or rhinorrhea. Mouth/Throat:      Mouth: Mucous membranes are moist.      Pharynx: No oropharyngeal exudate or posterior oropharyngeal erythema. Eyes:      General: No scleral icterus. Right eye: No discharge. Left eye: No discharge.       Conjunctiva/sclera: Conjunctivae normal. Pupils: Pupils are equal, round, and reactive to light. Cardiovascular:      Rate and Rhythm: Normal rate and regular rhythm. Pulses: Normal pulses. Heart sounds: No murmur heard. No friction rub. Pulmonary:      Effort: Pulmonary effort is normal. No respiratory distress. Breath sounds: Normal breath sounds. No stridor. No wheezing, rhonchi or rales. Abdominal:      General: Bowel sounds are normal.      Palpations: Abdomen is soft. Tenderness: There is no abdominal tenderness. There is no right CVA tenderness, left CVA tenderness, guarding or rebound. Musculoskeletal:         General: No swelling or tenderness. Normal range of motion. Cervical back: Normal range of motion and neck supple. No rigidity. No muscular tenderness. Lymphadenopathy:      Cervical: No cervical adenopathy. Skin:     General: Skin is warm and dry. Coloration: Skin is not jaundiced. Findings: No erythema or rash. Comments: Right leg wounds noted on the posterior side   Neurological:      General: No focal deficit present. Mental Status: He is alert and oriented to person, place, and time. Mental status is at baseline. Motor: No abnormal muscle tone. Psychiatric:         Mood and Affect: Mood normal.         Behavior: Behavior normal.         Thought Content: Thought content normal.              Lines: All vascular access sites are healthy with no local erythema, discharge or tenderness. Intake and output:     I/O last 3 completed shifts:  In: -   Out: 600 [Urine:600]    Lab Data:   All available labs were reviewed by me today.      CBC:   Recent Labs     07/01/21  1406 07/02/21  0529   WBC 6.6 5.5   RBC 5.78 5.13   HGB 16.7 14.9   HCT 52.7* 46.1   PLT 87* 75*   MCV 91.1 89.8   MCH 28.9 29.1   MCHC 31.7 32.4   RDW 17.2* 17.1*        BMP:  Recent Labs     07/01/21  1406 07/02/21  0529    144   K 3.9 3.7    103   CO2 26 31   BUN 31* 30*   CREATININE 1.4* 1.1 CALCIUM 9.4 8.9   GLUCOSE 122* 113*        Hepatic FunctionPanel:   Lab Results   Component Value Date    ALKPHOS 84 07/01/2021    ALT 11 07/01/2021    AST 25 07/01/2021    PROT 6.7 07/01/2021    BILITOT 2.8 07/01/2021    LABALBU 3.4 07/01/2021       CPK:   Lab Results   Component Value Date    CKTOTAL 170 07/01/2021     ESR: No results found for: SEDRATE  CRP: No results found for: CRP      Imaging: All pertinent images and reports for the current visit were reviewed by meduring this visit. CT Head WO Contrast   Final Result   No acute intracranial abnormality. Specifically, no acute intracranial   hemorrhage or mass effect. Chronic small vessel ischemic disease. XR CHEST PORTABLE   Final Result   1. Pulmonary vascular congestion. 2. Stable cardiomegaly. Outside records:    Labs, Microbiology, Radiology and pertinent results from Care everywhere, if available, were reviewed as a part ofthe consultation.       Problem list:       Patient Active Problem List   Diagnosis Code    New onset seizure ([address] R56.9    Delirium R41.0    Acute encephalopathy G93.40    Volume overload E87.70    Acute renal failure ([address] N17.9    Acute combined systolic and diastolic (congestive) hrt fail (HCC) I50.41    Anasarca R60.1    Seizure ([address] R56.9    Aspiration into airway T17.908A    Acute respiratory failure with hypoxia ([address] J96.01    Senile dementia without behavioral disturbance ([address] F03.90    Seizures ([address] R56.9    Syncope and collapse R55    Anemia D64.9    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, F02.80    HTN (hypertension), benign I10    Atrial fibrillation with RVR (HCC) I48.91    Non-pressure chronic ulcer of right lower leg with fat layer exposed ([address] L97.912    Non-pressure chronic ulcer of left lower leg with fat layer exposed ([address] E54.086    Peripheral venous insufficiency I87.2    Lower extremity edema R60.0    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 with ulceration of other part of lower leg (HCC) I70.248    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 (HCC) I70.232    Acute metabolic encephalopathy O48.93    Gram negative sepsis (HCC) A41.50    Cellulitis and abscess of leg L03.119, L02.419    Gram-negative bacteremia R78.81    Lactic acidosis E87.2    Leukopenia D72.819    Need for Tdap vaccination Z23    Thrombocytopenia (HCC) D69.6    Elevated d-dimer R79.89    Pulmonary vascular congestion R09.89         Please note that this chart was generated using Dragon dictation software. Although every effort was made to ensure the accuracy of this automated transcription, some errors in transcription may have occurred inadvertently. If you may need any clarification, please do not hesitate to contact me through EPIC or at the phone number provided below with my electronic signature. Any pictures or media included in this note were obtained after taking informed verbal consent from the patient and with their approval to include those in the patient's medical record.       Cece Leos MD, MPH  7/2/21, 9:51 AM EDT   [facility]   [address] [place] [address], [address] [place] [address]  Office: [phone]  Fax: [phone]  Clinic days:  Tuesday & Thursday

## 2021-07-02 NOTE — PROGRESS NOTES
Hospitalist Progress Note      PCP: Farooq Lopez MD    Date of Admission: 7/1/2021    Chief Complaint: Encephalopathy    Hospital Course: Admitted with encephalopathy. .    Subjective: Blood cultures were positive this morning-started on antibiotics. Patient feeling better. Has some pain in the right foot area      Medications:  Reviewed    Infusion Medications   Scheduled Medications    meropenem  1,000 mg Intravenous Q8H    carvedilol  6.25 mg Oral BID WC    levETIRAcetam  500 mg Oral BID    levothyroxine  25 mcg Oral Daily    rivaroxaban  15 mg Oral BID WC     PRN Meds:       Intake/Output Summary (Last 24 hours) at 7/2/2021 1843  Last data filed at 7/2/2021 1544  Gross per 24 hour   Intake 480 ml   Output 1300 ml   Net -820 ml       Physical Exam Performed:    BP (!) 132/51   Pulse 73   Temp 97.7 °F (36.5 °C) (Oral)   Resp 20   Ht 6' 4\" (1.93 m)   Wt 190 lb 8 oz (86.4 kg)   SpO2 100%   BMI 23.19 kg/m²     General appearance: No apparent distress, appears stated age and cooperative. HEENT: Pupils equal, round, and reactive to light. Conjunctivae/corneas clear. Neck: Supple, with full range of motion. No jugular venous distention. Trachea midline. Respiratory:  Normal respiratory effort. Clear to auscultation, bilaterally without Rales/Wheezes/Rhonchi. Cardiovascular: Regular rate and rhythm with normal S1/S2 without murmurs, rubs or gallops. Abdomen: Soft, non-tender, non-distended with normal bowel sounds. Musculoskeletal: No clubbing, cyanosis or edema bilaterally. Full range of motion without deformity. Skin: Right foot wounds/dressing in place. Neurologic:  Neurovascularly intact without any focal sensory/motor deficits.  Cranial nerves: II-XII intact, grossly non-focal.  Psychiatric: Alert and oriented place, person  Capillary Refill: Brisk,3 seconds, normal   Peripheral Pulses: +2 palpable, equal bilaterally       Labs:   Recent Labs     07/01/21  1406 07/02/21  0529   WBC 6.6 5. 5   HGB 16.7 14.9   HCT 52.7* 46.1   PLT 87* 75*     Recent Labs     07/01/21  1406 07/02/21  0529    144   K 3.9 3.7    103   CO2 26 31   BUN 31* 30*   CREATININE 1.4* 1.1   CALCIUM 9.4 8.9     Recent Labs     07/01/21  1406   AST 25   ALT 11   BILITOT 2.8*   ALKPHOS 84     Recent Labs     07/01/21  1406   INR 1.94*     Recent Labs     07/01/21  1406   CKTOTAL 170   TROPONINI 0.07*       Urinalysis:      Lab Results   Component Value Date    NITRU Negative 07/01/2021    WBCUA 1 07/01/2021    RBCUA 4 07/01/2021    BLOODU Negative 07/01/2021    SPECGRAV 1.016 07/01/2021    GLUCOSEU Negative 07/01/2021       Radiology:  CT Head WO Contrast   Final Result   No acute intracranial abnormality. Specifically, no acute intracranial   hemorrhage or mass effect. Chronic small vessel ischemic disease. XR CHEST PORTABLE   Final Result   1. Pulmonary vascular congestion. 2. Stable cardiomegaly. Assessment/Plan:    Active Hospital Problems    Diagnosis     Anasarca [R60.1]      Priority: High    Moderate malnutrition (HCC) [E44.0]     Gram negative sepsis (HCC) [A41.50]     Cellulitis and abscess of leg [L03.119, L02.419]     Gram-negative bacteremia [R78.81]     Lactic acidosis [E87.2]     Leukopenia [D72.819]     Need for Tdap vaccination [Z23]     Thrombocytopenia ([facility] [D69.6]     Elevated d-dimer [R79.89]     Pulmonary vascular congestion [R09.89]     Acute metabolic encephalopathy [Z23.22]     Peripheral venous insufficiency [I87.2]     Non-pressure chronic ulcer of left lower leg with fat layer exposed ([facility] [L97.922]     Lower extremity edema [R60.0]     Atrial fibrillation with RVR (HCC) [I48.91]     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[facility] [G30.9, F02.80]     HTN (hypertension), benign [I10]     Acute encephalopathy [G93.40]      Bacteremia: 2 out of 2 cultures positive for Enterobacter. On IV meropenem. ID on board. Await sensitivities.   Source could be leg wounds. Get x-ray of the right foot to rule out osteomyelitis. Foot wounds: Wound care on board. Acute on chronic systolic heart failure: EF 15 to 20% chest x-ray showed congestion. IV Lasix given. Stop IV Lasix. Lactic acidosis: Overall improving. Repeat lactic acid still high at 2.5. Stop IV Lasix and monitor. Elevated creatinine: Normalized. Atrial fibrillation with RVR: Given Cardizem bolus on admission. Now rate controlled. Continue home regimen. Continue Xarelto    History of seizure: Continue Keppra    Hypothyroidism: Synthroid    Chronic thrombocytopenia. ? Dementia    DVT Prophylaxis: Xarelto  Diet: ADULT DIET; Regular  Adult Oral Nutrition Supplement; Fortified Pudding Oral Supplement  Code Status: DNR-CC    PT/OT Eval Status: Ordered    Dispo -2 to 3 days.     Shannan Villar MD

## 2021-07-02 NOTE — PROGRESS NOTES
Comprehensive Nutrition Assessment    Type and Reason for Visit:  Initial, Positive Nutrition Screen    Nutrition Recommendations/Plan:   1. Continue current diet  2. Encourage po  3. RD ordered Boost puddings TID    Nutrition Assessment:  Pt is nutritionally compromised d/t poor intake pta and wt loss. Pt also has increased nutrient needs d/t wound healing. Pt has lost 35lbs over the past month resulting in a -16% change in wt. During this time pt did have swelling in his legs, however pt also appears malnourished, see NFPE flowsheet. Pt denies any n/v. Pt denies any changes in wt, however pt appears to still be slightly confused. RD to order Boost pudding TID and pt agreeable to try them. Pt declined Ensure drinks. No other needs at this time. Malnutrition Assessment:  Malnutrition Status: Moderate malnutrition    Context:  Chronic Illness     Findings of the 6 clinical characteristics of malnutrition:  Energy Intake:  7 - 75% or less estimated energy requirements for 1 month or longer  Weight Loss:  Unable to assess (Pt did lose wt, but unsure of the amount d/t pt having swelling in legs at a previous md visit on 6/7/21 225lbs)     Body Fat Loss:  1 - Mild body fat loss Orbital, Triceps   Muscle Mass Loss:  1 - Mild muscle mass loss Temples (temporalis), Clavicles (pectoralis & deltoids)  Fluid Accumulation:  7 - Severe Extremities   Strength:       Estimated Daily Nutrient Needs:  Energy (kcal):  2836-5326  cals (25-30 cals/86kg); Weight Used for Energy Requirements:  Current     Protein (g):  103-129 gms (1.2-1.5 gms/86kg); Weight Used for Protein Requirements:  Current        Fluid (ml/day):   ; Method Used for Fluid Requirements:  1 ml/kcal      Nutrition Related Findings:  BUE +1, LLE +2. I/O's: -600; pt also on lasix      Wounds:   (wound on RLE - currently wrapped, type of wound unknown at this time.)       Current Nutrition Therapies:    ADULT DIET;  Regular  Adult Oral Nutrition Supplement; Fortified Pudding Oral Supplement    Anthropometric Measures:  · Height: 6' 4\" (193 cm)  · Current Body Weight: 190 lb (86.2 kg)   · Ideal Body Weight: 202 lbs; % Ideal Body Weight     · BMI: 23.1  · BMI Categories: Normal Weight (BMI 18.5-24. 9)       Nutrition Diagnosis:   · Increased nutrient needs related to increase demand for energy/nutrients as evidenced by wounds    · Inadequate energy intake related to inadequate protein-energy intake as evidenced by weight loss, mild muscle loss, mild loss of subcutaneous fat      Nutrition Interventions:   Food and/or Nutrient Delivery:  Continue Current Diet, Start Oral Nutrition Supplement  Nutrition Education/Counseling:  Education not indicated   Coordination of Nutrition Care:  Continue to monitor while inpatient    Goals:  PO greater than 75% at meals and supp       Nutrition Monitoring and Evaluation:   Behavioral-Environmental Outcomes:  None Identified   Food/Nutrient Intake Outcomes:  Food and Nutrient Intake, Supplement Intake  Physical Signs/Symptoms Outcomes:  Weight, Skin     Discharge Planning:     Too soon to determine     Electronically signed by Marely Micheel RD, [address] , [place] on 7/2/21 at 1:35 PM EDT    Contact: [phone]

## 2021-07-02 NOTE — PROGRESS NOTES
4 Eyes Skin Assessment     NAME:  David Waters  YOB: 1940  MEDICAL RECORD NUMBER:  [id number]    The patient is being assess for  Admission    I agree that 2 RN's have performed a thorough Head to Toe Skin Assessment on the patient. ALL assessment sites listed below have been assessed. Areas assessed by both nurses:    Head, Face, Ears, Shoulders, Back, Chest, Arms, Elbows, Hands, Sacrum. Buttock, Coccyx, Ischium and Legs. Feet and Heels        Does the Patient have a Wound? Yes wound(s) were present on assessment.  LDA wound assessment was Initiated and completed        Michel Prevention initiated:  Yes   Wound Care Orders initiated:  Yes    Pressure Injury (Stage 3,4, Unstageable, DTI, NWPT, and Complex wounds) if present place consult order under [de-identified] Yes    New and Established Ostomies if present place consult order under : No      Nurse 1 eSignature: Electronically signed by Farhana Alcantara RN on 7/1/21 at 11:10 PM EDT    **SHARE this note so that the co-signing nurse is able to place an eSignature**    Nurse 2 eSignature: Electronically signed by Amber Uribe RN on 7/1/21 at 11:28 PM EDT

## 2021-07-02 NOTE — PLAN OF CARE
Problem: Falls - Risk of:  Goal: Will remain free from falls  Description: Will remain free from falls  Outcome: Ongoing  Note: Fall precautions in place: bed locked and in lowest position, bed alarm on, 2/4 side rails raised, non-slip socks on, call light and overhead table within reach, patient knows when to appropriately call out for help. Goal: Absence of physical injury  Description: Absence of physical injury  Outcome: Ongoing    Problem: Skin Integrity:  Goal: Will show no infection signs and symptoms  Description: Will show no infection signs and symptoms  Outcome: Ongoing  Note: Wound care ordered placed. Skin assessment completed. Preventative dressing located on buttocks.      Problem: Skin Integrity:  Goal: Absence of new skin breakdown  Description: Absence of new skin breakdown  Outcome: Ongoing

## 2021-07-02 NOTE — CONSULTS
Palliative Care:     a 79 y.o. male who presented with increased confusion fatigue. Per daughter. Patient with history of CHF chronic wound chronic kidney disease A. fib on blood thinner who has been doing well. Apparently was seen at wound care clinic last week. Patient has chronic wound in the legs as well. Patient smiling was not acting herself. No loss of conscious no fevers chills poor historian started to get all the history. Patient has not been doing well for past few weeks. Was seen by PCP of 3 weeks with unsteady gait weight loss patient had some basic labs ordered at the time. Patient denies any focal deficit. Patient admitted 7/1/21 and Palliative consult placed. 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 Past Surgical History:   has no past surgical history on file. Advance Directives:  Full code        Problem Severity: Pain/Other Symptoms: Coccyx reddened. Mepilex in place. Does go to Wound Clinic for RLE. Recent debridement to foot. Next appointment scheduled for 7/6/21. Blood cultures returned 7/2/21. ID consult placed and IV Rocephin initiated. Bed Mobility/Toileting/Transfer:  Pending PT/OT assessments   Unsteady with ambulation. Uses walker in home setting.     Performance Status:        Palliative Performance Scale:  100% []Full Normal activity & work No evidence of disease  90%   [] Full Normal activity & work Some evidence of disease  80%   [] Full Normal activity with Effort Some evidence of disease  70%   [] Reduced Unable Normal Job/Work Significant disease Full Normal or reduced  60%   [] Ambulation reduced; Significant disease; Can't do hobbies/housework; intake normal   or reduced; occasional assist; LOC full/confusion  50%   [] Mainly sit/lie; Extensive disease; Can't do any work; Considerable assist; intake normal  Or reduced; LOC full/confusion  40%   [x] Mainly in bed; Extensive disease; Mainly assist; intake normal or reduced; occasional assist; LOC full/confusion  30%   [] Bed Bound; Extensive disease; Total care; intake reduced; LOC full/confusion  20%   [] Bed Bound; Extensive disease; Total care; intake minimal; Drowsy/coma  10%   [] Bed Bound; Extensive disease; Total care; Mouth care only; Drowsy/coma    PPS 40%  Symptom Assessment: Appetite/Nausea/Bowels/Fatigue:    lbs. Family report WT @ 228 (3) months ago. Social History:   reports that he has never smoked. He has never used smokeless tobacco. He reports previous alcohol use. Family History:  family history is not on file. Psychological/Spiritual:   Resides in home with his wife. One daughter Harris Sutherland [phone]. Has Dementia. Is alert to self and place. Family Discussion:        Call to wife and daughter Harris Sutherland to provide update of today's status. Both indicate patient does at times becomes very confused and is usually arguments with wife. Today, patient presents self calm. Discussed ACP/Code status. No ACP in place. Patient wishes are DNR-CC. Discussed Jewish. Non-Hindu. Agreeable for 's spiritual support. Will notify. Education provided to family regarding hospice philosophy and comfort care support in home should they choose this upon discharge or perhaps after some skilled therapy for patient in home per family request. Nevaeh Many left in room for wife per her request.     Perfect Serve to physician for code status change. Obtained.      Will continue to follow for any additional emotional/educational support. Nurse Sarina Flores updated of these conversations.

## 2021-07-02 NOTE — PROGRESS NOTES
Microbiology called, patient's blood cultures positive for enterobacterales. Will notify on-call hospitalist.     8100 Message sent to Dr. Michoacano Hernandez: Patient's blood cultures came back positive for enterobacterales DNA. Would you like to place ID consult? Please advise     0530 orders for IV Rocephin and ID consult placed.

## 2021-07-02 NOTE — CARE COORDINATION
Discharge Planning Assessment  Rn/SW discharge planner met w/patient/family member to discuss reason for admission, current living situation, and potential needs at the time of discharge-completed w/spouse. Demographics/Insurance verified Yes    Current type of dwelling: apt-[facility] at [facility] ([facility]ross from [facility])    Living arrangements: w/spouse    Level of function/support: grand daughter assists as needed    PCP: Harriet Gaytan    Last Visit to PCP: 06/23/21    DME: none    Active with any community resources/agencies/skilled home care:no services in the home    Medication compliance issues: no issues    Financial issues that could impact healthcare: no issues    Transportation at time of d/c (Discussed w/pt/family that on the day of discharge home, tentative time of discharge will be between 10am and noon): TBD    Discussed and provided facilities of choice if transition to a skilled nursing facility is required a the time of discharge-not at this time. Tentative discharge plan: Call placed to spouse to discuss any potential dc needs. Spouse stated that she is hopeful that pt will be able to work w/therapy (none ordered at this time)-she stated she is agreeable to SNF for pt if recommended, or home care. No precert needed. CM following.     Electronically signed by TAL Escamilla  [phone]

## 2021-07-02 NOTE — CARE COORDINATION
Patient has consult for wounds. Patient was seen in Wound Clinic 6/28 by [address]. Wounds were debrided and unna boot applied. Discussed case with  Nurse Lay Velasquez from wound clinic. Patient has an appointment for Wednesday July 7th. At this time will leave unna boot in place and re-evaluate wound on Tuesday, July 6th. Will also consult Podiatry if needed.  SALVADOR LinN, RN  [facility]

## 2021-07-03 NOTE — PROGRESS NOTES
Hospitalist Progress Note      PCP: Sebas Frank MD    Date of Admission: 7/1/2021    Chief Complaint: Encephalopathy    Hospital Course: 58-year-old male with a history of seizures, right lower leg wounds, CHF, hypertension brought in from home with encephalopathy. Found to have bacteremia. On IV antibiotics per ID. Patient overall feeling better. Awaiting final culture results    Subjective: Doing okay. Pain reasonably controlled. No new complaints. Medications:  Reviewed    Infusion Medications   Scheduled Medications    meropenem  1,000 mg Intravenous Q8H    carvedilol  6.25 mg Oral BID WC    levETIRAcetam  500 mg Oral BID    levothyroxine  25 mcg Oral Daily    rivaroxaban  15 mg Oral BID WC     PRN Meds:       Intake/Output Summary (Last 24 hours) at 7/3/2021 1736  Last data filed at 7/3/2021 1009  Gross per 24 hour   Intake 1321 ml   Output 800 ml   Net 521 ml       Physical Exam Performed:    BP 95/61   Pulse 81   Temp 97.3 °F (36.3 °C) (Oral)   Resp 16   Ht 6' 4\" (1.93 m)   Wt 192 lb 4.8 oz (87.2 kg)   SpO2 98%   BMI 23.41 kg/m²     General appearance: No apparent distress, appears stated age and cooperative. HEENT: Pupils equal, round, and reactive to light. Conjunctivae/corneas clear. Neck: Supple, with full range of motion. No jugular venous distention. Trachea midline. Respiratory:  Normal respiratory effort. Clear to auscultation, bilaterally without Rales/Wheezes/Rhonchi. Cardiovascular: Regular rate and rhythm with normal S1/S2 without murmurs, rubs or gallops. Abdomen: Soft, non-tender, non-distended with normal bowel sounds. Musculoskeletal: No clubbing, cyanosis or edema bilaterally. Full range of motion without deformity. Skin: Right foot wounds/dressing in place. Neurologic:  Neurovascularly intact without any focal sensory/motor deficits.  Cranial nerves: II-XII intact, grossly non-focal.  Psychiatric: Alert and oriented place, person  Capillary Refill: Brisk,3 seconds, normal   Peripheral Pulses: +2 palpable, equal bilaterally       Labs:   Recent Labs     07/01/21  1406 07/02/21  0529 07/03/21  0552   WBC 6.6 5.5 7.3   HGB 16.7 14.9 14.6   HCT 52.7* 46.1 45.1   PLT 87* 75* 73*     Recent Labs     07/01/21  1406 07/02/21  0529 07/03/21  0552    144 143   K 3.9 3.7 3.2*    103 106   CO2 26 31 29   BUN 31* 30* 31*   CREATININE 1.4* 1.1 1.0   CALCIUM 9.4 8.9 8.3     Recent Labs     07/01/21  1406   AST 25   ALT 11   BILITOT 2.8*   ALKPHOS 84     Recent Labs     07/01/21  1406   INR 1.94*     Recent Labs     07/01/21  1406   CKTOTAL 170   TROPONINI 0.07*       Urinalysis:      Lab Results   Component Value Date    NITRU Negative 07/01/2021    WBCUA 1 07/01/2021    RBCUA 4 07/01/2021    BLOODU Negative 07/01/2021    SPECGRAV 1.016 07/01/2021    GLUCOSEU Negative 07/01/2021       Radiology:  XR FOOT RIGHT (2 VIEWS)   Final Result   No acute osseous injury. No osteomyelitis is apparent. Soft tissue swelling of the forefoot, edema versus cellulitis. Polyarticular osteoarthritis. CT Head WO Contrast   Final Result   No acute intracranial abnormality. Specifically, no acute intracranial   hemorrhage or mass effect. Chronic small vessel ischemic disease. XR CHEST PORTABLE   Final Result   1. Pulmonary vascular congestion. 2. Stable cardiomegaly.                  Assessment/Plan:    Active Hospital Problems    Diagnosis     Anasarca [R60.1]      Priority: High    Moderate malnutrition (HCC) [E44.0]     Gram negative sepsis (HCC) [A41.50]     Cellulitis and abscess of leg [L03.119, L02.419]     Gram-negative bacteremia [R78.81]     Lactic acidosis [E87.2]     Leukopenia [D72.819]     Need for Tdap vaccination [Z23]     Thrombocytopenia ([facility] [D69.6]     Elevated d-dimer [R79.89]     Pulmonary vascular congestion [R09.89]     Acute metabolic encephalopathy [H51.19]     Peripheral venous insufficiency [I87.2]     Non-pressure chronic ulcer of left lower leg with fat layer exposed ([facility] [L97.922]     Lower extremity edema [R60.0]     Atrial fibrillation with RVR (HCC) [I48.91]     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[facility] [G30.9, F02.80]     HTN (hypertension), benign [I10]     Acute encephalopathy [G93.40]      Providentia  bacteremia: 2 out of 2 cultures positive. On IV meropenem. ID on board. Await sensitivities. Source could be leg wounds. X-ray rule out osteomyelitis. Foot wounds: Wound care on board. Acute on chronic systolic heart failure: EF 15 to 20% . chest x-ray showed congestion on admission and IV Lasix given. Currently off Lasix . Continue to monitor closely. Lactic acidosis: Overall improved      Hypokalemia: Replace. Elevated creatinine: Normalized. Atrial fibrillation with RVR: Given Cardizem bolus on admission. Now rate controlled. Continue home regimen. Continue Xarelto    History of seizure: Continue Keppra    Hypothyroidism: Synthroid    Chronic thrombocytopenia. Dementia    DVT Prophylaxis: Xarelto  Diet: ADULT DIET; Regular  Adult Oral Nutrition Supplement;  Fortified Pudding Oral Supplement  Code Status: DNR-CC    PT/OT Eval Status: Ordered    Dispo - 2 days    Nette Ceballos MD

## 2021-07-03 NOTE — PROGRESS NOTES
ID Follow-up NOTE    CC:   GNR bacteremia, R leg wound infection  Antibiotics: Meropenem    Admit Date: 2021  Hospital Day: 3    Subjective:     Patient has no complaint       Objective:     Patient Vitals for the past 8 hrs:   BP Temp Temp src Pulse Resp SpO2   21 1735 (!) 97/53 97.8 °F (36.6 °C) Oral 99 14 100 %   21 1201 95/61 97.3 °F (36.3 °C) Oral 81 16 98 %     I/O last 3 completed shifts: In: 9131 [P.O.:1120; I.V.:201]  Out: 1150 [Urine:1150]  No intake/output data recorded. EXAM:  GENERAL: No apparent distress. HEENT: Membranes moist, no oral lesion  NECK:  Supple, no lymphadenopathy  LUNGS: Clear b/l, no rales, no dullness  CARDIAC: RRR, no murmur appreciated  ABD:  + BS, soft / NT  EXT:  No rash, no edema, no lesions  R leg with dressing   L leg with lateral superficial ulceration, clean  NEURO: No focal neurologic findings  PSYCH: Orientation, sensorium, mood normal  LINES:  Peripheral iv       Data Review:  Lab Results   Component Value Date    WBC 7.3 2021    HGB 14.6 2021    HCT 45.1 2021    MCV 89.6 2021    PLT 73 (L) 2021     Lab Results   Component Value Date    CREATININE 1.0 2021    BUN 31 (H) 2021     2021    K 3.2 (L) 2021     2021    CO2 29 2021       Hepatic Function Panel:   Lab Results   Component Value Date    ALKPHOS 84 2021    ALT 11 2021    AST 25 2021    PROT 6.7 2021    BILITOT 2.8 2021    LABALBU 3.4 2021       MICRO:  BC x 2 Providencia rettgeri, sensitivities in process    IMAGIN/2 --  XR FOOT RIGHT (2 VIEWS)   Final Result   No acute osseous injury.       No osteomyelitis is apparent.       Soft tissue swelling of the forefoot, edema versus cellulitis.       Polyarticular osteoarthritis.          Scheduled Meds:   meropenem  1,000 mg Intravenous Q8H    carvedilol  6.25 mg Oral BID WC    levETIRAcetam  500 mg Oral BID    levothyroxine  25 mcg Oral Daily    rivaroxaban  15 mg Oral BID WC       Continuous Infusions:        Assessment:     HTN, seizures, CHF, hypothyroid, AF    Encephalopathy  Lactic acidosis  GNR / Providencia bacteremia   R leg wounds    Afebrile, normal WBC  NKDA    Plan:     Cont meropenem   Await Providencia sensitivites  Wound care     May be able to d/c on oral antibiotics (poss FQ / levofloxacin)    Medical Decision Making:   The following items were considered in medical decision making:  Discussion of patient care with other providers  Reviewed clinical lab tests  Reviewed radiology tests  Reviewed other diagnostic tests/interventions  Microbiology cultures and reviewed      Discussed with pt, RN  Lashonda Cortez MD

## 2021-07-04 NOTE — CARE COORDINATION
The Plan for Transition of Care is related to the following treatment goals: home care    The Patient  was provided with a choice of provider and agrees   with the discharge plan. [] Yes [x] No    Freedom of choice list was provided with basic dialogue that supports the patient's individualized plan of care/goals, treatment preferences and shares the quality data associated with the providers. [x] Yes [] No    The wife stated patient has dementia and she is agreeable to home care. She requested her daughter, Kayy Mcdonough agency [phone] to select an agency. The daughter works for [employer],  Pager,  and [place], [phone]. The daughter is his caregiver every day.

## 2021-07-04 NOTE — DISCHARGE SUMMARY
Patient: Dyan Dewitt     Gender: male  : 1940   Age: 79 y.o.   MRN: [id number]    Admitting Physician: Michaela Newell MD  Discharge Physician: Marcelle Cast MD     Code Status: DNR-CC     Admit Date: 2021   Discharge Date: 2021    Disposition:  Home    Discharge Diagnoses:  Procidentia and Enterobacter bacteremia secondary to skin wounds  Hypeoension secondary to underlying severe systolic heart failure  Mild acute on chronic severe systolic heart failure    Active Hospital Problems    Diagnosis Date Noted    Anasarca [R60.1]      Priority: High    Moderate malnutrition ([facility] [E44.0] 2021    Gram negative sepsis ([facility] [A41.50]     Cellulitis and abscess of leg [L03.119, L02.419]     Gram-negative bacteremia [R78.81]     Lactic acidosis [E87.2]     Leukopenia [D72.819]     Need for Tdap vaccination [Z23]     Thrombocytopenia ([facility] [D69.6]     Elevated d-dimer [R79.89]     Pulmonary vascular congestion [R09.89]     Acute metabolic encephalopathy [L98.01] 2021    Peripheral venous insufficiency [I87.2] 2021    Non-pressure chronic ulcer of left lower leg with fat layer exposed ([facility] [L97.922] 2021    Lower extremity edema [R60.0] 2021    Atrial fibrillation with RVR ([facility] [I48.91] 2020    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[facility] [G30.9, F02.80]     HTN (hypertension), benign [I10]     Acute encephalopathy [G93.40]        Follow-up appointments:  one week    Outpatient to do list: none     Condition at Discharge:  [address] Course:   79-year-old gentleman admitted with altered mental status    Acute encephalopathy metabolic secondary to right lower extremity wound infection with [address] and Enterobacter bacteremia  On admission blood cultures were drawn which were positive for [address] [facility] and Enterobacter repeat blood cultures were sent seen by infectious disease  Based on sensitivities and they recommended treating with oral levofloxacin  for 10 days  His wounds were dressed he was doing well  His mental status improved he was awake alert more oriented  He has chronic lower extremity wounds  His repeat blood cultures were negative    Acute on chronic severe systolic heart failure  On admission he had some vascular congestion  He was started on IV Lasix  He did not require any oxygen  During the course of his stay was a bit hypotensive creatinine was mildly elevated at 1.1  Initially his blood pressure medications were held and subsequently was restarted on Coreg at a smaller dose  I discontinued his lisinopril but restarted his Lasix  He was seen by physical and Occupational Therapy next      Discharge Medications:   Current Discharge Medication List      START taking these medications    Details   levoFLOXacin (LEVAQUIN) 500 MG tablet Take 1 tablet by mouth daily for 9 days  Qty: 9 tablet, Refills: 0           Current Discharge Medication List      CONTINUE these medications which have CHANGED    Details   carvedilol (COREG) 3.125 MG tablet Take 1 tablet by mouth 2 times daily (with meals)  Qty: 60 tablet, Refills: 3           Current Discharge Medication List      CONTINUE these medications which have NOT CHANGED    Details   aspirin 81 MG EC tablet Take 81 mg by mouth daily      rivaroxaban (XARELTO) 15 MG TABS tablet Take 1 tablet by mouth 2 times daily (with meals) for 21 days  Qty: 42 tablet, Refills: 0      levothyroxine (SYNTHROID) 25 MCG tablet Take 1 tablet by mouth daily  Qty: 90 tablet, Refills: 1    Associated Diagnoses: Hypothyroidism, unspecified type      vitamin D (ERGOCALCIFEROL) 1.25 MG (49854 UT) CAPS capsule Take 1 capsule by mouth once a week  Qty: 4 capsule, Refills: 5      levETIRAcetam (KEPPRA) 500 MG tablet Take 1 tablet by mouth 2 times daily  Qty: 180 tablet, Refills: 3    Associated Diagnoses: Seizure ([facility]      Compression Stockings MISC by Does not apply route Bilateral below knee compression stockings 20-30 mmHg  Qty: 1 each, Refills: 0      furosemide (LASIX) 40 MG tablet Take 2 tablets by mouth 2 times daily  Qty: 360 tablet, Refills: 0    Associated Diagnoses: Essential hypertension           Current Discharge Medication List      STOP taking these medications       lisinopril (PRINIVIL;ZESTRIL) 5 MG tablet Comments:   Reason for Stopping:               Discharge ROS:  A complete review of systems was asked and negative    Discharge Exam:    BP 99/62   Pulse 81   Temp 97.5 °F (36.4 °C) (Oral)   Resp 16   Ht 6' 4\" (1.93 m)   Wt 207 lb 12.8 oz (94.3 kg)   SpO2 91%   BMI 25.29 kg/m²   General appearance:  NAD  HEENT:   Normal cephalic, atraumatic, moist mucous membranes, no oropharyngeal erythema or exudate  Heart[de-identified] Normal s1/s2, RRR, no murmurs, gallops, or rubs. no leg edema  Lungs:  Normal respiratory effort. Clear to auscultation, bilaterally without Rales/Wheezes/Rhonchi. Abdomen: Soft, non-tender, non-distended, bowel sounds present, no masses  Musculoskeletal:  No clubbing, no cyanosis, *  Neurologic:  Neurovascularly intact without any focal sensory/motor deficits. Cranial nerves: II-XII intact, grossly non-focal.  Right lower extremity wound dressed    Labs:  For convenience and continuity at follow-up the following most recent labs are provided:    Lab Results   Component Value Date    WBC 4.7 07/06/2021    HGB 14.0 07/06/2021    HCT 43.1 07/06/2021    MCV 89.3 07/06/2021     07/06/2021     07/06/2021    K 3.7 07/06/2021    K 3.9 07/01/2021    CL 99 07/06/2021    CO2 31 07/06/2021    BUN 43 07/06/2021    CREATININE 1.0 07/06/2021    CALCIUM 8.2 07/06/2021    PHOS 3.8 08/05/2020    ALKPHOS 84 07/01/2021    ALT 11 07/01/2021    AST 25 07/01/2021    BILITOT 2.8 07/01/2021    LABALBU 3.4 07/01/2021    LDLCALC 110 08/11/2018    TRIG 88 08/11/2018     Lab Results   Component Value Date    INR 1.94 (H) 07/01/2021    INR 1.00 06/29/2020    INR 1.06 02/17/2019           The patient was seen and examined on day of discharge and this discharge summary is in conjunction with any daily progress note from day of discharge. Time Spent on discharge is 45 minutes  in the examination, evaluation, counseling and review of medications and discharge plan. Note that greater  than 30 minutes was spent in preparing discharge papers, discussing discharge with patient, medication review, etc.       Signed:    Jihan Hamm MD   7/6/2021      Thank you Jose E Painting MD for the opportunity to be involved in this patient's care.  If you have any questions or concerns please feel free to contact me

## 2021-07-04 NOTE — DISCHARGE INSTR - COC
Continuity of Care Form    Patient Name: Pramod Tse   :  1940  MRN:  [id number]    Admit date:  2021  Discharge date:  ***    Code Status Order: [facility]   Advance Directives:   [facility] Documentation       Date/Time Healthcare Directive Type of Healthcare Directive Copy in [address] Agent's Name Healthcare Agent's Phone Number    21  No, patient does not have an advance directive for healthcare treatment -- -- -- -- --    21  No, patient does not have an advance directive for healthcare treatment -- -- -- -- --    21  No, patient does not have an advance directive for healthcare treatment -- -- -- -- --            Admitting Physician:  Feli Carlos MD  PCP: Pat Youssef MD    Discharging Nurse: [facility] Unit/Room#: [id number]-2117/5869-01  Discharging Unit Phone Number: ***    Emergency Contact:   Extended Emergency Contact Information  Primary Emergency Contact: [facility]  Address: [address] [facility] Phone: [phone]  Mobile Phone: [phone]  Relation: Spouse  Secondary Emergency Contact: Chantal Kramer [address] Phone: [phone]  Mobile Phone: [phone]  Relation: Child    Past Surgical History:  History reviewed. No pertinent surgical history.     Immunization History:   Immunization History   Administered Date(s) Administered    Tdap (Boostrix, Adacel) 2021       Active Problems:  Patient Active Problem List   Diagnosis Code    New onset seizure ([address] R56.9    Delirium R41.0    Acute encephalopathy G93.40    Volume overload E87.70    Acute renal failure ([address] N17.9    Acute combined systolic and diastolic (congestive) hrt fail (HCC) I50.41    Anasarca R60.1    Seizure ([address] R56.9    Aspiration into airway T17.908A    Acute respiratory failure with hypoxia ([address] J96.01    Senile dementia without behavioral disturbance ([facility]) F03.90    Seizures ([facility]) R56.9    Syncope and collapse R55    Anemia D64.9    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[facility] G30.9, F02.80    HTN (hypertension), benign I10    Atrial fibrillation with RVR ([facility]) I48.91    Non-pressure chronic ulcer of right lower leg with fat layer exposed ([facility] L97.912    Non-pressure chronic ulcer of left lower leg with fat layer exposed [facility] L97.922    Peripheral venous insufficiency I87.2    Lower extremity edema R60.0    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 with ulceration of other part of lower leg ([facility]) I70.248    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 ([facility]) I70.232    Acute metabolic encephalopathy R47.81    Gram negative sepsis ([facility]) A41.50    Cellulitis and abscess of leg L03.119, L02.419    Gram-negative bacteremia R78.81    Lactic acidosis E87.2    Leukopenia D72.819    Need for Tdap vaccination Z23    Thrombocytopenia ([facility]) D69.6    Elevated d-dimer R79.89    Pulmonary vascular congestion R09.89    Moderate malnutrition ([facility]) E44.0       Isolation/Infection:   Isolation            No Isolation          Patient Infection Status       None to display            Nurse Assessment:  Last Vital Signs: BP (!) 98/54   Pulse 78   Temp 97.3 °F (36.3 °C) (Oral)   Resp 16   Ht 6' 4\" (1.93 m)   Wt 196 lb 9.6 oz (89.2 kg)   SpO2 99%   BMI 23.93 kg/m²     Last documented pain score (0-10 scale): Pain Level: 0  Last Weight:   Wt Readings from Last 1 Encounters:   07/04/21 196 lb 9.6 oz (89.2 kg)     Mental Status:  disoriented-a/o to self    IV Access:  - None    Nursing Mobility/ADLs:  Walking   Assisted: contact guard x2 w rolling walker  Transfer  Assisted: contact guard x2 w rolling walker  Bathing  Assisted  Dressing  Assisted  Toileting Assisted-incontinence  Feeding  Independent  Med Admin  Assisted  Med Delivery   whole    Wound Care Documentation and Therapy:  Wound 06/02/21 Leg Right; Lower; Posterior #4 (Active)   Wound Image   06/02/21 1501   Wound Etiology Venous 07/04/21 0940   Dressing Status Clean;Dry; Intact 07/04/21 0940   Wound Cleansed Wound cleanser 06/28/21 1026   Dressing/Treatment Ace wrap 07/04/21 0940   Wound Length (cm) 2.4 cm 06/28/21 1026   Wound Width (cm) 0.7 cm 06/28/21 1026   Wound Depth (cm) 0.1 cm 06/28/21 1026   Wound Surface Area (cm^2) 1.68 cm^2 06/28/21 1026   Change in Wound Size % (l*w) 96.41 06/28/21 1026   Wound Volume (cm^3) 0.168 cm^3 06/28/21 1026   Wound Healing % 96 06/28/21 1026   Post-Procedure Length (cm) 2.4 cm 06/28/21 1040   Post-Procedure Width (cm) 0.7 cm 06/28/21 1040   Post-Procedure Depth (cm) 0.15 cm 06/28/21 1040   Post-Procedure Surface Area (cm^2) 1.68 cm^2 06/28/21 1040   Post-Procedure Volume (cm^3) 0.252 cm^3 06/28/21 1040   Wound Assessment Other (Comment) 07/04/21 0940   Drainage Amount Other (Comment) 07/04/21 0940   Drainage Description Other (Comment) 07/04/21 0940   Odor None 07/04/21 0940   Jennifer-wound Assessment Other (Comment) 07/04/21 0940   Margins Other (Comment) 07/04/21 0940   Number of days: 31       Wound 07/01/21 Toe (Comment  which one) Anterior;Right DTI on 4th toe (Active)   Wound Etiology Deep tissue/Injury 07/04/21 0940   Dressing/Treatment Open to air 07/04/21 0940   Wound Assessment Purple/maroon 07/04/21 0940   Drainage Amount None 07/04/21 0940   Odor None 07/04/21 0940   Jennifer-wound Assessment Blanchable erythema 07/04/21 0940   Margins Defined edges 07/04/21 0940   Number of days: 2       Wound 07/01/21 Coccyx (Active)   Wound Etiology Pressure Stage  1 07/04/21 0940   Dressing Status New dressing applied;Clean;Dry; Intact 07/04/21 0940   Dressing/Treatment Foam 07/04/21 0940   Wound Assessment Non-blanchable erythema 07/04/21 0940   Drainage Amount None 07/04/21 0940   Odor None 07/04/21 0940   Jennifer-wound Assessment Blanchable erythema 07/04/21 0940   Margins Defined edges 07/04/21 0940   Number of days: 2        Elimination:  Continence:   · Bowel: No  · Bladder: No  Urinary Catheter: None   Colostomy/Ileostomy/Ileal Conduit: No       Date of Last BM: 7/2/2021    Intake/Output Summary (Last 24 hours) at 7/4/2021 1158  Last data filed at 7/3/2021 2225  Gross per 24 hour   Intake 240 ml   Output 800 ml   Net -560 ml     I/O last 3 completed shifts: In: 200 [P.O.:1240]  Out: 800 [Urine:800]    Safety Concerns: At Risk for Falls, dementia    Impairments/Disabilities:      Rosi Gayle [facility] Impairments/Disabilities:[id number]:::0}    Nutrition Therapy:  Current Nutrition Therapy:   Diet:  Regular diet with oral supplement    Routes of Feeding: Oral  Liquids: Thin Liquids  Daily Fluid Restriction: no-pt is CHF pt. Monitor I/O  Last Modified Barium Swallow with Video (Video Swallowing Test): not done    Treatments at the Time of Hospital Discharge:   Respiratory Treatments: none  Oxygen Therapy:  is not on home oxygen therapy. Ventilator:    - No ventilator support    Rehab Therapies: Physical and Occupational Therapy-recommended 3 to 5 times per week  Weight Bearing Status/Restrictions: No weight bearing restirctions  Other Medical Equipment (for information only, NOT a DME order):  Rolling walker  Other Treatments: Pt to see outpatient wound care weekly-Bilateral lower extremities.     Patient's personal belongings (please select all that are sent with patient):  None    RN SIGNATURE:  Electronically signed by Aaron Neil RN on 7/7/21 at 10:24 AM EDT    CASE MANAGEMENT/SOCIAL WORK SECTION    Inpatient Status Date: 7/1/2021    Readmission Risk Assessment Score:  Readmission Risk              Risk of Unplanned Readmission:  16           Discharging to Facility/ Agency   · Name: Home at [facility]  · Address: [address] [place] [address]  · Phone:[phone] for report   · Fax:[phone]    Dialysis Facility (if applicable)   · Name:  · Address:  · Dialysis Schedule:  · Phone:  · Fax:    Case Manager/ signature: Chavo Krishnan, RN, BSN  [phone]       PHYSICIAN SECTION    Prognosis: Good    Condition at Discharge: Stable    Rehab Potential (if transferring to Rehab): Good    Recommended Labs or Other Treatments After Discharge: bactermia    Physician Certification: I certify the above information and transfer of Alanis Handler  is necessary for the continuing treatment of the diagnosis listed and that he requires [address] for greater 30 days.      Update Admission H&P: No change in H&P    PHYSICIAN SIGNATURE:  Electronically signed by Cristal Banerjee MD on 7/4/21 at 11:58 AM EDT

## 2021-07-04 NOTE — PROGRESS NOTES
Physical Therapy    Facility/Department: [address] ONCOLOGY  Initial Assessment    NAME: Jeremiah Conrad  : 1940  MRN: [id number]    Date of Service: 2021    Discharge Recommendations:  Jeremiah Conrad scored a 14/24 on the AM-PAC short mobility form. Current research shows that an AM-PAC score of 17 or less is typically not associated with a discharge to the patient's home setting. Based on the patient's AM-PAC score and their current functional mobility deficits, it is recommended that the patient have 3-5 sessions per week of Physical Therapy at d/c to increase the patient's independence. Please see assessment section for further patient specific details. If patient discharges prior to next session this note will serve as a discharge summary. Please see below for the latest assessment towards goals. 24 hour supervision or assist, 3-5 sessions per week   PT Equipment Recommendations  Equipment Needed: Yes  Mobility Devices: Ana Mix: Rolling  Other: Defer to next level of care. Assessment   Body structures, Functions, Activity limitations: Decreased functional mobility ; Decreased cognition;Decreased posture;Decreased ADL status; Decreased strength;Decreased balance  Assessment: Patient reports he was independent without a device prior to admission. Patient is a poor historian and his reports of independence are likely not accurate. He presently requires assistance for all mobility and is very unsafe with transfers and ambulation given strength and balance deficits. Recommend 24 hour assist and continued therapy at d/c. Treatment Diagnosis: functional mobility deficits secondary to cognitive deficits and age  Prognosis: Good  Decision Making: Medium Complexity  History: As above. Exam: MMT, ROM, functional mobility  Clinical Presentation: Stable.   PT Education: Goals;PT Role;Plan of Care;Transfer Training;Gait Training;Functional Mobility Training  Patient Education: Patient verbalized understanding but will need reinforcement given his cognitive deficits. Barriers to Learning: cognition  REQUIRES PT FOLLOW UP: Yes  Activity Tolerance  Activity Tolerance: Patient limited by cognitive status; Patient limited by fatigue;Patient limited by endurance       Patient Diagnosis(es): The primary encounter diagnosis was Atrial fibrillation with RVR ([address] Diagnoses of Delirium, Elevated troponin, Cellulitis and abscess of leg, and Essential hypertension were also pertinent to this visit. 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 has no past surgical history on file. Restrictions  Restrictions/Precautions  Restrictions/Precautions: Fall Risk (high fall risk)  Required Braces or Orthoses?: No  Position Activity Restriction  Other position/activity restrictions: Payton Greco is a 79 y.o. male who presents to the emergency department with \"confusion\". This is an 79-year-old male with a history of dementia and atrial fibrillation who presents with increased confusion. The patient appears to have a history of chronic lower extremity cellulitis and sees wound care for this. Initially the patient did not appear to be septic and was having normal speech.      Vision/Hearing  Vision: Within Functional Limits  Hearing: Within functional limits       Subjective  General  Chart Reviewed: Yes  Patient assessed for rehabilitation services?: Yes  Response To Previous Treatment: Not applicable  Family / Caregiver Present: Yes  Diagnosis: acute encephalopathy  Follows Commands: Within Functional Limits  General Comment  Comments: Patient supine in bed, HOB elevated. Subjective  Subjective: Patient tangential, speaking on multiple topics, requires reorientation to task at hand. Reports being independent at home w/o device. 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 Based on patient's current deficits, it was unlikely he was independent prior to admission. Objective  AROM RLE (degrees)  RLE AROM: WNL  AROM LLE (degrees)  LLE AROM : WNL  Strength RLE  Strength RLE: WFL  Comment: Patient demonstrated BLE weakness with functional activity. Strength LLE  Strength LLE: WFL  Comment: Patient demonstrated BLE weakness with functional activity. Bed mobility  Supine to Sit: Stand by assistance  Scooting: Stand by assistance  Comment: Patient performed with HOB elevated and use of bed rail. Transfers  Sit to Stand: Moderate Assistance;2 Person Assistance  Stand to sit: Minimal Assistance  Bed to Chair: 2 Person Assistance;Minimal assistance  Stand Pivot Transfers: Minimal Assistance;2 Person Assistance  Comment: Gait belt donned. Patient stood w/ MIN x2 assist.  Forward flexed posture, VCs to stay close to walker for safety.   Ambulation  Ambulation?: Yes  More Ambulation?: No  Ambulation 1  Surface: level tile  Device: Rolling Walker  Assistance: Minimal assistance  Quality of Gait: forward flexed posture, holding walker at a distance from body  Gait Deviations: Slow Joyce;Decreased step length;Decreased step height  Distance: 10'  Comments: Patient unsafe, impaired safety awareness, high fall risk. Stairs/Curb  Stairs?: No     Balance  Posture: Good  Sitting - Static: Good  Sitting - Dynamic: Fair  Standing - Static: Fair  Standing - Dynamic: Fair;-        Plan   Plan  Times per week: 3-5  Current Treatment Recommendations: Strengthening, Gait Training, Patient/Caregiver Education & Training, Equipment Evaluation, Education, & procurement, Stair training, Balance Training, Functional Mobility Training, Transfer Training, Safety Education & Training  Safety Devices  Type of devices: All fall risk precautions in place, Call light within reach, Gait belt, Chair alarm in place, Nurse notified, Left in chair, Patient at risk for falls  Restraints  Initially in place: No           AM-PAC Score  AM-PAC Inpatient Mobility Raw Score : 14 (07/04/21 1600)  AM-PAC Inpatient T-Scale Score : 38.1 (07/04/21 1600)  Mobility Inpatient CMS 0-100% Score: 61.29 (07/04/21 1600)  Mobility Inpatient CMS G-Code Modifier : CL (07/04/21 1600)        Goals  Short term goals  Time Frame for Short term goals: Discharged. Short term goal 1: Patient will transfer supine-sit MOD I. Short term goal 2: Patient will perform sit-stand w/ CGA and RW. Short term goal 3: Patient will peform bed-chair transfer w/ CGA and RW. Short term goal 4: Patient will ambulate 22' w/ RW and CGA. Short term goal 5: Patient will negotiate 4 stairs w/ B railings and MIN assist to enter/exit home. Patient Goals   Patient goals : None verbalized.        Therapy Time   Individual Concurrent Group Co-treatment   Time In 2220         Time Out 1552         Minutes 44         Timed Code Treatment Minutes: [address], [place], DPT, ATC-R [id number]

## 2021-07-04 NOTE — PROGRESS NOTES
Occupational Therapy   Occupational Therapy Initial Assessment  Date: 2021   Patient Name: Rios Ramon  MRN: [id number]     : 1940    Date of Service: 2021    Discharge Recommendations:  Rios Ramon scored a 15/24 on the AM-PAC ADL Inpatient form. Current research shows that an AM-PAC score of 17 or less is typically not associated with a discharge to the patient's home setting. Based on the patient's AM-PAC score and their current ADL deficits, it is recommended that the patient have 3-5 sessions per week of Occupational Therapy at d/c to increase the patient's independence. Please see assessment section for further patient specific details. If patient discharges prior to next session this note will serve as a discharge summary. Please see below for the latest assessment towards goals. OT Equipment Recommendations  Equipment Needed: No (TBD at next level of care)    Assessment   Performance deficits / Impairments: Decreased functional mobility ; Decreased ADL status; Decreased endurance;Decreased balance;Decreased safe awareness  Assessment: Pt is below his baseline level of occupational function, based on the above deficits associated with acute encephalopathy. Pt would benefit from continued skilled acute OT services to address these deficits. Treatment Diagnosis: Decreased ADL status, functional mobility, endurance, balance, and safety awareness associated with acute encephalopathy  Prognosis: Good  Decision Making: Medium Complexity  History: As above  Exam: As above  Assistance / Modification: As above  OT Education: OT Role;Plan of Care;Transfer Training  Patient Education: D/C recommendation. Pt verbalized understanding. Needs reinforcement. Barriers to Learning: Cognition  REQUIRES OT FOLLOW UP: Yes  Activity Tolerance  Activity Tolerance: Patient Tolerated treatment well  Safety Devices  Safety Devices in place: Yes  Type of devices:  All fall risk precautions in place;Call light within reach; Left in chair;Nurse notified; Chair alarm in place;Gait belt;Patient at risk for falls           Patient Diagnosis(es): The primary encounter diagnosis was Atrial fibrillation with RVR ([address] Diagnoses of Delirium, Elevated troponin, Cellulitis and abscess of leg, and Essential hypertension were also pertinent to this visit. 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 has no past surgical history on file. Treatment Diagnosis: Decreased ADL status, functional mobility, endurance, balance, and safety awareness associated with acute encephalopathy      Restrictions  Restrictions/Precautions  Restrictions/Precautions: Fall Risk (high fall risk)  Required Braces or Orthoses?: No  Position Activity Restriction  Other position/activity restrictions: Hiral Kiran is a 79 y.o. male who presents to the emergency department with \"confusion\". This is an 79-year-old male with a history of dementia and atrial fibrillation who presents with increased confusion. The patient appears to have a history of chronic lower extremity cellulitis and sees wound care for this. Initially the patient did not appear to be septic and was having normal speech. Subjective   General  Chart Reviewed: Yes  Patient assessed for rehabilitation services?: Yes  Family / Caregiver Present: No  Diagnosis: Acute encephalopathy  Subjective  Subjective: Pt in semi-price's position in in bed, pleasant and agreeable to OT eval. Pt denies pain.  Pt very greyson. Patient Currently in Pain: No  Pre Treatment Pain Screening  Intervention List: Patient able to continue with treatment  . 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 Based on patient's current deficits, it was unlikely he was independent prior to admission. Objective   Vision: Within Functional Limits  Hearing: Within functional limits    Orientation  Overall Orientation Status: Impaired  Orientation Level: Oriented to person;Disoriented to situation;Disoriented to time;Oriented to place (did not know year of birth)     Balance  Standing Balance: Dependent/Total (Min A of 2 wRW)  Standing Balance  Time: ~1 min  Activity: functional mobility ~10 ft w/RW EOB to recliner  Functional Mobility  Functional - Mobility Device: Rolling Walker  Activity: Other  Assist Level: Dependent/Total (Min A of 2)  ADL  Feeding: Beverage management; Independent  LE Dressing: Maximum assistance; Increased time to complete;Verbal cueing (don socks, one over wrapped LE)  Tone RUE  RUE Tone: Normotonic  Tone LUE  LUE Tone: Normotonic  Coordination  Movements Are Fluid And Coordinated: Yes     Bed mobility  Supine to Sit: Stand by assistance (HOB elevated)  Scooting: Stand by assistance  Transfers  Stand Step Transfers: Dependent/Total;2 Person assistance (Min A of 2)  Sit to stand: Dependent/Total (Mod A of 2 sit to stand from EOB)  Stand to sit: Minimal assistance (Min A of 2)  Vision - Basic Assessment  Visual History: No significant visual history  Patient Visual Report: No visual complaint reported. Cognition  Overall Cognitive Status: Exceptions  Arousal/Alertness: Appropriate responses to stimuli  Following Commands: Follows one step commands consistently  Attention Span: Attends with cues to redirect (Pt very verbose, needs redirection)  Memory: Decreased short term memory (Pt repeated stories)  Safety Judgement: Decreased awareness of need for safety  Insights: Decreased awareness of deficits  Initiation: Does not require cues  Sequencing: Does not require cues        Sensation  Overall Sensation Status: WFL        LUE AROM (degrees)  LUE AROM : WFL  Left Hand AROM (degrees)  Left Hand AROM: WFL  RUE AROM (degrees)  RUE AROM : WFL  Right Hand AROM (degrees)  Right Hand AROM: WFL  LUE Strength  Gross LUE Strength: WNL  L Hand General: 5/5  RUE Strength  Gross RUE Strength: WNL  R Hand General: 5/5                   Plan   Plan  Times per week: 3-5  Times per day: Daily  Current Treatment Recommendations: Self-Care / ADL, Safety Education & Training, Endurance Training, Functional Mobility Training    .   -PAC Score        [facility] Inpatient Daily Activity Raw Score: 15 (07/04/21 1623)  AM-PAC Inpatient ADL T-Scale Score : 34.69 (07/04/21 1623)  ADL Inpatient CMS 0-100% Score: 56.46 (07/04/21 1623)  ADL Inpatient CMS G-Code Modifier : CK (07/04/21 1623)    Goals  Short term goals  Time Frame for Short term goals: Discharge  Short term goal 1: CGA for functional transfers to ADL surfaces w/RW and safe technique  Short term goal 2: CGA for functional mobility w/RW for ADL activity  Short term goal 3: S/U for UB bathing/dressing  Short term goal 4: CGA for LB bathing/dressing       Therapy Time   Individual Concurrent Group Co-treatment   Time In 1508         Time Out 1552         Minutes 44               Timed Code Treatment Minutes:  29 Minutes    Total Treatment Minutes:  40 min    C/ [address], OT   Annabel Go., OTR/L, [id number]

## 2021-07-05 NOTE — CARE COORDINATION
Referral sent to [facility]. Left message for admissions to inform of incoming referral and inform pt is dc ready. Pt does not need precert to admit.   Electronically signed by TAL Guerrero on 7/5/2021 at 8:35 AM

## 2021-07-05 NOTE — PROGRESS NOTES
Spoke with wife concerning PT/OT recommendations for SNF. Wife is ok with this option states she cannot take care of patient if he is unable to walk unassisted. Wife requesting [facility] as first choice of facility.

## 2021-07-06 NOTE — CARE COORDINATION
left for Tike at [facility] [phone] to check on referral and if pt can be accepted.      Zhang Restrepo RN, BSN  [phone]

## 2021-07-06 NOTE — PROGRESS NOTES
Called and spoke to patients spouse and daughter and confirmed that patients has a wound clinic appointment tomorrow morning at 9:00 am. If [facility] and he goes there today, the plan is for daughter Shu Gaitan) take her dad to his appointment. .. if for whatever reason the patient is unable to make it to his appointment tomorrow. Shanna Yip needs to remove patients Unna boot and dress it with Vaseline guaze, ABD and ACE.

## 2021-07-06 NOTE — CARE COORDINATION
CM received call from [address] at [facility] [phone] who is coverin for Katja this week. CM called Sadaf back and left vm and pt information. Waiting to see if they can accept pt.     Osorio Kaufman, RN, BSN  [phone]

## 2021-07-06 NOTE — CARE COORDINATION
CM spoke to [facility] earlier this afternoon and no beds available. CM spoke with pt and informed him, slightly confused in conversation for CM. CM left vm for daughter and then spoke to pt's spouse about other choices. She had CM send referrals to : [facility], home at [facility] and [facility]. Per spouse pt has not received Covid vaccine. CM ordered rapid covid and notified Charge RN Kiko Kiser of order. Facilities are requesting rapid test if pt has not been vaccinated.      Ewa Garcia RN, BSN  [phone]

## 2021-07-06 NOTE — CARE COORDINATION
Spoke to nurse [facility]. Patient is pending discharge today. The nurse spoke to patient's wife and daughter, patient has appointment at the wound care clinic 7/7/21 at [address] boots still in place.  RAQUEL De León, RN  [facility]

## 2021-07-06 NOTE — CARE COORDINATION
CM received message from [employer] with home at [place] [address]..  CM returned call and left pt information on her secure and confidential line and CM will follow up with her in the am.    Elias Smith RN, BSN  [phone]

## 2021-07-06 NOTE — PROGRESS NOTES
Message sent to hospitalist:    [facility] [employer] - [facility], still waiting on a place for this patient. The facility we were going to send him to doesn't have beds today, [facility] working on it. Pt was supposed to go to the wound clinic tomorrow morning at 9am to get his Unna boot replaced but he will most likely still be here. Estella our inpatient wound care nurse said if we want to replace it inpatient we would have to get a podiatrist on board to order the [employer] Or we can just remove it tomorrow and put a gauze dressing over it until he can get back to the wound clinic. What do you think, never had this before so I wanted to see what you thought. \"    \"Consult podiatry. \"    Order placed.

## 2021-07-06 NOTE — PROGRESS NOTES
Pts wife called for updates about placement for Pt. Wife wants to be notified as soon approval from [facility]  Cell phone number if [phone]

## 2021-07-06 NOTE — PROGRESS NOTES
Infectious Diseases   Progress Note      Admission Date: 7/1/2021  Hospital Day: Hospital Day: 6   Attending: Geovany Johnson MD  Date of service: 7/6/2021     Chief complaint/ Reason for consult:     · Sepsis with ltachypnea and tachycardia and hypotension  · Metabolic encephalopathy on admission  · Gram-negative bacteremia with Enterobacterales  · Lactic acidosis on admission  · Leukopenia and thrombocytopenia    Microbiology:        I have reviewed allavailable micro lab data and cultures    · Blood culture (2/2) - collected on 7/1/2021: [facility]    Susceptibility    Providencia rettgeri (2)    Antibiotic Interpretation VIOLETA Status    amoxicillin-clavulanate Resistant <=8/4 mcg/mL     ampicillin Resistant >16 mcg/mL     ceFAZolin Resistant 16 mcg/mL     cefepime Sensitive <=2 mcg/mL     cefTRIAXone Sensitive <=1 mcg/mL     cefuroxime Sensitive <=4 mcg/mL     ciprofloxacin Sensitive <=1 mcg/mL     ertapenem Sensitive <=0.5 mcg/mL     gentamicin Sensitive <=4 mcg/mL     meropenem Sensitive <=1 mcg/mL     piperacillin-tazobactam Sensitive <=16 mcg/mL     trimethoprim-sulfamethoxazole Sensitive <=2/38 mcg/mL         · Blood culture  - collected on 7/4/2021: in process      Antibiotics and immunizations:       Current antibiotics: All antibiotics and their doses were reviewed by me    Recent Abx Admin                   levoFLOXacin (LEVAQUIN) tablet 500 mg (mg) 500 mg Given 07/06/21 0803                  Immunization History: All immunization history was reviewed by me today. Immunization History   Administered Date(s) Administered    Tdap (Boostrix, Adacel) 07/02/2021       Known drug allergies: All allergies were reviewed and updated    No Known Allergies    Social history:     Social History:  All social andepidemiologic history was reviewed and updated by me today as needed. · Tobacco use:   reports that he has never smoked.  He has never used smokeless tobacco.  · Alcohol use:   reports previous alcohol use. · Currently lives in: [address] [place] [address]  ·  has no history on file for drug use. COVID VACCINATION AND LAB RESULT RECORDS:     Internal Administration   First Dose      Second Dose           Last COVID Lab No results found for: SARS-COV-2, SARS-COV-2 RNA, SARS-COV-2, SARS-COV-2, SARS-COV-2 BY PCR, SARS-COV-2, SARS-COV-2, SARS-COV-2         Assessment:     The patient is a 79 y.o. old male who  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 with ulceration of other part of lower leg ([facility] (3/2/2021), 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 ([facility] (3/2/2021), CHF (congestive heart failure) ([facility] Hypertension, Lower extremity edema (3/2/2021), Non-pressure chronic ulcer of left lower leg with fat layer exposed ([facility] (3/2/2021), Non-pressure chronic ulcer of right lower leg with fat layer exposed ([address] (3/2/2021), Peripheral venous insufficiency (3/2/2021), Seizures ([facility] and TB (pulmonary tuberculosis). with following problems:    · Sepsis with tachypnea and tachycardia and hypotension - resolved  · Metabolic encephalopathy on admission - improving  · Gram-negative bacteremia with Enterobacterales - has been identified as Providencia  · Lactic acidosis on admission - improved  · Leukopenia and thrombocytopenia -leukopenia resolved, platelet count is 221,870  · Bilateral leg wounds with infection  · Elevated D-dimer  · Pulmonary vascular congestion on x-ray  · Need for Tdap vaccination      Discussion:      The patient is afebrile. Gram neg organisms from 7/1/21 was positive for Providencia, susceptibilities reviewed. Xray of right foot from 7/2/21 did not show any osteomyelitis      Repeat blood culture from 7/4/2021 is negative. The patient was switched to levofloxacin on 7/4/2021 by Dr. Taylor Sibley and seems to be tolerating it okay.     EKG on 7/1/2021 showed QTc interval of 430 ms    Plan:     Diagnostic Workup:    · Will order *twelve-lead EKG to assess QTc interval  · Continue to follow  fever curve, WBC count and blood cultures. · Continue to monitor blood counts, liver and renal function. Antimicrobials:    · Will continue oral Levaquin 500 mg every 24 hours  · If the QTc interval from today's EKG is okay, will recommend a 10-day course of oral levofloxacin at discharge  · Continue oral probiotics twice daily  · We will follow up on the culture results and clinical progress and will make further recommendations accordingly. · Continue close vitals monitoring. · Maintain good glycemic control. · Fall precautions. Aspiration precautions. · Continue to watch for new fever or diarrhea. · DVT prophylaxis. · Discussed all above with patient and RN. Drug Monitoring:    · Continue monitoring for antibiotic toxicity as follows: CBC, CMP, QTc interval  · Continue to watch for following: new or worsening fever, new hypotension, hives, lip swelling and redness or purulence at vascular access sites. I/v access Management:    · Continue to monitor i.v access sites for erythema, induration, discharge or tenderness. · As always, continue efforts to minimize tubes/lines/drains as clinically appropriate to reduce chances of line associated infections. Patient education and counseling:        · The patient was educated in detail about the side-effects of various antibiotics and things to watch for like new rashes, lip swelling, severe reaction, worsening diarrhea, break through fever etc.  · Discussed patient's condition and what to expect. All of the patient's questions were addressed in a satisfactory manner and patient verbalized understanding all instructions. Level of complexity of visit: High     Fluoroquinolone related instructions:     Patient instructed to watch for low or high blood sugars, muscle pains and ankle tendon pain while on Ciprofloxacin or Levofloxacin.  Patient was advised to keep a sugar candy at all times as all fluoroquinolones have the potential of causing hypoglycemia. If these symptoms develops, patient was instructed to stop the antibiotic and call my office at [phone]. Use sunscreen when going in bright sun while on Ciprofloxacin or Levofloxacin as these antibiotics can cause photosensitivity. Take 1 hour before or 2 hour after dairy, calcium, iron, magnesium, aluminum or zinc.      TIME SPENT TODAY:     - Spent over  37 minutes on visit (including interval history, physical exam, review of data including labs, cultures, imaging, development and implementation of treatment plan and coordination of complex care). More than 50 percent of this includes face-to-face time spent with the patient for counseling and coordination of care. Thank you for involving me in the care of your patient. I will continue to follow. If you have anyadditional questions, please do not hesitate to contact me. Subjective: Interval history: Interval history was obtained from chart review and patient/ RN. The patient is more awake today but still slightly encephalopathic. Seems to be tolerating antibiotics okay. No diarrhea     REVIEW OF SYSTEMS:      Review of Systems   Constitutional: Positive for fatigue. Negative for chills, diaphoresis and fever. HENT: Negative for ear discharge, ear pain, rhinorrhea, sore throat and trouble swallowing. Eyes: Negative for discharge and redness. Respiratory: Negative for cough, shortness of breath and wheezing. Cardiovascular: Negative for chest pain and leg swelling. Gastrointestinal: Negative for abdominal pain, constipation, diarrhea and nausea. Endocrine: Negative for polyuria. Genitourinary: Negative for dysuria, flank pain, frequency, hematuria and urgency. Musculoskeletal: Negative for back pain and myalgias. Skin: Negative for rash. Neurological: Negative for dizziness, seizures and headaches. Hematological: Does not bruise/bleed easily. Psychiatric/Behavioral: Negative for hallucinations and suicidal ideas. All other systems reviewed and are negative. Past Medical History: All past medical history reviewed today. Past Medical History:   Diagnosis Date    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 with ulceration of other part of lower leg ([address] 3/2/2021    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 (HCC) 3/2/2021    CHF (congestive heart failure) ([address]     Hypertension     Lower extremity edema 3/2/2021    Non-pressure chronic ulcer of left lower leg with fat layer exposed ([address] 3/2/2021    Non-pressure chronic ulcer of right lower leg with fat layer exposed ([address] 3/2/2021    Peripheral venous insufficiency 3/2/2021    Seizures ([address]     TB (pulmonary tuberculosis)     he says the doctors told him he had TB when he was 15 and he had to have surgery       Past Surgical History: All past surgical history was reviewed today. History reviewed. No pertinent surgical history. Family History: All family history was reviewed today. History reviewed. No pertinent family history. Objective:       PHYSICAL EXAM:      Vitals:   Vitals:    07/06/21 0330 07/06/21 0615 07/06/21 0745 07/06/21 1146   BP: 102/78  99/62 102/70   Pulse: 86  81 89   Resp: 16  16 16   Temp: 97.5 °F (36.4 °C)  97.5 °F (36.4 °C) 97.1 °F (36.2 °C)   TempSrc: Oral  Oral Temporal   SpO2: 90%  91% 97%   Weight:  207 lb 12.8 oz (94.3 kg)     Height:           Physical Exam  Vitals and nursing note reviewed. Constitutional:       Appearance: Normal appearance. He is well-developed. HENT:      Head: Normocephalic and atraumatic. Right Ear: External ear normal.      Left Ear: External ear normal.      Nose: Nose normal. No congestion or rhinorrhea. Mouth/Throat:      Mouth: Mucous membranes are moist.      Pharynx: No oropharyngeal exudate or posterior oropharyngeal erythema.    Eyes:      General: No scleral icterus. Right eye: No discharge. Left eye: No discharge. Conjunctiva/sclera: Conjunctivae normal.      Pupils: Pupils are equal, round, and reactive to light. Cardiovascular:      Rate and Rhythm: Normal rate and regular rhythm. Pulses: Normal pulses. Heart sounds: No murmur heard. No friction rub. Pulmonary:      Effort: Pulmonary effort is normal. No respiratory distress. Breath sounds: Normal breath sounds. No stridor. No wheezing, rhonchi or rales. Abdominal:      General: Bowel sounds are normal.      Palpations: Abdomen is soft. Tenderness: There is no abdominal tenderness. There is no right CVA tenderness, left CVA tenderness, guarding or rebound. Musculoskeletal:         General: No swelling or tenderness. Normal range of motion. Cervical back: Normal range of motion and neck supple. No rigidity. No muscular tenderness. Lymphadenopathy:      Cervical: No cervical adenopathy. Skin:     General: Skin is warm and dry. Coloration: Skin is not jaundiced. Findings: No erythema or rash. Comments: Right leg wound noted again, slowly improving   Neurological:      General: No focal deficit present. Mental Status: He is alert and oriented to person, place, and time. Mental status is at baseline. Motor: No abnormal muscle tone. Psychiatric:         Mood and Affect: Mood normal.         Behavior: Behavior normal.         Thought Content: Thought content normal.           Lines: All vascular access sites are healthy with no local erythema, discharge or tenderness. Intake and output:    I/O last 3 completed shifts: In: 240 [P.O.:240]  Out: 550 [Urine:550]    Lab Data:   All available labs and old records have been reviewed by me.     CBC:  Recent Labs     07/04/21  0528 07/05/21  0534 07/06/21  0548   WBC 6.8 4.9 4.7   RBC 4.84 4.95 4.83   HGB 14.0 14.3 14.0   HCT 43.6 44.8 43.1   PLT 87* 94* 125*   MCV 90.1 90.4 89.3   MCH 28.9 28.9 28.9   MCHC 32.0 31.9 32.4   RDW 16.7* 17.1* 16.4*        BMP:  Recent Labs     07/04/21  0528 07/05/21  0534 07/06/21  0548    133* 137   K 4.0 3.4* 3.7    98* 99   CO2 25 24 31   BUN 37* 43* 43*   CREATININE 0.9 1.0 1.0   CALCIUM 8.2* 8.1* 8.2*   GLUCOSE 114* 112* 111*        Hepatic Function Panel:   Lab Results   Component Value Date    ALKPHOS 84 07/01/2021    ALT 11 07/01/2021    AST 25 07/01/2021    PROT 6.7 07/01/2021    BILITOT 2.8 07/01/2021    LABALBU 3.4 07/01/2021       CPK:   Lab Results   Component Value Date    CKTOTAL 170 07/01/2021     ESR:   Lab Results   Component Value Date    SEDRATE 11 07/02/2021     CRP:   Lab Results   Component Value Date    .0 (H) 07/02/2021           Imaging: All pertinent images and reports for the current visit were reviewed by me during this visit. XR FOOT RIGHT (2 VIEWS)   Final Result   No acute osseous injury. No osteomyelitis is apparent. Soft tissue swelling of the forefoot, edema versus cellulitis. Polyarticular osteoarthritis. CT Head WO Contrast   Final Result   No acute intracranial abnormality. Specifically, no acute intracranial   hemorrhage or mass effect. Chronic small vessel ischemic disease. XR CHEST PORTABLE   Final Result   1. Pulmonary vascular congestion. 2. Stable cardiomegaly. Medications: All current and past medications were reviewed.      carvedilol  3.125 mg Oral BID     levoFLOXacin  500 mg Oral Daily    levETIRAcetam  500 mg Oral BID    levothyroxine  25 mcg Oral Daily    rivaroxaban  15 mg Oral BID                  Problem list:       Patient Active Problem List   Diagnosis Code    New onset seizure ([address] R56.9    Delirium R41.0    Acute encephalopathy G93.40    Volume overload E87.70    Acute renal failure ([facility] N17.9    Acute combined systolic and diastolic (congestive) hrt fail (HCC) I50.41    Anasarca R60.1    Seizure ([facility] R56.9    Aspiration into airway T17.908A    Acute respiratory failure with hypoxia ([facility]) J96.01    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    Seizures (HCC) R56.9    Syncope and collapse R55    Anemia D64.9    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, F02.80    HTN (hypertension), benign I10    Atrial fibrillation with RVR (HCC) I48.91    Non-pressure chronic ulcer of right lower leg with fat layer exposed ([address] L97.912    Non-pressure chronic ulcer of left lower leg with fat layer exposed ([address] L97.922    Peripheral venous insufficiency I87.2    Lower extremity edema R60.0    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 with ulceration of other part of lower leg (HCC) I70.248    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 (HCC) I70.232    Acute metabolic encephalopathy O18.10    Gram negative sepsis ([facility]) A41.50    Cellulitis and abscess of leg L03.119, L02.419    Gram-negative bacteremia R78.81    Lactic acidosis E87.2    Leukopenia D72.819    Need for Tdap vaccination Z23    Thrombocytopenia (HCC) D69.6    Elevated d-dimer R79.89    Pulmonary vascular congestion R09.89    Moderate malnutrition ([facility]) E44.0       Please note that this chart was generated using Dragon dictation software. Although every effort was made to ensure the accuracy of this automated transcription, some errors in transcription may have occurred inadvertently. If you may need any clarification, please do not hesitate to contact me through EPIC or at the phone number provided below with my electronic signature. Any pictures or media included in this note were obtained after taking informed verbal consent from the patient and with their approval to include those in the patient's medical record.     Renee Crowe MD, MPH  7/6/2021 , 1:28 PM   [facility] Infectious Disease   [address] [place] [address], [address], [address] [place] [address]  Office: [phone]  Fax: [phone]  Clinic days: Tuesday & Thursday

## 2021-07-06 NOTE — CARE COORDINATION
CM informed by pt's RN that pt has wound care appt here at [facility] wound clinic tomorrow at [address] [place] [address] and pt's daughter told her she would pick pt up at [facility] and take him to appt. SIMON has not yet heard from [facility] if they have accepted pt. SIMON phoenix Bernard with [facility] [phone] and left vm about the above.     Kerry Sow RN, BSN  [phone]

## 2021-07-07 NOTE — ACP (ADVANCE CARE PLANNING)
Advanced Care Planning Note. Purpose of Encounter: Advanced care planning in light of dementia  Parties In Attendance: Patient  Decisional Capacity: No  Subjective: Patient denies CP, SOB, HA or abdominal pain  Objective: Cr 1.2  Goals of Care Determination: Patient/POA want limited support (NO CPR, no vent, no surgery, no HD, no trach, no PEG)  Plan:  Abx. SNF placement  Code Status: DNR CC   Time spent on Advanced care Plannin minutes  Advanced Care Planning Documents: Completed advanced directives on chart, daughter is the POA.     Elenita Burton MD  2021 11:44 AM

## 2021-07-07 NOTE — CARE COORDINATION
CM called [facility] and spoke to JONATHAN. Pt was due to be picked up at 2pm. She states they are running behind and will be here at [place] called Richelle at home at [facility] and updated and faxed paperwork. Discharge Plan:     Patient discharged to: home at [facility]   SW/DC Planner faxed, [address] and AVS and copy of DNR-CC to: [phone]  Narcotic Prescriptions faxed were:n/a   RN: Lise Rico will call report to:   [phone]    [facility]   time: 5:00 pm   Family advised of discharge?:yes   HENS Submitted?:  Yes   All discharge needs met per case management.     Cherie León RN, BSN  [phone]

## 2021-07-07 NOTE — CARE COORDINATION
[facility] Nurse  Consult Note       NAME:  Matias Sandoval  MEDICAL RECORD NUMBER:  [id number]  AGE: 79 y.o. GENDER: male  : 1940  TODAY'S DATE:  2021    Subjective   Reason for WOCN Evaluation and Assessment: change unna boot dressing      David Mar is a 79 y.o. male referred by:   [x] Physician  [x] Nursing  [] Other:     Wound Identification:  Wound Type: venous  Contributing Factors: venous stasis    Wound History: present on admission   Current Wound Care Treatment:  Unna boot     Patient Goal of Care:  [x] Wound Healing  [] Odor Control  [] Palliative Care  [] Pain Control   [] Other:         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 ([address] 3/2/2021    CHF (congestive heart failure) ([address]     Hypertension     Lower extremity edema 3/2/2021    Non-pressure chronic ulcer of left lower leg with fat layer exposed ([address] 3/2/2021    Non-pressure chronic ulcer of right lower leg with fat layer exposed ([address] 3/2/2021    Peripheral venous insufficiency 3/2/2021    Seizures ([address]     TB (pulmonary tuberculosis)     he says the doctors told him he had TB when he was 15 and he had to have surgery       PAST SURGICAL HISTORY    History reviewed. No pertinent surgical history. FAMILY HISTORY    History reviewed. No pertinent family history. SOCIAL HISTORY    Social History     Tobacco Use    Smoking status: Never Smoker    Smokeless tobacco: Never Used   Vaping Use    Vaping Use: Never used   Substance Use Topics    Alcohol use: Not Currently    Drug use: Not on file       ALLERGIES    No Known Allergies    MEDICATIONS    No current facility-administered medications on file prior to encounter.      Current Outpatient Medications on File Prior to Encounter   Medication Sig Dispense Refill    aspirin 81 MG EC tablet Take 81 mg by mouth daily      rivaroxaban (XARELTO) 15 MG TABS tablet Take 1 tablet by mouth 2 times daily (with meals) for 21 days 42 tablet 0    levothyroxine (SYNTHROID) 25 MCG tablet Take 1 tablet by mouth daily 90 tablet 1    vitamin D (ERGOCALCIFEROL) 1.25 MG (23020 UT) CAPS capsule Take 1 capsule by mouth once a week 4 capsule 5    levETIRAcetam (KEPPRA) 500 MG tablet Take 1 tablet by mouth 2 times daily 180 tablet 3    Compression Stockings MISC by Does not apply route Bilateral below knee compression stockings 20-30 mmHg 1 each 0    furosemide (LASIX) 40 MG tablet Take 2 tablets by mouth 2 times daily 360 tablet 0       Objective    /69   Pulse 86   Temp 97.2 °F (36.2 °C) (Oral)   Resp 16   Ht 6' 4\" (1.93 m)   Wt 206 lb 7 oz (93.6 kg)   SpO2 94%   BMI 25.13 kg/m²     LABS:  WBC:    Lab Results   Component Value Date    WBC 5.4 07/07/2021     H/H:    Lab Results   Component Value Date    HGB 13.8 07/07/2021    HCT 42.7 07/07/2021     PTT:    Lab Results   Component Value Date    APTT 43.7 07/01/2021   [APTT}  PT/INR:    Lab Results   Component Value Date    PROTIME 22.5 07/01/2021    INR 1.94 07/01/2021     HgBA1c:    Lab Results   Component Value Date    LABA1C 5.8 08/11/2018       Assessment   Michel Risk Score: Michel Scale Score: 18    Patient Active Problem List   Diagnosis Code    New onset seizure ([facility] [address] R56.9    Delirium R41.0    Acute encephalopathy G93.40    Volume overload E87.70    Acute renal failure ([facility] [address] [facility] [facility] N17.9    Acute combined systolic and diastolic (congestive) hrt fail ([facility]) I50.41    Anasarca R60.1    Seizure ([facility] [address] [facility] R56.9    Aspiration into airway T17.908A    Acute respiratory failure with hypoxia ([facility]) J96.01    Senile dementia without behavioral disturbance ([facility]) F03.90    Seizures ([facility]) R56.9    Syncope and collapse R55    Anemia D64.9    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[facility]) G30.9, F02.80    HTN (hypertension), benign I10    Atrial fibrillation with RVR ([facility]) I48.91    Non-pressure chronic ulcer of right lower leg with fat layer exposed ([facility] L97.912    Non-pressure chronic ulcer of left lower leg with fat layer exposed ([facility] L97.922    Peripheral venous insufficiency I87.2    Lower extremity edema R60.0    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 with ulceration of other part of lower leg ([facility]) I70.248    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 ([facility]) I70.232    Acute metabolic encephalopathy W18.23    Gram negative sepsis ([facility]) A41.50    Cellulitis and abscess of leg L03.119, L02.419    Gram-negative bacteremia R78.81    Lactic acidosis E87.2    Leukopenia D72.819    Need for Tdap vaccination Z23    Thrombocytopenia (HCC) D69.6    Elevated d-dimer R79.89    Pulmonary vascular congestion R09.89    Moderate malnutrition ([facility] E44.0    Encounter for medication counseling Z71.89       Measurements:  Wound 06/02/21 Leg Right; Lower; Posterior #4 (Active)   Wound Image   07/07/21 1401   Wound Etiology Venous 07/07/21 1401   Dressing Status New dressing applied 07/07/21 1401   Wound Cleansed Cleansed with saline 07/07/21 1401   Dressing/Treatment Hydrofiber Ag; Other (comment) 07/07/21 1401   Wound Length (cm) 2 cm 07/07/21 1401   Wound Width (cm) 2 cm 07/07/21 1401   Wound Depth (cm) 0.1 cm 06/28/21 1026   Wound Surface Area (cm^2) 4 cm^2 07/07/21 1401   Change in Wound Size % (l*w) 91.45 07/07/21 1401   Wound Volume (cm^3) 0.168 cm^3 06/28/21 1026   Wound Healing % 96 06/28/21 1026   Post-Procedure Length (cm) 2.4 cm 06/28/21 1040   Post-Procedure Width (cm) 0.7 cm 06/28/21 1040   Post-Procedure Depth (cm) 0.15 cm 06/28/21 1040   Post-Procedure Surface Area (cm^2) 1.68 cm^2 06/28/21 1040   Post-Procedure Volume (cm^3) 0.252 cm^3 06/28/21 1040   Wound Assessment Bleeding;Granulation tissue 07/07/21 1401   Drainage Amount Small 07/07/21 1401   Drainage Description Serosanguinous 07/07/21 1401   Odor None 07/07/21 1401   Jennifer-wound Assessment Dry/flaky 07/07/21 1401   Margins Defined edges 07/07/21 1401   Wound Thickness Description not for Pressure Injury Partial thickness 07/07/21 1401   Number of days: 34       Wound 07/01/21 Toe (Comment  which one) Anterior;Right DTI on 4th toe (Active)   Wound Image   07/07/21 1401   Wound Etiology Deep tissue/Injury 07/07/21 1401   Dressing/Treatment Open to air 07/07/21 1401   Wound Length (cm) 1.2 cm 07/07/21 1401   Wound Width (cm) 1.2 cm 07/07/21 1401   Wound Surface Area (cm^2) 1.44 cm^2 07/07/21 1401   Wound Assessment Dry;Purple/maroon 07/07/21 1401   Drainage Amount None 07/07/21 0945   Odor None 07/07/21 0945   Jennifer-wound Assessment Blanchable erythema 07/07/21 1401   Margins Defined edges 07/07/21 1401   Number of days: 5          Unna boot removed, new one place per order. Patient seen last week in the wound clinic. Patient agreeable to visit and to have dressing removed. Old dressing removed, small area to posterior right leg bleeding. From ankle to below knee dry flaky intact skin. Scarring noted from healed venous wounds on medial side of right leg. Saline moistend hydrofiber with silver, applied to right leg wound. New unna boot placed, covered with roll gauze and coban. mepilex to lateral left leg above ankle. Dressing removed, skin intact. Pretibial left leg area, dry with flaky skin. Lotion applied to left leg and new mepilex applied to fragile skin. Patient pending discharge today. Spoke to nurse Emmanuelle Polanco is that family will take patient to wound care clinic next week to have unna boot dressing changed. Posterior right leg         Necrotic tissue right great toe. DTI 4th toe. Lateral left leg healed    Left lower leg      Right leg       Response to treatment:  Well tolerated by patient.      Pain Assessment:  Severity:  0 / 10  Quality of pain: N/A  Wound Pain Timing/Severity: none  Premedicated: No    Plan   Plan of Care: Wound 06/02/21 Leg Right; Lower; Posterior #4-Dressing/Treatment: Hydrofiber Ag, Other (comment) (unna boot)  Wound 07/01/21 Toe (Comment  which one) Anterior;Right DTI on 4th toe-Dressing/Treatment: Open to air  Wound 07/01/21 Coccyx-Dressing/Treatment: Foam    Specialty Bed Required : No   [] Low Air Loss   [] Pressure Redistribution  [] Fluid Immersion  [] Bariatric  [] Total Pressure Relief  [] Other:     Current Diet: ADULT DIET; Regular  Adult Oral Nutrition Supplement;  Fortified Pudding Oral Supplement  Dietician consult:  No    Discharge Plan:  Placement for patient upon discharge: skilled nursing    Patient appropriate for Outpatient [address]: Yes    Referrals:  []   [] [facility]  [] Supplies  [] Other    Patient/Caregiver Teaching:  Level of patient/caregiver understanding able to:   [x] Indicates understanding       [x] Needs reinforcement  [] Unsuccessful      [] Verbal Understanding  [] Demonstrated understanding       [] No evidence of learning  [] Refused teaching         [] N/A       Electronically signed by RAQUEL Flowers, RN  Wound/Ostomy Care on 7/7/2021 at 2:07 PM

## 2021-07-07 NOTE — PROGRESS NOTES
Infectious Diseases   Progress Note      Admission Date: 7/1/2021  Hospital Day: Hospital Day: 7   Attending: Figueroa Cooper MD  Date of service: 7/7/2021     Chief complaint/ Reason for consult:     · Sepsis with ltachypnea and tachycardia and hypotension  · Metabolic encephalopathy on admission  · Gram-negative bacteremia with Enterobacterales  · Lactic acidosis on admission  · Leukopenia and thrombocytopenia    Microbiology:        I have reviewed allavailable micro lab data and cultures    · Blood culture (2/2) - collected on 7/1/2021: [facility]    Susceptibility    Providencia rettgeri (2)    Antibiotic Interpretation VIOLETA Status    amoxicillin-clavulanate Resistant <=8/4 mcg/mL     ampicillin Resistant >16 mcg/mL     ceFAZolin Resistant 16 mcg/mL     cefepime Sensitive <=2 mcg/mL     cefTRIAXone Sensitive <=1 mcg/mL     cefuroxime Sensitive <=4 mcg/mL     ciprofloxacin Sensitive <=1 mcg/mL     ertapenem Sensitive <=0.5 mcg/mL     gentamicin Sensitive <=4 mcg/mL     meropenem Sensitive <=1 mcg/mL     piperacillin-tazobactam Sensitive <=16 mcg/mL     trimethoprim-sulfamethoxazole Sensitive <=2/38 mcg/mL         · Blood culture  - collected on 7/4/2021: in process      Antibiotics and immunizations:       Current antibiotics: All antibiotics and their doses were reviewed by me    Recent Abx Admin                   levoFLOXacin (LEVAQUIN) tablet 500 mg (mg) 500 mg Given 07/07/21 0941                  Immunization History: All immunization history was reviewed by me today. Immunization History   Administered Date(s) Administered    Tdap (Boostrix, Adacel) 07/02/2021       Known drug allergies: All allergies were reviewed and updated    No Known Allergies    Social history:     Social History:  All social andepidemiologic history was reviewed and updated by me today as needed. · Tobacco use:   reports that he has never smoked.  He has never used smokeless tobacco.  · Alcohol use:   reports previous alcohol use. · Currently lives in: [address] [place] [address]  ·  has no history on file for drug use. COVID VACCINATION AND LAB RESULT RECORDS:     Internal Administration   First Dose      Second Dose           Last COVID Lab SARS-CoV-2, NAAT (no units)   Date Value   07/07/2021 Not Detected            Assessment:     The patient is a 79 y.o. old male who  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 with ulceration of other part of lower leg ([address] (3/2/2021), 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 ([address] (3/2/2021), CHF (congestive heart failure) ([address] Hypertension, Lower extremity edema (3/2/2021), Non-pressure chronic ulcer of left lower leg with fat layer exposed ([address] (3/2/2021), Non-pressure chronic ulcer of right lower leg with fat layer exposed ([address] (3/2/2021), Peripheral venous insufficiency (3/2/2021), Seizures ([address] and TB (pulmonary tuberculosis). with following problems:    · Sepsis with tachypnea and tachycardia and hypotension -this is resolved  · Metabolic encephalopathy on admission -resolved  · Gram-negative bacteremia with Enterobacterales - has been identified as Providencia-covered with Levaquin  · Lactic acidosis on admission -resolved  · Leukopenia and thrombocytopenia -improving  · Bilateral leg wounds with infection  · Elevated D-dimer  · Pulmonary vascular congestion on x-ray  · Need for Tdap vaccination      Discussion:      The patient is afebrile. He is on levofloxacin. He is tolerating antibiotics okay. He is more awake today. Encephalopathy is resolving. Serum creatinine is 1.0 today. White cell count is 4400. COVID-19 NAAT test was done today and was negative    EKG shows QTc interval of 473 ms, which is acceptable    Plan:     Diagnostic Workup:      · Continue to follow  fever curve, WBC count and blood cultures.   · Continue to monitor blood counts, liver and renal to follow. If you have anyadditional questions, please do not hesitate to contact me. Subjective: Interval history: Interval history was obtained from chart review and patient/ RN. He is afebrile. Encephalopathy is resolving. Tolerating antibiotic okay     REVIEW OF SYSTEMS:      Review of Systems   Constitutional: Negative for chills, diaphoresis and fever. HENT: Negative for ear discharge, ear pain, rhinorrhea, sore throat and trouble swallowing. Eyes: Negative for discharge and redness. Respiratory: Negative for cough, shortness of breath and wheezing. Cardiovascular: Negative for chest pain and leg swelling. Gastrointestinal: Negative for abdominal pain, constipation, diarrhea and nausea. Endocrine: Negative for polyuria. Genitourinary: Negative for dysuria, flank pain, frequency, hematuria and urgency. Musculoskeletal: Negative for back pain and myalgias. Skin: Negative for rash. Neurological: Negative for dizziness, seizures and headaches. Hematological: Does not bruise/bleed easily. Psychiatric/Behavioral: Negative for hallucinations and suicidal ideas. All other systems reviewed and are negative. Past Medical History: All past medical history reviewed today.     Past Medical History:   Diagnosis Date    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 with ulceration of other part of lower leg ([address] 3/2/2021    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 ([address] 3/2/2021    CHF (congestive heart failure) ([address]     Hypertension     Lower extremity edema 3/2/2021    Non-pressure chronic ulcer of left lower leg with fat layer exposed ([address] 3/2/2021    Non-pressure chronic ulcer of right lower leg with fat layer exposed ([address] 3/2/2021    Peripheral venous insufficiency 3/2/2021    Seizures ([address]     TB (pulmonary tuberculosis)     he says the doctors told him he had TB when he was 15 and he had to have surgery       Past Surgical History: All past surgical history was reviewed today. History reviewed. No pertinent surgical history. Family History: All family history was reviewed today. History reviewed. No pertinent family history. Objective:       PHYSICAL EXAM:      Vitals:   Vitals:    07/07/21 0505 07/07/21 0937 07/07/21 0945 07/07/21 1045   BP: 110/73 (!) 127/93  105/69   Pulse: 90 80 80 86   Resp: 16 16     Temp: 98 °F (36.7 °C) 97.1 °F (36.2 °C)  97.2 °F (36.2 °C)   TempSrc: Oral Oral  Oral   SpO2: 92% 95%  94%   Weight: 206 lb 7 oz (93.6 kg)      Height:           Physical Exam  Vitals and nursing note reviewed. Constitutional:       Appearance: Normal appearance. He is well-developed. HENT:      Head: Normocephalic and atraumatic. Right Ear: External ear normal.      Left Ear: External ear normal.      Nose: Nose normal. No congestion or rhinorrhea. Mouth/Throat:      Mouth: Mucous membranes are moist.      Pharynx: No oropharyngeal exudate or posterior oropharyngeal erythema. Eyes:      General: No scleral icterus. Right eye: No discharge. Left eye: No discharge. Conjunctiva/sclera: Conjunctivae normal.      Pupils: Pupils are equal, round, and reactive to light. Cardiovascular:      Rate and Rhythm: Normal rate and regular rhythm. Pulses: Normal pulses. Heart sounds: No murmur heard. No friction rub. Pulmonary:      Effort: Pulmonary effort is normal. No respiratory distress. Breath sounds: Normal breath sounds. No stridor. No wheezing, rhonchi or rales. Abdominal:      General: Bowel sounds are normal.      Palpations: Abdomen is soft. Tenderness: There is no abdominal tenderness. There is no right CVA tenderness, left CVA tenderness, guarding or rebound. Musculoskeletal:         General: No swelling or tenderness. Normal range of motion. Cervical back: Normal range of motion and neck supple. No rigidity. No muscular tenderness.    Lymphadenopathy: Cervical: No cervical adenopathy. Skin:     General: Skin is warm and dry. Coloration: Skin is not jaundiced. Findings: No erythema or rash. Comments:  Leg wound improving   Neurological:      General: No focal deficit present. Mental Status: He is alert and oriented to person, place, and time. Mental status is at baseline. Motor: No abnormal muscle tone. Psychiatric:         Mood and Affect: Mood normal.         Behavior: Behavior normal.         Thought Content: Thought content normal.         Lines: All vascular access sites are healthy with no local erythema, discharge or tenderness. Intake and output:    I/O last 3 completed shifts:  In: -   Out: 600 [Urine:600]    Lab Data:   All available labs and old records have been reviewed by me. CBC:  Recent Labs     07/05/21  0534 07/06/21  0548 07/07/21  0531   WBC 4.9 4.7 5.4   RBC 4.95 4.83 4.81   HGB 14.3 14.0 13.8   HCT 44.8 43.1 42.7   PLT 94* 125* 148   MCV 90.4 89.3 88.9   MCH 28.9 28.9 28.6   MCHC 31.9 32.4 32.2   RDW 17.1* 16.4* 16.0*        BMP:  Recent Labs     07/05/21  0534 07/06/21  0548 07/07/21  0531   * 137 139   K 3.4* 3.7 4.0   CL 98* 99 102   CO2 24 31 32   BUN 43* 43* 41*   CREATININE 1.0 1.0 1.2   CALCIUM 8.1* 8.2* 8.0*   GLUCOSE 112* 111* 116*        Hepatic Function Panel:   Lab Results   Component Value Date    ALKPHOS 84 07/01/2021    ALT 11 07/01/2021    AST 25 07/01/2021    PROT 6.7 07/01/2021    BILITOT 2.8 07/01/2021    LABALBU 3.4 07/01/2021       CPK:   Lab Results   Component Value Date    CKTOTAL 170 07/01/2021     ESR:   Lab Results   Component Value Date    SEDRATE 11 07/02/2021     CRP:   Lab Results   Component Value Date    .0 (H) 07/02/2021           Imaging: All pertinent images and reports for the current visit were reviewed by me during this visit. XR FOOT RIGHT (2 VIEWS)   Final Result   No acute osseous injury. No osteomyelitis is apparent.       Soft tissue swelling of the forefoot, edema versus cellulitis. Polyarticular osteoarthritis. CT Head WO Contrast   Final Result   No acute intracranial abnormality. Specifically, no acute intracranial   hemorrhage or mass effect. Chronic small vessel ischemic disease. XR CHEST PORTABLE   Final Result   1. Pulmonary vascular congestion. 2. Stable cardiomegaly. Medications: All current and past medications were reviewed.      carvedilol  3.125 mg Oral BID WC    levoFLOXacin  500 mg Oral Daily    levETIRAcetam  500 mg Oral BID    levothyroxine  25 mcg Oral Daily    rivaroxaban  15 mg Oral BID                  Problem list:       Patient Active Problem List   Diagnosis Code    New onset seizure ([address] R56.9    Delirium R41.0    Acute encephalopathy G93.40    Volume overload E87.70    Acute renal failure ([address] N17.9    Acute combined systolic and diastolic (congestive) hrt fail ([facility]) I50.41    Anasarca R60.1    Seizure ([address] R56.9    Aspiration into airway T17.908A    Acute respiratory failure with hypoxia ([facility]) J96.01    Senile dementia without behavioral disturbance ([facility]) F03.90    Seizures ([facility]) R56.9    Syncope and collapse R55    Anemia D64.9    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, F02.80    HTN (hypertension), benign I10    Atrial fibrillation with RVR ([facility]) I48.91    Non-pressure chronic ulcer of right lower leg with fat layer exposed ([address] L97.912    Non-pressure chronic ulcer of left lower leg with fat layer exposed ([address] L97.922    Peripheral venous insufficiency I87.2    Lower extremity edema R60.0    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 with ulceration of other part of lower leg ([facility]) I70.248    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 ([facility]) I70.232    Acute metabolic encephalopathy W12.68    Gram negative sepsis ([facility]) A41.50    Cellulitis and abscess of leg L03.119, L02.419    Gram-negative

## 2021-07-07 NOTE — PROGRESS NOTES
Shift assessment completed. Routine vitals stable. Scheduled medications given. Patient is awake, alert and oriented. Respirations are easy and unlabored. Patient does not appear to be in distress, resting comfortably at this time. Call light within reach. Bed alarm engaged.

## 2021-07-07 NOTE — CARE COORDINATION
CM spoke to Richelle at home at [facility] who states they can accept pt. CM called pt's spouse and she is fine with pt going to facility. She states family can take to weekly wound care appointment. [facility] for 2pm today. Charge RN is going to get wound care nurse to place landry boots before pt leaves. CM called Richelle at facility and spouse and informed of transport time.     Zhang Restrepo, RN, BSN  [phone]

## 2021-07-07 NOTE — DISCHARGE SUMMARY
Patient: Beulah Nair     Gender: male  : 1940   Age: 79 y.o.   MRN: [id number]    Admitting Physician: Rohan Whitaker MD  Discharge Physician: Sami Grijalva MD     Code Status: DNR-CC     Admit Date: 2021   Discharge Date: 2021    Disposition:  Home at [facility]    Discharge Diagnoses:  Procidentia and Enterobacter bacteremia secondary to skin wounds  Hypeoension secondary to underlying severe systolic heart failure  Mild acute on chronic severe systolic heart failure    Active Hospital Problems    Diagnosis Date Noted    Anasarca [R60.1]      Priority: High    Encounter for medication counseling [Z71.89]     Moderate malnutrition ([facility] [E44.0] 2021    Gram negative sepsis ([facility] [A41.50]     Cellulitis and abscess of leg [L03.119, L02.419]     Gram-negative bacteremia [R78.81]     Lactic acidosis [E87.2]     Leukopenia [D72.819]     Need for Tdap vaccination [Z23]     Thrombocytopenia ([address] [D69.6]     Elevated d-dimer [R79.89]     Pulmonary vascular congestion [R09.89]     Acute metabolic encephalopathy [V70.05] 2021    Peripheral venous insufficiency [I87.2] 2021    Non-pressure chronic ulcer of left lower leg with fat layer exposed ([facility] [L97.922] 2021    Lower extremity edema [R60.0] 2021    Atrial fibrillation with RVR ([facility] [I48.91] 2020    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[facility] [G30.9, F02.80]     HTN (hypertension), benign [I10]     Acute encephalopathy [G93.40]        Follow-up appointments:  one week    Outpatient to do list: none     Condition at Discharge:  [address] Course:   79-year-old gentleman admitted with altered mental status    Acute encephalopathy metabolic secondary to right lower extremity wound infection with Grover and Enterobacter bacteremia  On admission blood cultures were drawn which were positive for [place] ER and Enterobacter repeat blood cultures were sent seen by infectious disease  Based on sensitivities and they recommended treating with oral levofloxacin  for 10 days  His wounds were dressed he was doing well  His mental status improved he was awake alert more oriented  He has chronic lower extremity wounds  His repeat blood cultures were negative    Acute on chronic severe systolic heart failure  On admission he had some vascular congestion  He was started on IV Lasix  He did not require any oxygen  During the course of his stay was a bit hypotensive creatinine was mildly elevated at 1.2  Initially his blood pressure medications were held and subsequently was restarted on Coreg at a smaller dose  We discontinued his lisinopril but restarted his Lasix  He was seen by physical and Occupational Therapy   Will go to SNF for rehab. Code status is DNR-CC.   Daughter and wife involved with decision making      Discharge Medications:   Current Discharge Medication List      START taking these medications    Details   levoFLOXacin (LEVAQUIN) 500 MG tablet Take 1 tablet by mouth daily for 9 days  Qty: 9 tablet, Refills: 0           Current Discharge Medication List      CONTINUE these medications which have CHANGED    Details   carvedilol (COREG) 3.125 MG tablet Take 1 tablet by mouth 2 times daily (with meals)  Qty: 60 tablet, Refills: 3           Current Discharge Medication List      CONTINUE these medications which have NOT CHANGED    Details   aspirin 81 MG EC tablet Take 81 mg by mouth daily      rivaroxaban (XARELTO) 15 MG TABS tablet Take 1 tablet by mouth 2 times daily (with meals) for 21 days  Qty: 42 tablet, Refills: 0      levothyroxine (SYNTHROID) 25 MCG tablet Take 1 tablet by mouth daily  Qty: 90 tablet, Refills: 1    Associated Diagnoses: Hypothyroidism, unspecified type      vitamin D (ERGOCALCIFEROL) 1.25 MG (36600 UT) CAPS capsule Take 1 capsule by mouth once a week  Qty: 4 capsule, Refills: 5      levETIRAcetam (KEPPRA) 500 MG tablet Take 1 tablet by mouth 2 times daily  Qty: 180 tablet, Refills: 3    Associated Diagnoses: Seizure ([address]      Compression Stockings MISC by Does not apply route Bilateral below knee compression stockings 20-30 mmHg  Qty: 1 each, Refills: 0      furosemide (LASIX) 40 MG tablet Take 2 tablets by mouth 2 times daily  Qty: 360 tablet, Refills: 0    Associated Diagnoses: Essential hypertension           Current Discharge Medication List      STOP taking these medications       lisinopril (PRINIVIL;ZESTRIL) 5 MG tablet Comments:   Reason for Stopping:               Discharge ROS:  A complete review of systems was asked and negative    Discharge Exam:    /69   Pulse 86   Temp 97.2 °F (36.2 °C) (Oral)   Resp 16   Ht 6' 4\" (1.93 m)   Wt 206 lb 7 oz (93.6 kg)   SpO2 94%   BMI 25.13 kg/m²   General appearance:  NAD  HEENT:   Normal cephalic, atraumatic, moist mucous membranes, no oropharyngeal erythema or exudate  Heart[de-identified] Normal s1/s2, RRR, no murmurs, gallops, or rubs. no leg edema  Lungs:  Normal respiratory effort. Clear to auscultation, bilaterally without Rales/Wheezes/Rhonchi. Abdomen: Soft, non-tender, non-distended, bowel sounds present, no masses  Musculoskeletal:  No clubbing, no cyanosis, *  Neurologic:  Neurovascularly intact without any focal sensory/motor deficits. Cranial nerves: II-XII intact, grossly non-focal.  Right lower extremity wound dressed    Labs:  For convenience and continuity at follow-up the following most recent labs are provided:    Lab Results   Component Value Date    WBC 5.4 07/07/2021    HGB 13.8 07/07/2021    HCT 42.7 07/07/2021    MCV 88.9 07/07/2021     07/07/2021     07/07/2021    K 4.0 07/07/2021    K 3.9 07/01/2021     07/07/2021    CO2 32 07/07/2021    BUN 41 07/07/2021    CREATININE 1.2 07/07/2021    CALCIUM 8.0 07/07/2021    PHOS 3.8 08/05/2020    ALKPHOS 84 07/01/2021    ALT 11 07/01/2021    AST 25 07/01/2021    BILITOT 2.8 07/01/2021    LABALBU 3.4 07/01/2021    LDLCALC 110

## 2021-07-14 NOTE — CONSULTS
Nurse called requesting an unna boot be applied to the patient. Patient has already been seen by wound nurse with history of unna boot applications by wound care. Nurse requesting unna boot change by podiatry. Unna boot does not need to be applied by a podiatrist. Tyrone Serenegis boot can be placed by wound nurse. Chart was reviewed. Called nursing staff and informed them it was ok to apply unna boot. Wound nurse will apply unna boot.

## 2021-07-21 NOTE — TELEPHONE ENCOUNTER
Pt still in hospital scheduled for transitional on Monday contact pt or daughter to go over tcm questions. ambulatory

## 2021-07-22 NOTE — PROGRESS NOTES
Physician Progress Note      PATIENT:               Lucille Shin  CSN #:                  [id number]  :                       1940  ADMIT DATE:       2021 1:40 PM  DISCH DATE:        2021 5:06 PM  RESPONDING  PROVIDER #:        Karie Witt MD          QUERY TEXT:    Pt admitted with encephalopathy secondary to Right lower extremity wound   infection. Pt noted to have sepsis documented by ID. If possible, please   document in the progress notes and discharge summary if you are evaluating and   /or treating any of the following: The medical record reflects the following:  Risk Factors: Right lower extremity wound infection  Clinical Indicators: ID consult note  *Sepsis, gram-negative bacteremia   Blood culture () - collected on 2021: Enterobacterales, Sepsis with   ltachypnea and tachycardia and hypotension. PT was in AFib. WBC 6.6    lactic acid 3.7. Documented YAMILETH And Encephalopathy as well. Treatment: IVF, IV Rocephin, IV Meropenem, Oral Levaquin, Unna boots, ID   Consult  Options provided:  -- Sepsis, present on admission  -- Sepsis, present on admission, now resolved  -- Right lower extremity wound infection without Sepsis  -- Sepsis was ruled out  -- Other - I will add my own diagnosis  -- Disagree - Not applicable / Not valid  -- Disagree - Clinically unable to determine / Unknown  -- Refer to Clinical Documentation Reviewer    PROVIDER RESPONSE TEXT:    This patient has sepsis which was present on admission. Query created by:  Mago Mac on 2021 5:00 PM      Electronically signed by:  Karie Witt MD 2021 11:05 PM

## 2021-07-22 NOTE — CARE COORDINATION
Per [employer] Email:    [facility] Discharging From: [facility]    Discharge Date: 7/22/21    Services at Discharge: n/a; Home Health refused     Agency or facility name if [facility] or [facility], etc: n/a    Is Patient Agreeable to Calls?: n/a    Brief Background:  -Pt with dementia, unable to get verbal permission from pt to speak with pts wife re: discharge planning. Was able to speak with pts wife Radha Lynn, [phone])  in facility who confirms she is there 24/7 with pt.   -Home Health refused by patient family.

## 2021-07-22 NOTE — TELEPHONE ENCOUNTER
[facility] Transitions Initial Follow Up Call    Outreach made within 2 business days of discharge: Yes    Patient: Nelli Cage Patient : 1940   MRN: [id number]  Reason for Admission: There are no discharge diagnoses documented for the most recent discharge. Discharge Date: 21       Spoke with: His daughter    Discharge department/facility: [address] [place] [address] Interactive Patient Contact:  Was patient able to fill all prescriptions: Yes  Was patient instructed to bring all medications to the follow-up visit: Yes  Is patient taking all medications as directed in the discharge summary?  Yes  Does patient understand their discharge instructions: Yes  Does patient have questions or concerns that need addressed prior to 7-14 day follow up office visit: no    Scheduled appointment with PCP within 7-14 days    Follow Up  Future Appointments   Date Time Provider Neno Otto   2021  2:15 PM Casandra Padron, [address] [place] [address] 114    2021  3:00 PM Renetta Guthrie MD [address] [place] [address]

## 2021-07-24 PROBLEM — A41.9 SEPSIS (HCC): Status: ACTIVE | Noted: 2021-01-01

## 2021-07-24 NOTE — H&P
HOSPITALISTS HISTORY AND PHYSICAL    7/24/2021 3:59 PM    Patient Information:  Kami Rudd is a 79 y.o. male [id number]  PCP:  Milena Ahn MD (Tel: [phone] )    Chief complaint:    Chief Complaint   Patient presents with    Extremity Weakness        History of Present Illness:  Remigio Agosto is a 79 y.o. male  with PMHx of hypertension, HFrEF(EF 15 to 20%), PAD, CKD, seizures, childhood TB, dementia and BLE venous ulceration was brought into the ED by EMS after they were called to his residence for a lift assist.  EMS found patient to be febrile and his legs look bad and right foot seemed gangrenous. Of note, patient was recently admitted at [facility] from 7/1/2021--7/7/2021 for lower extremity providentia wound infection and Enterobacter bacteremia and was discharged on levofloxacin. On admission, patient was tachycardic, febrile with temp max of 101.5. Initial diagnostic labs were unremarkable except elevated BUN, albumin 2.8, hemoglobin 10.8 (13.8 on 7/7/21). Right foot x-ray showed gangrene of first digit and lateral foot ulcer with no evidence of osteomyelitis. Patient was given IV fluids and Vanco and Zosyn in the ED. REVIEW OF SYSTEMS:   Detailed 12 point ROS obtained which were negative except what mentioned above in HPI    Past Medical History:   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 with ulceration of other part of lower leg ([address] 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 ([address] CHF (congestive heart failure) ([address] Hypertension, Lower extremity edema, Non-pressure chronic ulcer of left lower leg with fat layer exposed ([address] Non-pressure chronic ulcer of right lower leg with fat layer exposed ([address] Peripheral venous insufficiency, Seizures ([address] and TB (pulmonary tuberculosis).      Past Surgical History:   has no past surgical history on file. Medications:  No current facility-administered medications on file prior to encounter. Current Outpatient Medications on File Prior to Encounter   Medication Sig Dispense Refill    carvedilol (COREG) 3.125 MG tablet Take 1 tablet by mouth 2 times daily (with meals) 60 tablet 3    aspirin 81 MG EC tablet Take 81 mg by mouth daily      Compression Stockings MISC by Does not apply route Bilateral below knee compression stockings 20-30 mmHg 1 each 0    levothyroxine (SYNTHROID) 25 MCG tablet Take 1 tablet by mouth daily 90 tablet 1    vitamin D (ERGOCALCIFEROL) 1.25 MG (53747 UT) CAPS capsule Take 1 capsule by mouth once a week 4 capsule 5    levETIRAcetam (KEPPRA) 500 MG tablet Take 1 tablet by mouth 2 times daily 180 tablet 3    rivaroxaban (XARELTO) 15 MG TABS tablet Take 1 tablet by mouth 2 times daily (with meals) for 21 days 42 tablet 0    furosemide (LASIX) 40 MG tablet Take 2 tablets by mouth 2 times daily 360 tablet 0       Allergies:  No Known Allergies     Social History:   reports that he has never smoked. He has never used smokeless tobacco. He reports previous alcohol use. Family History:  family history is not on file. Physical Exam:  /70   Pulse 106   Temp 98.5 °F (36.9 °C) (Oral)   Resp 18   Ht 6' 4\" (1.93 m)   Wt 205 lb (93 kg)   SpO2 99%   BMI 24.95 kg/m²     General appearance: malnourished ; and no apparent distress appears   HEENT Normal cephalic, atraumatic without obvious deformity. Pupils equal, round, and reactive to light. Extra ocular muscles intact. Conjunctivae/corneas clear. Neck: Supple, No jugular venous distention/bruits. Trachea midline without thyromegaly or adenopathy with full range of motion. Lungs: Clear to auscultation, bilaterally without Rales/Wheezes/Rhonchi with good respiratory effort.   Heart: Regular rate and rhythm with Normal S1/S2 without murmurs, rubs or gallops, point of maximum impulse non-displaced  Abdomen: Soft, non-tender or non-distended without rigidity or guarding and positive bowel sounds all four quadrants. Extremities: Normal ROM BUE; ROM of BLE could not be obtained  Skin: BLE erythematous with scattered open ulceration, dressing in place on right foot, gangrenous right toes with open ulcers  Neurologic: Alert and oriented x2. Neurovascularly intact with sensory/motor intact upper extremities/lower extremities, bilaterally. Cranial nerves: II-XII intact, grossly non-focal.      Labs:  CBC:   Lab Results   Component Value Date    WBC 7.1 07/24/2021    RBC 3.78 07/24/2021    HGB 10.8 07/24/2021    HCT 33.5 07/24/2021    MCV 88.5 07/24/2021    MCH 28.5 07/24/2021    MCHC 32.3 07/24/2021    RDW 15.8 07/24/2021     07/24/2021    MPV 9.5 07/24/2021     BMP:    Lab Results   Component Value Date     07/24/2021    K 4.4 07/24/2021     07/24/2021    CO2 28 07/24/2021    BUN 32 07/24/2021    CREATININE 1.0 07/24/2021    CALCIUM 7.9 07/24/2021    GFRAA >60 07/24/2021    LABGLOM >60 07/24/2021    GLUCOSE 111 07/24/2021     MRI FOOT RIGHT WO CONTRAST   Preliminary Result   Evaluation is limited as the patient could not complete the whole exam.      Within this limitation, there are no definitive findings of acute   osteomyelitis or a drainable abscess. Generalized subcutaneous edema compatible with cellulitis. Suspected cystic versus erosive-like change in the 5th metatarsal head which   is favored to be chronic in nature. XR CHEST PORTABLE   Final Result   Cardiomegaly with no evidence of edema         XR ANKLE RIGHT (2 VIEWS)   Final Result   1. Negative ankle   2. Gangrene of the 1st digit and lateral foot ulcer, with no findings of   osteomyelitis         XR FOOT RIGHT (2 VIEWS)   Final Result   1. Negative ankle   2.  Gangrene of the 1st digit and lateral foot ulcer, with no findings of   osteomyelitis             Discussed case  with ED physician    Problem List  Active Problems:    Sepsis [facility])  Resolved Problems:    * No resolved hospital problems. *        Assessment/Plan:     RLE cellulitis and right hallux gangrene  Right foot x-ray showed gangrene of first digit and lateral foot ulcer with no evidence of osteomyelitis. Tachycardic and febrile on admission  Received IV fluids and Vanco and Zosyn in the ED. Podiatry and infectious disease consulted  Might need right hallux debridement/amputation  MRI of the right foot ordered  Wound wound care consulted  Continue Zyvox and Zosyn  Blood cultures in process    Sepsis secondary to above  Treatment as above    HFrEF: Last echo on 6/8/2021 showed EF of 15%, severe hypokinesis and grade 2 diastolic dysfunction  Cardiology consulted  Continue carvedilol; holding Lasix as patient blood pressures are soft  Not on any ACE prior to admission  Salt and fluid restriction  Strict intake output and daily weights    Hx of paroxysmal A. Fib  Continue Coreg  Holding Xarelto for possible amputation  Monitor on telemetry    Acute blood loss anemia; hemoglobin 13.8 on 7/7/2021  Hemoglobin 10.8 on admission likely secondary to bleeding from foot ulcer  Monitor hemoglobin closely    Hx of chronic thrombocytopenia; improved platelet count on admission    Hypothyroidism, continue Synthroid    Hx of seizure; continue Keppra      DVT prophylaxis: Lovenox  Code status: DNR CCA      Admit as inpatient I anticipate hospitalization spanning less/more than two midnights for investigation and treatment of the above medically necessary diagnoses. Please note that some part of this chart was generated using Dragon dictation software. Although every effort was made to ensure the accuracy of this automated transcription, some errors in transcription may have occurred inadvertently. If you may need any clarification, please do not hesitate to contact me through [facility].        Tata Sierra MD    7/24/2021 3:59 PM

## 2021-07-24 NOTE — PROGRESS NOTES
Daughter Evan [place], #[phone]. Spoke with her regarding history, timeline, currently living situation. Pt lives with wife, who is 79 yrs old. Pt does not move positions all day due to legs. He sits either on the couch or in bed. Follows no diet. They currently live at [facility] [place]. Pt has a stage 3 pressure ulcer. Diagnosed with Alzheimers. He can become combative with his family members. Med rec completed, admission done.

## 2021-07-24 NOTE — CONSULTS
PODIATRY CONSULT NOTE    Pt seen bedside States he has been dealing with ulcers of foot for several weeks. Denies pain. Denies N/v/f/c/sob. ASSESSMENT/PLAN    Gangrene R toes  Cellulitis R lower extremity    Pt evaluated bedside. Reviewed xrays. Will order MRI of foot to evaluate for deep infection. Consult to ID and vascular placed. Orders for betadine and dressing to the R foot. Podiatry will follow after MRI to determine if pt needs surgical intervention.

## 2021-07-24 NOTE — ED NOTES
Spoke with daughter very concerned. Says her dad wants his foot removed due to the infection.   Also says that he has become very combative     Tammy Castro RN  07/24/21 0900

## 2021-07-24 NOTE — PROGRESS NOTES
4 Eyes Skin Assessment     The patient is being assess for   Admission    I agree that 2 RN's have performed a thorough Head to Toe Skin Assessment on the patient. ALL assessment sites listed below have been assessed. Areas assessed for pressure by both nurses:   [x]   Head, Face, and Ears   [x]   Shoulders, Back, and Chest, Abdomen  [x]   Arms, Elbows, and Hands   [x]   Coccyx, Sacrum, and Ischium  [x]   Legs, Feet, and Heels        Skin Assessed Under all Medical Devices by both nurses: Nonoe               All Mepilex Borders were peeled back and area peeked at by both nurses:  Yes  Please list where Mepilex Borders are located:  Sacrum             **SHARE this note so that the co-signing nurse is able to place an eSignature**    Co-signer eSignature: {Esignature:[id number]}    Does the Patient have Skin Breakdown related to pressure?   Yes [facility] WOUND CARE was Initiated documentation include the Jennifer-wound, Wound Assessment, Measurements, Dressing Treatment, Drainage, and Color\",     (Insert Photo here***)          Michel Prevention initiated:  Yes   Wound Care Orders initiated:  Yes      [id number]  nurse consulted for Pressure Injury (Stage 3,4, Unstageable, DTI, NWPT, Complex wounds)and New or Established Ostomies:  Yes      Primary Nurse eSignature: Electronically signed by Margarita Brambila RN on 7/24/21 at 6:37 PM EDT

## 2021-07-24 NOTE — CONSULTS
Patient lives in community     Family History:   No immunodeficiency    REVIEW OF SYSTEMS:    No fever / chills / sweats. No weight loss. No visual change, eye pain, eye discharge. No oral lesion, sore throat, dysphagia. Denies cough / sputum. Denies chest pain, palpitations. Denies n / v / abd pain. No diarrhea. Denies dysuria or change in urinary function. Denies joint swelling or pain. No myalgia, arthralgia.  + skin changes - R lower leg, R hallux  Denies focal weakness, sensory change or other neurologic symptom    Denies new / worse depression, psychiatric symptoms    PHYSICAL EXAM:      Vitals:    /67   Pulse 99   Temp 99 °F (37.2 °C) (Oral)   Resp 22   Ht 6' 4\" (1.93 m)   Wt 205 lb (93 kg)   SpO2 100%   BMI 24.95 kg/m²     GENERAL: No apparent distress. RA  HEENT: Membranes moist, no oral lesion  NECK:  Supple, no lymphadenopathy  LUNGS: Clear b/l, no rales, no dullness  CARDIAC: RRR, no murmur appreciated  ABD:  + BS, soft / NT  EXT:  No rash, no edema, no lesions  LE extremity venous stasis, R LE superficial ulceration, red, warm. R hallux 'black', odor, firm, non-tender  NEURO: No focal neurologic findings  PSYCH: Orientation, sensorium, mood normal  LINES:  Peripheral iv    DATA:    Lab Results   Component Value Date    WBC 7.1 2021    HGB 10.8 (L) 2021    HCT 33.5 (L) 2021    MCV 88.5 2021     2021     Lab Results   Component Value Date    CREATININE 1.0 2021    BUN 32 (H) 2021     2021    K 4.4 2021     2021    CO2 28 2021       Hepatic Function Panel:   Lab Results   Component Value Date    ALKPHOS 114 2021    ALT 11 2021    AST 21 2021    PROT 6.4 2021    BILITOT 1.0 2021    LABALBU 2.8 2021       Micro:   BC sent    Imagin/24 --  XR ANKLE RIGHT (2 VIEWS)   Final Result   1. Negative ankle   2.  Gangrene of the 1st digit and lateral foot ulcer, with no findings of osteomyelitis       XR FOOT RIGHT (2 VIEWS)   Final Result   1. Negative ankle   2. 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)    Seizures (HCC)    Syncope and collapse    Anemia    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[address]    HTN (hypertension), benign    Atrial fibrillation with RVR (HCC)    Non-pressure chronic ulcer of right lower leg with fat layer exposed ([address]    Non-pressure chronic ulcer of left lower leg with fat layer exposed ([address]    Peripheral venous insufficiency    Lower extremity edema    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 with ulceration of other part of lower leg ([address]    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 (HCC)    Acute metabolic encephalopathy    Gram negative sepsis (HCC)    Cellulitis and abscess of leg    Gram-negative bacteremia    Lactic acidosis    Leukopenia    Need for Tdap vaccination    Thrombocytopenia (HCC)    Elevated d-dimer    Pulmonary vascular congestion    Moderate malnutrition ([address]    Encounter for medication counseling    Sepsis ([address]       hx AF, HTN, CHF, PAD, CKD, seizures, childhood TB  Hx LE venous ulcerations  Admit 7/1 - 7/7 with LE wound infection with Providencia and Enterobacter bacteremia    R LE cellulitis  R hallux gangrene - necrotic / black tissue, odor      RECOMMENDATIONS:    Cont zosyn  Add linezolid for gram positive coverage  Will f/u on cult   Await MRI result    Pt requires R hallux debridement / amputation. He is resistant to this and was taking about God / nature. I am concern that pt had limb threatening infection and requires excision of gangrenous tissue . Vascular Surg consulted    Medical Decision Making:   The following items were considered in medical decision making:  Discussion of patient care with other providers  Reviewed clinical lab tests  Reviewed radiology tests  Reviewed other diagnostic tests/interventions  Microbiology cultures and other micro tests reviewed      Discussed with pt  Marcelo Branch MD

## 2021-07-24 NOTE — ED PROVIDER NOTES
[address] [facility]  [facility]      Pt Name: Nelli Cage  MRN: [id number]  [place] 1940  Date ofevaluation: 7/24/2021  Provider: Sylvia Srinivasan MD    CHIEF COMPLAINT       Chief Complaint   Patient presents with    Extremity Weakness       HPI    HISTORY OF PRESENT ILLNESS   (Location/Symptom, Timing/Onset,Context/Setting, Quality, Duration, Modifying Factors, Severity)  Note limiting factors. Nelli Cage is a 79 y.o. male who presents to the emergency department with an infected right foot. This is a an 79-year-old male with multiple medical problems including severe systolic heart failure, atrial fibrillation and recent foot infection who presents after the EMS were called to his residence for a lift assist.  Patient was febrile and they felt the leg looked bad and brought him here for further evaluation. The patient was recently here for a right foot infection and discharged with Levaquin. NursingNotes were reviewed. Review of Systems    REVIEW OF SYSTEMS    (2-9 systems for level 4, 10 or more for level 5)     Review of Systems   Constitutional: Negative for fever. HENT: Negative for rhinorrhea and sore throat. Eyes: Negative for redness. Respiratory: Negative for shortness of breath. Cardiovascular: Negative for chest pain. Gastrointestinal: Negative for abdominal pain. Genitourinary: Negative for flank pain. Neurological: Negative for headaches. Hematological: Negative for adenopathy. Psychiatric/Behavioral: Negative for confusion. Except as noted above the remainder of the review of systems was reviewed and negative.        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 of other part of lower leg ([address] 3/2/2021    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 ([address] 3/2/2021    CHF (congestive heart failure) ([facility]     Hypertension     Lower extremity edema 3/2/2021    Non-pressure chronic ulcer of left lower leg with fat layer exposed ([facility] 3/2/2021    Non-pressure chronic ulcer of right lower leg with fat layer exposed ([facility] 3/2/2021    Peripheral venous insufficiency 3/2/2021    Seizures ([facility]     TB (pulmonary tuberculosis)     he says the doctors told him he had TB when he was 15 and he had to have surgery         SURGICALHISTORY     History reviewed. No pertinent surgical history. CURRENT MEDICATIONS       Previous Medications    ASPIRIN 81 MG EC TABLET    Take 81 mg by mouth daily    CARVEDILOL (COREG) 3.125 MG TABLET    Take 1 tablet by mouth 2 times daily (with meals)    COMPRESSION STOCKINGS MISC    by Does not apply route Bilateral below knee compression stockings 20-30 mmHg    FUROSEMIDE (LASIX) 40 MG TABLET    Take 2 tablets by mouth 2 times daily    LEVETIRACETAM (KEPPRA) 500 MG TABLET    Take 1 tablet by mouth 2 times daily    LEVOTHYROXINE (SYNTHROID) 25 MCG TABLET    Take 1 tablet by mouth daily    RIVAROXABAN (XARELTO) 15 MG TABS TABLET    Take 1 tablet by mouth 2 times daily (with meals) for 21 days    VITAMIN D (ERGOCALCIFEROL) 1.25 MG (66546 UT) CAPS CAPSULE    Take 1 capsule by mouth once a week       ALLERGIES     Patient has no known allergies. FAMILY HISTORY     History reviewed. No pertinent family history.        SOCIAL HISTORY       Social History     Socioeconomic History    Marital status:      Spouse name: Ze Whittington Number of children: None    Years of education: None    Highest education level: None   Occupational History    Occupation:       Comment: 32 years    Occupation: mail train   Tobacco Use    Smoking status: Never Smoker    Smokeless tobacco: Never Used   Vaping Use    Vaping Use: Never used   Substance and Sexual Activity    Alcohol use: Not Currently    Drug use: None    Sexual activity: Yes     Partners: Female   Other Topics Concern    None   Social History Narrative    None     Social Determinants of Health     Financial Resource Strain:     Difficulty of Paying Living Expenses:    Food Insecurity:     Worried About Running Out of Food in the Last Year:     [address] in the Last Year:    Transportation Needs:     Lack of Transportation (Medical):  Lack of Transportation (Non-Medical):    Physical Activity:     Days of Exercise per Week:     Minutes of Exercise per Session:    Stress:     Feeling of Stress :    Social Connections:     Frequency of Communication with Friends and Family:     Frequency of Social Gatherings with Friends and Family:     Attends Judaism Services:     Active Member of Clubs or Organizations:     Attends Club or Organization Meetings:     Marital Status:    Intimate Partner Violence:     Fear of Current or Ex-Partner:     Emotionally Abused:     Physically Abused:     Sexually Abused:        SCREENINGS    Naz Coma Scale  Eye Opening: To speech  Best Verbal Response: Confused (confused to month)  Best Motor Response: Obeys commands  Naz Coma Scale Score: 13        PHYSICAL EXAM    (up to 7 for level 4, 8 or more for level 5)     ED Triage Vitals [07/24/21 0637]   BP Temp Temp Source Pulse Resp SpO2 Height Weight   102/67 101.5 °F (38.6 °C) Oral 71 18 97 % 6' 4\" (1.93 m) 205 lb (93 kg)       Physical Exam:      General Appearance:  Alert, cooperative, appears stated age. Head:  Normocephalic, without obvious abnormality, atraumatic. Eyes:  conjunctiva/corneas clear, EOM's intact. Sclera anicteric. ENT:  Mucous remains moist and pink   Neck: Supple, symmetrical, trachea midline, no adenopathy. No jugular venous distention. Lungs:    Clear to auscultation bilaterally   Chest Wall:   No pain to palpation   Heart:   Genitourinary:  Irregular tachycardic with no murmurs rubs gallops  Deferred   Abdomen:     Thin soft and benign   Extremities: Edema of the right lower extremity. The patient had a gross infection of his distal right extremity into the foot especially around the right great toe. The area was warm, red and grossly infected appearing. Pulses:  Good throughout   Skin:  No rashes or lesions to exposed skin. Neurologic: Alert and oriented X 3. DIAGNOSTIC RESULTS         RADIOLOGY:   Non-plain filmimages such as CT, Ultrasound and MRI are read by the radiologist. Plain radiographic images are visualized and preliminarily interpreted by the emergency physician with the below findings:    See below    Interpretation per the Radiologist below, if available at the time ofthis note: All incidental findings were discussed with the patient. XR ANKLE RIGHT (2 VIEWS)   Final Result   1. Negative ankle   2. Gangrene of the 1st digit and lateral foot ulcer, with no findings of   osteomyelitis         XR FOOT RIGHT (2 VIEWS)   Final Result   1. Negative ankle   2.  Gangrene of the 1st digit and lateral foot ulcer, with no findings of   osteomyelitis         XR CHEST PORTABLE    (Results Pending)         ED BEDSIDE ULTRASOUND:   Performed by ED Physician - none    LABS:  Labs Reviewed   CBC WITH AUTO DIFFERENTIAL - Abnormal; Notable for the following components:       Result Value    RBC 3.78 (*)     Hemoglobin 10.8 (*)     Hematocrit 33.5 (*)     RDW 15.8 (*)     Lymphocytes Absolute 0.6 (*)     All other components within normal limits    Narrative:     Performed at:  [facility] [address], [place], [address]   Phone [phone]   COMPREHENSIVE METABOLIC PANEL W/ REFLEX TO MG FOR LOW K - Abnormal; Notable for the following components:    Glucose 111 (*)     BUN 32 (*)     Calcium 7.9 (*)     Albumin 2.8 (*)     Albumin/Globulin Ratio 0.8 (*)     All other components within normal limits    Narrative:     Performed at:  [facility] [address], [place], [place] [address]   Phone [phone]   CULTURE, BLOOD 1   CULTURE, BLOOD 2   LACTATE, SEPSIS    Narrative:     Performed at:  [facility] [address] [facility],  [facility], [address]   Phone [phone]   LACTATE, SEPSIS       All other labs were within normal range or not returned as of this dictation. EMERGENCY DEPARTMENT COURSE and DIFFERENTIAL DIAGNOSIS/MDM:   Vitals:    Vitals:    07/24/21 0730 07/24/21 0745 07/24/21 0800 07/24/21 0815   BP: 94/69 81/72 94/69 100/74   Pulse: 119 104 115 121   Resp: 17 23 20 27   Temp:       TempSrc:       SpO2: 96% 100% 98% 98%   Weight:       Height:               MDM    The patient has remained stable since hospital course. Patient's work-up included x-rays of the right foot that show gangrene of the right great toe. Patient was given some fluid for his tachycardia and his initial CBC was unremarkable. Lactic acid is normal chemistry profile was unremarkable except for mildly elevated BUN. I contacted the hospitalist will be admitting the patient. I contacted podiatry and spoke to Dr. Vinnie Najjar who is instructed me to start Zosyn and vancomycin. The patient will be admitted for further care and definitive care of his right foot infection. Patient is DNR CC per his last visit. REASSESSMENT          CRITICAL CARE TIME   Total Critical Care time was 50 minutes, excluding separatelyreportable procedures. There was a high probability ofclinically significant/life threatening deterioration in the patient's condition which required my urgent intervention. CONSULTS:  IP CONSULT TO HOSPITALIST  IP CONSULT TO PODIATRY  IP CONSULT TO INFECTIOUS DISEASES  IP CONSULT TO CARDIOLOGY    PROCEDURES:  Unless otherwise noted below, none     Procedures    FINAL IMPRESSION      1. Gangrene of right foot ([address]    2.  Septicemia [facility])          DISPOSITION/PLAN   DISPOSITION Admitted 07/24/2021 08:12:46 AM      PATIENT REFERREDTO:  No follow-up provider specified. DISCHARGEMEDICATIONS:  New Prescriptions    No medications on file     Controlled Substances Monitoring:     No flowsheet data found.     (Please note that portions of this note were completed with a voice recognition program.  Efforts were made to edit the dictations but occasionally words are mis-transcribed.)    Benjamin Vann MD (electronically signed)  Attending Emergency Physician         Benjamin Vann MD  07/24/21 6199

## 2021-07-24 NOTE — ED NOTES
Tried to call 3T to give video report but was told the charge nurse is using it for bed board meeting and will call me back when she was done     Jabari Larson RN  07/24/21 3570

## 2021-07-25 NOTE — PLAN OF CARE
Problem: Falls - Risk of:  Goal: Will remain free from falls  Description: Will remain free from falls  Outcome: Ongoing  Goal: Absence of physical injury  Description: Absence of physical injury  Outcome: Ongoing     Problem: Skin Integrity:  Goal: Will show no infection signs and symptoms  Description: Will show no infection signs and symptoms  Outcome: Ongoing     Problem: Pain:  Goal: Pain level will decrease  Description: Pain level will decrease  Outcome: Ongoing   Pt remains free of falls and injury this shift. Pt skin shows no change from previous shift. Pt has reported no pain this shift. WCTM.

## 2021-07-25 NOTE — PLAN OF CARE
Problem: Falls - Risk of:  Goal: Will remain free from falls  Description: Will remain free from falls  7/25/2021 1149 by Guy Pimentel RN  Outcome: Ongoing  7/25/2021 0136 by Maria Alejandra Small RN  Outcome: Ongoing     Problem: Falls - Risk of:  Goal: Absence of physical injury  Description: Absence of physical injury  7/25/2021 1149 by Guy Pimentel RN  Outcome: Ongoing  7/25/2021 0136 by Maria Alejandra Small RN  Outcome: Ongoing     Problem: Skin Integrity:  Goal: Will show no infection signs and symptoms  Description: Will show no infection signs and symptoms  7/25/2021 1149 by Guy Pimentel RN  Outcome: Ongoing  7/25/2021 0136 by Maria Alejandra Small RN  Outcome: Ongoing  Goal: Absence of new skin breakdown  Description: Absence of new skin breakdown  Outcome: Ongoing     Problem: Pain:  Goal: Pain level will decrease  Description: Pain level will decrease  7/25/2021 1149 by Guy Pimentel RN  Outcome: Ongoing  7/25/2021 0136 by Maria Alejandra Small RN  Outcome: Ongoing  Goal: Control of acute pain  Description: Control of acute pain  Outcome: Ongoing  Goal: Control of chronic pain  Description: Control of chronic pain  Outcome: Ongoing   Pt remains free from falls and physical injury. Pt on Q2 turning schedule as pt tolerates to prevent further skin breakdown. Pt currently on antibiotic therapy for infection related to feet and leg wounds. Pt has not complained of pain at this time. See flowsheet for assessment.

## 2021-07-25 NOTE — PROGRESS NOTES
Podiatry Progress Note    Subjective:  Pt seen bedside for follow of gangrene R foot. He had MRI yesterday. Tolerating abx well. No overnight events. ROS: Denies nausea, vomiting, fevers, chills. Objective:    BP 94/65   Pulse 113   Temp 99 °F (37.2 °C) (Oral)   Resp 18   Ht 6' 4\" (1.93 m)   Wt 215 lb 11.2 oz (97.8 kg)   SpO2 92%   BMI 26.26 kg/m²   In: 2161.9 [P.O.:720; I.V.:48.3]  Out: -      Exam:  Vascular: Pulses non palpable. Venous stasis changes to R LE. Edema noted to R LE  Derm: Gangrenous changes to R hallux. Mild odor noted. No fluctuance. Ulcer noted to dorsal 5th MPJ area with fibro granular base. Musculoskeletal: Decreased ROM to b/l LE. Decreased strength to b/l LE. No pain to palpation of R hallux. Neuro: Sensation diminished. Data:    WBC:    Lab Results   Component Value Date    WBC 7.1 2021     CMP:    Lab Results   Component Value Date     2021    K 4.2 2021     2021    CO2 26 2021    BUN 32 2021    CREATININE 1.0 2021    GFRAA >60 2021    AGRATIO 0.8 2021    LABGLOM >60 2021    GLUCOSE 112 2021    PROT 6.4 2021    LABALBU 2.8 2021    CALCIUM 7.8 2021    BILITOT 1.0 2021    ALKPHOS 114 2021    AST 21 2021    ALT 11 2021        Imagin/24 --  XR ANKLE RIGHT (2 VIEWS)   Final Result   1. Negative ankle   2. Gangrene of the 1st digit and lateral foot ulcer, with no findings of osteomyelitis       XR FOOT RIGHT (2 VIEWS)   Final Result   1. Negative ankle   2.  Gangrene of the 1st digit and lateral foot ulcer, with no findings of osteomyelitis     MRI R foot  Impression   Evaluation is limited as the patient could not complete the whole exam.       Within this limitation, there are no definitive findings of acute   osteomyelitis or a drainable abscess.       Generalized subcutaneous edema compatible with cellulitis.       Suspected cystic versus erosive-like change in the 5th metatarsal head which   is favored to be chronic in nature. ASSESSMENT/PLAN     Gangrene R toes  Cellulitis R lower extremity     Pt evaluated bedside. Reviewed xrays and MRI. No abscess noted on MRI  Await vascular intervention prior to any surgical plans. Abx per ID  Orders for betadine and dressing to the R foot. Will wait for clearance from vascular pending intervention. Pt likely will need amputation of R hallux pending healing potential.     Podiatry will continue to follow.         Crystal Renee DPM  7/25/2021

## 2021-07-25 NOTE — CONSULTS
[facility]  Consultation Note    Chief Complaint / Reason for Consultation  Right foot gangrene    History of Present Illness  Patient is a 79 y.o. male known to me and seen as an outpatient in the office with his daughter related to an apparent venous leg ulceration. Outside hospital studies would suggest triphasic waveforms however tibial flow was limited. Patient's associated medical history is significant and for congestive heart failure, hypertension, chronic kidney disease, dementia who was brought in to the emergency department after EMS found patient to be febrile and gangrenous appearing right foot. Because of the right foot vascular surgery is being consulted. Patient states he has had this discoloration of his toes for the last several weeks. He denies any specific pain. Review of Systems     Denies fevers, chills, chest pain, shortness of breath, nausea, vomiting, hematemesis, diarrhea, constipation, melena, hematochezia, wt changes, vision problems, blindness, hearing problems, facial droop, slurred speech, extremity weakness, extremity numbness, dysuria. Past Medical History:   Diagnosis Date    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 with ulceration of other part of lower leg ([address] 3/2/2021    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 ([address] 3/2/2021    CHF (congestive heart failure) ([address]     Hypertension     Lower extremity edema 3/2/2021    Non-pressure chronic ulcer of left lower leg with fat layer exposed ([address] 3/2/2021    Non-pressure chronic ulcer of right lower leg with fat layer exposed ([address] 3/2/2021    Peripheral venous insufficiency 3/2/2021    Seizures ([address]     TB (pulmonary tuberculosis)     he says the doctors told him he had TB when he was 15 and he had to have surgery       History reviewed. No pertinent surgical history.     No Known Allergies    Social History     Socioeconomic History    Marital status:      Spouse name: Vinay Ramirez Number of children: Not on file    Years of education: Not on file    Highest education level: Not on file   Occupational History    Occupation:       Comment: 32 years    Occupation: mail train   Tobacco Use    Smoking status: Never Smoker    Smokeless tobacco: Never Used   Vaping Use    Vaping Use: Never used   Substance and Sexual Activity    Alcohol use: Not Currently    Drug use: Not on file    Sexual activity: Yes     Partners: Female   Other Topics Concern    Not on file   Social History Narrative    Not on file     Social Determinants of Health     Financial Resource Strain:     Difficulty of Paying Living Expenses:    Food Insecurity:     Worried About [address] in the Last Year:     [address] in the Last Year:    Transportation Needs:     Lack of Transportation (Medical):  Lack of Transportation (Non-Medical):    Physical Activity:     Days of Exercise per Week:     Minutes of Exercise per Session:    Stress:     Feeling of Stress :    Social Connections:     Frequency of Communication with Friends and Family:     Frequency of Social Gatherings with Friends and Family:     Attends Zoroastrian Services:     Active Member of Clubs or Organizations:     Attends Club or Organization Meetings:     Marital Status:    Intimate Partner Violence:     Fear of Current or Ex-Partner:     Emotionally Abused:     Physically Abused:     Sexually Abused:        History reviewed.  No pertinent family history.  - No history of bleeding or clotting disorders    Vital Signs  Vitals:    07/25/21 0315 07/25/21 0355 07/25/21 0403 07/25/21 0503   BP:   99/67    Pulse:   98    Resp:   18    Temp:   97.7 °F (36.5 °C)    TempSrc:   Oral    SpO2:   97%    Weight: 215 lb 11.2 oz (97.8 kg) 215 lb 11.2 oz (97.8 kg)  215 lb 11.2 oz (97.8 kg)   Height:           Physical Examination  General:  no apparent distress  Psychiatric: affect FACS.  7/25/2021  8:10 AM

## 2021-07-25 NOTE — CONSULTS
[address]   Cardiac Evaluation      Patient: Zuleima Vu  YOB: 1940         Chief Complaint   Patient presents with    Extremity Weakness        Referring provider: Maryjane Cronin MD    History of Present Illness:  80 yo AAM admitted for weakness and an infected toe. She has a very severe cmp which has been present for years and for which he has been noncompliant with meds and f/u. He has seen the CHF NP in the office twice in the past 3 years. He has some dementia and apparently there have not been other family members to enforce any sort of medical compliance. He is DNR-CCA. He tells me he has no children but the office note states his daughter brought him. He eats a great deal of salty food and has a EF of 15-20% but does not appear to be in clinical heart failure despite a pro bnp of 35K. His chest xr shows some pulmonary congestion but his O2 sats are 92%-100%  on RA. He is in AF which may be new. Last echo : 6/8/21   Left ventricular systolic function is reduced with ejection fraction estimated at 15-20 %. Severe hypokinesis of the inferior, inferoseptal and anterior septal walls with hypokinesis of the remaining walls. There is mild left ventricular hypertrophy. Left ventricular size is mildly increased. Grade II diastolic dysfunction with elevated filling pressure. Biatrial enlargement. Right ventricular systolic function appears reduced . Aortic valve appears sclerotic but opens adequately. Mild aortic regurgitation is present. Moderate to severe mitral regurgitation. Mild-moderate pulmonic regurgitation present. Moderate tricuspid regurgitation. Systolic pulmonary artery pressure (SPAP) estimated at 62 mmHg (right atrial pressure 8 mmHg), consistent with severe pulmonary  hypertension. IVC size is dilated (>2.1 cm) but collapses > 50% with respiration consistent with elevated RA pressure (8 mmHg).   There is a trivial circumferential pericardial effusion noted. Past Medical History:   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 with ulceration of other part of lower leg ([facility] [address] 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 ([facility] [address] CHF (congestive heart failure) ([facility] [address] Hypertension, Lower extremity edema, Non-pressure chronic ulcer of left lower leg with fat layer exposed ([facility] [address] Non-pressure chronic ulcer of right lower leg with fat layer exposed ([facility] [address] Peripheral venous insufficiency, Seizures ([facility] and TB (pulmonary tuberculosis). Surgical History:   has no past surgical history on file.      Current Facility-Administered Medications   Medication Dose Route Frequency Provider Last Rate Last Admin    sodium chloride flush 0.9 % injection 5-40 mL  5-40 mL Intravenous 2 times per day Stephanie Smith MD   10 mL at 07/25/21 0954    sodium chloride flush 0.9 % injection 5-40 mL  5-40 mL Intravenous PRN Stephanie Smith MD        0.9 % sodium chloride infusion  25 mL Intravenous PRN Stephanie Smith  mL/hr at 07/25/21 0355 Rate Verify at 07/25/21 0355    acetaminophen (TYLENOL) tablet 650 mg  650 mg Oral Q6H PRN Stephanie Smith MD        Or    acetaminophen (TYLENOL) suppository 650 mg  650 mg Rectal Q6H PRN Stephanie Smith MD        piperacillin-tazobactam (ZOSYN) 3,375 mg in dextrose 5 % 50 mL IVPB (mini-bag)  3,375 mg Intravenous Q6H Betsy Pizano MD   Stopped at 07/25/21 1124    mupirocin (BACTROBAN) 2 % ointment   Topical Daily Serafin Medina DPM   Given at 07/25/21 1004    carvedilol (COREG) tablet 3.125 mg  3.125 mg Oral BID  Stephanie Smith MD   3.125 mg at 07/25/21 0947    levETIRAcetam (KEPPRA) tablet 500 mg  500 mg Oral BID Stephanie Smith MD   500 mg at 07/25/21 0949    linezolid (ZYVOX) tablet 600 mg  600 mg Oral 2 times per day Cynthia Shen MD   600 mg at 07/25/21 0950    aspirin EC tablet 81 mg  81 mg Oral Daily Stephanie Smith MD   81 mg at 07/25/21 0934    enoxaparin (LOVENOX) injection 90 mg  1 mg/kg Subcutaneous BID Jackie Cordoba MD   90 mg at 07/25/21 1637       Allergies:  Patient has no known allergies. Social History:  Social History     Socioeconomic History    Marital status:      Spouse name: Veronica Peterson Number of children: Not on file    Years of education: Not on file    Highest education level: Not on file   Occupational History    Occupation:       Comment: 32 years    Occupation: mail train   Tobacco Use    Smoking status: Never Smoker    Smokeless tobacco: Never Used   Vaping Use    Vaping Use: Never used   Substance and Sexual Activity    Alcohol use: Not Currently    Drug use: Not on file    Sexual activity: Yes     Partners: Female   Other Topics Concern    Not on file   Social History Narrative    Not on file     Social Determinants of Health     Financial Resource Strain:     Difficulty of Paying Living Expenses:    Food Insecurity:     Worried About [address] in the Last Year:     [address] [place] in the Last Year:    Transportation Needs:     Lack of Transportation (Medical):      Lack of Transportation (Non-Medical):    Physical Activity:     Days of Exercise per Week:     Minutes of Exercise per Session:    Stress:     Feeling of Stress :    Social Connections:     Frequency of Communication with Friends and Family:     Frequency of Social Gatherings with Friends and Family:     Attends [employer] Services:     Active Member of Clubs or Organizations:     Attends Club or Organization Meetings:     Marital Status:    Intimate Partner Violence:     Fear of Current or Ex-Partner:     Emotionally Abused:     Physically Abused:     Sexually Abused:        Family History:   Not reliable due to dementia    Review of Systems:   · Not reliable due to dementia    Physical Examination:    Vitals:    07/25/21 0355 07/25/21 0403 07/25/21 0503 07/25/21 0930   BP:  99/67  94/65   Pulse:  98  113 11/2020) - will aim for rate control but his BP is quite low and he will not tolerate many meds. He has many aberrantly conducted beats as well. I will start dig and keep him on the coreg. Thank you for allowing to me to participate in the care of [address].

## 2021-07-26 PROBLEM — I10 ESSENTIAL HYPERTENSION: Status: ACTIVE | Noted: 2020-01-01

## 2021-07-26 NOTE — PROGRESS NOTES
PODIATRY PROGRESS NOTE    Pt seen follow up of gangrene of R toes. Pt is NPO for vascular intervention today. No overnight events. ASSESSMENT/PLAN     Gangrene R toes  Cellulitis R lower extremity     Pt evaluated bedside. Reviewed xrays and MRI. No abscess noted on MRI  Await vascular intervention prior to any surgical plans. Abx per ID  Orders for betadine and dressing to the R foot.      Will wait for clearance from vascular pending intervention. Pt likely will need amputation of R hallux pending healing potential.      Podiatry will continue to follow. And will discuss with vascular after intervention.

## 2021-07-26 NOTE — PROGRESS NOTES
Pt admitted to room 3310 from cath lab. Pt had peripheral angiogram today per cath lab. L groin site is clean, dry, and intact. Restrictions up per cath lab. Pt had BM x1. Pt cleaned repositioned in bed. Call light placed within reach.

## 2021-07-26 NOTE — CARE COORDINATION
Chart reviewed and DCPA attempted. Pt off of floor at this time. SW following- palliative care following.       Rashawn Batres MSW, [address]

## 2021-07-26 NOTE — OP NOTE
Brief Postoperative Note    David Austin  YOB: 1940  [id number]    Pre-operative Diagnosis: right foot ulceration    Post-operative Diagnosis: Same    Procedure: 1. Ultrasound guided left CFA access  2. Aortogram with BLE runoff  3. Right PT atherectomy  4.   Right PT PTA    Anesthesia: Local    Surgeons/Assistants: Carol Khanna    Estimated Blood Loss: less than 50     Complications: None    Specimens: Was Not Obtained    Findings: right AT and PT occlusion, peroneal small but patent with collateral to DP    Electronically signed by Anjana James MD on 7/26/2021 at 2:51 PM

## 2021-07-26 NOTE — PROGRESS NOTES
HOSPITALISTS PROGRESS NOTE    7/26/2021 9:55 AM        Name: Dasia Jauregui . Admitted: 7/24/2021  Primary Care Provider: Yaima Espinoza MD (Tel: [phone])      CC:     Brief Course: This  79 y.o. male  with PMHx of hypertension, HFrEF(EF 15 to 20%), PAD, CKD, seizures, childhood TB, dementia and BLE venous ulceration was brought into the ED by EMS after they were called to his residence for a lift assist.  EMS found patient to be febrile and his legs look bad and right foot seemed gangrenous. Of note, patient was recently admitted at [facility] from 7/1/2021--7/7/2021 for lower extremity providentia wound infection and Enterobacter bacteremia and was discharged on levofloxacin. On admission, patient was tachycardic, febrile with temp max of 101.5. Initial diagnostic labs were unremarkable except elevated BUN, albumin 2.8, hemoglobin 10.8 (13.8 on 7/7/21). Right foot x-ray showed gangrene of first digit and lateral foot ulcer with no evidence of osteomyelitis. Patient was given IV fluids and Vanco and Zosyn in the ED. Interval history:   Pt seen and examined today   Overnight events noted and interval ancillary notes  Pt denied any fevers, chills, chest pain, palpitations, cough, SOB  Plan for peripheral angiography and possible intervention by vascular surgery today      Assessment & Plan:     RLE cellulitis and right hallux gangrene  Right foot x-ray showed gangrene of first digit and lateral foot ulcer with no evidence of osteomyelitis. MRI of the right foot showed generalized subcutaneous edema compatible with cellulitis, no definitive findings of acute osteomyelitis or drainable abscess, suspected cystic versus erosive-like changes in fifth metatarsal head likely chronic in nature.   Podiatry and infectious disease consulted  Might need right hallux debridement/amputation  Vascular surgeon on board: Plan for peripheral angiography and possible intervention to assist with wound healing prior to probable amputation  Wound wound care consulted  Continue Zyvox and Zosyn  Blood cultures in process     Sepsis secondary to above  Tachycardic and febrile on admission  Treatment as above     HFrEF: Last echo on 6/8/2021 showed EF of 15%, severe hypokinesis and grade 2 diastolic dysfunction  Cardiology on board: Appreciate their recs  Continue carvedilol; holding Lasix as patient blood pressures are soft  Not on any ACE prior to admission  Salt and fluid restriction  Strict intake output and daily weights     Hx of paroxysmal A.  Fib  Continue Coreg; started on digoxin per cardiology recs  Holding Xarelto for possible amputation  Monitor on telemetry     Acute blood loss anemia; hemoglobin 13.8 on 7/7/2021  Hemoglobin 10.8 on admission likely secondary to bleeding from foot ulcer  Iron studies ordered  Monitor hemoglobin closely     Hx of chronic thrombocytopenia; improved platelet count on admission     Hypothyroidism, continue Synthroid     Hx of seizure; continue Keppra        DVT prophylaxis: Lovenox  Code status: DNR CCA       DVT PPX:   Code:DNR-CCA  Diet: Diet NPO    Disposition:     Current Medications  0.9 % sodium chloride infusion, Continuous  digoxin (LANOXIN) tablet 125 mcg, Daily  sodium chloride flush 0.9 % injection 5-40 mL, 2 times per day  sodium chloride flush 0.9 % injection 5-40 mL, PRN  0.9 % sodium chloride infusion, PRN  acetaminophen (TYLENOL) tablet 650 mg, Q6H PRN   Or  acetaminophen (TYLENOL) suppository 650 mg, Q6H PRN  piperacillin-tazobactam (ZOSYN) 3,375 mg in dextrose 5 % 50 mL IVPB (mini-bag), Q6H  mupirocin (BACTROBAN) 2 % ointment, Daily  carvedilol (COREG) tablet 3.125 mg, BID WC  levETIRAcetam (KEPPRA) tablet 500 mg, BID  linezolid (ZYVOX) tablet 600 mg, 2 times per day  aspirin EC tablet 81 mg, Daily  [Held by provider] enoxaparin (LOVENOX) injection 90 mg, BID        Objective:  /66   Pulse 92 Temp 98.3 °F (36.8 °C) (Oral)   Resp 16   Ht 6' 4\" (1.93 m)   Wt 216 lb 11.4 oz (98.3 kg)   SpO2 95%   BMI 26.38 kg/m²     Intake/Output Summary (Last 24 hours) at 7/26/2021 0955  Last data filed at 7/26/2021 0557  Gross per 24 hour   Intake 2003.6 ml   Output 125 ml   Net 1878.6 ml      Wt Readings from Last 3 Encounters:   07/26/21 216 lb 11.4 oz (98.3 kg)   07/07/21 206 lb 7 oz (93.6 kg)   06/23/21 206 lb (93.4 kg)       Physical Examination:   General appearance: No apparent distress appears stated age and cooperative. HEENT Normal cephalic, atraumatic without obvious deformity. Pupils equal, round, and reactive to light. Extra ocular muscles intact. Conjunctivae/corneas clear. Lungs: Clear to auscultation, bilaterally without Rales/Wheezes/Rhonchi with good respiratory effort. Heart: Regular rate and rhythm with Normal S1/S2 without murmurs, rubs or gallops, point of maximum impulse non-displaced  Abdomen: Soft, non-tender or non-distended without rigidity or guarding and positive bowel sounds all four quadrants. Extremities:  Normal ROM BUE; ROM of BLE could not be obtained  Skin : BLE erythematous with scattered open ulceration, dressing in place on right foot, gangrenous right toes with open ulcers  Neurologic: Alert and oriented x2. Neurovascularly intact with sensory/motor intact upper extremities/lower extremities, bilaterally.   Cranial nerves: II-XII intact, grossly non-focal.    Labs and Tests:  CBC:   Recent Labs     07/24/21  0719 07/25/21  0443 07/26/21  0532   WBC 7.1 7.1 7.5   HGB 10.8* 10.8* 10.9*    170 181     BMP:    Recent Labs     07/24/21  0719 07/25/21  0443 07/26/21  0532    139 140   K 4.4 4.2 3.9    103 104   CO2 28 26 28   BUN 32* 32* 29*   CREATININE 1.0 1.0 1.0   GLUCOSE 111* 112* 119*     Hepatic:   Recent Labs     07/24/21  0719   AST 21   ALT 11   BILITOT 1.0   ALKPHOS 114     MRI FOOT RIGHT WO CONTRAST   Final Result   Evaluation is limited as the patient could not complete the whole exam.      Within this limitation, there are no definitive findings of acute   osteomyelitis or a drainable abscess. Generalized subcutaneous edema compatible with cellulitis. Suspected cystic versus erosive-like change in the 5th metatarsal head which   is favored to be chronic in nature. XR CHEST PORTABLE   Final Result   Cardiomegaly with no evidence of edema         XR ANKLE RIGHT (2 VIEWS)   Final Result   1. Negative ankle   2. Gangrene of the 1st digit and lateral foot ulcer, with no findings of   osteomyelitis         XR FOOT RIGHT (2 VIEWS)   Final Result   1. Negative ankle   2. Gangrene of the 1st digit and lateral foot ulcer, with no findings of   osteomyelitis             Problem List  Active Problems:    Sepsis ([address]  Resolved Problems:    * No resolved hospital problems.  Oscar Munoz MD   7/26/2021 9:55 AM

## 2021-07-26 NOTE — CARE COORDINATION
Patient has consult for wound care. Patient being seen by Vascular surgery and Podiatry for leg and foot wounds. Orders for betadine to foot wounds and bacitracin for legs wound given by Podiatry. Patient has stage 3 to coccyx and buttocks. Discussed case with nurse Jerrica Moore, will place wound care orders. Patient currently in Cath lab. Will continue to follow this admission.  RAQUEL Auguste, RN  [facility]

## 2021-07-26 NOTE — CONSULTS
Palliative Care:      a 79 y.o. male  with PMHx of hypertension, HFrEF(EF 15 to 20%), PAD, CKD, seizures, childhood TB, dementia and BLE venous ulceration was brought into the ED by EMS after they were called to his residence for a lift assist.  EMS found patient to be febrile and his legs look bad and right foot seemed gangrenous. Of note, patient was recently admitted at [facility] from 7/1/2021--7/7/2021 for lower extremity providentia wound infection and Enterobacter bacteremia and was discharged on levofloxacin. On admission, patient was tachycardic, febrile with temp max of 101.5. Initial diagnostic labs were unremarkable except elevated BUN, albumin 2.8, hemoglobin 10.8 (13.8 on 7/7/21). Right foot x-ray showed gangrene of first digit and lateral foot ulcer with no evidence of osteomyelitis. Patient was given IV fluids and Vanco and Zosyn in the ED. Admitted 7/24/21 and Palliative consult placed.        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 Past Surgical History:   has no past surgical history on file. Advance Directives:     DNR-CC verified with Paul bartholomew. Problem Severity: Pain/Other Symptoms:   Stage III on coccyx/buttock. Gangrene right foot and lateral foot ulcer noted. Patient completed right posterior tibial artherectomy and right PTA 7/26/21. To follow up out-patient in three weeks to monitor  Progress. Bed Mobility/Toileting/Transfer:   PT/OT with this admit remains pending.  Patient does have decreased functional mobility. Confused with orientation of where he is. Needs frequent reinforcement for safety. Performance Status:        Palliative Performance Scale:  100% []Full Normal activity & work No evidence of disease  90%   [] Full Normal activity & work Some evidence of disease  80%   [] Full Normal activity with Effort Some evidence of disease  70%   [] Reduced Unable Normal Job/Work Significant disease Full Normal or reduced  60%   [] Ambulation reduced; Significant disease; Can't do hobbies/housework; intake normal   or reduced; occasional assist; LOC full/confusion  50%   [] Mainly sit/lie; Extensive disease; Can't do any work; Considerable assist; intake normal  Or reduced; LOC full/confusion  40%   [x] Mainly in bed; Extensive disease; Mainly assist; intake normal or reduced; occasional assist; LOC full/confusion  30%   [] Bed Bound; Extensive disease; Total care; intake reduced; LOC full/confusion  20%   [] Bed Bound; Extensive disease; Total care; intake minimal; Drowsy/coma  10%   [] Bed Bound; Extensive disease; Total care; Mouth care only; Drowsy/coma    PPS 40%  Symptom Assessment: Appetite/Nausea/Bowels/Fatigue:  216 lbs. Albumin   2.8      Social History:   reports that he has never smoked. He has never used smokeless tobacco. He reports previous alcohol use. Family History:  family history is not on file. Psychological/Spiritual:   . Daughter Sravanthi Kline. Non-Voodoo. Patient has dementia. Family Discussion:    Attempt to visit patient as of 1400 and 1500. Off floor for angiogram 7/26/21. Follow up visit with patient. Is alert and orientated to self. States he is ready to get up and get out of here to go to school. Easily redirected. Denies pain. Awaiting Podiatry to determine next steps for surgical intervention of right toes. PT/OT remain pending to determine if patient will qualify to obtain SNIF. Call to daughter Satya Oliveros (Clora Meckel).  Update of current status provided. Discussed potential DC POC. Satya Chatmanjeanne states at current level of care patient could not be managed safely in home. Is in hopes patient would qualify for SNIF. Satya Oliveros states patient will not go back to [facility] as she feels that his skin breakdown on coccyx and buttock are related to not receiving adequate therapy/out of bed activities. Is agreeable to allow CM to explore other options. Currently not interested in hospice comfort care as they await to do surgical intervention. Discussed code status. Agreeable to return to [facility]. Denies need for any spiritual support as patient at times is confused/easily agitated. Daughter Satya Oliveros states she will be in to see patient on Wednesday 7/28/21. Perfect Serve to physician for code status change. Obtained. Palliative will continue to follow as needed. SIMON Knowles and Nurse Ramos updated of these conversations.

## 2021-07-26 NOTE — CONSULTS
[address]   Electrophysiology Nurse Practitioner  Consult    Date: 7/26/2021  Date of admission: 7/24/2021  6:34 AM  Reason for Admission: Sepsis [facility]) [A41.9]    Consult Requesting Physician: Lucy Chavez MD    -Reason for Consultation: Atrial fibrillation    Chief Complaint   Patient presents with    Extremity Weakness       HISTORY OF PRESENT ILLNESS: History obtained from medical record. Pt is confused and drowsy    Rios Ramon is a 79 y.o. male with a past medical history of long standing severe cardiomyopathy, CHF, medical non-compliance, MR, HTN, seizures, PVD, and TB. Pt presented to hospital for worsening weakness. He was found to have septicemia and gangrene of his right foot. An EKG in the ER showed atrial fibrillation. Chart review shows AF on 7/1/21. An echo showed EF of 15-20% with severe pulmonary HTN. EP consulted for rhythm management. He had EF of 15-20% back in 2018. No ischemic work up was done at that time. He has not had adequate follow up and concerns about medication compliance. Interval Hx: Today, he is being seen for consult. He is pleasantly confused and drowsy. He remains in atrial fibrillation, rate fairly controlled. His BP is marginal. Plans for vascular study in AM.     Patient seen and examined. Clinical notes reviewed. Telemetry reviewed. No new complaints today. No major events overnight. Denies having chest pain, palpitations, shortness of breath, orthopnea, cough, or dizziness at the time of this visit. Allergies:  No Known Allergies    Home Meds:  Prior to Visit Medications    Medication Sig Taking?  Authorizing Provider   carvedilol (COREG) 3.125 MG tablet Take 1 tablet by mouth 2 times daily (with meals) Yes Jessica Davis MD   aspirin 81 MG EC tablet Take 81 mg by mouth daily Yes Historical Provider, MD   Compression Stockings MISC by Does not apply route Bilateral below knee compression stockings 20-30 mmHg Yes Donell Seymour MD levothyroxine (SYNTHROID) 25 MCG tablet Take 1 tablet by mouth daily Yes Marco Antonio Garcia MD   vitamin D (ERGOCALCIFEROL) 1.25 MG (65227 UT) CAPS capsule Take 1 capsule by mouth once a week Yes RODERICK Sandoval - CNS   levETIRAcetam (KEPPRA) 500 MG tablet Take 1 tablet by mouth 2 times daily Yes Marco Antonio Garcia MD   rivaroxaban (XARELTO) 15 MG TABS tablet Take 1 tablet by mouth 2 times daily (with meals) for 21 days  Marco Antonio Garcia MD   furosemide (LASIX) 40 MG tablet Take 2 tablets by mouth 2 times daily  RODERICK Sandoval - CNS      Past Medical History:  Past Medical History:   Diagnosis Date    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 with ulceration of other part of lower leg ([facility] [address] 3/2/2021    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 ([facility] [address] 3/2/2021    CHF (congestive heart failure) ([facility] [address]     Hypertension     Lower extremity edema 3/2/2021    Non-pressure chronic ulcer of left lower leg with fat layer exposed ([facility] [address] 3/2/2021    Non-pressure chronic ulcer of right lower leg with fat layer exposed ([facility] [address] 3/2/2021    Peripheral venous insufficiency 3/2/2021    Seizures ([facility] [address]     TB (pulmonary tuberculosis)     he says the doctors told him he had TB when he was 15 and he had to have surgery      Past Surgical History:    has no past surgical history on file. Social History:  Reviewed. reports that he has never smoked. He has never used smokeless tobacco. He reports previous alcohol use. Family History:  Reviewed. family history is not on file.      Review of System: Limited due to pt mental status and drowsiness  · Constitutional: Negative for fever, night sweats, chills, weight changes, or weakness  · Skin: Negative for rash, dry skin, pruritus, bruising, bleeding, blood clots, or changes in skin pigment  · HEENT: Negative for vision changes, ringing in the ears, sore throat, dysphagia, or swollen lymph nodes  · Respiratory: Reviewed in HPI  · Cardiovascular: Reviewed in HPI  · Gastrointestinal: Negative for abdominal pain, N/V/D, constipation, or black/tarry stools  · Genito-Urinary: Negative for dysuria, incontinence, urgency, or hematuria  · Musculoskeletal: Negative for joint swelling, muscle pain, or injuries  · Neurological/Psych: Negative for confusion, seizures, headaches, balance issues or TIA-like symptoms. No anxiety, depression, or insomnia    Physical Examination:  Vitals:    07/26/21 0800   BP: 115/66   Pulse:    Resp:    Temp:    SpO2:       In: 1283.6 [P.O.:120; I.V.:969.7]  Out: 125    Wt Readings from Last 3 Encounters:   07/26/21 216 lb 11.4 oz (98.3 kg)   07/07/21 206 lb 7 oz (93.6 kg)   06/23/21 206 lb (93.4 kg)       Telemetry: Personally Reviewed  - Atrial fibrillation with PVCs  · Constitutional: Cooperative and in no apparent distress, and appears well nourished  · Skin: Warm and pink; no pallor, cyanosis, bruising, or clubbing. + wound to right foot with black toes  · HEENT: Symmetric and normocephalic. PERRL, EOM intact. Conjunctiva pink with clear sclera. Mucus membranes pink and moist. Teeth intact. Thyroid smooth without nodules or goiter. · Cardiovascular: Regular rate and irregular rhythm. S1/S2 present without murmurs, rubs, or gallops. Peripheral pulses 2+, capillary refill < 3 seconds. No peripheral edema, no elevation of JVP  · Respiratory: Respirations symmetric and unlabored. Lungs clear to auscultation bilaterally, no wheezing, crackles, or rhonchi  · Gastrointestinal: Abdomen soft and rotund. Bowel sounds normoactive in all quadrants without tenderness or masses. · Musculoskeletal: Bilateral upper and lower extremity strength 5/5 with full ROM  · Neurologic/Psych: . Drowsy but arouseable. Pleasantly confused.  Calm affect, appropriate mood    Pertinent labs, diagnostic, device, and imaging results reviewed as a part of this visit    Labs:  BMP:   Recent Labs     07/24/21  0719 07/25/21  0443 07/25/21  1404 21  0532    139  --  140   K 4.4 4.2  --  3.9    103  --  104   CO2 28 26  --  28   BUN 32* 32*  --  29*   CREATININE 1.0 1.0  --  1.0   MG  --   --  2.00  --      Estimated Creatinine Clearance: 70 mL/min (based on SCr of 1 mg/dL). CBC:   Recent Labs     21  0719 21  0443 21  0532   WBC 7.1 7.1 7.5   HGB 10.8* 10.8* 10.9*   HCT 33.5* 32.8* 33.1*   MCV 88.5 88.1 88.1    170 181     Thyroid:   Lab Results   Component Value Date    TSH 7.42 03/10/2021     Lipids:  Lab Results   Component Value Date    CHOL 174 2018    HDL 46 2018    TRIG 88 2018     LFTS:   Lab Results   Component Value Date    ALT 11 2021    AST 21 2021    ALKPHOS 114 2021    PROT 6.4 2021    AGRATIO 0.8 2021    BILITOT 1.0 2021     Cardiac Enzymes:   Lab Results   Component Value Date    CKTOTAL 170 2021    TROPONINI 0.04 2021    TROPONINI 0.07 2021    TROPONINI 0.05 2020     Coags:   Lab Results   Component Value Date    PROTIME 22.5 2021    INR 1.94 2021       EC21  Atrial fibrillation with IVCD    ECHO:      Left ventricular systolic function is reduced with ejection fraction estimated at 15-20 %. Severe hypokinesis of the inferior, inferoseptal and anterior septal walls with hypokinesis of the remaining walls. There is mild left ventricular hypertrophy. Left ventricular size is mildly increased. Grade II diastolic dysfunction with elevated filling pressure. Biatrial enlargement. Right ventricular systolic function appears reduced. Aortic valve appears sclerotic but opens adequately. Mild aortic regurgitation is present. Moderate to severe mitral regurgitation. Mild-moderate pulmonic regurgitation present. Moderate tricuspid regurgitation. Systolic pulmonary artery pressure (SPAP) estimated at 62 mmHg (right atrial pressure 8 mmHg), consistent with severe pulmonary hypertension.  IVC size in AF, rate controlled   ~ Mild tachycardia is acceptable given acute illness/infection    - Continue digoxin 125 mcg QD, coreg 3.125 mg BID  - [id number] score:high ; [id number] Vasc score and anticoagulation discussed. High risk for stroke and thromboembolism. Anticoagulation is recommended. Risk of bleeding was discussed. ~ Lovenox on hold per vascular team for peripheral angiogram in AM. Will consider starting DOAC once ok with vascular team following his procedures, although, this will need to be discussed with pt/family given his fall risk    - Afib risk factors including age, HTN, obesity, inactivity and POLA were discussed with patient. Risk factor modification recommended   ~ TSH 5.80 (7/21)      - Treatment options including cardioversion, rate control strategy, antiarrhythmics, anticoagulation and possible ablation were discussed with patient. Risks, benefits and alternative of each treatment options were explained. All questions answered    ~ Poor candidate for invasive EP procedures at this time due to sepsis/acute infection. Will consider DCCV as outpatient if he remains compliant with medical therapy and is able to tolerate 4 weeks of anti-coagulation, although this is unlikely to be successful given severe left atrium dilation    2. Chronic systolic heart failure (NYHA Class IV), Pulmonary HTN  - Appears compensated   ~ EF 15-20% per echo  - Continue with coreg 3.125 mg BID, digoxin 125 mcg QD  ~ Add ACE/ARB if BP tolerates  - Monitor I&Os, daily weights    3. Cardiomyopathy   - Unknown origin ?, but long standing since 2018 (No ischemic work up at that time   - On BB, dig    - Given low EF and no prior ischemic testing, he would benefit from left/right heart cath at some point, but this will need to be discussed further with his family as he is a DNR-CCA    4. Mitral Regurgitation   - Moderate to severe per echo     5. HTN   - Stable, but marginal   - If worsens, may switch coreg to Toprol    6. Anemia   - Stable   - Iron studies pending   - Will need closely monitored on AC    7. Gangrene, Septicemia   - Remains hemodynamically stable    - On ABX   - Plans for peripheral angiogram in AM   - Management per vascular surgery/hospitalist    Agree with palliative care consult. Pt is currently DNR-CCA (Previously DNR-CC on prior admission just a few weeks ago ?)    All pertinent information and plan of care discussed with the EP physician. All questions and concerns were addressed to the patient/family. Alternatives to my treatment were discussed. I have discussed the above stated plan and the patient verbalized understanding and agreed with the plan. Discussed plan with patient and nurse.     Thank you for allowing to us to participate in the care of [address] [place] [address] RODERICKCNP  [address]   Office: [phone]

## 2021-07-26 NOTE — PROGRESS NOTES
[address]   Daily Progress Note      Admit Date:  7/24/2021    HPI:    Mr. Jin Rubio is an 79year old male with no medical history as he does not go to physicians, new sHF in 2018 (non compliant and no LHC due to dementia)    He is admitted for weakness and infected toe. He has been non compliant with medications and follow up in the office. His cxr shows pulmonary congestion and probnp 35K. He is in in AF. He is a DNR-CCA. Subjective:  Patient is being seen for sHF. There were no acute overnight cardiac events. Creat 1.0 potassium 3.9 HR controlled 90s weight stable at 216   No family at bedside. He is pleasantly confused. Objective:   /66   Pulse 92   Temp 98.3 °F (36.8 °C) (Oral)   Resp 16   Ht 6' 4\" (1.93 m)   Wt 216 lb 11.4 oz (98.3 kg)   SpO2 95%   BMI 26.38 kg/m²       Intake/Output Summary (Last 24 hours) at 7/26/2021 1023  Last data filed at 7/26/2021 0557  Gross per 24 hour   Intake 2003.6 ml   Output 125 ml   Net 1878.6 ml          Physical Exam:  General:  Awake, alert, pleasantly confused in NAD  Skin:  Warm and dry. No unusual bruising or rash  Neck:  Supple. No JVD or carotid bruit appreciated  Chest:  Normal effort.   Clear to auscultation, no wheezes/rhonchi/rales  Cardiovascular:  RRR, S1/S2, no murmur/gallop/rub  Abdomen:  Soft, nontender, +bowel sounds  Extremities:  No edema, right toes black  Neurological: No focal deficits  Psychological: Normal mood and affect      Medications:    digoxin  125 mcg Oral Daily    sodium chloride flush  5-40 mL Intravenous 2 times per day    piperacillin-tazobactam  3,375 mg Intravenous Q6H    mupirocin   Topical Daily    carvedilol  3.125 mg Oral BID WC    levETIRAcetam  500 mg Oral BID    linezolid  600 mg Oral 2 times per day    aspirin  81 mg Oral Daily    [Held by provider] enoxaparin  1 mg/kg Subcutaneous BID      sodium chloride      sodium chloride 25 mL (07/25/21 2017)       Lab Gangrene of right foot  2. Septicemia  3. Severe cardiomyopathy, on BB  4. PAF  5. Dementia  6. Anemia    Plan:    1. Check iron studies  2. Continue digoxin 125 mcg daily  3. Continue coreg 3.125 mg bid  4. Consider adding lisinopril after surgery   5. CHF nurse following for diet education, fluid restriction and daily weights  6. Palliative consult pending  7. Vascular following    new AF. He has had very poor compliance with f/u in the past.  He has had falls and problems with mobility which makes [facility] also treacherous. He is presently on therapeutic lovenox, but eliquis could be started after we decide that he does not need any surgery and after discussion with his caregivers. per consult note    Discussed with bedside nurse    NYHA IV    Discussed with patient who is agreeable with plan of care. Thank you for allowing me to participate in the care of your patient.     Hesham Arzate, APRN - CNS, CNS

## 2021-07-26 NOTE — PLAN OF CARE
Problem: Falls - Risk of:  Goal: Will remain free from falls  Description: Will remain free from falls  Outcome: Ongoing  Goal: Absence of physical injury  Description: Absence of physical injury  Outcome: Ongoing     Problem: Skin Integrity:  Goal: Will show no infection signs and symptoms  Description: Will show no infection signs and symptoms  Outcome: Ongoing     Problem: Pain:  Goal: Pain level will decrease  Description: Pain level will decrease  Outcome: Ongoing   Pt remains free of falls and injury. Pt skin shows no change from previous shifts. Pt has reported no pain this shift. WCTM.

## 2021-07-26 NOTE — PROGRESS NOTES
Pt noted to have 46 beats of V-Tach on telemetry. Pt asymptomatic. On call cardiology notified via telephone. Received call back from cardiology, cardiology to review pt cart and put new orders in.

## 2021-07-26 NOTE — PROGRESS NOTES
Dr. Marcia Mccullough spoke to on call cardiology regarding the V-tach. Received call back from on call cardiology to monitor the pt. Will let the night shift RN know.

## 2021-07-26 NOTE — PROGRESS NOTES
Vascular Progress Note    7/26/2021 10:09 AM    Chief complaint / Reason for visit : right foot gangrene     Subjective:  Patient resting in bed. He is pleasantly confused. Wanting to drink and eat. Has no other complaints. Appears hemodynamically stable, afebrile.      Vital Signs: /66   Pulse 92   Temp 98.3 °F (36.8 °C) (Oral)   Resp 16   Ht 6' 4\" (1.93 m)   Wt 216 lb 11.4 oz (98.3 kg)   SpO2 95%   BMI 26.38 kg/m²      I/O:      Intake/Output Summary (Last 24 hours) at 7/26/2021 1009  Last data filed at 7/26/2021 0557  Gross per 24 hour   Intake 2003.6 ml   Output 125 ml   Net 1878.6 ml       Physical Exam:   General: no apparent distress, appears stated age  Chest/Lungs: no accessory muscle use  Cardiac:  irregular rhythm, controlled rate  Vascular: + DP/PT doppler signals bilaterally   Extremities: BLE stasis dermatitis     Right toes gangrene, no significant drainage, + malodor           Labs:   Lab Results   Component Value Date     07/26/2021    K 3.9 07/26/2021     07/26/2021    CO2 28 07/26/2021    BUN 29 07/26/2021    CREATININE 1.0 07/26/2021    GFRAA >60 07/26/2021    LABGLOM >60 07/26/2021    GLUCOSE 119 07/26/2021    PHOS 3.8 08/05/2020    MG 2.00 07/25/2021    CALCIUM 7.8 07/26/2021     Lab Results   Component Value Date    WBC 7.5 07/26/2021    RBC 3.76 07/26/2021    HGB 10.9 07/26/2021    HCT 33.1 07/26/2021    MCV 88.1 07/26/2021    RDW 15.7 07/26/2021     07/26/2021     Lab Results   Component Value Date    INR 1.94 (H) 07/01/2021    PROTIME 22.5 (H) 07/01/2021        Imaging:    MRI right foot 7/24/21:  Impression   Evaluation is limited as the patient could not complete the whole exam.       Within this limitation, there are no definitive findings of acute   osteomyelitis or a drainable abscess.       Generalized subcutaneous edema compatible with cellulitis.       Suspected cystic versus erosive-like change in the 5th metatarsal head which   is favored to be chronic in nature. Venous duplex right leg 6/28/21:  Conclusions        Summary        Acute totally occluding deep vein thrombosis involving an isolated segment    of a set of gastroc veins     BLE arterial duplex 4/8/21:  Impressions   Right Impression   Right ZENA was not available due to noncompressible tibial vessels . The majority of the waveforms are triphasic throughout the right lower   extremity. Ultrasound images of the right lower extremity reveal moderate calcific plaque   formation throughout. Elevated velocity of the mid peroneal artery suggests a greater than 50%   stenosis. Proximal posterior tibial artery was not visualized. Left Impression   Left ZENA was not available due to noncompressible tibial vessels . The majority of the waveforms are triphasic throughout the left lower   extremity. Ultrasound images of the left lower extremity reveal moderate calcific plaque   formation throughout. 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 Proximal   posterior tibial and distal anterior tibial arteries were not visualized.       Conclusions        Summary        Elevated velocity of the mid peroneal artery suggests a greater than 50%    stenosis.    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 Proximal    posterior tibial and distal anterior tibial arteries were not visualized.        ZENA was not available due to noncompressible tibial vessels .          Scheduled Meds:    digoxin  125 mcg Oral Daily    sodium chloride flush  5-40 mL Intravenous 2 times per day    piperacillin-tazobactam  3,375 mg Intravenous Q6H    mupirocin   Topical Daily    carvedilol  3.125 mg Oral BID WC    levETIRAcetam  500 mg Oral BID    linezolid  600 mg Oral 2 times per day    aspirin  81 mg Oral Daily    [Held by provider] enoxaparin  1 mg/kg Subcutaneous BID     Continuous Infusions:    sodium chloride      sodium chloride 25 mL (07/25/21 2017)         Assessment:   Gangrene toes of right foot  Venous ulceration right leg with stasis dermatitis   CHF - EF 15-20%  PAF on chronic anticoagulation - Xarelto on hold   Dementia     Plan: Will plan for peripheral angiogram possible intervention today with Dr. Nitza Molina to assist with wound healing prior to probable amputation. NPO since midnight. Low dose IVFs prior to angiogram.   Hold Lovenox. Wound care per podiatry. No family present, nursing spoke with patient's wife this morning and updated on above. Patient educated on plan of care and disease process. All questions answered.         Electronically signed by RODERICK Galeana CNP on 7/26/2021 at 10:09 AM

## 2021-07-26 NOTE — PROGRESS NOTES
Infectious Diseases   Progress Note      Admission Date: 7/24/2021  Hospital Day: Hospital Day: 3   Attending: Estephania Mercado MD  Date of service: 7/26/2021     Chief complaint/ Reason for consult:     · Complicated right diabetic foot infection-MRI was negative for osteomyelitis but showed chronic erosive changes in the right fifth metatarsal head  · History of Providencia and Enterobacter bacteremia  · Essential hypertension  · Peripheral arterial disease    Microbiology:        I have reviewed allavailable micro lab data and cultures    · Blood culture (2/2) - collected on 7/24/2021: Negative so far        Antibiotics and immunizations:       Current antibiotics: All antibiotics and their doses were reviewed by me    Recent Abx Admin                   piperacillin-tazobactam (ZOSYN) 3,375 mg in dextrose 5 % 50 mL IVPB (mini-bag) (mg) 3,375 mg New Bag 07/26/21 0841     3,375 mg New Bag  0231     3,375 mg New Bag 07/25/21 2019    linezolid (ZYVOX) tablet 600 mg (mg) 600 mg Given 07/26/21 0820     600 mg Given 07/25/21 2015                  Immunization History: All immunization history was reviewed by me today. Immunization History   Administered Date(s) Administered    Tdap (Boostrix, Adacel) 07/02/2021       Known drug allergies: All allergies were reviewed and updated    No Known Allergies    Social history:     Social History:  All social andepidemiologic history was reviewed and updated by me today as needed. · Tobacco use:   reports that he has never smoked. He has never used smokeless tobacco.  · Alcohol use:   reports previous alcohol use. · Currently lives in: [address] [place] [address]  ·  has no history on file for drug use.      COVID VACCINATION AND LAB RESULT RECORDS:     Internal Administration   First Dose      Second Dose           Last COVID Lab SARS-CoV-2, NAAT (no units)   Date Value   07/07/2021 Not Detected            Assessment:     The patient is a 79 y.o. 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 with following problems:    · Complicated right diabetic foot infection-MRI was negative for osteomyelitis but showed chronic erosive changes in the right fifth metatarsal head  · History of Providencia and Enterobacter bacteremia  · Essential hypertension  · Peripheral arterial disease  · S/p right leg angiography on 7/26/2021  · Seizure disorder  · Type 2 diabetes mellitus  · Coronary artery disease  · Chronic atrial fibrillation  · History of tuberculosis as a child  · Overweight due to excess calorie intake : Body mass index is 26.38 kg/m². ·       Discussion:      The patient had a fever of 101.5 on presentation to the ER. White cell count was 7100 on admission    White cell count 7500 today. Platelet count is 743,326. He is on empiric IV linezolid and IV Zosyn. He is known to me from previous hospitalization earlier this month for gram-negative bacteremia with Providencia     Serum creatinine is 1.0    Patient had an MRI of the right foot done without contrast on 7/24/2021. Return for any obvious osteomyelitis but did show chronic erosive changes in the right fifth metatarsal head. He had a right leg angiography done earlier today with arthrectomy of the right posterior tibial artery.     Plan:     Diagnostic Workup:    · Will ask for surgical cultures and path  · Continue to follow  fever curve, WBC count and blood cultures. · Continue to monitor blood counts, liver and renal function. · Add sed rate and CRP to morning labs    Antimicrobials:    · Will continue empiric IV linezolid 60 mg every 12 hour  · Continue IV Zosyn 3.375 g every 8 hour  · Podiatry following. Plans for right hallux amputation noted  · Will order oral probiotics twice daily  · We will follow up on the culture results and clinical progress and will make further recommendations accordingly. · Continue close vitals monitoring. · Maintain good glycemic control. · Fall precautions. Aspiration precautions. · Continue to watch for new fever or diarrhea. · DVT prophylaxis. · Discussed all above with patient and RN. Drug Monitoring:    · Continue monitoring for antibiotic toxicity as follows: CBC, CMP   · Continue to watch for following: new or worsening fever, new hypotension, hives, lip swelling and redness or purulence at vascular access sites. I/v access Management:    · Continue to monitor i.v access sites for erythema, induration, discharge or tenderness. · As always, continue efforts to minimize tubes/lines/drains as clinically appropriate to reduce chances of line associated infections. Patient education and counseling:        · The patient was educated in detail about the side-effects of various antibiotics and things to watch for like new rashes, lip swelling, severe reaction, worsening diarrhea, break through fever etc.  · Discussed patient's condition and what to expect. All of the patient's questions were addressed in a satisfactory manner and patient verbalized understanding all instructions. ·   Diabetes mellitus education and counseling:    Patient was educated in detail about the importance of keeping diabetes under control to improve the cure rate of infection and prevent future infections.  Patient was advised to check blood glucose level regularly and to stay compliant with the diabetes medications. Patient was advised to the trim the toe nails carefully, wear shoes or slippers at all times and check both feet everyday before going to bed to look for any cuts, blisters, swelling or redness. Importance of washing the feet everyday with soap and water and keeping them dry, and seeking prompt medical attention in case of a non-healing wound or ulcer on the feet was also highlighted. Level of complexity of visit: High     Risk of Complications/Morbidity: High     · Illness(es)/ Infection present that pose threat to bodily function. · There is potential for severe exacerbation of infection/side effects of treatment. · Therapy requires intensive monitoring for antimicrobial agent toxicity. TIME SPENT TODAY:     - Spent over  36 minutes on visit (including interval history, physical exam, review of data including labs, cultures, imaging, development and implementation of treatment plan and coordination of complex care). More than 50 percent of this includes face-to-face time spent with the patient for counseling and coordination of care. Thank you for involving me in the care of your patient. I will continue to follow. If you have anyadditional questions, please do not hesitate to contact me. Subjective: Interval history: Interval history was obtained from chart review and patient/ RN. The patient is afebrile. The patient has undergone right leg angiography earlier today. He is tolerating antibiotics okay. No diarrhea. REVIEW OF SYSTEMS:     Review of Systems   Constitutional: Positive for fatigue. Negative for chills, diaphoresis and fever. HENT: Negative for ear discharge, ear pain, rhinorrhea, sore throat and trouble swallowing. Eyes: Negative for discharge and redness. Respiratory: Negative for cough, shortness of breath and wheezing.     Cardiovascular: Negative for chest pain and leg swelling. Gastrointestinal: Negative for abdominal pain, constipation, diarrhea and nausea. Endocrine: Negative for polyuria. Genitourinary: Negative for dysuria, flank pain, frequency, hematuria and urgency. Musculoskeletal: Negative for back pain and myalgias. Skin: Negative for rash. Ongoing right foot ulcers   Neurological: Negative for dizziness, seizures and headaches. Hematological: Does not bruise/bleed easily. Psychiatric/Behavioral: Negative for hallucinations and suicidal ideas. All other systems reviewed and are negative. Past Medical History: All past medical history reviewed today. Past Medical History:   Diagnosis Date    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 with ulceration of other part of lower leg ([address] 3/2/2021    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 (HCC) 3/2/2021    CHF (congestive heart failure) (HCC)     Coronary artery disease due to lipid rich plaque     Hypertension     Lower extremity edema 3/2/2021    Non-pressure chronic ulcer of left lower leg with fat layer exposed ([address] 3/2/2021    Non-pressure chronic ulcer of right lower leg with fat layer exposed ([address] 3/2/2021    Peripheral venous insufficiency 3/2/2021    Seizures ([address]     TB (pulmonary tuberculosis)     he says the doctors told him he had TB when he was 15 and he had to have surgery    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 History reviewed. No pertinent surgical history. Family History: All family history was reviewed today. History reviewed. No pertinent family history.     Objective:       PHYSICAL EXAM:      Vitals:   Vitals:    07/26/21 0745 07/26/21 0800 07/26/21 1130 07/26/21 1400   BP: 102/71 115/66 98/67    Pulse: 92  79    Resp: 16  16    Temp: 98.3 °F (36.8 °C)  97.2 °F (36.2 °C)    TempSrc: Oral  Oral    SpO2: 95%  97% Weight:       Height:    6' 4\" (1.93 m)       Physical Exam  Vitals and nursing note reviewed. Constitutional:       Appearance: Normal appearance. He is well-developed. HENT:      Head: Normocephalic and atraumatic. Right Ear: External ear normal.      Left Ear: External ear normal.      Nose: Nose normal. No congestion or rhinorrhea. Mouth/Throat:      Mouth: Mucous membranes are moist.      Pharynx: No oropharyngeal exudate or posterior oropharyngeal erythema. Eyes:      General: No scleral icterus. Right eye: No discharge. Left eye: No discharge. Conjunctiva/sclera: Conjunctivae normal.      Pupils: Pupils are equal, round, and reactive to light. Cardiovascular:      Rate and Rhythm: Normal rate and regular rhythm. Pulses: Normal pulses. Heart sounds: No murmur heard. No friction rub. Pulmonary:      Effort: Pulmonary effort is normal. No respiratory distress. Breath sounds: Normal breath sounds. No stridor. No wheezing, rhonchi or rales. Abdominal:      General: Bowel sounds are normal.      Palpations: Abdomen is soft. Tenderness: There is no abdominal tenderness. There is no right CVA tenderness, left CVA tenderness, guarding or rebound. Musculoskeletal:         General: No swelling or tenderness. Normal range of motion. Cervical back: Normal range of motion and neck supple. No rigidity. No muscular tenderness. Lymphadenopathy:      Cervical: No cervical adenopathy. Skin:     General: Skin is warm and dry. Coloration: Skin is not jaundiced. Findings: No erythema or rash. Comments: Right hallux gangrenous changes noted   Neurological:      General: No focal deficit present. Mental Status: He is alert and oriented to person, place, and time. Mental status is at baseline. Motor: No abnormal muscle tone.    Psychiatric:         Mood and Affect: Mood normal.         Behavior: Behavior normal.         Thought Content: Thought content normal.           Lines: All vascular access sites are healthy with no local erythema, discharge or tenderness. Intake and output:    I/O last 3 completed shifts: In: 1283.6 [P.O.:120; I.V.:969.7; IV Piggyback:193.9]  Out: 125 [Urine:125]    Lab Data:   All available labs and old records have been reviewed by me. CBC:  Recent Labs     07/24/21  0719 07/25/21  0443 07/26/21  0532   WBC 7.1 7.1 7.5   RBC 3.78* 3.72* 3.76*   HGB 10.8* 10.8* 10.9*   HCT 33.5* 32.8* 33.1*    170 181   MCV 88.5 88.1 88.1   MCH 28.5 29.1 28.9   MCHC 32.3 33.0 32.9   RDW 15.8* 15.7* 15.7*        BMP:  Recent Labs     07/24/21  0719 07/25/21  0443 07/26/21  0532    139 140   K 4.4 4.2 3.9    103 104   CO2 28 26 28   BUN 32* 32* 29*   CREATININE 1.0 1.0 1.0   CALCIUM 7.9* 7.8* 7.8*   GLUCOSE 111* 112* 119*        Hepatic Function Panel:   Lab Results   Component Value Date    ALKPHOS 114 07/24/2021    ALT 11 07/24/2021    AST 21 07/24/2021    PROT 6.4 07/24/2021    BILITOT 1.0 07/24/2021    LABALBU 2.8 07/24/2021       CPK:   Lab Results   Component Value Date    CKTOTAL 170 07/01/2021     ESR:   Lab Results   Component Value Date    SEDRATE 11 07/02/2021     CRP:   Lab Results   Component Value Date    .0 (H) 07/02/2021           Imaging: All pertinent images and reports for the current visit were reviewed by me during this visit. MRI FOOT RIGHT WO CONTRAST   Final Result   Evaluation is limited as the patient could not complete the whole exam.      Within this limitation, there are no definitive findings of acute   osteomyelitis or a drainable abscess. Generalized subcutaneous edema compatible with cellulitis. Suspected cystic versus erosive-like change in the 5th metatarsal head which   is favored to be chronic in nature. XR CHEST PORTABLE   Final Result   Cardiomegaly with no evidence of edema         XR ANKLE RIGHT (2 VIEWS)   Final Result   1.  Negative Tuesday & Thursday

## 2021-07-26 NOTE — PLAN OF CARE
Nutrition Problem #1: Increased nutrient needs  Intervention: Food and/or Nutrient Delivery: Start Oral Diet, Start Oral Nutrition Supplement  Nutritional Goals: advance diet with po intake at least 50% of meals & supplements

## 2021-07-26 NOTE — PROGRESS NOTES
Comprehensive Nutrition Assessment    Type and Reason for Visit:  Initial, Consult, Wound    Nutrition Recommendations/Plan:   Resume diet as tolerated  Ensure Enlive BID once diet resumes    Nutrition Assessment:  RD consulted for poor po intake & wound. Pt has gangrene to rt hallux which may require amputation per podiatry. Pt has additional increased nutrient needs for st 2 area noted to buttock. Per hx, pt has lost ~23 lb over past 4 months, but pt has hx CHF. Will offer Ensure supplements BID once diet resumes. Malnutrition Assessment:  Malnutrition Status: Moderate malnutrition    Context:  Chronic Illness     Findings of the 6 clinical characteristics of malnutrition:  Energy Intake:  Mild decrease in energy intake (Comment)  Weight Loss:  7 - 10% over 6 months     Body Fat Loss:  1 - Mild body fat loss Fat Overlying Ribs   Muscle Mass Loss:  1 - Mild muscle mass loss Clavicles (pectoralis & deltoids)  Fluid Accumulation:  1 - Mild Extremities   Strength:  Not Performed    Estimated Daily Nutrient Needs:  Energy (kcal):  1960- 2450 (20-25 kcal/98kg); Weight Used for Energy Requirements:  Current     Protein (g):  120- 138g (1.3-1.5g/92 kg); Weight Used for Protein Requirements:  Ideal        Fluid (ml/day):   ; Method Used for Fluid Requirements:  1 ml/kcal      Nutrition Related Findings:  LBM 7/26; +2 edema BLE      Wounds:  Stage II (gangrene to rt hallux; necrotic toe)       Current Nutrition Therapies:    Diet NPO    Anthropometric Measures:  · Height: 6' 4\" (193 cm)  · Current Body Weight: 216 lb (98 kg)   · Admission Body Weight:      · Usual Body Weight:       · Ideal Body Weight: 202 lbs; % Ideal Body Weight 106.9 %   · BMI: 26.3  · Adjusted Body Weight:  ; No Adjustment   · BMI Categories: Overweight (BMI 25.0-29. 9)       Nutrition Diagnosis:   · Increased nutrient needs related to increase demand for energy/nutrients as evidenced by wounds    · Inadequate oral intake related to inadequate protein-energy intake as evidenced by intake 26-50%, poor intake prior to admission      Nutrition Interventions:   Food and/or Nutrient Delivery:  Start Oral Diet, Start Oral Nutrition Supplement  Nutrition Education/Counseling:  Education not indicated   Coordination of Nutrition Care:  Continue to monitor while inpatient    Goals:  advance diet with po intake at least 50% of meals & supplements       Nutrition Monitoring and Evaluation:   Behavioral-Environmental Outcomes:  None Identified   Food/Nutrient Intake Outcomes:  Food and Nutrient Intake, Supplement Intake  Physical Signs/Symptoms Outcomes:  Weight, Skin     Discharge Planning:     Too soon to determine     Electronically signed by Joss Sheppard RD, LD on 7/26/21 at 2:14 PM EDT    Contact: [phone]

## 2021-07-27 NOTE — PROGRESS NOTES
Physician Progress Note      PATIENT:               Shelly ANTON #:                  [id number]  :                       1940  ADMIT DATE:       2021 6:34 AM  DISCH DATE:  RESPONDING  PROVIDER #:        Jaymie Ray MD        QUERY TEXT:    Stage of Chronic Kidney Disease: Please provide further specificity, if known. Clinical indicators include: ckd, creatinine, bun, chronic kidney disease  Options provided:  -- Chronic kidney disease stage 1  -- Chronic kidney disease stage 2  -- Chronic kidney disease stage 3  -- Chronic kidney disease stage 3a  -- Chronic kidney disease stage 3b  -- Chronic kidney disease stage 4  -- Chronic kidney disease stage 5  -- Chronic kidney disease stage 5, requiring dialysis  -- End stage renal disease  -- Other - I will add my own diagnosis  -- Disagree - Not applicable / Not valid  -- Disagree - Clinically Unable to determine / Unknown        PROVIDER RESPONSE TEXT:    The patient has chronic kidney disease stage 2.       Electronically signed by:  Jaymie Ray MD 2021 4:58 PM

## 2021-07-27 NOTE — PROGRESS NOTES
HOSPITALISTS PROGRESS NOTE    7/27/2021 8:44 AM        Name: Jovanni Vazquez . Admitted: 7/24/2021  Primary Care Provider: Kellie Costa MD (Tel: [phone])      CC:     Brief Course: This  79 y.o. male  with PMHx of hypertension, HFrEF(EF 15 to 20%), PAD, CKD, seizures, childhood TB, dementia and BLE venous ulceration was brought into the ED by EMS after they were called to his residence for a lift assist.  EMS found patient to be febrile and his legs look bad and right foot seemed gangrenous. Of note, patient was recently admitted at [facility] from 7/1/2021--7/7/2021 for lower extremity providentia wound infection and Enterobacter bacteremia and was discharged on levofloxacin. On admission, patient was tachycardic, febrile with temp max of 101.5. Initial diagnostic labs were unremarkable except elevated BUN, albumin 2.8, hemoglobin 10.8 (13.8 on 7/7/21). Right foot x-ray showed gangrene of first digit and lateral foot ulcer with no evidence of osteomyelitis. Patient was given IV fluids and Vanco and Zosyn in the ED. Interval history:   Patient seen and examined today  Overnight events and interval insulin notes reviewed  Afebrile overnight, WBC within normal limits  S/p right leg angiography with arthrectomy of right posterior tibial artery  Plan for partial 1st ray amputation right foot pending or availability  NPO after midnight. Started on Plavix 75 mg daily. Okay to resume Xarelto after podiatric intervention. Once Xarelto is resumed; stop Aspirin & keep on Plavix daily    Assessment & Plan:     RLE cellulitis and right hallux gangrene  Right foot x-ray showed gangrene of first digit and lateral foot ulcer with no evidence of osteomyelitis.     MRI of the right foot showed generalized subcutaneous edema compatible with cellulitis, no definitive findings of acute osteomyelitis or drainable abscess, suspected cystic versus erosive-like changes in fifth metatarsal head likely chronic in nature. Vascular surgeon on board: Status post right leg angiography with arthrectomy on 7/26/2021  Podiatry on board: Plan for partial 1st ray amputation right foot pending OR availability:NPO after midnight. Infectious disease on board: Continue IV Zyvox and Zosyn per the recs  Wound wound care consulted  Blood cultures in process     Sepsis secondary to above  Tachycardic and febrile on admission  Treatment as above     HFrEF: Last echo on 6/8/2021 showed EF of 15%, severe hypokinesis and grade 2 diastolic dysfunction  Cardiology on board: Appreciate their recs  Continue carvedilol; holding Lasix as patient blood pressures are soft  Not on any ACE prior to admission due to low blood pressure  Consider adding lisinopril after surgery  No need for Livevest or ischemic work up given code status (pt unlikely to wear device given his baseline confusion/dementia)   Salt and fluid restriction  Strict intake output and daily weights     Persistent A. Fib  EP on board; appreciate recs  Continue Coreg; started on digoxin per cardiology recs  Holding Xarelto for possible amputation  Poor candidate for invasive EP procedures at this time due to sepsis/acute infection and any EP procedure is unlikely to be successful given severe left atrium dilation   Monitor on telemetry     Acute blood loss anemia; hemoglobin 13.8 on 7/7/2021  Hemoglobin 10.8 on admission likely secondary to bleeding from foot ulcer  Iron studies ordered  Monitor hemoglobin closely     Hx of chronic thrombocytopenia; improved platelet count on admission     Hypothyroidism, continue Synthroid     Hx of seizure; continue Keppra        DVT prophylaxis: Lovenox  Code:Full Code  Diet: ADULT DIET; Regular  Adult Oral Nutrition Supplement; Standard High Calorie/High Protein Oral Supplement    Disposition:  To be determined; patient history of dementia  Patient was DNR CC prior to admission; changed to DNR CCA per wife's request.  Palliative care on board ;after discussion  with daughter Kasie Hernandez is [address] [facility]; [phone] patient is changed back to DNR CC. Patient's daughter and wife agreeable for podiatry procedure      Current Medications  clopidogrel (PLAVIX) tablet 75 mg, Daily  lactobacillus (CULTURELLE) capsule 1 capsule, BID WC  digoxin (LANOXIN) tablet 125 mcg, Daily  sodium chloride flush 0.9 % injection 5-40 mL, 2 times per day  sodium chloride flush 0.9 % injection 5-40 mL, PRN  0.9 % sodium chloride infusion, PRN  acetaminophen (TYLENOL) tablet 650 mg, Q6H PRN   Or  acetaminophen (TYLENOL) suppository 650 mg, Q6H PRN  piperacillin-tazobactam (ZOSYN) 3,375 mg in dextrose 5 % 50 mL IVPB (mini-bag), Q6H  mupirocin (BACTROBAN) 2 % ointment, Daily  carvedilol (COREG) tablet 3.125 mg, BID WC  levETIRAcetam (KEPPRA) tablet 500 mg, BID  linezolid (ZYVOX) tablet 600 mg, 2 times per day  aspirin EC tablet 81 mg, Daily  [Held by provider] enoxaparin (LOVENOX) injection 90 mg, BID        Objective:  /61   Pulse 80   Temp 97.4 °F (36.3 °C) (Oral)   Resp 16   Ht 6' 4\" (1.93 m)   Wt 216 lb 11.4 oz (98.3 kg)   SpO2 94%   BMI 26.38 kg/m²   No intake or output data in the 24 hours ending 07/27/21 0844   Wt Readings from Last 3 Encounters:   07/26/21 216 lb 11.4 oz (98.3 kg)   07/07/21 206 lb 7 oz (93.6 kg)   06/23/21 206 lb (93.4 kg)       Physical Examination:   General appearance: Not in any apparent distress  HEENT Normal cephalic, atraumatic without obvious deformity. Pupils equal, round, and reactive to light. Extra ocular muscles intact. Conjunctivae/corneas clear. Lungs: Clear to auscultation, bilaterally without Rales/Wheezes/Rhonchi with good respiratory effort.   Heart: Regular rate and rhythm with Normal S1/S2 without murmurs, rubs or gallops, point of maximum impulse non-displaced  Abdomen: Soft, non-tender or non-distended without rigidity or guarding and positive bowel sounds all four quadrants. Extremities:  Normal ROM BUE; ROM of BLE could not be obtained  Skin : BLE erythematous with scattered open ulceration, dressing in place on right foot, gangrenous right toes with open ulcers  Neurologic: Alert and oriented x2, confused at times. Neurovascularly intact with sensory/motor intact upper extremities/lower extremities, bilaterally. Cranial nerves: II-XII intact, grossly non-focal.    Labs and Tests:  CBC:   Recent Labs     07/25/21 0443 07/26/21  0532 07/27/21  0509   WBC 7.1 7.5 6.9   HGB 10.8* 10.9* 10.5*    181 192     BMP:    Recent Labs     07/25/21 0443 07/26/21  0532 07/27/21  0509    140 140   K 4.2 3.9 4.0    104 103   CO2 26 28 26   BUN 32* 29* 24*   CREATININE 1.0 1.0 1.0   GLUCOSE 112* 119* 104*     Hepatic:   No results for input(s): AST, ALT, ALB, BILITOT, ALKPHOS in the last 72 hours. MRI FOOT RIGHT WO CONTRAST   Final Result   Evaluation is limited as the patient could not complete the whole exam.      Within this limitation, there are no definitive findings of acute   osteomyelitis or a drainable abscess. Generalized subcutaneous edema compatible with cellulitis. Suspected cystic versus erosive-like change in the 5th metatarsal head which   is favored to be chronic in nature. XR CHEST PORTABLE   Final Result   Cardiomegaly with no evidence of edema         XR ANKLE RIGHT (2 VIEWS)   Final Result   1. Negative ankle   2. Gangrene of the 1st digit and lateral foot ulcer, with no findings of   osteomyelitis         XR FOOT RIGHT (2 VIEWS)   Final Result   1. Negative ankle   2.  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  Lyndsey Irizarry MD   7/27/2021 8:44 AM

## 2021-07-27 NOTE — PROGRESS NOTES
Infectious Diseases   Progress Note      Admission Date: 7/24/2021  Hospital Day: Hospital Day: 4   Attending: Itz Mesa MD  Date of service: 7/27/2021     Chief complaint/ Reason for consult:     · Complicated right diabetic foot infection-MRI was negative for osteomyelitis but showed chronic erosive changes in the right fifth metatarsal head  · History of Providencia and Enterobacter bacteremia  · Essential hypertension  · Peripheral arterial disease    Microbiology:        I have reviewed allavailable micro lab data and cultures    · Blood culture (2/2) - collected on 7/24/2021: Negative so far        Antibiotics and immunizations:       Current antibiotics: All antibiotics and their doses were reviewed by me    Recent Abx Admin                   piperacillin-tazobactam (ZOSYN) 3,375 mg in dextrose 5 % 50 mL IVPB (mini-bag) (mg) 3,375 mg New Bag 07/27/21 0850     3,375 mg New Bag  0300     3,375 mg New Bag 07/26/21 2104     3,375 mg New Bag  1816    linezolid (ZYVOX) tablet 600 mg (mg) 600 mg Given 07/27/21 0848     600 mg Given 07/26/21 2102                  Immunization History: All immunization history was reviewed by me today. Immunization History   Administered Date(s) Administered    Tdap (Boostrix, Adacel) 07/02/2021       Known drug allergies: All allergies were reviewed and updated    No Known Allergies    Social history:     Social History:  All social andepidemiologic history was reviewed and updated by me today as needed. · Tobacco use:   reports that he has never smoked. He has never used smokeless tobacco.  · Alcohol use:   reports previous alcohol use. · Currently lives in: [address] [place] [address]  ·  has no history on file for drug use.      COVID VACCINATION AND LAB RESULT RECORDS:     Internal Administration   First Dose      Second Dose           Last COVID Lab SARS-CoV-2, NAAT (no units)   Date Value   07/07/2021 Not Detected            Assessment:     The patient is a 79 y.o. old male who  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 with ulceration of other part of lower leg ([address] (3/2/2021), 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 ([address] (3/2/2021), CHF (congestive heart failure) ([address] Coronary artery disease due to lipid rich plaque, Hypertension, Lower extremity edema (3/2/2021), Non-pressure chronic ulcer of left lower leg with fat layer exposed ([address] (3/2/2021), Non-pressure chronic ulcer of right lower leg with fat layer exposed ([address] (3/2/2021), Peripheral venous insufficiency (3/2/2021), Seizures ([address] TB (pulmonary tuberculosis), and 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 with following problems:    · Complicated right diabetic foot infection-MRI was negative for osteomyelitis but showed chronic erosive changes in the right fifth metatarsal head-plans for right hallux amputation tomorrow  · History of Providencia and Enterobacter bacteremia  · Essential hypertension  · Peripheral arterial disease  · S/p right leg angiography on 7/26/2021  · Seizure disorder  · Type 2 diabetes mellitus-diabetes counseling done  · Coronary artery disease  · Chronic atrial fibrillation-rate controlled  · History of tuberculosis as a child  · Overweight due to excess calorie intake : Body mass index is 26.38 kg/m². ·       Discussion:      The patient is afebrile. He is on empiric IV linezolid and IV Zosyn. He has undergone a right leg angiography yesterday. Podiatry is following    Plan:     Diagnostic Workup:    · Will ask for surgical cultures and path  · Continue to follow  fever curve, WBC count and blood cultures. · Continue to monitor blood counts, liver and renal function. Antimicrobials:    · Will continue empiric IV linezolid 600 mg every 12 hour  · Continue IV Zosyn 3.375 g every 8 hour  · Podiatry note reviewed.   Plans for right hallux amputation for tomorrow noted  · Continue oral probiotic twice daily  · We will follow up on the culture results and clinical progress and will make further recommendations accordingly. · Continue close vitals monitoring. · Maintain good glycemic control. · Fall precautions. Aspiration precautions. · Continue to watch for new fever or diarrhea. · DVT prophylaxis. · Discussed all above with patient and RN. Drug Monitoring:    · Continue monitoring for antibiotic toxicity as follows: CBC, CMP   · Continue to watch for following: new or worsening fever, new hypotension, hives, lip swelling and redness or purulence at vascular access sites. I/v access Management:    · Continue to monitor i.v access sites for erythema, induration, discharge or tenderness. · As always, continue efforts to minimize tubes/lines/drains as clinically appropriate to reduce chances of line associated infections. Patient education and counseling:        · The patient was educated in detail about the side-effects of various antibiotics and things to watch for like new rashes, lip swelling, severe reaction, worsening diarrhea, break through fever etc.  · Discussed patient's condition and what to expect. All of the patient's questions were addressed in a satisfactory manner and patient verbalized understanding all instructions. Level of complexity of visit: High     Risk of Complications/Morbidity: High     · Illness(es)/ Infection present that pose threat to bodily function. · There is potential for severe exacerbation of infection/side effects of treatment. · Therapy requires intensive monitoring for antimicrobial agent toxicity. Weight loss counseling:    Extensive weight loss counseling was done. It is important to set a realistic weight loss goal. First goal should be to avoid gaining more weight and staying at current weight (or within 5 percent).  People at high risk of developing diabetes who are able to lose 5 percent of their body weight and maintain this weight will reduce their risk of developing diabetes by about 50 percent and reduce their blood pressure. Losing more than 15 percent of  body weight and staying at this weight is an extremely good result, even if you never reach your \"dream\" or \"ideal\" weight. Lifestyle changes including changing eating habits, substituting excess carbohydrates with proteins, stress reduction, using self-help programs like Weight Watchers®, Overeaters Anonymous®, and Take Off Pounds Sensibly (TOPS)© , following DASH diet and increasing exercise or walking briskly daily for half hour to and hour 5-7 days a week was suggested among other measures. Information was given about various weight loss education programs and their websites like www.cdc.gov/healthyweight, www.choosemyplate.gov and www.health.gov/dietaryguidelines/        Thank you for involving me in the care of your patient. I will continue to follow. If you have anyadditional questions, please do not hesitate to contact me. Subjective: Interval history: Interval history was obtained from chart review and patient/ RN. The patient is afebrile. He is tolerating antibiotics okay. No diarrhea     REVIEW OF SYSTEMS:     Review of Systems   Constitutional: Negative for chills, diaphoresis and fever. HENT: Negative for ear discharge, ear pain, rhinorrhea, sore throat and trouble swallowing. Eyes: Negative for discharge and redness. Respiratory: Negative for cough, shortness of breath and wheezing. Cardiovascular: Negative for chest pain and leg swelling. Gastrointestinal: Negative for abdominal pain, constipation, diarrhea and nausea. Endocrine: Negative for polyuria. Genitourinary: Negative for dysuria, flank pain, frequency, hematuria and urgency. Musculoskeletal: Negative for back pain and myalgias. Skin: Negative for rash. Ongoing right foot wounds   Neurological: Negative for dizziness, seizures and headaches. Hematological: Does not bruise/bleed easily. Psychiatric/Behavioral: Negative for hallucinations and suicidal ideas. All other systems reviewed and are negative. Past Medical History: All past medical history reviewed today. Past Medical History:   Diagnosis Date    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 with ulceration of other part of lower leg ([address] 3/2/2021    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 (HCC) 3/2/2021    CHF (congestive heart failure) (HCC)     Coronary artery disease due to lipid rich plaque     Hypertension     Lower extremity edema 3/2/2021    Non-pressure chronic ulcer of left lower leg with fat layer exposed ([address] 3/2/2021    Non-pressure chronic ulcer of right lower leg with fat layer exposed ([address] 3/2/2021    Peripheral venous insufficiency 3/2/2021    Seizures ([address]     TB (pulmonary tuberculosis)     he says the doctors told him he had TB when he was 15 and he had to have surgery    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 History reviewed. No pertinent surgical history. Family History: All family history was reviewed today. History reviewed. No pertinent family history. Objective:       PHYSICAL EXAM:      Vitals:   Vitals:    07/27/21 0031 07/27/21 0441 07/27/21 0845 07/27/21 1055   BP: 110/75 109/61 97/69 (!) 91/57   Pulse: 74 80 102 90   Resp: 16 16 16 16   Temp: 98 °F (36.7 °C) 97.4 °F (36.3 °C) 98.5 °F (36.9 °C) 94.6 °F (34.8 °C)   TempSrc: Oral Oral Oral Axillary   SpO2: 97% 94% 95% 93%   Weight:       Height:           Physical Exam  Vitals and nursing note reviewed. Constitutional:       Appearance: Normal appearance. He is well-developed. HENT:      Head: Normocephalic and atraumatic.       Right Ear: External ear normal.      Left Ear: External ear normal.      Nose: Nose normal. No congestion or rhinorrhea. Mouth/Throat:      Mouth: Mucous membranes are moist.      Pharynx: No oropharyngeal exudate or posterior oropharyngeal erythema. Eyes:      General: No scleral icterus. Right eye: No discharge. Left eye: No discharge. Conjunctiva/sclera: Conjunctivae normal.      Pupils: Pupils are equal, round, and reactive to light. Cardiovascular:      Rate and Rhythm: Normal rate and regular rhythm. Pulses: Normal pulses. Heart sounds: No murmur heard. No friction rub. Pulmonary:      Effort: Pulmonary effort is normal. No respiratory distress. Breath sounds: Normal breath sounds. No stridor. No wheezing, rhonchi or rales. Abdominal:      General: Bowel sounds are normal.      Palpations: Abdomen is soft. Tenderness: There is no abdominal tenderness. There is no right CVA tenderness, left CVA tenderness, guarding or rebound. Musculoskeletal:         General: No swelling or tenderness. Normal range of motion. Cervical back: Normal range of motion and neck supple. No rigidity. No muscular tenderness. Lymphadenopathy:      Cervical: No cervical adenopathy. Skin:     General: Skin is warm and dry. Coloration: Skin is not jaundiced. Findings: No erythema or rash. Comments: Right foot wound noted again   Neurological:      General: No focal deficit present. Mental Status: He is alert and oriented to person, place, and time. Mental status is at baseline. Motor: No abnormal muscle tone. Psychiatric:         Mood and Affect: Mood normal.         Behavior: Behavior normal.         Thought Content: Thought content normal.           Lines: All vascular access sites are healthy with no local erythema, discharge or tenderness. Intake and output:    No intake/output data recorded. Lab Data:   All available labs and old records have been reviewed by me.     CBC:  Recent Labs     07/25/21  0443 07/26/21  0532 07/27/21  0509   WBC 7.1 7.5 6.9   RBC 3.72* 3.76* 3.64*   HGB 10.8* 10.9* 10.5*   HCT 32.8* 33.1* 32.1*    181 192   MCV 88.1 88.1 88.3   MCH 29.1 28.9 28.9   MCHC 33.0 32.9 32.7   RDW 15.7* 15.7* 15.5*        BMP:  Recent Labs     07/25/21  0443 07/26/21  0532 07/27/21  0509    140 140   K 4.2 3.9 4.0    104 103   CO2 26 28 26   BUN 32* 29* 24*   CREATININE 1.0 1.0 1.0   CALCIUM 7.8* 7.8* 7.7*   GLUCOSE 112* 119* 104*        Hepatic Function Panel:   Lab Results   Component Value Date    ALKPHOS 114 07/24/2021    ALT 11 07/24/2021    AST 21 07/24/2021    PROT 6.4 07/24/2021    BILITOT 1.0 07/24/2021    LABALBU 2.8 07/24/2021       CPK:   Lab Results   Component Value Date    CKTOTAL 170 07/01/2021     ESR:   Lab Results   Component Value Date    SEDRATE 65 (H) 07/27/2021     CRP:   Lab Results   Component Value Date    CRP 75.0 (H) 07/27/2021           Imaging: All pertinent images and reports for the current visit were reviewed by me during this visit. MRI FOOT RIGHT WO CONTRAST   Final Result   Evaluation is limited as the patient could not complete the whole exam.      Within this limitation, there are no definitive findings of acute   osteomyelitis or a drainable abscess. Generalized subcutaneous edema compatible with cellulitis. Suspected cystic versus erosive-like change in the 5th metatarsal head which   is favored to be chronic in nature. XR CHEST PORTABLE   Final Result   Cardiomegaly with no evidence of edema         XR ANKLE RIGHT (2 VIEWS)   Final Result   1. Negative ankle   2. Gangrene of the 1st digit and lateral foot ulcer, with no findings of   osteomyelitis         XR FOOT RIGHT (2 VIEWS)   Final Result   1. Negative ankle   2. Gangrene of the 1st digit and lateral foot ulcer, with no findings of   osteomyelitis             Medications: All current and past medications were reviewed.      iron sucrose  200 mg Intravenous Q24H    clopidogrel  75 mg Oral Daily    lactobacillus 1 capsule Oral BID     digoxin  125 mcg Oral Daily    sodium chloride flush  5-40 mL Intravenous 2 times per day    piperacillin-tazobactam  3,375 mg Intravenous Q6H    mupirocin   Topical Daily    carvedilol  3.125 mg Oral BID     levETIRAcetam  500 mg Oral BID    linezolid  600 mg Oral 2 times per day    aspirin  81 mg Oral Daily    [Held by provider] enoxaparin  1 mg/kg Subcutaneous BID        sodium chloride 25 mL (07/25/21 2017)       sodium chloride flush, sodium chloride, acetaminophen **OR** acetaminophen      Problem list:       Patient Active Problem List   Diagnosis Code    New onset seizure ([address] R56.9    Delirium R41.0    Acute encephalopathy G93.40    Volume overload E87.70    Acute renal failure ([address] N17.9    Acute combined systolic and diastolic (congestive) hrt fail ([address] I50.41    Essential hypertension I10    Anasarca R60.1    Seizure ([address] R56.9    Aspiration into airway T17.908A    Acute respiratory failure with hypoxia ([address] J96.01    Dementia without behavioral disturbance ([address] F03.90    Seizures ([address] R56.9    Syncope and collapse R55    Anemia D64.9    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, F02.80    HTN (hypertension), benign I10    Atrial fibrillation with RVR ([facility]) I48.91    Non-pressure chronic ulcer of right lower leg with fat layer exposed ([address] L97.912    Non-pressure chronic ulcer of left lower leg with fat layer exposed ([address] L97.922    Peripheral venous insufficiency I87.2    Lower extremity edema R60.0    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 with ulceration of other part of lower leg (HCC) I70.248    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 ([facility]) I70.232    Acute metabolic encephalopathy D02.52    Gram negative sepsis (HCC) A41.50    Cellulitis and abscess of leg L03.119, L02.419    Gram-negative bacteremia R78.81    Lactic acidosis E87.2    Leukopenia D72.819    Need for Tdap vaccination Z23    Thrombocytopenia ([facility] D69.6    Elevated d-dimer R79.89    Pulmonary vascular congestion R09.89    Moderate malnutrition ([address] E44.0    Diabetes education, encounter for Z71.89    Septicemia ([facility] A41.9    Gangrene of right foot ([facility] I96    Cardiomyopathy ([facility] I42.9    PAF (paroxysmal atrial fibrillation) (HCC) I48.0    PAD (peripheral artery disease) (HCC) I73.9    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 Although every effort was made to ensure the accuracy of this automated transcription, some errors in transcription may have occurred inadvertently. If you may need any clarification, please do not hesitate to contact me through EPIC or at the phone number provided below with my electronic signature. Any pictures or media included in this note were obtained after taking informed verbal consent from the patient and with their approval to include those in the patient's medical record.     Brenda Guerra MD, MPH  7/27/2021 , 2:04 PM   [facility]   [address] [place] [address], [address] [place] [address]  Office: [phone]  Fax: [phone]  Clinic days:  Tuesday & Thursday

## 2021-07-27 NOTE — PROGRESS NOTES
[address]   Electrophysiology Nurse Practitioner  Progress Note    Date: 7/27/2021  Date of admission: 7/24/2021  6:34 AM  Reason for Admission: Sepsis [facility]) [A41.9]    Consult Requesting Physician: Stephan Hernandez MD    -Reason for Consultation: Atrial fibrillation    Chief Complaint   Patient presents with    Extremity Weakness       HISTORY OF PRESENT ILLNESS: History obtained from medical record. Pt is confused and drowsy    Salomón Butler is a 79 y.o. male with a past medical history of long standing severe cardiomyopathy, CHF, medical non-compliance, MR, HTN, seizures, PVD, and TB. Pt presented to hospital for worsening weakness. He was found to have septicemia and gangrene of his right foot. An EKG in the ER showed atrial fibrillation. Chart review shows AF on 7/1/21. An echo showed EF of 15-20% with severe pulmonary HTN. EP consulted for rhythm management. He had EF of 15-20% back in 2018. No ischemic work up was done at that time. He has not had adequate follow up and concerns about medication compliance. Interval Hx: Today, he is being seen for follow up. Pt remains pleasantly confused and offers little insight. He remains in atrial fibrillation. He had an episode of VT last evening that was asymptomatic. Patient seen and examined. Clinical notes reviewed. Telemetry reviewed. No new complaints today. No major events overnight. Denies having chest pain, palpitations, shortness of breath, orthopnea, cough, or dizziness at the time of this visit. Allergies:  No Known Allergies    Home Meds:  Prior to Visit Medications    Medication Sig Taking?  Authorizing Provider   carvedilol (COREG) 3.125 MG tablet Take 1 tablet by mouth 2 times daily (with meals) Yes Beatrice Knox MD   aspirin 81 MG EC tablet Take 81 mg by mouth daily Yes Historical Provider, MD   Compression Stockings MISC by Does not apply route Bilateral below knee compression stockings 20-30 mmHg Yes Lou Lopez Jeremiah Mckeon MD   levothyroxine (SYNTHROID) 25 MCG tablet Take 1 tablet by mouth daily Yes Oralia Rey MD   vitamin D (ERGOCALCIFEROL) 1.25 MG (32929 UT) CAPS capsule Take 1 capsule by mouth once a week Yes RODERICK Sandoval - CNS   levETIRAcetam (KEPPRA) 500 MG tablet Take 1 tablet by mouth 2 times daily Yes Oralia Rey MD   rivaroxaban (XARELTO) 15 MG TABS tablet Take 1 tablet by mouth 2 times daily (with meals) for 21 days  Oralia Rey MD   furosemide (LASIX) 40 MG tablet Take 2 tablets by mouth 2 times daily  Kellie Smith, APRN - CNS      Past Medical History:  Past Medical History:   Diagnosis Date    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 with ulceration of other part of lower leg ([facility] 3/2/2021    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 ([facility] 3/2/2021    CHF (congestive heart failure) ([facility]     Coronary artery disease due to lipid rich plaque     Hypertension     Lower extremity edema 3/2/2021    Non-pressure chronic ulcer of left lower leg with fat layer exposed ([facility] 3/2/2021    Non-pressure chronic ulcer of right lower leg with fat layer exposed ([facility] 3/2/2021    Peripheral venous insufficiency 3/2/2021    Seizures ([facility]     TB (pulmonary tuberculosis)     he says the doctors told him he had TB when he was 15 and he had to have surgery    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 ([facility]       Past Surgical History:    has no past surgical history on file. Social History:  Reviewed. reports that he has never smoked. He has never used smokeless tobacco. He reports previous alcohol use. Family History:  Reviewed. family history is not on file.      Review of System: Limited due to pt mental status and drowsiness  · Constitutional: Negative for fever, night sweats, chills, weight changes, or weakness  · Skin: Negative for rash, dry skin, pruritus, bruising, bleeding, blood clots, or changes in skin pigment  · HEENT: Negative for vision changes, ringing in the ears, sore throat, dysphagia, or swollen lymph nodes  · Respiratory: Reviewed in HPI  · Cardiovascular: Reviewed in HPI  · Gastrointestinal: Negative for abdominal pain, N/V/D, constipation, or black/tarry stools  · Genito-Urinary: Negative for dysuria, incontinence, urgency, or hematuria  · Musculoskeletal: Negative for joint swelling, muscle pain, or injuries  · Neurological/Psych: Negative for seizures, headaches, balance issues or TIA-like symptoms. No anxiety, depression, or insomnia    Physical Examination:  Vitals:    07/27/21 1055   BP: (!) 91/57   Pulse: 90   Resp: 16   Temp: 94.6 °F (34.8 °C)   SpO2: 93%      In: 360 [P.O.:360]  Out: -    Wt Readings from Last 3 Encounters:   07/26/21 216 lb 11.4 oz (98.3 kg)   07/07/21 206 lb 7 oz (93.6 kg)   06/23/21 206 lb (93.4 kg)       Telemetry: Personally Reviewed  - Atrial fibrillation with PVCs. VT last evening  · Constitutional: Cooperative and in no apparent distress, and appears well nourished  · Skin: Warm and pink; no pallor, cyanosis, bruising, or clubbing. + wound to right foot with black toes  · HEENT: Symmetric and normocephalic. PERRL, EOM intact. Conjunctiva pink with clear sclera. Mucus membranes pink and moist. Teeth intact. Thyroid smooth without nodules or goiter. · Cardiovascular: Regular rate and irregular rhythm. S1/S2 present without murmurs, rubs, or gallops. Peripheral pulses 2+, capillary refill < 3 seconds. No peripheral edema, no elevation of JVP  · Respiratory: Respirations symmetric and unlabored. Lungs clear to auscultation bilaterally, no wheezing, crackles, or rhonchi  · Gastrointestinal: Abdomen soft and rotund. Bowel sounds normoactive in all quadrants without tenderness or masses. · Musculoskeletal: Bilateral upper and lower extremity strength 5/5 with full ROM  · Neurologic/Psych: . Drowsy but arouseable. Pleasantly confused.  Calm affect, appropriate mood    Pertinent labs, diagnostic, device, and imaging results reviewed as a part of this visit    Labs:  BMP:   Recent Labs     21  0443 21  1404 21  0532 21  0509     --  140 140   K 4.2  --  3.9 4.0     --  104 103   CO2 26  --  28 26   BUN 32*  --  29* 24*   CREATININE 1.0  --  1.0 1.0   MG  --  2.00  --   --      Estimated Creatinine Clearance: 70 mL/min (based on SCr of 1 mg/dL). CBC:   Recent Labs     21  0443 21  0532 21  0509   WBC 7.1 7.5 6.9   HGB 10.8* 10.9* 10.5*   HCT 32.8* 33.1* 32.1*   MCV 88.1 88.1 88.3    181 192     Thyroid:   Lab Results   Component Value Date    TSH 7.42 03/10/2021     Lipids:  Lab Results   Component Value Date    CHOL 174 2018    HDL 46 2018    TRIG 88 2018     LFTS:   Lab Results   Component Value Date    ALT 11 2021    AST 21 2021    ALKPHOS 114 2021    PROT 6.4 2021    AGRATIO 0.8 2021    BILITOT 1.0 2021     Cardiac Enzymes:   Lab Results   Component Value Date    CKTOTAL 170 2021    TROPONINI 0.04 2021    TROPONINI 0.07 2021    TROPONINI 0.05 2020     Coags:   Lab Results   Component Value Date    PROTIME 22.5 2021    INR 1.94 2021       EC21  Atrial fibrillation with IVCD    ECHO:      Left ventricular systolic function is reduced with ejection fraction estimated at 15-20 %. Severe hypokinesis of the inferior, inferoseptal and anterior septal walls with hypokinesis of the remaining walls. There is mild left ventricular hypertrophy. Left ventricular size is mildly increased. Grade II diastolic dysfunction with elevated filling pressure. Biatrial enlargement. Right ventricular systolic function appears reduced. Aortic valve appears sclerotic but opens adequately. Mild aortic regurgitation is present. Moderate to severe mitral regurgitation. Mild-moderate pulmonic regurgitation present.  Moderate tricuspid regurgitation. Systolic pulmonary artery pressure (SPAP) estimated at 62 mmHg (right atrial pressure 8 mmHg), consistent with severe pulmonary hypertension. IVC size is dilated (>2.1 cm) but collapses > 50% with respiration consistent with elevated RA pressure (8 mmHg). There is a trivial circumferential pericardial effusion noted.     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 [address] 07/02/2021    Gram negative sepsis (HCC)     Cellulitis and abscess of leg     Gram-negative bacteremia     Lactic acidosis     Leukopenia     Need for Tdap vaccination     Thrombocytopenia (HCC)     Elevated d-dimer     Pulmonary vascular congestion     Acute metabolic encephalopathy 53/23/6709    Non-pressure chronic ulcer of right lower leg with fat layer exposed ([address] 03/02/2021    Non-pressure chronic ulcer of left lower leg with fat layer exposed ([address] 03/02/2021    Peripheral venous insufficiency 03/02/2021    Lower extremity edema 03/02/2021    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 with ulceration of other part of lower leg ([address] 03/02/2021    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 ([address] 03/02/2021    Atrial fibrillation with RVR ([address] 11/23/2020    Essential hypertension 11/03/2020    Anemia 02/18/2019    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[address]     HTN (hypertension), benign     Seizures ([address] 02/17/2019    Syncope and collapse 02/17/2019    Dementia without behavioral disturbance ([facility] 10/08/2018    Aspiration into airway 09/26/2018    Acute respiratory failure with hypoxia (HCC)     Seizure ([facility] 09/25/2018    Acute renal failure (HCC)     Acute combined systolic and diastolic (congestive) hrt fail ([facility]     Volume overload 08/11/2018    New onset seizure ([facility] 02/03/2017    Delirium     Acute encephalopathy         Assessment and Plan:     1. Persistent Atrial Fibrillation  - New finding on EKG on July 1st of unknown duration (prior EKG in June of 2020 was sinus)    - Currently in AF, rate controlled   ~ Mild tachycardia is acceptable given acute illness/infection    - Continue digoxin 125 mcg QD, coreg 3.125 mg BID  - [id number] score:high ; [id number] Vasc score and anticoagulation discussed. High risk for stroke and thromboembolism. Anticoagulation is recommended. Risk of bleeding was discussed. ~ Lovenox on hold per vascular team for peripheral angiogram in AM. Will consider starting DOAC once ok with vascular team following his procedures, although, this will need to be discussed with pt/family given his fall risk/confusion    - Afib risk factors including age, HTN, obesity, inactivity and POLA were discussed with patient. Risk factor modification recommended   ~ TSH 5.80 (7/21)      - Poor candidate for invasive EP procedures at this time due to sepsis/acute infection and any EP procedure is unlikely to be successful given severe left atrium dilation    2. Chronic systolic heart failure (NYHA Class IV), Pulmonary HTN  - Appears compensated   ~ EF 15-20% per echo  - Continue with coreg 3.125 mg BID, digoxin 125 mcg QD  ~ Add ACE/ARB if BP tolerates  - Monitor I&Os, daily weights  - CHF team following    3.  Cardiomyopathy, VT   - Unknown origin ?, but long standing since 2018 (No ischemic work up at that time   - On BB, dig   - Keep K+ >4 and mag >2    - Pt seen by palliative care and decision was made to change pt to DNR-CC   ~ No need for Livevest or ischemic work up given code status (pt unlikely to wear device given his baseline confusion/dementia)     4. Mitral Regurgitation   - Moderate to severe per echo     5. HTN   - Stable, but marginal   - If worsens, may switch coreg to Toprol    6. Anemia   - Stable   - Iron studies pending   - Will need closely monitored on AC    7. Gangrene, Septicemia   - S/p recanalization of posterior tibial   - On ABX   - Plans for peripheral angiogram in AM   - Management per vascular surgery/hospitalist    Multiple co-morbidities that place him at high risk for decompensation. Agree with change in code status to [facility]. No need for EP follow up. All pertinent information and plan of care discussed with the EP physician. All questions and concerns were addressed to the patient/family. Alternatives to my treatment were discussed. I have discussed the above stated plan and the patient verbalized understanding and agreed with the plan. Discussed plan with patient and nurse. Attempted to call number to discuss his cardiac issues, but no answer.      Thank you for allowing to us to participate in the care of [address] [place], [place] [address] [place]   Office: [phone]

## 2021-07-27 NOTE — PROGRESS NOTES
Vascular Progress Note    7/27/2021 10:57 AM    Chief complaint / Reason for visit : right foot gangrene     Subjective:  Patient resting in bed. He denies any pain in his legs at this time. He is pleasantly confused. Appears hemodynamically stable, afebrile.      Vital Signs: BP 97/69   Pulse 102   Temp 98.5 °F (36.9 °C) (Oral)   Resp 16   Ht 6' 4\" (1.93 m)   Wt 216 lb 11.4 oz (98.3 kg)   SpO2 95%   BMI 26.38 kg/m²      I/O:      Intake/Output Summary (Last 24 hours) at 7/27/2021 1057  Last data filed at 7/27/2021 0902  Gross per 24 hour   Intake 360 ml   Output --   Net 360 ml       Physical Exam:   General: no apparent distress, appears stated age  Chest/Lungs: no accessory muscle use  Cardiac:  irregular rhythm, controlled rate  Vascular: + right DP/PT doppler signal  Extremities: BLE ACE wraps in place, toes right foot with dry gangrene   Skin: left groin site soft with no hematoma or drainage     Labs:   Lab Results   Component Value Date     07/27/2021    K 4.0 07/27/2021     07/27/2021    CO2 26 07/27/2021    BUN 24 07/27/2021    CREATININE 1.0 07/27/2021    GFRAA >60 07/27/2021    LABGLOM >60 07/27/2021    GLUCOSE 104 07/27/2021    PHOS 3.8 08/05/2020    MG 2.00 07/25/2021    CALCIUM 7.7 07/27/2021     Lab Results   Component Value Date    WBC 6.9 07/27/2021    RBC 3.64 07/27/2021    HGB 10.5 07/27/2021    HCT 32.1 07/27/2021    MCV 88.3 07/27/2021    RDW 15.5 07/27/2021     07/27/2021     Lab Results   Component Value Date    INR 1.94 (H) 07/01/2021    PROTIME 22.5 (H) 07/01/2021        Imaging:    MRI right foot 7/24/21:  Impression   Evaluation is limited as the patient could not complete the whole exam.       Within this limitation, there are no definitive findings of acute   osteomyelitis or a drainable abscess.       Generalized subcutaneous edema compatible with cellulitis.       Suspected cystic versus erosive-like change in the 5th metatarsal head which   is favored to be chronic in nature. Venous duplex right leg 6/28/21:  Conclusions        Summary        Acute totally occluding deep vein thrombosis involving an isolated segment    of a set of gastroc veins     BLE arterial duplex 4/8/21:  Impressions   Right Impression   Right ZENA was not available due to noncompressible tibial vessels . The majority of the waveforms are triphasic throughout the right lower   extremity. Ultrasound images of the right lower extremity reveal moderate calcific plaque   formation throughout. Elevated velocity of the mid peroneal artery suggests a greater than 50%   stenosis. Proximal posterior tibial artery was not visualized. Left Impression   Left ZENA was not available due to noncompressible tibial vessels . The majority of the waveforms are triphasic throughout the left lower   extremity. Ultrasound images of the left lower extremity reveal moderate calcific plaque   formation throughout. 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 Proximal   posterior tibial and distal anterior tibial arteries were not visualized.       Conclusions        Summary        Elevated velocity of the mid peroneal artery suggests a greater than 50%    stenosis.    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 Proximal    posterior tibial and distal anterior tibial arteries were not visualized.        ZENA was not available due to noncompressible tibial vessels .          Scheduled Meds:    iron sucrose  200 mg Intravenous Q24H    clopidogrel  75 mg Oral Daily    lactobacillus  1 capsule Oral BID WC    digoxin  125 mcg Oral Daily    sodium chloride flush  5-40 mL Intravenous 2 times per day    piperacillin-tazobactam  3,375 mg Intravenous Q6H    mupirocin   Topical Daily    carvedilol  3.125 mg Oral BID WC    levETIRAcetam  500 mg Oral BID    linezolid  600 mg Oral 2 times per day    aspirin  81 mg Oral Daily    [Held by provider] enoxaparin  1 mg/kg Subcutaneous BID     Continuous Infusions:    sodium chloride 25 mL (07/25/21 2017)         Assessment:   Gangrene toes of right foot s/p right PT atherectomy and PTA - POD # 1 - right DP/PT doppler signal present   Venous ulceration right leg with stasis dermatitis   CHF - EF 15-20%  PAF on chronic anticoagulation - Xarelto on hold   Dementia     Plan:  Started on Plavix 75 mg daily. Okay to resume Xarelto after podiatric intervention. Once Xarelto is resumed would stop Aspirin and keep on Plavix daily. Okay for podiatry to move forward with right hallux amputation. No further vascular intervention or testing at this time. Wound care per podiatry. Will have patient follow up in the office in 2-3 weeks with repeat right leg arterial duplex. Please contact us should there be any difficulties with wound healing and wound consider an antegrade retrograde approach to the anterior tibial artery. Patient is stable from vascular standpoint. We will sign off for now, but please do not hesitate to contact us with any questions. Thank you for the consultation. Patient educated on plan of care and disease process. All questions answered.         Electronically signed by RODERICK He CNP on 7/27/2021 at 10:57 AM

## 2021-07-27 NOTE — PROGRESS NOTES
BID     levETIRAcetam  500 mg Oral BID    linezolid  600 mg Oral 2 times per day    aspirin  81 mg Oral Daily    [Held by provider] enoxaparin  1 mg/kg Subcutaneous BID      sodium chloride 25 mL (07/25/21 2017)       Lab Data:  CBC:   Recent Labs     07/25/21  0443 07/26/21  0532 07/27/21  0509   WBC 7.1 7.5 6.9   HGB 10.8* 10.9* 10.5*    181 192     BMP:    Recent Labs     07/25/21  0443 07/26/21  0532 07/27/21  0509    140 140   K 4.2 3.9 4.0   CO2 26 28 26   BUN 32* 29* 24*   CREATININE 1.0 1.0 1.0     INR:  No results for input(s): INR in the last 72 hours. BNP:    No results for input(s): PROBNP in the last 72 hours. Diagnostics:  Echo 2/17/2019  Summary   -The left ventricle is mildly dilated. Mild concentric left ventricular   hypertrophy noted.   -Severely reduced global systolic function with an ejection fraction   estimated at 30%. Severe global hypokinesis noted.   -Diastolic filling parameters suggest grade I diastolic dysfunction.   B/E'=85.00   -Aortic valve appears sclerotic but opens adequately. Mild aortic   regurgitation.   -Moderate mitral regurgitation.   -There is mild tricuspid regurgitation with a RVSP estimation of 37 mmHg.   -Biatrial enlargement.   -Estimated pulmonary artery systolic pressure is normal at 37 mmHg assuming   a right atrial pressure of 3 mmHg.   -There is a small-moderate localized near right ventricle pericardial   effusion noted    Echo 8/13/18  The left ventricle is mildly dilated.   -Severely reduced global systolic function with an ejection fraction   estimated at 15-20%.    -Severe global hypokinesis noted.   -RV systolic function appears to be reduced.   -Biatrial enlargement.   -Aortic valve appears sclerotic but opens adequately.   -Mild aortic regurgitation.   -Severe mitral regurgitation.   -There is severe tricuspid regurgitation with a RVSP estimation of 63 mmHg.   -Mild-to-moderate pulmonic regurgitation present.   -Mild circumferential pericardial effusion noted.   -Large pleural effusion noted measuring 7.90 cm.   -Diastolic filling parameters suggest grade III diastolic   dysfunction. E/e'=31.85   -The inferior vena cava is dilated. Assessment:    1. Gangrene of right foot  2. Septicemia  3. Severe cardiomyopathy, on BB  4. PAF  5. Dementia  6. Anemia    Plan:    1. Will give some IV venofer 200 mg for 3 doses  2. Continue digoxin 125 mcg daily  3. Continue coreg 3.125 mg bid  4. Consider adding lisinopril after surgery   5. CHF nurse following for diet education, fluid restriction and daily weights  6. Palliative consult pending  7. Vascular following  8. EP to see regarding VT vs AF last night    new AF. He has had very poor compliance with f/u in the past.  He has had falls and problems with mobility which makes [facility] also treacherous. He is presently on therapeutic lovenox, but eliquis could be started after we decide that he does not need any surgery and after discussion with his caregivers. per consult note    He has endstage heart failure. Family should consider palliative/hospice care. Discussed with bedside nurse    NYHA IV    Discussed with patient who is agreeable with plan of care. Thank you for allowing me to participate in the care of your patient.     Remona Opitz, APRN - CNS, CNS

## 2021-07-27 NOTE — OP NOTE
[facility] [address]                     [address] [facility], [address] [facility]                                OPERATIVE REPORT    PATIENT NAME: Nicci Mari                   :        1940  MED REC NO:   [id number]                          ROOM:       3310  ACCOUNT NO:   [de-identified]                           ADMIT DATE: 2021  PROVIDER:     Bere Rojo MD    DATE OF PROCEDURE:  2021    PREPROCEDURE DIAGNOSIS:  Right lower extremity ulceration. POSTPROCEDURE DIAGNOSIS:  Right lower extremity ulceration. PROCEDURES:  1. Ultrasound-guided left common femoral artery access. 2.  Abdominal aortogram with bilateral lower extremity runoff. 3.  Right posterior tibial atherectomy (CSI Diamondback 1.25-mm orbital  atherectomy). 4.  Right PT PTA ([place] 3 x 220, 3 x 60). SURGEON:  Bere Rojo MD    ANESTHESIA:  Local/IV. ESTIMATED BLOOD LOSS:  Minimal.    COMPLICATIONS:  None. 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 His noninvasive  studies were inconclusive related to tibial disease. However, his  clinical exam would suggest decreased distal perfusion. As a result, he  presents today for angiographic evaluation. All risks, benefits and  alternatives were discussed in detail. All questions were answered. PROCEDURE:  After witnessed informed consent was obtained, the patient  brought to the cath lab where under my supervision Versed and fentanyl  were administered intravenously for moderate sedation. Pulse oximetry,  heart rate and blood pressure were monitored by an independent trained  observer that was present. I spent 1 hour of face-to-face sedation time  with the patient. His left groin was carefully prepped and draped.    Ultrasound was used to identify the left common femoral artery which was  noted to be patent and image was saved to the patient's permanent  medical record. Under direct visualization, it was accessed with a  4-Nepalese micropuncture needle. Bentson wire was introduced and a  5-Nepalese sheath was placed. An Omni Flush catheter was inserted to the  level of the renal arteries and an abdominal aortogram was performed. It was pulled back to the level of the aortic bifurcation. A bilateral  lower extremity runoff was performed. The Omni Flush was then used to  hook the aortic bifurcation. A stiff Glidewire was placed up and over  the bifurcation into the right SFA. The Omni Flush and 5-Nepalese sheath  were removed and a 6-Nepalese sheath was placed up and over the  bifurcation into the right SFA. A 5000 heparin with an additional 2000  heparin was given to maintain ACTs greater than 250. With this then  done, a Glidewire and [employer] catheter were advanced through the SFA  and popliteal and dedicated tibial angiograms were performed showing a  single patent vessel peroneal.  The posterior tibial artery was occluded  in its mid third and the anterior tibial artery was occluded just distal  to its origin with reconstitution of a very distal dorsalis pedis  secondary to peroneal artery collateralization. Posterior tibial artery  was engaged. A 0.014 Command wire was able to cross the occlusion. With this then done, a Viper wire was advanced distally. A CSI  Diamondback 1.25-mm orbital atherectomy was used on low and medium  speeds across this lesion followed up with balloon angioplasty using a  [place] 3 x 220 and then a 3 x 60 [place] inflated in the distal PT near  the ankle. Completion angiogram showed widely patent flow and no  residual stenosis through a dominant posterior tibial runoff into a  complete pedal arch. The procedure was terminated at this point. A  6-Nepalese StarClose device was placed in the left groin pain.   The  patient tolerated the procedure well and was transferred to the recovery  room in stable condition. FINDINGS:  1. Abdominal aortogram:  Right and left renal patent. Infrarenal  abdominal aorta, bilateral common external and internal iliac arteries  are patent with mild peripheral calcification. 2.  Right lower extremity runoff:  Right common, femoral and profunda  are patent. SFA is patent. Popliteal is patent. There is complete  occlusion of the anterior tibial.  The peroneal is patent with though  diminutive in size. There is collateralization to a dorsalis pedis via  the distal peroneal artery. The posterior tibial artery is occluded  along its middle half. 3.  Left lower extremity runoff. Left common, femoral, profunda and SFA  are patent. Popliteal is patent. There is a dominant posterior tibial  runoff on the left. The anterior tibial is occluded. The peroneal is  diminutive in size. PLAN:  The patient underwent successful recanalization of the posterior  tibial which is now widely patent into a complete medial and lateral  tarsal and complete pedal arch. He will continue on aspirin with the  addition of Plavix. Okay to proceed with planned podiatric  intervention. We would recommend followup duplex imaging in three weeks  to monitor his progress. If there are still any difficulties with wound  healing, we would consider antegrade retrograde approach to the anterior  tibial artery.         April Vázquez MD    D: 07/26/2021 15:01:38       T: 07/26/2021 15:05:16     [email or web address]  Job#: [id number]     Doc#: [id number]    CC:

## 2021-07-27 NOTE — PROGRESS NOTES
Podiatry Progress Note    Subjective:  Pt seen bedside for follow up of gangrene of R toe. Pt is lethargic and confused this AM. Denies pain to foot. Denies n/v/f/c/sob. Tolerating abx well. He had vascular intervention yesterday. ROS: Denies nausea, vomiting, fevers, chills. Objective:    BP (!) 91/57   Pulse 90   Temp 94.6 °F (34.8 °C) (Axillary)   Resp 16   Ht 6' 4\" (1.93 m)   Wt 216 lb 11.4 oz (98.3 kg)   SpO2 93%   BMI 26.38 kg/m²   In: 360 [P.O.:360]  Out: -      Exam:  Vascular: Pulses non palpable. Venous stasis changes to R LE. Edema noted to R LE  Derm: Gangrenous changes to R hallux. Mild odor noted. No fluctuance. Ulcer noted to dorsal 5th MPJ area with fibro granular base. Musculoskeletal: Decreased ROM to b/l LE. Decreased strength to b/l LE. No pain to palpation of R hallux. Neuro: Sensation diminished.          Data:    CBC with Differential:    Lab Results   Component Value Date    WBC 6.9 07/27/2021    RBC 3.64 07/27/2021    HGB 10.5 07/27/2021    HCT 32.1 07/27/2021     07/27/2021    MCV 88.3 07/27/2021    MCH 28.9 07/27/2021    MCHC 32.7 07/27/2021    RDW 15.5 07/27/2021    LYMPHOPCT 11.4 07/27/2021    MONOPCT 8.2 07/27/2021    BASOPCT 0.3 07/27/2021    MONOSABS 0.6 07/27/2021    LYMPHSABS 0.8 07/27/2021    EOSABS 0.0 07/27/2021    BASOSABS 0.0 07/27/2021     CMP:    Lab Results   Component Value Date     07/27/2021    K 4.0 07/27/2021     07/27/2021    CO2 26 07/27/2021    BUN 24 07/27/2021    CREATININE 1.0 07/27/2021    GFRAA >60 07/27/2021    AGRATIO 0.8 07/24/2021    LABGLOM >60 07/27/2021    GLUCOSE 104 07/27/2021    PROT 6.4 07/24/2021    LABALBU 2.8 07/24/2021    CALCIUM 7.7 07/27/2021    BILITOT 1.0 07/24/2021    ALKPHOS 114 07/24/2021    AST 21 07/24/2021    ALT 11 07/24/2021        ASSESSMENT/PLAN     Gangrene R toes  Cellulitis R lower extremity     Pt evaluated bedside. Reviewed xrays and MRI.  No abscess noted on MRI  Pt had vascular intervention and cleared for amputation of R hallux. Abx per ID  Orders for betadine and dressing to the R foot.      Plan for OR tomorrow for partial 1st ray amputation right foot pending OR availability  NPO after midnight. Consent order placed in chart.           Brenda Munoz DPM  7/27/2021

## 2021-07-28 NOTE — PROGRESS NOTES
Arrived into preop per hospital bed from hospital room. Accompanied by nurse and student nurse. Telemetry unit unhooked and attached to our monitors. Pt sleepy- but will communicate with head nods or thumbs up when asked questions. Teaching / education initiated regarding perioperative experience, expectations, and pain management during stay. Patient verbalized understanding.

## 2021-07-28 NOTE — PROGRESS NOTES
Pt awake in bed. VSS, and assessment complete. Pt very agitated and wanting to leave. Talking to daughter at this time. Will continue to monitor. Call light in reach, bed alarm engaged and camera in room.

## 2021-07-28 NOTE — PROGRESS NOTES
Occupational Therapy   Occupational Therapy Initial Assessment  Date: 2021   Patient Name: Alanis Bynum  MRN: [id number]     : 1940    Date of Service: 2021    Discharge Recommendations:    Alanis Bynum scored a 15/24 on the AM-PAC ADL Inpatient form. Current research shows that an AM-PAC score of 17 or less is typically not associated with a discharge to the patient's home setting. Based on the patient's AM-PAC score and their current ADL deficits, it is recommended that the patient have 3-5 sessions per week of Occupational Therapy at d/c to increase the patient's independence. Please see assessment section for further patient specific details. If patient discharges prior to next session this note will serve as a discharge summary. Please see below for the latest assessment towards goals. OT Equipment Recommendations  Equipment Needed: No  Other: defer to next level of care    Assessment   Performance deficits / Impairments: Decreased functional mobility ; Decreased strength;Decreased safe awareness;Decreased cognition;Decreased endurance;Decreased balance  Assessment: Pt is currently functioning below occupational baseline and demo the deficits listed above, pt would benefit from continued skilled OT services to address these deficits and increase independence, safety, and ease with all occupational pursuits  Treatment Diagnosis: Decreased ADL status, functional mobility, and functional transfers 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 in place: Yes  Type of devices: Patient at risk for falls; All fall risk precautions in place;Call light within reach; Chair alarm in place; Left in chair           Patient Diagnosis(es): The primary encounter diagnosis was Gangrene of right foot ([address] A diagnosis of Septicemia ([address] was also pertinent to this visit. 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 with ulceration of other part of lower leg ([address] 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 ([address] CHF (congestive heart failure) ([address] Coronary artery disease due to lipid rich plaque, Hypertension, Lower extremity edema, Non-pressure chronic ulcer of left lower leg with fat layer exposed ([address] Non-pressure chronic ulcer of right lower leg with fat layer exposed ([address] Peripheral venous insufficiency, Seizures ([address] TB (pulmonary tuberculosis), and 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 has no past surgical history on file. 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 male  with PMHx of hypertension, HFrEF(EF 15 to 20%), PAD, CKD, seizures, childhood TB, dementia and BLE venous ulceration was brought into the ED by EMS after they were called to his residence for a lift assist.  EMS found patient to be febrile and his legs look bad and right foot seemed gangrenous.   Of note, patient was recently admitted at [facility] from 7/1/2021--7/7/2021 for lower extremity providentia wound infection and Enterobacter bacteremia and was discharged on levofloxacin. On admission, patient was tachycardic, febrile with temp max of 101.5. Initial diagnostic labs were unremarkable except elevated BUN, albumin 2.8, hemoglobin 10.8 (13.8 on 7/7/21). Right foot x-ray showed gangrene of first digit and lateral foot ulcer with no evidence of osteomyelitis. Patient was given IV fluids and Vanco and Zosyn in the ED. patient continued on vancomycin and Zosyn. 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 with ulceration of other part of lower leg ([address] (3/2/2021), 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 ([address] (3/2/2021), CHF (congestive heart failure) ([address] Coronary artery disease due to lipid rich plaque, Hypertension, Lower extremity edema (3/2/2021), Non-pressure chronic ulcer of left lower leg with fat layer exposed ([address] (3/2/2021), Non-pressure chronic ulcer of right lower leg with fat layer exposed ([address] (3/2/2021), Peripheral venous insufficiency (3/2/2021), Seizures ([address] TB (pulmonary tuberculosis), and 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 ([address]  Subjective  Subjective: Pt supine in bed upon arrival, slightly confused but agreeable to OT evaluation and treat  Patient Currently in Pain: Denies  Pain Assessment  Pain Assessment: 0-10  Pain Level: 0  Vital Signs  Temp: 97.4 °F (36.3 °C)  Temp Source: Oral  Pulse: 82  Heart Rate Source: Monitor  Resp: 16  BP: 137/76  BP Location: Left upper arm  MAP (mmHg): 97  Patient Position: Up in chair;Sitting  Patient Currently in Pain: Denies  Oxygen Therapy  SpO2: 95 %  Pulse Oximeter Device Mode: Intermittent  Pulse Oximeter Device Location: Finger  O2 Device: None (Room air)    Social/Functional History  Social/Functional History  Lives With: Spouse (Daughter assists)  Type of Home: House (per chart review home setting also noted to be an apartment with no steps)  Home Layout:  (with basement)  Bathroom Shower/Tub: Tub/Shower unit, Walk-in shower (walk-in shower is in basement)  Bathroom Equipment: Toilet raiser  Home Equipment: 4 wheeled walker  ADL Assistance: Needs assistance  IADL Comments: Daughter handled medications  Additional Comments: Per chart on 7/4/21 as pt is poor historian. Per CM note pt recently discharged (7/22/21) [facility]. Objective   Vision: Within Functional Limits  Hearing: Within functional limits    Orientation  Overall Orientation Status: Impaired  Orientation Level: Disoriented to time;Disoriented to situation;Oriented to place;Oriented to person  Balance  Sitting Balance: Stand by assistance (sat EOB 15 minutes)  Standing Balance: Dependent/Total (CGA for static; min(A)x2 for dynamic)  Standing Balance  Time: 2-3 minutes in stance  Activity: functional transfer from bed>chair  Comment: no LOB, use of RW  Functional Mobility  Functional - Mobility Device: Rolling Walker  Activity: Other  Assist Level: Dependent/Total  Functional Mobility Comments: min(A)x2 from EOB>chair 3', decreased step length and speed noted  ADL  UE Dressing: Minimal assistance (gown change)  LE Dressing: Moderate assistance (assist to suhas L sock, pt able to don R but very effortful and assist to adjust sock)  Additional Comments: Pt declined additional ADLs  Tone RUE  RUE Tone: Normotonic  Tone LUE  LUE Tone: Normotonic  Coordination  Movements Are Fluid And Coordinated: Yes  Bed mobility  Supine to Sit: Moderate assistance (HOB elevated)  Sit to Supine: Unable to assess (pt in recliner chair at EOS)  Scooting:  Moderate assistance (mod(A) progressing to SBA to scoot towards EOB)  Comment: verbal cues for sequencing and technique  Transfers  Stand Step Transfers: 2 Person assistance;Dependent/Total (min(A)x2)  Sit to stand: 2 Person assistance;Dependent/Total (mod(A)x2)  Stand to sit: Moderate assistance (decreased eccentric control)  Cognition  Overall Cognitive Status: Exceptions  Arousal/Alertness: Appropriate responses to stimuli  Following Commands: Follows one step commands with increased time; Follows one step commands with repetition  Attention Span: Attends with cues to redirect  Memory: Decreased recall of recent events;Decreased recall of biographical Information;Decreased short term memory  Safety Judgement: Decreased awareness of need for safety;Decreased awareness of need for assistance  Problem Solving: Decreased awareness of errors;Assistance required to identify errors made;Assistance required to correct errors made;Assistance required to implement solutions  Insights: Decreased awareness of deficits  Initiation: Requires cues for some  Sequencing: Requires cues for some  Cognition Comment: hx of dementia; frequent redirection to task at hand needed.   Perception  Overall Perceptual Status: WFL  Sensation  Overall Sensation Status: WFL  LUE AROM (degrees)  LUE AROM : WFL  Left Hand AROM (degrees)  Left Hand AROM: WFL  RUE AROM (degrees)  RUE AROM : WFL  Right Hand AROM (degrees)  Right Hand AROM: WFL  LUE Strength  Gross LUE Strength: WFL  RUE Strength  Gross RUE Strength: WFL       Plan   Plan  Times per week: 3-5x/wk  Times per day: Daily  Specific instructions for Next Treatment: cotx to maximize safety and function  Current Treatment Recommendations: Strengthening, Balance Training, Functional Mobility Training, Endurance Training, Cognitive Reorientation, Patient/Caregiver Education & Training, Equipment Evaluation, Education, & procurement, Self-Care / ADL, Safety Education & Training    G-Code     OutComes Score                                                  AM-PAC Score AM-PAC Inpatient Daily Activity Raw Score: 15 (07/28/21 1619)  AM-PAC Inpatient ADL T-Scale Score : 34.69 (07/28/21 1619)  ADL Inpatient CMS 0-100% Score: 56.46 (07/28/21 1619)  ADL Inpatient CMS G-Code Modifier : CK (07/28/21 1619)    Goals  Short term goals  Time Frame for Short term goals: d/c  Short term goal 1: Pt will complete functional transfer to ADL surface with min(A)  Short term goal 2: Pt will complete functional mobility to<>from bathroom with min(A)  Short term goal 3: Pt will complete LB ADLs with min(A)  Short term goal 4: Pt will complete UB ADLs with setup  Short term goal 5: Pt will complete toileting with min(A)  Long term goals  Time Frame for Long term goals : LTG=STG  Patient Goals   Patient goals : pt did not state       Therapy Time   Individual Concurrent Group Co-treatment   Time In 1515         Time Out 1600         Minutes 45         Timed Code Treatment Minutes: [address] [place] [address] [id number]

## 2021-07-28 NOTE — PROGRESS NOTES
Infectious Diseases   Progress Note      Admission Date: 7/24/2021  Hospital Day: Hospital Day: 5   Attending: Letitia Robles MD  Date of service: 7/28/2021     Chief complaint/ Reason for consult:     · Complicated right diabetic foot infection-MRI was negative for osteomyelitis but showed chronic erosive changes in the right fifth metatarsal head  · History of Providencia and Enterobacter bacteremia  · Essential hypertension  · Peripheral arterial disease    Microbiology:        I have reviewed allavailable micro lab data and cultures    · Blood culture (2/2) - collected on 7/24/2021: Negative so far        Antibiotics and immunizations:       Current antibiotics: All antibiotics and their doses were reviewed by me    Recent Abx Admin                   piperacillin-tazobactam (ZOSYN) 3,375 mg in dextrose 5 % 50 mL IVPB (mini-bag) (mg) 3,375 mg New Bag 07/28/21 0838     3,375 mg New Bag  0233     3,375 mg New Bag 07/27/21 2032     3,375 mg New Bag  1434    linezolid (ZYVOX) tablet 600 mg (mg) 600 mg Given 07/27/21 2029                  Immunization History: All immunization history was reviewed by me today. Immunization History   Administered Date(s) Administered    Tdap (Boostrix, Adacel) 07/02/2021       Known drug allergies: All allergies were reviewed and updated    No Known Allergies    Social history:     Social History:  All social andepidemiologic history was reviewed and updated by me today as needed. · Tobacco use:   reports that he has never smoked. He has never used smokeless tobacco.  · Alcohol use:   reports previous alcohol use. · Currently lives in: [address] [place] [address]  ·  has no history on file for drug use.      COVID VACCINATION AND LAB RESULT RECORDS:     Internal Administration   First Dose      Second Dose           Last COVID Lab SARS-CoV-2, NAAT (no units)   Date Value   07/07/2021 Not Detected            Assessment:     The patient is a 79 y.o. 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 with following problems:    · Complicated right diabetic foot infection-MRI was negative for osteomyelitis but showed chronic erosive changes in the right fifth metatarsal head-   · History of Providencia and Enterobacter bacteremia-blood cultures have been negative during this admission  · Essential hypertension-blood pressure okay  · Peripheral arterial disease  · S/p right leg angiography on 7/26/2021  · Seizure disorder  · Type 2 diabetes mellitus-maintain good glycemic control  · Coronary artery disease  · Chronic atrial fibrillation-rate controlled  · History of tuberculosis as a child  · Overweight due to excess calorie intake : Body mass index is 26.38 kg/m². ·       Discussion:      The patient is afebrile. Blood cultures from 07/24/21 are negative so far. He is on empiric IV linezolid and IV Zosyn. Serum creatinine is 1.0. Sed rate was 65 and CRP was 75 yesterday. Plan:     Diagnostic Workup:    · Follow-up on surgical cultures and path  · Continue to follow  fever curve, WBC count and blood cultures. · Continue to monitor blood counts, liver and renal function.     Antimicrobials:    · Will continue empiric IV linezolid 600 mg every 12 hour  · Continue IV Zosyn 3.375 g every 8 hours  · Right foot surgical site care  · We will follow up on the culture results and clinical progress and will make further recommendations accordingly. · Continue close vitals monitoring. · Maintain good glycemic control. · Fall precautions. Aspiration precautions. · Continue to watch for new fever or diarrhea. · DVT prophylaxis. · Discussed all above with patient and RN. Drug Monitoring:    · Continue monitoring for antibiotic toxicity as follows: CBC, CMP   · Continue to watch for following: new or worsening fever, new hypotension, hives, lip swelling and redness or purulence at vascular access sites. I/v access Management:    · Continue to monitor i.v access sites for erythema, induration, discharge or tenderness. · As always, continue efforts to minimize tubes/lines/drains as clinically appropriate to reduce chances of line associated infections. Patient education and counseling:        · The patient was educated in detail about the side-effects of various antibiotics and things to watch for like new rashes, lip swelling, severe reaction, worsening diarrhea, break through fever etc.  · Discussed patient's condition and what to expect. All of the patient's questions were addressed in a satisfactory manner and patient verbalized understanding all instructions. Level of complexity of visit: High     Risk of Complications/Morbidity: High     · Illness(es)/ Infection present that pose threat to life/bodily function. · There is potential for severe exacerbation of infection/side effects of treatment. · Therapy requires intensive monitoring for antimicrobial agent toxicity. Thank you for involving me in the care of your patient. I will continue to follow. If you have anyadditional questions, please do not hesitate to contact me. Subjective: Interval history: Interval history was obtained from chart review and patient/ RN. The patient is afebrile. He is tolerating antibiotics okay.   No diarrhea     REVIEW OF SYSTEMS:     Review of Systems   Constitutional: Positive for fatigue. Negative for chills, diaphoresis and fever. HENT: Negative for ear discharge, ear pain, rhinorrhea, sore throat and trouble swallowing. Eyes: Negative for discharge and redness. Respiratory: Negative for cough, shortness of breath and wheezing. Cardiovascular: Negative for chest pain and leg swelling. Gastrointestinal: Negative for abdominal pain, constipation, diarrhea and nausea. Endocrine: Negative for polyuria. Genitourinary: Negative for dysuria, flank pain, frequency, hematuria and urgency. Musculoskeletal: Negative for back pain and myalgias. Skin: Negative for rash. Ongoing right foot wound   Neurological: Negative for dizziness, seizures and headaches. Hematological: Does not bruise/bleed easily. Psychiatric/Behavioral: Negative for hallucinations and suicidal ideas. All other systems reviewed and are negative. Past Medical History: All past medical history reviewed today. Past Medical History:   Diagnosis Date    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 with ulceration of other part of lower leg ([address] 3/2/2021    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 (HCC) 3/2/2021    CHF (congestive heart failure) (HCC)     Coronary artery disease due to lipid rich plaque     Hypertension     Lower extremity edema 3/2/2021    Non-pressure chronic ulcer of left lower leg with fat layer exposed ([address] 3/2/2021    Non-pressure chronic ulcer of right lower leg with fat layer exposed ([address] 3/2/2021    Peripheral venous insufficiency 3/2/2021    Seizures ([address]     TB (pulmonary tuberculosis)     he says the doctors told him he had TB when he was 15 and he had to have surgery    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 History reviewed.  No pertinent surgical history. Family History: All family history was reviewed today. History reviewed. No pertinent family history. Objective:       PHYSICAL EXAM:      Vitals:   Vitals:    07/28/21 0010 07/28/21 0313 07/28/21 0720 07/28/21 0724   BP: 110/71 116/74 113/82    Pulse: 86 77 82 82   Resp: 18 18 17    Temp: 97.5 °F (36.4 °C) 97.8 °F (36.6 °C) 97.7 °F (36.5 °C)    TempSrc: Oral Oral Oral    SpO2: 92% 94% 95%    Weight:       Height:           Physical Exam  Vitals and nursing note reviewed. Constitutional:       Appearance: Normal appearance. He is well-developed. HENT:      Head: Normocephalic and atraumatic. Right Ear: External ear normal.      Left Ear: External ear normal.      Nose: Nose normal. No congestion or rhinorrhea. Mouth/Throat:      Mouth: Mucous membranes are moist.      Pharynx: No oropharyngeal exudate or posterior oropharyngeal erythema. Eyes:      General: No scleral icterus. Right eye: No discharge. Left eye: No discharge. Conjunctiva/sclera: Conjunctivae normal.      Pupils: Pupils are equal, round, and reactive to light. Cardiovascular:      Rate and Rhythm: Normal rate and regular rhythm. Pulses: Normal pulses. Heart sounds: No murmur heard. No friction rub. Pulmonary:      Effort: Pulmonary effort is normal. No respiratory distress. Breath sounds: Normal breath sounds. No stridor. No wheezing, rhonchi or rales. Abdominal:      General: Bowel sounds are normal.      Palpations: Abdomen is soft. Tenderness: There is no abdominal tenderness. There is no right CVA tenderness, left CVA tenderness, guarding or rebound. Musculoskeletal:         General: No swelling or tenderness. Normal range of motion. Cervical back: Normal range of motion and neck supple. No rigidity. No muscular tenderness. Lymphadenopathy:      Cervical: No cervical adenopathy. Skin:     General: Skin is warm and dry.       Coloration: Skin is not jaundiced. Findings: No erythema or rash. Comments: Right foot wounds   Neurological:      General: No focal deficit present. Mental Status: He is alert and oriented to person, place, and time. Mental status is at baseline. Motor: No abnormal muscle tone. Psychiatric:         Mood and Affect: Mood normal.         Behavior: Behavior normal.         Thought Content: Thought content normal.             Lines: All vascular access sites are healthy with no local erythema, discharge or tenderness. Intake and output:    I/O last 3 completed shifts: In: 360 [P.O.:360]  Out: -     Lab Data:   All available labs and old records have been reviewed by me. CBC:  Recent Labs     07/26/21  0532 07/27/21  0509 07/28/21  0458   WBC 7.5 6.9 5.5   RBC 3.76* 3.64* 3.87*   HGB 10.9* 10.5* 11.2*   HCT 33.1* 32.1* 34.5*    192 196   MCV 88.1 88.3 89.2   MCH 28.9 28.9 28.9   MCHC 32.9 32.7 32.4   RDW 15.7* 15.5* 15.6*        BMP:  Recent Labs     07/26/21  0532 07/27/21  0509 07/28/21  0458    140 139   K 3.9 4.0 4.5    103 104   CO2 28 26 30   BUN 29* 24* 21*   CREATININE 1.0 1.0 1.0   CALCIUM 7.8* 7.7* 8.3   GLUCOSE 119* 104* 94        Hepatic Function Panel:   Lab Results   Component Value Date    ALKPHOS 114 07/24/2021    ALT 11 07/24/2021    AST 21 07/24/2021    PROT 6.4 07/24/2021    BILITOT 1.0 07/24/2021    LABALBU 2.8 07/24/2021       CPK:   Lab Results   Component Value Date    CKTOTAL 170 07/01/2021     ESR:   Lab Results   Component Value Date    SEDRATE 65 (H) 07/27/2021     CRP:   Lab Results   Component Value Date    CRP 75.0 (H) 07/27/2021           Imaging: All pertinent images and reports for the current visit were reviewed by me during this visit.     MRI FOOT RIGHT WO CONTRAST   Final Result   Evaluation is limited as the patient could not complete the whole exam.      Within this limitation, there are no definitive findings of acute   osteomyelitis or a drainable abscess. Generalized subcutaneous edema compatible with cellulitis. Suspected cystic versus erosive-like change in the 5th metatarsal head which   is favored to be chronic in nature. XR CHEST PORTABLE   Final Result   Cardiomegaly with no evidence of edema         XR ANKLE RIGHT (2 VIEWS)   Final Result   1. Negative ankle   2. Gangrene of the 1st digit and lateral foot ulcer, with no findings of   osteomyelitis         XR FOOT RIGHT (2 VIEWS)   Final Result   1. Negative ankle   2. Gangrene of the 1st digit and lateral foot ulcer, with no findings of   osteomyelitis             Medications: All current and past medications were reviewed.      iron sucrose  200 mg Intravenous Q24H    clopidogrel  75 mg Oral Daily    lactobacillus  1 capsule Oral BID WC    digoxin  125 mcg Oral Daily    sodium chloride flush  5-40 mL Intravenous 2 times per day    piperacillin-tazobactam  3,375 mg Intravenous Q6H    mupirocin   Topical Daily    carvedilol  3.125 mg Oral BID WC    levETIRAcetam  500 mg Oral BID    linezolid  600 mg Oral 2 times per day    aspirin  81 mg Oral Daily    [Held by provider] enoxaparin  1 mg/kg Subcutaneous BID        sodium chloride 25 mL (07/25/21 2017)       sodium chloride flush, sodium chloride, acetaminophen **OR** acetaminophen      Problem list:       Patient Active Problem List   Diagnosis Code    New onset seizure ([address] R56.9    Delirium R41.0    Acute encephalopathy G93.40    Volume overload E87.70    Acute renal failure ([address] N17.9    Acute combined systolic and diastolic (congestive) hrt fail ([address] I50.41    Essential hypertension I10    Anasarca R60.1    Seizure ([address] R56.9    Aspiration into airway T17.908A    Acute respiratory failure with hypoxia ([address] J96.01    Dementia without behavioral disturbance ([address] F03.90    Seizures ([address] R56.9    Syncope and collapse R55    Anemia D64.9    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, F02.80    HTN (hypertension), benign I10    Atrial fibrillation with RVR ([facility]) I48.91    Non-pressure chronic ulcer of right lower leg with fat layer exposed ([facility] [address] L97.912    Non-pressure chronic ulcer of left lower leg with fat layer exposed ([address] L97.922    Peripheral venous insufficiency I87.2    Lower extremity edema R60.0    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 with ulceration of other part of lower leg ([facility]) I70.248    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 ([facility]) I70.232    Acute metabolic encephalopathy Y51.92    Gram negative sepsis ([facility]) A41.50    Cellulitis and abscess of leg L03.119, L02.419    Gram-negative bacteremia R78.81    Lactic acidosis E87.2    Leukopenia D72.819    Need for Tdap vaccination Z23    Thrombocytopenia ([facility]) D69.6    Elevated d-dimer R79.89    Pulmonary vascular congestion R09.89    Moderate malnutrition ([facility]) E44.0    Weight loss counseling, encounter for Z71.3    Septicemia ([facility] [address] A41.9    Gangrene of right foot ([facility] [address] I96    Cardiomyopathy ([facility] [address] I42.9    PAF (paroxysmal atrial fibrillation) ([facility]) I48.0    PAD (peripheral artery disease) ([facility]) I73.9    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 Although every effort was made to ensure the accuracy of this automated transcription, some errors in transcription may have occurred inadvertently. If you may need any clarification, please do not hesitate to contact me through [employer] or at the phone number provided below with my electronic signature.   Any pictures or media included in this note were obtained after taking informed verbal consent from the patient and with their approval to include those in

## 2021-07-28 NOTE — PROGRESS NOTES
PODIATRY PROGRESS NOTE    Pt was seen in pre op holding with nursing staff present. Discussed that due to the gangrene and infection in the R great toe I recommend amputation of the R great toe. Pt declined amputation. He states that his toe is not hurting and he is not going to have surgery on his foot. Discussed that I recommend amputation of the great toe to decrease the chance of infection in the future. Again pt declined and refused to move forward with surgery. I recommend continued betadine and dry dressing to the wounds of the R foot and leg. Pt can follow up in the wound care clinic. Podiatry will sign off at this time. Please reconsult if pt changes mind about surgery in the future.

## 2021-07-28 NOTE — PROGRESS NOTES
Pt back to room. VSS and pt denies any needs. Call light in reach, bed alarm engaged and camera in room.

## 2021-07-28 NOTE — PROGRESS NOTES
Physical Therapy    Facility/Department: [facility] 3A NURSING  Initial Assessment    NAME: Zuleima Vu  : 1940  MRN: [id number]    Date of Service: 2021    Discharge Recommendations:  PT Equipment Recommendations  Equipment Needed: No  Other: Defer to next level of care; if pt does d/c home he would benefit from a RW for safety with ambulation     David Waters scored a  on the AM-PAC short mobility form. Current research shows that an AM-PAC score of 17 or less is typically not associated with a discharge to the patient's home setting. Based on the patient's AM-PAC score and their current functional mobility deficits, it is recommended that the patient have 3-5 sessions per week of Physical Therapy at d/c to increase the patient's independence. Please see assessment section for further patient specific details. If patient discharges prior to next session this note will serve as a discharge summary. Please see below for the latest assessment towards goals. Assessment   Body structures, Functions, Activity limitations: Decreased functional mobility ; Decreased balance;Decreased safe awareness;Decreased cognition;Decreased endurance;Decreased strength;Decreased posture  Assessment: Pt is an 80 yo male admitted to [facility] for sepsis seen s/p declining (R) great toe amputation earlier today. The patient required assist of 2 for transfer and was unable to ambulate more than the 1 ft to the chair. The patient is limited by dementia and frequently repeats the same information within minutes and requires frequent redirection. Recommend continued skilled PT to safely progress tolerance to activity and independence with functional mobility as the patient requires more assist than one person can provide and is at a high risk of falling.   Treatment Diagnosis: Impaired functional mobility and decreased safety awareness  Prognosis: Fair  Decision Making: Medium Complexity  History: See below  Exam: Balance, transfers  Clinical Presentation: Evolving  PT Education: Goals;PT Role;Plan of Care;General Safety  Patient Education: Pt verbalized understanding but will require reinforcement due to cognitive deficits  Barriers to Learning: Dementia  REQUIRES PT FOLLOW UP: Yes  Activity Tolerance  Activity Tolerance: Patient Tolerated treatment well;Patient limited by cognitive status       Patient Diagnosis(es): The primary encounter diagnosis was Gangrene of right foot ([address] A diagnosis of Septicemia ([address] was also pertinent to this visit. 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 has no past surgical history on file. Restrictions  Restrictions/Precautions  Restrictions/Precautions: Fall Risk (HIGH FALL RISK)  Required Braces or Orthoses?: No  Position Activity Restriction  Other position/activity restrictions: 79 y.o. male  with PMHx of hypertension, HFrEF(EF 15 to 20%), PAD, CKD, seizures, childhood TB, dementia and BLE venous ulceration was brought into the ED by EMS after they were called to his residence for a lift assist.  EMS found patient to be febrile and his legs look bad and right foot seemed gangrenous. Of note, patient was recently admitted at [facility] from 7/1/2021--7/7/2021 for lower extremity providentia wound infection and Enterobacter bacteremia and was discharged on levofloxacin.   On admission, patient was Cognition (Per OT note)  Cognition  Overall Cognitive Status: Exceptions  Arousal/Alertness: Appropriate responses to stimuli  Following Commands: Follows one step commands with increased time; Follows one step commands with repetition  Attention Span: Attends with cues to redirect  Memory: Decreased recall of recent events;Decreased recall of biographical Information;Decreased short term memory  Safety Judgement: Decreased awareness of need for safety;Decreased awareness of need for assistance  Problem Solving: Decreased awareness of errors;Assistance required to identify errors made;Assistance required to correct errors made;Assistance required to implement solutions  Insights: Decreased awareness of deficits  Initiation: Requires cues for some  Sequencing: Requires cues for some  Cognition Comment: hx of dementia; frequent redirection to task at hand needed. Objective  AROM RLE (degrees)  RLE AROM: WFL  AROM LLE (degrees)  LLE AROM : WFL  Strength RLE  Strength RLE: WFL  Comment: Grossly at least 3/5 as observed through functional mobility. Strength LLE  Strength LLE: WFL  Comment: Grossly at least 3/5 as observed through functional mobility. Strength Other  Other: Formal testing deferred due to delayed command following and difficulty keeping pt on task. Bed mobility  Supine to Sit: Moderate assistance (with HOB elevated)  Sit to Supine: Unable to assess  Scooting: Moderate assistance (in sitting, progressing to SBA by end of session)  Comment: VC for sequencing and hand placement     Transfers  Sit to Stand: 2 Person Assistance (Mod A of 2 with cues for hand placement)  Stand to sit: Minimal Assistance (to control descent)  Bed to Chair: 2 Person Assistance (Min A for stand step transfer with RW)    Balance  Posture: Good  Sitting - Static: Good;-  Sitting - Dynamic: Good;-  Standing - Static: Fair;+  Standing - Dynamic: Fair;-  Comments: SBA for static/dynamic sitting balance at EOB x15 minutes.  CGA for static standing balance with (B) UE support on RW and wide DOMENICO. Min A of 2 for dynamic standing balance with (B) UE support on RW. Plan   Plan  Times per week: 3-5x  Current Treatment Recommendations: Strengthening, Functional Mobility Training, Transfer Training, Balance Training, Endurance Training, Stair training, Gait Training, Safety Education & Training, Patient/Caregiver Education & Training, Home Exercise Program, Equipment Evaluation, Education, & procurement, Neuromuscular Re-education  Safety Devices  Type of devices:  All fall risk precautions in place, Call light within reach, Chair alarm in place, Left in chair, Telesitter in use, Nurse notified, Patient at risk for falls, Gait belt    AM-PAC Score  AM-PAC Inpatient Mobility Raw Score : 8 (07/28/21 1623)  AM-PAC Inpatient T-Scale Score : 28.52 (07/28/21 1623)  Mobility Inpatient CMS 0-100% Score: 86.62 (07/28/21 1623)  Mobility Inpatient CMS G-Code Modifier : CM (07/28/21 1623)    Goals  Short term goals  Time Frame for Short term goals: Before discharge  Short term goal 1: Pt will complete bed mobility with CGA  Short term goal 2: Pt will complete sit<>stand with CGA  Short term goal 3: Pt will transfer bed<>chair with LRAD and CGA  Short term goal 4: Pt will ambulate 25 ft with LRAD and CGA  Patient Goals   Patient goals : Pt confused no goal stated       Therapy Time   Individual Concurrent Group Co-treatment   Time In 1515         Time Out 1600         Minutes 45         Timed Code Treatment Minutes: 30 Minutes        Manuel Vides, PT, DPT #[id number]

## 2021-07-28 NOTE — CARE COORDINATION
Discharge Planning Assessment  RN/discharge planner met with patient/ (and family member- daughter Estela La to discuss reason for admission, current living situation, and potential needs at the time of discharge    Demographics/Insurance verified [facility]    Current type of dwellin [facility] with no steps to get in     Patient from ECF/SW confirmed with:  Recently d/c -21 from Home at [address] arrangements:  Lives with spouse   Daughter assists    Level of function/Support: Requires assistance with ADLs    PCP:  Dr Vani Kessler    Last Visit to PCP: had an appointment for  but he was in the hospital     DME:  Kristina Matter with a seat , raised toilet seat    Active with any community resources/agencies/skilled home care: None. Agreeable if recommended      Medication compliance issues: Daughter administers    Financial issues that could impact healthcare:  No      Tentative discharge plan: SNF  Vs hhc services    Discussed and provided facilities of choice if transition to a skilled nursing facility is required at the time of discharge  Recently d/c -21 from Home at [address] with going to another SNF- . List left in pr's room for daughter to look at during her visit    Discussed with patient and/or family that on the day of discharge home tentative time of discharge will be between 10 AM and noon.     Transportation at the time of discharge:  TBD on /c

## 2021-07-28 NOTE — PROGRESS NOTES
[address]   Daily Progress Note      Admit Date:  7/24/2021    HPI:    Mr. Johanna Gutiérrez is an 79year old male with no medical history as he does not go to physicians, new F in 2018 (non compliant and no LHC due to dementia)    He is admitted for weakness and infected toe. He has been non compliant with medications and follow up in the office. His cxr shows pulmonary congestion and probnp 35K. He is in in AF. He is a DNR-CCA. Successful recanalization of posterior tibial    Subjective:  Patient is being seen for sHF. There were no acute overnight cardiac events. Creat 1.0 potassium 4.5 weight stable at 216 he continues to be pleasantly confused. Planning toe amputation today. No family at bedside. Objective:   /82   Pulse 82   Temp 97.7 °F (36.5 °C) (Oral)   Resp 17   Ht 6' 4\" (1.93 m)   Wt 216 lb 11.4 oz (98.3 kg)   SpO2 95%   BMI 26.38 kg/m²       Intake/Output Summary (Last 24 hours) at 7/28/2021 0900  Last data filed at 7/27/2021 0902  Gross per 24 hour   Intake 360 ml   Output --   Net 360 ml          Physical Exam:  General:  Awake, alert, pleasantly confused in NAD  Skin:  Warm and dry. No unusual bruising or rash  Neck:  Supple. No JVD or carotid bruit appreciated  Chest:  Normal effort.   Clear to auscultation, no wheezes/rhonchi/rales  Cardiovascular:  RRR, S1/S2, no murmur/gallop/rub  Abdomen:  Soft, nontender, +bowel sounds  Extremities:  No edema, right toes black, bilateral wrapped  Neurological: No focal deficits  Psychological: Normal mood and affect      Medications:    iron sucrose  200 mg Intravenous Q24H    clopidogrel  75 mg Oral Daily    lactobacillus  1 capsule Oral BID WC    digoxin  125 mcg Oral Daily    sodium chloride flush  5-40 mL Intravenous 2 times per day    piperacillin-tazobactam  3,375 mg Intravenous Q6H    mupirocin   Topical Daily    carvedilol  3.125 mg Oral BID WC    levETIRAcetam  500 mg Oral BID    linezolid  600 mg Oral noted.   -Diastolic filling parameters suggest grade I diastolic dysfunction.   A/U'=75.73   -Aortic valve appears sclerotic but opens adequately. Mild aortic   regurgitation.   -Moderate mitral regurgitation.   -There is mild tricuspid regurgitation with a RVSP estimation of 37 mmHg.   -Biatrial enlargement.   -Estimated pulmonary artery systolic pressure is normal at 37 mmHg assuming   a right atrial pressure of 3 mmHg.   -There is a small-moderate localized near right ventricle pericardial   effusion noted    Echo 8/13/18  The left ventricle is mildly dilated.   -Severely reduced global systolic function with an ejection fraction   estimated at 15-20%.  -Severe global hypokinesis noted.   -RV systolic function appears to be reduced.   -Biatrial enlargement.   -Aortic valve appears sclerotic but opens adequately.   -Mild aortic regurgitation.   -Severe mitral regurgitation.   -There is severe tricuspid regurgitation with a RVSP estimation of 63 mmHg.   -Mild-to-moderate pulmonic regurgitation present.   -Mild circumferential pericardial effusion noted.   -Large pleural effusion noted measuring 3.70 cm.   -Diastolic filling parameters suggest grade III diastolic   dysfunction. E/e'=31.85   -The inferior vena cava is dilated. Assessment:    1. Gangrene of right foot  2. Septicemia  3. Severe cardiomyopathy, on BB; no aldosterone antagonist due to compliance with appts  4. PAF  5. Dementia  6. Anemia    Plan:    1.  receiving IV venofer 200 mg for 3 doses  2. Continue digoxin 125 mcg daily  3. Continue coreg 3.125 mg bid  4. Consider adding lisinopril after surgery which is scheduled today  5. CHF nurse following for diet education, fluid restriction and daily weights  6. Palliative care following  7. Vascular, ID, EP and podiatry following    new AF. He has had very poor compliance with f/u in the past.  He has had falls and problems with mobility which makes [facility] also treacherous.  He is presently on therapeutic lovenox, but eliquis could be started after we decide that he does not need any surgery and after discussion with his caregivers. per consult note    He has endstage heart failure. Family should consider palliative/hospice care. Discussed with bedside nurse    NYHA IV    Discussed with patient who is agreeable with plan of care. Thank you for allowing me to participate in the care of your patient.     Mk Yarbrough, APRN - CNS, CNS

## 2021-07-28 NOTE — PLAN OF CARE
Problem: Falls - Risk of:  Goal: Will remain free from falls  Description: Will remain free from falls  Outcome: Ongoing   Pt will remain free from falls. Fall precautions in place and alarm engaged. Bedside table and call light within reach. Pt aware to use call light for assistance ambulating. Camera In  Problem: Skin Integrity:  Goal: Will show no infection signs and symptoms  Description: Will show no infection signs and symptoms  Outcome: Ongoing    room   Pt will remain free from skin breakdown. Pt turned Q2hrs, skin kept clean and dry, and skin assessed per shift or as needed. Problem: Pain:  Goal: Pain level will decrease  Description: Pain level will decrease  Outcome: Ongoing   Pt can rate pain on a 0-10 scale. Pt pain will remain at a level that is tolerable to pt. PRN pain medication as needed.

## 2021-07-28 NOTE — FLOWSHEET NOTE
Occupational/Physical Therapy  David Waters    Attempted to see pt for OT/PT evaluation however per RN pt leaving floor for surgery. Will check back later as time permits or schedule allows.      Thanks,  Genna Casas, OTR/L [id number]  Marci Stiles, PT, DPT #[id number]

## 2021-07-28 NOTE — PROGRESS NOTES
Spoke with patient's daughter Maryjane Fothergill on phone. Discussed the cancellation decision for surgery today by his surgeon.  Maryjane Fothergill voiced concern that she felt surgery was needed, but she agrees that her father should be able to make these decisions

## 2021-07-28 NOTE — PROGRESS NOTES
Hospitalist Progress Note      PCP: Sameera Nunes MD    Date of Admission: 7/24/2021    Chief Complaint: [facility] Course: 79 y. o. male  with PMHx of hypertension, HFrEF(EF 15 to 20%), PAD, CKD, seizures, childhood TB, dementia and BLE venous ulceration was brought into the ED by EMS after they were called to his residence for a lift assist.  EMS found patient to be febrile and his legs look bad and right foot seemed gangrenous.  Of note, patient was recently admitted at [facility] from 7/1/2021--7/7/2021 for lower extremity providentia wound infection and Enterobacter bacteremia and was discharged on levofloxacin.  On admission, patient was tachycardic, febrile with temp max of 101.5.  Initial diagnostic labs were unremarkable except elevated BUN, albumin 2.8, hemoglobin 10.8 (13.8 on 7/7/21).  Right foot x-ray showed gangrene of first digit and lateral foot ulcer with no evidence of osteomyelitis.  Patient was given IV fluids and Vanco and Zosyn in the ED. patient continued on vancomycin and Zosyn. He was scheduled with podiatry for OR for debridement today however patient refused. Subjective: Patient seen and examined at bedside. Patient appears to be answering questions appropriately. He is somewhat agitated. He states that he has been having problems with his fooT for a long time now. He states that at 79years old he believes that he has the right to choose his own care. He is not very concerned about very long-term outcomes. He also states that he does not want his estranged wife making healthcare decisions for him. Discussed with daughter Fransisco Godoy, his POA. She is comfortable was letting her father making the decision about not doing the surgery. She confirms my suspicion and also does not believe that patient is at that stage of dementia where he is unable to make his care decisions. We discussed his living situation where he currently resides with his estranged spouse.   According to mom, that this behavior has been bothersome to her. She does recall that her father has always been strong-willed and they had trouble getting alone in the past.      Medications:  Reviewed    Infusion Medications    sodium chloride 100 mL (07/28/21 1138)     Scheduled Medications    iron sucrose  200 mg Intravenous Q24H    clopidogrel  75 mg Oral Daily    lactobacillus  1 capsule Oral BID WC    digoxin  125 mcg Oral Daily    sodium chloride flush  5-40 mL Intravenous 2 times per day    piperacillin-tazobactam  3,375 mg Intravenous Q6H    mupirocin   Topical Daily    carvedilol  3.125 mg Oral BID WC    levETIRAcetam  500 mg Oral BID    linezolid  600 mg Oral 2 times per day    aspirin  81 mg Oral Daily    [Held by provider] enoxaparin  1 mg/kg Subcutaneous BID     PRN Meds: Local Anesthetic Custom Mixture, LORazepam, dextrose, sodium chloride flush, sodium chloride, acetaminophen **OR** acetaminophen    No intake or output data in the 24 hours ending 07/28/21 1356    Physical Exam Performed:    /70   Pulse 82   Temp 97.4 °F (36.3 °C) (Oral)   Resp 16   Ht 6' 4\" (1.93 m)   Wt 216 lb 11.4 oz (98.3 kg)   SpO2 95%   BMI 26.38 kg/m²     General appearance: No apparent distress, appears stated age and cooperative. HEENT: Pupils equal, round, and reactive to light. Conjunctivae/corneas clear. Neck: Supple, with full range of motion. No jugular venous distention. Trachea midline. Respiratory:  Normal respiratory effort. Clear to auscultation, bilaterally without Rales/Wheezes/Rhonchi. Cardiovascular: Regular rate and rhythm with normal S1/S2 without murmurs, rubs or gallops. Abdomen: Soft, non-tender, non-distended with normal bowel sounds. Musculoskeletal: No clubbing, cyanosis or edema bilaterally. Full range of motion without deformity. Skin: Skin color, texture, turgor normal.  No rashes or lesions. Neurologic:  Neurovascularly intact without any focal sensory/motor deficits.  Cranial nerves: II-XII intact, grossly non-focal.  Psychiatric: Alert and oriented, thought content appropriate, normal insight  Capillary Refill: Brisk,< 3 seconds   Peripheral Pulses: +2 palpable, equal bilaterally       Labs:   Recent Labs     07/26/21  0532 07/27/21  0509 07/28/21  0458   WBC 7.5 6.9 5.5   HGB 10.9* 10.5* 11.2*   HCT 33.1* 32.1* 34.5*    192 196     Recent Labs     07/26/21  0532 07/27/21  0509 07/28/21  0458    140 139   K 3.9 4.0 4.5    103 104   CO2 28 26 30   BUN 29* 24* 21*   CREATININE 1.0 1.0 1.0   CALCIUM 7.8* 7.7* 8.3     No results for input(s): AST, ALT, BILIDIR, BILITOT, ALKPHOS in the last 72 hours. No results for input(s): INR in the last 72 hours. No results for input(s): [place] Lager in the last 72 hours. Urinalysis:      Lab Results   Component Value Date    NITRU Negative 07/01/2021    WBCUA 1 07/01/2021    RBCUA 4 07/01/2021    BLOODU Negative 07/01/2021    SPECGRAV 1.016 07/01/2021    GLUCOSEU Negative 07/01/2021       Radiology:  MRI FOOT RIGHT WO CONTRAST   Final Result   Evaluation is limited as the patient could not complete the whole exam.      Within this limitation, there are no definitive findings of acute   osteomyelitis or a drainable abscess. Generalized subcutaneous edema compatible with cellulitis. Suspected cystic versus erosive-like change in the 5th metatarsal head which   is favored to be chronic in nature. XR CHEST PORTABLE   Final Result   Cardiomegaly with no evidence of edema         XR ANKLE RIGHT (2 VIEWS)   Final Result   1. Negative ankle   2. Gangrene of the 1st digit and lateral foot ulcer, with no findings of   osteomyelitis         XR FOOT RIGHT (2 VIEWS)   Final Result   1. Negative ankle   2.  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 ([facility] [W88.695, L08.9]     Chronic atrial fibrillation (HCC) [I48.20]     History of tuberculosis [Z86.11]     Coronary artery disease due to lipid rich plaque [I25.10, I25.83]     Overweight [E66.3]     Septicemia ([facility] [A41.9]     Diabetes education, encounter for [Z71.89]     Essential hypertension [I10]     Dementia without behavioral disturbance ([facility] [F03.90]      Right lower extremity cellulitis in the right hallux gangrene  Currently on IV Zyvox and Zosyn  ID following  Refused debridement/amputation  We will get long-term antibiotic recommendations from ID    Sepsis secondary to above  Resolved  Continue antibiotics    Heart failure with reduced ejection fraction  EF 15%  Lasix and ACE inhibitor have been on hold in view of low blood pressures    Normocytic anemia  Iron deficiency  Hemoglobin stable  Only renal failure        DVT Prophylaxis: Lovenox  Diet: ADULT DIET;  Regular  Code Status: [facility]      Electronically signed by Ashleigh Meyers MD on 7/28/2021 at 1:56 PM

## 2021-07-29 NOTE — PROGRESS NOTES
Occupational Therapy  Facility/Department: [facility] 3A NURSING  Daily Treatment Note  NAME: Zuleima Vu  : 1940  MRN: [id number]    Date of Service: 2021    Discharge Recommendations:      Zuleima Vu scored a 15/24 on the AM-PAC ADL Inpatient form. Current research shows that an AM-PAC score of 17 or less is typically not associated with a discharge to the patient's home setting. Based on the patient's AM-PAC score and their current ADL deficits, it is recommended that the patient have 3-5 sessions per week of Occupational Therapy at d/c to increase the patient's independence. Please see assessment section for further patient specific details. If patient discharges prior to next session this note will serve as a discharge summary. Please see below for the latest assessment towards goals. OT Equipment Recommendations  Equipment Needed: No  Other: defer to next level of care    Assessment   Performance deficits / Impairments: Decreased functional mobility ; Decreased strength;Decreased safe awareness;Decreased cognition;Decreased endurance;Decreased balance  Assessment: Pt is currently functioning below occupational baseline and demo the deficits listed above, pt would benefit from continued skilled OT services to address these deficits and increase independence, safety, and ease with all occupational pursuits  Treatment Diagnosis: Decreased ADL status, functional mobility, and functional transfers 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 Patient was given IV fluids and Vanco and Zosyn in the ED. patient continued on vancomycin and Zosyn. He was scheduled with podiatry for OR for debridement today however patient refused  Subjective   General  Chart Reviewed: Yes  Patient assessed for rehabilitation services?: Yes  Additional Pertinent Hx: PMH: 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 with ulceration of other part of lower leg ([address] (3/2/2021), 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 ([address] (3/2/2021), CHF (congestive heart failure) ([address] Coronary artery disease due to lipid rich plaque, Hypertension, Lower extremity edema (3/2/2021), Non-pressure chronic ulcer of left lower leg with fat layer exposed ([address] (3/2/2021), Non-pressure chronic ulcer of right lower leg with fat layer exposed ([address] (3/2/2021), Peripheral venous insufficiency (3/2/2021), Seizures ([address] TB (pulmonary tuberculosis), and 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 ([address]  Subjective  Subjective: Pt supine in bed upon entry, pleasant and agreeable to therapy session  Vital Signs  Patient Currently in Pain: Denies   Orientation  Orientation  Overall Orientation Status: Impaired  Orientation Level: Disoriented to time;Disoriented to situation;Oriented to place;Oriented to person  Objective    ADL  Feeding: Setup  Grooming: Setup;Stand by assistance (set up required to perform oral care/washing face seated in recliner)  Toileting: Dependent/Total (external male catheter present)        Balance  Sitting Balance: Stand by assistance  Standing Balance: Dependent/Total  Standing Balance  Time: ~2 miuntes  Activity: functional transfer from 1456)    Goals  Short term goals  Time Frame for Short term goals: d/c  Short term goal 1: Pt will complete functional transfer to ADL surface with min(A)-ongoing 7/29  Short term goal 2: Pt will complete functional mobility to<>from bathroom with min(A)-ongoing 7/29  Short term goal 3: Pt will complete LB ADLs with min(A)-ongoing 7/29  Short term goal 4: Pt will complete UB ADLs with setup-ongoing 7/29  Short term goal 5: Pt will complete toileting with min(A)-ongoing 7/29  Long term goals  Time Frame for Long term goals : LTG=STG  Patient Goals   Patient goals : pt did not state       Therapy Time   Individual Concurrent Group Co-treatment   Time In       1409   Time Out       1449   Minutes       40         Timed Code Treatment Minutes:  40 Minutes  Total Treatment Minutes:  [address], [address]    I have reviewed and am in agreement with this treatment session.     DENIZ Flores OTR/L [id number]

## 2021-07-29 NOTE — CARE COORDINATION
[facility]V declined , not able to manage patient due to dementia and agitation. Amber Sinks- the admission person will present the case to their CNP for review , will call back with update. Still awaiting for called back from [facility].

## 2021-07-29 NOTE — CONSULTS
PSYCHIATRY CONSULT, INITIAL EVALUATION    Referring Provider:  Abdiel Fowler MD    CC/Reason for Consult: ?dementia, agitation      ASSESSMENT:   78 yo M with dementia. I agree with the diagnosis of dementia - most likely Alzheimer's type but certainly he has pretty back vascular disease so this could also be contributing to cognitive deterioration. Agitation seems to be better. D/t his acute medical illness he may have had superimposed delirium when he first came in and this continue to lead to some waxing/waning. He has elevated QT, a.fib so antipsychotic use can be risky, so would avoid haldol and geodon as these are more prolonging of the QT. Doses should be kept minimal and should avoid use if possible. 1. 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 R/o delirium     RECOMMENDATIONS:   1. I will d/c prn lorazepam as this would only make his agitation and confusion worse. 2. If agitation occurs, i'd recommend first a sitter in the room to provide frequent redirection. If pharmacotherapy is needed, can consider low dose olanzapine 2.5mg IM TID prn - do not give IM/IV lorazepam +/- 2hrs of any IM olanzapine. Thank you for this consult, please call the psychiatry consult line for further questions. I will sign off at this time. ____________________________________________________________________________    HPI:   context: 78 yo M with hx of alzheimer's dementia, multiple medical problems including heart failure, PAD, CKD, seizure disorder, BLE venous ulceration. Brought to the ED by EMS after being called for a lift assist. He was febrile and had gangrene on his rt foot. He's had some agitation here and consult placed for agitation and ?dementia. He has issues with non-adherence to treatment. associated symptoms:   Pt does report some depressed mood as he understands his health is not the best and he can't get around like he used to.  He does admit to having memory problems but feels it is normal for his age. He denies hallucinations. He is sleeping ok. Energy is ok. Denies problems with suicidal or homicidal ideation. modifying factors:   He is refusing some treatment, - was planned for amputation of his toe but patient refused. Family is involved in his care. Timing: acute on chronic  duration: seems he had documented dementia for the last 1-2 yrs per chart. severity: moderate     ROS:   Gen: no fevers or chills  HEENT: no vision or hearing problems  CV: no cp  Resp: no dyspnea  : no dysuria  MSK:  +LE swelling  GI: no n/v  Skin: no rashes  Neuro: no tremors  Endo: +weight loss    Past Psychiatric History:   Patient denies any past psychiatric history  Per chart he has a diagnosis of alzheimers dementia noted for the last couple years    Substance Use History:   Nicotine: denies   Alcohol: denies   Illicits: denies    Past Medical History:   Past Medical History:   Diagnosis Date    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 with ulceration of other part of lower leg ([address] 3/2/2021    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 ([address] 3/2/2021    CHF (congestive heart failure) (HCC)     Coronary artery disease due to lipid rich plaque     Hypertension     Lower extremity edema 3/2/2021    Non-pressure chronic ulcer of left lower leg with fat layer exposed ([address] 3/2/2021    Non-pressure chronic ulcer of right lower leg with fat layer exposed ([address] 3/2/2021    Peripheral venous insufficiency 3/2/2021    Seizures ([address]     TB (pulmonary tuberculosis)     he says the doctors told him he had TB when he was 15 and he had to have surgery    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 ([address]      History reviewed. No pertinent surgical history. Social/Developmental History:   Lives in an apartment. Spouse and daughter involved in his care. [facility] was discharged from there on 7/22/21. Family History:   History reviewed. No pertinent family history. Psychiatric: denies    Allergies:  No Known Allergies    Home Medications:   No current facility-administered medications on file prior to encounter. Current Outpatient Medications on File Prior to Encounter   Medication Sig Dispense Refill    carvedilol (COREG) 3.125 MG tablet Take 1 tablet by mouth 2 times daily (with meals) 60 tablet 3    aspirin 81 MG EC tablet Take 81 mg by mouth daily      Compression Stockings MISC by Does not apply route Bilateral below knee compression stockings 20-30 mmHg 1 each 0    levothyroxine (SYNTHROID) 25 MCG tablet Take 1 tablet by mouth daily 90 tablet 1    vitamin D (ERGOCALCIFEROL) 1.25 MG (94931 UT) CAPS capsule Take 1 capsule by mouth once a week 4 capsule 5    levETIRAcetam (KEPPRA) 500 MG tablet Take 1 tablet by mouth 2 times daily 180 tablet 3    rivaroxaban (XARELTO) 15 MG TABS tablet Take 1 tablet by mouth 2 times daily (with meals) for 21 days 42 tablet 0    furosemide (LASIX) 40 MG tablet Take 2 tablets by mouth 2 times daily 360 tablet 0       Medications:  Scheduled Meds:   lisinopril  2.5 mg Oral Daily    clopidogrel  75 mg Oral Daily    lactobacillus  1 capsule Oral BID WC    digoxin  125 mcg Oral Daily    sodium chloride flush  5-40 mL Intravenous 2 times per day    piperacillin-tazobactam  3,375 mg Intravenous Q6H    mupirocin   Topical Daily    carvedilol  3.125 mg Oral BID WC    levETIRAcetam  500 mg Oral BID    linezolid  600 mg Oral 2 times per day    aspirin  81 mg Oral Daily    enoxaparin  1 mg/kg Subcutaneous BID     PRN Meds:. Local Anesthetic Custom Mixture, LORazepam, dextrose, sodium chloride flush, sodium chloride, acetaminophen **OR** acetaminophen    OBJECTIVE:  .  Vitals:    07/29/21 0404 07/29/21 0745 07/29/21 1115 07/29/21 1130   BP: 112/70 125/78  101/60   Pulse: 82 82  82   Resp: 16 16  16   Temp: 97.8 °F (36.6 °C) 97.8 °F (36.6 °C)  97.4 °F (36.3 °C) TempSrc: Axillary Oral  Oral   SpO2: 95% 95%  95%   Weight:       Height:   6' 4\" (1.93 m)        MSE:   Appearance    alert, cooperative  Motor:  No abnormal movements, tics or mannerisms. Speech    spontaneous and normal rate  Language    0 - no aphasia, normal  Mood/Affect    Depressed / full quality, good motility and range  Thought Process   A little tangential  Thought Content    No delusions , no suicidal ideation  Associations    tangential connections  Attention/Concentration    Intact - attends to interview well  Orientation   Oriented to person, place, not to time (doesn't know month, day, or year) or full to situation  Memory    Delayed recall is 0/3. Impairments in remote memory noted. Fund of Knowledge    impaired  Insight/Judgement    Poor / Impaired    Labs:   Recent Labs     07/27/21  0509 07/28/21  0458 07/29/21  0531   WBC 6.9 5.5 4.8   HGB 10.5* 11.2* 11.8*   HCT 32.1* 34.5* 36.8*   MCV 88.3 89.2 88.9    196 244     Recent Labs     07/27/21  0509 07/28/21  0458 07/29/21  0531    139 141   K 4.0 4.5 4.2    104 106   CO2 26 30 27   BUN 24* 21* 21*     No results for input(s): AST, ALT, ALBUMIN, TOTALPROTIEN in the last 72 hours.     Invalid input(s): BILT, BILD   Lab Results   Component Value Date    COLORU DK YELLOW 07/01/2021    NITRU Negative 07/01/2021    GLUCOSEU Negative 07/01/2021    KETUA Negative 07/01/2021    UROBILINOGEN 1.0 07/01/2021    BILIRUBINUR SMALL 07/01/2021     Lab Results   Component Value Date    LABA1C 5.8 08/11/2018     Lab Results   Component Value Date    .8 08/11/2018     Lab Results   Component Value Date    CHOL 174 08/11/2018     Lab Results   Component Value Date    TRIG 88 08/11/2018     Lab Results   Component Value Date    HDL 46 08/11/2018     Lab Results   Component Value Date    LDLCALC 110 (H) 08/11/2018     Lab Results   Component Value Date    LABVLDL 18 08/11/2018     No results found for: [facility]  Lab Results   Component Value Date    TSH 7.42 (H) 03/10/2021     No results found for: VZACNFY9K2  Lab Results   Component Value Date    GBLJHZND54 4848 (H) 2021     Lab Results   Component Value Date    FOLATE 4.90 2021     Imaging:   MRI Brain 2018: Impression   No evidence of acute intracranial abnormality.       Mild chronic small vessel ischemic disease within the periventricular white   matter and subcortical white matter of both cerebral hemispheres.       Moderate cerebral atrophy.       Severe sphenoid sinus disease.          EK2021: 75 bpm, Atrial fibrillationLeft axis deviationNon-specific intra-ventricular conduction blockAbnormal , QTc 500ms        Jewel Hayden MD   Psychiatrist

## 2021-07-29 NOTE — PROGRESS NOTES
During AM assessment, noted that pt had swelling above ACE wraps on BLE. Offered to unwrap legs and re-wrap d/t swelling and to do assessment on BLE. Pt refused. States that there is nothing wrong with them and they are not dirty, so he does not want his legs un-wrapped at this time. Educated pt that swelling around ACE wraps can cause other problems and it would be best to unwrap legs and re-wrap, but pt cont to refuse.

## 2021-07-29 NOTE — PROGRESS NOTES
Nutrition Assessment     Type and Reason for Visit: Initial, Consult, Wound    Nutrition Recommendations/Plan:   con't current plan of care     Nutrition Assessment:  Pt is confused at baseline d/t dementia. PO intake greater than 50% of meals on regular diet. Palliative care following. Will not restrict diet @ this time. Con't Ensure to promote wound healing. Will monitor for further nutrition needs as identified. Malnutrition Assessment:  Malnutrition Status: Moderate malnutrition (as previously assessed)    Estimated Daily Nutrient Needs:  Energy (kcal): 1960- 2450 (20-25 kcal/98kg); Weight Used for Energy Requirements:  Current     Protein (g): 120- 138g (1.3-1.5g/92 kg); Weight Used for Protein Requirements:  Ideal        Fluid (ml/day):  ; Weight Used for Fluid Requirements:  1 ml/kcal      Nutrition Related Findings: LBM 7/27. Trace edema BUE      Current Nutrition Therapies:    ADULT DIET;  Regular    Anthropometric Measures:  · Height: 6' 4\" (193 cm)  · Current Body Wt: 216 lb (98 kg)   · BMI: 26.3    Nutrition Diagnosis:   · Increased nutrient needs related to increase demand for energy/nutrients as evidenced by wounds      Nutrition Interventions:   Food and/or Nutrient Delivery:  Continue Current Diet, Continue Oral Nutrition Supplement  Nutrition Education/Counseling:  Education not indicated   Coordination of Nutrition Care:  Continue to monitor while inpatient    Goals:  advance diet with po intake at least 50% of meals & supplements       Nutrition Monitoring and Evaluation:   Behavioral-Environmental Outcomes:  None Identified   Food/Nutrient Intake Outcomes:  Food and Nutrient Intake, Supplement Intake  Physical Signs/Symptoms Outcomes:  Weight, Skin     Discharge Planning:    No discharge needs at this time     Electronically signed by Lawyer Dasia RD, LD on 7/29/21 at 11:23 AM EDT    Contact: [phone]

## 2021-07-29 NOTE — CARE COORDINATION
CM spoke with patient's spouse and daughter not happy that the surgery was not done. Provided the list of [place] [id number], [facility] and [facility]- Referrals sent.

## 2021-07-29 NOTE — PROGRESS NOTES
Physician Progress Note      PATIENT:               Noel Cosby  CSN #:                  [id number]  :                       1940  ADMIT DATE:       2021 6:34 AM  DISCH DATE:  RESPONDING  PROVIDER #:        Adin Sparks MD          QUERY TEXT:    Pt admitted with Sepsis. Noted documentation of Moderate malnutrition on    Nutrition Consult note by ordered Registered dietician consultant. If   possible, please document in progress notes and discharge summary:      The medical record reflects the following:  Risk Factors: Chronic illness, Sepsis  Clinical Indicators:  Nutrition Consult note documented \"Nutrition   Assessment:  RD consulted for poor po intake & wound. 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 Pt has additional increased   nutrient needs for st 2 area noted to buttock. Per hx, pt has lost   23 lb over past 4 months, but pt has hx CHF. Will offer Ensure supplements   BID once diet resumes. \"  \"Malnutrition Assessment: Malnutrition Status: Moderate malnutrition  Context:  Chronic Illness   Findings of the 6 clinical   characteristics of malnutrition: Energy Intake:  Mild decrease in energy   intake (Comment)  Weight Loss:  7 - 10% over 6 months   Body Fat Loss:  1 - Mild body fat loss   Fat Overlying Ribs Muscle Mass Loss:  1 - Mild muscle mass loss Clavicles   (pectoralis & deltoids) Fluid Accumulation:  1 - Mild Extremities    Strength:  Not Performed. \"  Treatment: Nutrition consult, Ensure supplements BID  Options provided:  -- Moderate malnutrition confirmed present on admission  -- Moderate malnutrition confirmed not present on admission  -- Moderate malnutrition ruled out  -- Other - I will add my own diagnosis  -- Disagree - Not applicable / Not valid  -- Disagree - Clinically unable to determine / Unknown  -- Refer to Clinical Documentation Reviewer    PROVIDER RESPONSE TEXT:    The diagnosis of Moderate malnutrition was confirmed as present on admission.     Query created by: Dusty Doss on 7/28/2021 3:00 PM      Electronically signed by:  Delmer Puentes MD 7/29/2021 5:41 PM

## 2021-07-29 NOTE — PROGRESS NOTES
Hospitalist Progress Note      PCP: Aidee Graham MD    Date of Admission: 7/24/2021    Chief Complaint: [facility] Course: 79 y. o. male  with PMHx of hypertension, HFrEF(EF 15 to 20%), PAD, CKD, seizures, childhood TB, dementia and BLE venous ulceration was brought into the ED by EMS after they were called to his residence for a lift assist.  EMS found patient to be febrile and his legs look bad and right foot seemed gangrenous.  Of note, patient was recently admitted at [facility] from 7/1/2021--7/7/2021 for lower extremity providentia wound infection and Enterobacter bacteremia and was discharged on levofloxacin.  On admission, patient was tachycardic, febrile with temp max of 101.5.  Initial diagnostic labs were unremarkable except elevated BUN, albumin 2.8, hemoglobin 10.8 (13.8 on 7/7/21).  Right foot x-ray showed gangrene of first digit and lateral foot ulcer with no evidence of osteomyelitis.  Patient was given IV fluids and Vanco and Zosyn in the ED. patient continued on vancomycin and Zosyn. He was scheduled with podiatry for OR for debridement today however patient refused. Subjective: Patient seen and examined at bedside. Patient is more confused today. He is oriented, however, tangential in his conversation, verbose, and seems to lack insight.       Medications:  Reviewed    Infusion Medications    sodium chloride 100 mL (07/28/21 1138)     Scheduled Medications    lisinopril  2.5 mg Oral Daily    clopidogrel  75 mg Oral Daily    lactobacillus  1 capsule Oral BID WC    digoxin  125 mcg Oral Daily    sodium chloride flush  5-40 mL Intravenous 2 times per day    piperacillin-tazobactam  3,375 mg Intravenous Q6H    mupirocin   Topical Daily    carvedilol  3.125 mg Oral BID WC    levETIRAcetam  500 mg Oral BID    linezolid  600 mg Oral 2 times per day    aspirin  81 mg Oral Daily    enoxaparin  1 mg/kg Subcutaneous BID     PRN Meds: Local Anesthetic Custom Mixture, LORazepam, dextrose, sodium chloride flush, sodium chloride, acetaminophen **OR** acetaminophen      Intake/Output Summary (Last 24 hours) at 7/29/2021 1317  Last data filed at 7/29/2021 0551  Gross per 24 hour   Intake 240 ml   Output 600 ml   Net -360 ml       Physical Exam Performed:    /60   Pulse 82   Temp 97.4 °F (36.3 °C) (Oral)   Resp 16   Ht 6' 4\" (1.93 m)   Wt 216 lb 11.4 oz (98.3 kg)   SpO2 95%   BMI 26.38 kg/m²     General appearance: No apparent distress, appears stated age and cooperative. HEENT: Pupils equal, round, and reactive to light. Conjunctivae/corneas clear. Neck: Supple, with full range of motion. No jugular venous distention. Trachea midline. Respiratory:  Normal respiratory effort. Clear to auscultation, bilaterally without Rales/Wheezes/Rhonchi. Cardiovascular: Regular rate and rhythm with normal S1/S2 without murmurs, rubs or gallops. Abdomen: Soft, non-tender, non-distended with normal bowel sounds. Musculoskeletal: No clubbing, cyanosis or edema bilaterally. Full range of motion without deformity. Skin: Skin color, texture, turgor normal.  No rashes or lesions. Neurologic:  Neurovascularly intact without any focal sensory/motor deficits. Cranial nerves: II-XII intact, grossly non-focal.  Psychiatric: Alert and oriented, thought content appropriate, normal insight  Capillary Refill: Brisk,< 3 seconds   Peripheral Pulses: +2 palpable, equal bilaterally       Labs:   Recent Labs     07/27/21  0509 07/28/21  0458 07/29/21  0531   WBC 6.9 5.5 4.8   HGB 10.5* 11.2* 11.8*   HCT 32.1* 34.5* 36.8*    196 244     Recent Labs     07/27/21  0509 07/28/21  0458 07/29/21  0531    139 141   K 4.0 4.5 4.2    104 106   CO2 26 30 27   BUN 24* 21* 21*   CREATININE 1.0 1.0 1.0   CALCIUM 7.7* 8.3 8.2*     No results for input(s): AST, ALT, BILIDIR, BILITOT, ALKPHOS in the last 72 hours. No results for input(s): INR in the last 72 hours.   No results for input(s): CKTOTAL, antibiotic recommendations from ID    Sepsis secondary to above  Resolved  Continue antibiotics    Heart failure with reduced ejection fraction  EF 15%  Lasix and ACE inhibitor have been on hold in view of low blood pressures    Normocytic anemia  Iron deficiency  Hemoglobin stable  Only renal failure        DVT Prophylaxis: Lovenox  Diet: ADULT DIET;  Regular  Adult Oral Nutrition Supplement; Standard High Calorie/High Protein Oral Supplement  Code Status: [facility]      Electronically signed by Raven Sheppard MD on 7/29/2021 at 1:17 PM

## 2021-07-29 NOTE — PLAN OF CARE
Problem: Falls - Risk of:  Goal: Will remain free from falls  Description: Will remain free from falls  7/29/2021 0853 by Pantera Reina RN  Outcome: Ongoing  Note: Pt has call light within reach. Video monitoring and fall precautions in place. Pt has intermittent confusion      Problem: Skin Integrity:  Goal: Will show no infection signs and symptoms  Description: Will show no infection signs and symptoms  Outcome: Ongoing  Note: Pt is refusing to allow staff to unwrap BLE to cleanse wound and replace bandages. Education provided about risks of refusal.      Problem: Pain:  Goal: Pain level will decrease  Description: Pain level will decrease  7/29/2021 0853 by Pantera Reina RN  Outcome: Ongoing  Note: Pt has no c/o pain thus far this shift.       Problem: Nutrition  Goal: Optimal nutrition therapy  Outcome: Ongoing

## 2021-07-29 NOTE — PLAN OF CARE
Problem: Falls - Risk of:  Goal: Will remain free from falls  Description: Will remain free from falls  Outcome: Ongoing  Note: Pt is wearing the fall bracelet, S.A.F.E. sign is posted outside door, and yellow blanket is on the bed. Pt informed of fall risks, verbalizes understanding, and agrees to ask for help to ambulate. Will monitor.       Problem: Skin Integrity:  Goal: Absence of new skin breakdown  Description: Absence of new skin breakdown  Outcome: Ongoing     Problem: Pain:  Goal: Pain level will decrease  Description: Pain level will decrease  Outcome: Ongoing

## 2021-07-29 NOTE — PROGRESS NOTES
Physical Therapy  Facility/Department: [facility] 3A NURSING  Daily Treatment Note  NAME: Micheline Charles  : 1940  MRN: [id number]    Date of Service: 2021    Discharge Recommendations:    Micheline Charles scored a 10/24 on the AM-PAC short mobility form. Current research shows that an AM-PAC score of 17 or less is typically not associated with a discharge to the patient's home setting. Based on the patient's AM-PAC score and their current functional mobility deficits, it is recommended that the patient have 3-5 sessions per week of Physical Therapy at d/c to increase the patient's independence. Please see assessment section for further patient specific details. If patient discharges prior to next session this note will serve as a discharge summary. Please see below for the latest assessment towards goals. PT Equipment Recommendations  Equipment Needed: No  Other: Defer to next level of care; if pt does d/c home he would benefit from a RW for safety with ambulation    Assessment   Body structures, Functions, Activity limitations: Decreased functional mobility ; Decreased balance;Decreased safe awareness;Decreased cognition;Decreased endurance;Decreased strength;Decreased posture  Assessment: Pt is an 80 yo male admitted to [facility] for sepsis seen s/p declining (R) great toe amputation earlier today. Mod A of 2 for SPT. Recommend continued skilled PT to safely progress tolerance to activity and independence with functional mobility as the patient requires more assist than one person can provide and is at a high risk of falling.   Treatment Diagnosis: Impaired functional mobility and decreased safety awareness  Prognosis: Fair  Decision Making: Medium Complexity  History: See below  Exam: Balance, transfers  Clinical Presentation: Evolving  PT Education: Goals;PT Role;Plan of Care;General Safety  Patient Education: Pt verbalized understanding but will require reinforcement due to cognitive deficits  Barriers to Learning: Dementia  REQUIRES PT FOLLOW UP: Yes  Activity Tolerance  Activity Tolerance: Patient Tolerated treatment well;Patient limited by cognitive status  Activity Tolerance: Increased time due to patient speaking     Patient Diagnosis(es): The primary encounter diagnosis was Gangrene of right foot ([address] A diagnosis of Septicemia ([address] was also pertinent to this visit. 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 with ulceration of other part of lower leg ([address] 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 ([address] CHF (congestive heart failure) ([address] Coronary artery disease due to lipid rich plaque, Hypertension, Lower extremity edema, Non-pressure chronic ulcer of left lower leg with fat layer exposed ([address] Non-pressure chronic ulcer of right lower leg with fat layer exposed ([address] Peripheral venous insufficiency, Seizures ([facility] [address] TB (pulmonary tuberculosis), and 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 has no past surgical history on file. Restrictions  Restrictions/Precautions  Restrictions/Precautions: Fall Risk (HIGH FALL RISK)  Required Braces or Orthoses?: No  Position Activity Restriction  Other position/activity restrictions: 79 y.o. male  with PMHx of hypertension, HFrEF(EF 15 to 20%), PAD, CKD, seizures, childhood TB, dementia and BLE venous ulceration was brought into the ED by EMS after they were called to his residence for a lift assist.  EMS found patient to be febrile and his legs look bad and right foot seemed gangrenous. Of note, patient was recently admitted at [facility] from 7/1/2021--7/7/2021 for lower extremity providentia wound infection and Enterobacter bacteremia and was discharged on levofloxacin. On admission, patient was tachycardic, febrile with temp max of 101.5. Initial diagnostic labs were unremarkable except elevated BUN, albumin 2.8, hemoglobin 10.8 (13.8 on 7/7/21). Right foot x-ray showed gangrene of first digit and lateral foot ulcer with no evidence of osteomyelitis. Patient was given IV fluids and Vanco and Zosyn in the ED. patient continued on vancomycin and Zosyn. He was scheduled with podiatry for OR for debridement today however patient refused  Subjective   General  Chart Reviewed: Yes  Family / Caregiver Present: No  Subjective  Subjective: Pt denies pain. General Comment  Comments: Pt semi-reclined in bed, pleasantly condused throughout session. Agreeable to PT/OT. Orientation  Orientation  Overall Orientation Status: Impaired  Orientation Level: Oriented to person;Oriented to place; Disoriented to time;Disoriented to situation  Cognition      Objective   Bed mobility  Supine to Sit: Stand by assistance  Scooting: Stand by assistance  Comment: Max time required as pt verbalizes and executes and the sequence utilizing bed rail and HOB elevated. Transfers  Sit to Stand: Dependent/Total;2 Person Assistance (Mod A of 2)  Stand to sit: Dependent/Total;2 Person Assistance (Max A of 2)  Bed to Chair: Dependent/Total;2 Person Assistance (Mod A of 2)  Comment: decreased eccentric control; max time for turning steps        Balance  Posture: Good  Sitting - Static: Good;-  Sitting - Dynamic: Fair;+  Standing - Static: Fair  Standing - Dynamic: Poor;+  Comments: Mod A of 2 to stand static ~2 min.  Sat SBA static EOB ~8 min         AM-PAC Score  AM-PAC Inpatient Mobility Raw Score : 10 (07/29/21 1515)  AM-PAC Inpatient T-Scale Score : 32.29 (07/29/21 1515)  Mobility Inpatient CMS 0-100% Score: 76.75 (07/29/21 1515)  Mobility Inpatient CMS G-Code Modifier : CL (07/29/21 1515)          Goals  Short term goals  Time Frame for Short term goals: Before discharge  Short term goal 1: Pt will complete bed mobility with CGA  Short term goal 2: Pt will complete sit<>stand with CGA  Short term goal 3: Pt will transfer bed<>chair with LRAD and CGA  Short term goal 4: Pt will ambulate 25 ft with LRAD and CGA  Patient Goals   Patient goals : Pt confused no goal stated  **none met    Plan    Plan  Times per week: 3-5x  Current Treatment Recommendations: Strengthening, Functional Mobility Training, Transfer Training, Balance Training, Endurance Training, Stair training, Gait Training, Safety Education & Training, Patient/Caregiver Education & Training, Home Exercise Program, Equipment Evaluation, Education, & procurement, Neuromuscular Re-education  Safety Devices  Type of devices: All fall risk precautions in place, Call light within reach, Chair alarm in place, Left in chair, Telesitter in use, Nurse notified, Patient at risk for falls, Gait belt     Therapy Time   Individual Concurrent Group Co-treatment   Time In       1409   Time Out       1449   Minutes       40   Timed Code Treatment Minutes: [address] [facility], [address]  I agree with the above note and have addressed this patient's goals.   Dina Levine, PT, DPT, [id number]

## 2021-07-29 NOTE — PROGRESS NOTES
[address]   Daily Progress Note      Admit Date:  7/24/2021    HPI:    Mr. Rosalind Macedo is an 79year old male with no medical history as he does not go to physicians, new F in 2018 (non compliant and no LHC due to dementia)    He is admitted for weakness and infected toe. He has been non compliant with medications and follow up in the office. His cxr shows pulmonary congestion and probnp 35K. He is in in AF. He is a DNR-CCA. Successful recanalization of posterior tibial    Subjective:  Patient is being seen for sHF. There were no acute overnight cardiac events. Patient refused amputation of right great toe yesterday. Creat 1.0 potassium 4.2 He continues to be pleasantly confused. Weight stable at 216   No family at bedside. Objective:   /78   Pulse 82   Temp 97.8 °F (36.6 °C) (Oral)   Resp 16   Ht 6' 4\" (1.93 m)   Wt 216 lb 11.4 oz (98.3 kg)   SpO2 95%   BMI 26.38 kg/m²       Intake/Output Summary (Last 24 hours) at 7/29/2021 8133  Last data filed at 7/29/2021 0551  Gross per 24 hour   Intake 480 ml   Output 600 ml   Net -120 ml          Physical Exam:  General:  Awake, alert, pleasantly confused in NAD  Skin:  Warm and dry. No unusual bruising or rash  Neck:  Supple. No JVD or carotid bruit appreciated  Chest:  Normal effort.   Clear to auscultation, no wheezes/rhonchi/rales  Cardiovascular:  RRR, S1/S2, no murmur/gallop/rub  Abdomen:  Soft, nontender, +bowel sounds  Extremities:  No edema, right toes black, bilateral wrapped  Neurological: No focal deficits  Psychological: Normal mood and affect      Medications:    iron sucrose  200 mg Intravenous Q24H    clopidogrel  75 mg Oral Daily    lactobacillus  1 capsule Oral BID WC    digoxin  125 mcg Oral Daily    sodium chloride flush  5-40 mL Intravenous 2 times per day    piperacillin-tazobactam  3,375 mg Intravenous Q6H    mupirocin   Topical Daily    carvedilol  3.125 mg Oral BID WC    levETIRAcetam  500 mg Oral BID    linezolid  600 mg Oral 2 times per day    aspirin  81 mg Oral Daily    enoxaparin  1 mg/kg Subcutaneous BID      sodium chloride 100 mL (07/28/21 1138)       Lab Data:  CBC:   Recent Labs     07/27/21  0509 07/28/21  0458 07/29/21  0531   WBC 6.9 5.5 4.8   HGB 10.5* 11.2* 11.8*    196 244     BMP:    Recent Labs     07/27/21  0509 07/28/21  0458 07/29/21  0531    139 141   K 4.0 4.5 4.2   CO2 26 30 27   BUN 24* 21* 21*   CREATININE 1.0 1.0 1.0     INR:  No results for input(s): INR in the last 72 hours. BNP:    No results for input(s): PROBNP in the last 72 hours. Diagnostics:  Echo 6/8/2021  Summary   Irregular rhythm   Left ventricular systolic function is reduced with ejection fraction   estimated at 15-20 %. Severe hypokinesis of the inferior, inferoseptal and anterior septal walls   with hypokinesis of the remaining walls. There is mild left ventricular hypertrophy. Left ventricular size is mildly increased. Grade II diastolic dysfunction with elevated filling pressure. Biatrial enlargement. Right ventricular systolic function appears reduced . Aortic valve appears sclerotic but opens adequately. Mild aortic regurgitation is present. Moderate to severe mitral regurgitation. Mild-moderate pulmonic regurgitation present. Moderate tricuspid regurgitation. Systolic pulmonary artery pressure (SPAP) estimated at 62 mmHg (right atrial   pressure 8 mmHg), consistent with severe pulmonary hypertension. IVC size is dilated (>2.1 cm) but collapses > 50% with respiration   consistent with elevated RA pressure (8 mmHg). There is a trivial circumferential pericardial effusion noted. Previous echo done 2/2019 - EF 30% hypokinesis    Echo 2/17/2019  Summary   -The left ventricle is mildly dilated. Mild concentric left ventricular   hypertrophy noted.   -Severely reduced global systolic function with an ejection fraction   estimated at 30%.  Severe global hypokinesis noted.   -Diastolic filling parameters suggest grade I diastolic dysfunction.   B/X'=15.92   -Aortic valve appears sclerotic but opens adequately. Mild aortic   regurgitation.   -Moderate mitral regurgitation.   -There is mild tricuspid regurgitation with a RVSP estimation of 37 mmHg.   -Biatrial enlargement.   -Estimated pulmonary artery systolic pressure is normal at 37 mmHg assuming   a right atrial pressure of 3 mmHg.   -There is a small-moderate localized near right ventricle pericardial   effusion noted    Echo 8/13/18  The left ventricle is mildly dilated.   -Severely reduced global systolic function with an ejection fraction   estimated at 15-20%.  -Severe global hypokinesis noted.   -RV systolic function appears to be reduced.   -Biatrial enlargement.   -Aortic valve appears sclerotic but opens adequately.   -Mild aortic regurgitation.   -Severe mitral regurgitation.   -There is severe tricuspid regurgitation with a RVSP estimation of 63 mmHg.   -Mild-to-moderate pulmonic regurgitation present.   -Mild circumferential pericardial effusion noted.   -Large pleural effusion noted measuring 2.42 cm.   -Diastolic filling parameters suggest grade III diastolic   dysfunction. E/e'=31.85   -The inferior vena cava is dilated. Assessment:    1. Gangrene of right foot  2. Septicemia  3. Severe cardiomyopathy, on BB; no aldosterone antagonist due to compliance with appts  4. PAF  5. Dementia  6. Anemia  7. VT EP following    Plan:    1.  receiving IV venofer 200 mg for 3 doses  2. Continue digoxin 125 mcg daily  3. Continue coreg 3.125 mg bid  4. Will restart lisinopril 2.5 mg once a day  5. CHF nurse following for diet education, fluid restriction and daily weights  6. Palliative care following  7. Vascular, ID, EP and podiatry following    He is stable from cardiology standpoint. new AF.  He has had very poor compliance with f/u in the past.  He has had falls and problems with mobility which makes AC also treacherous. He is presently on therapeutic lovenox, but eliquis could be started after we decide that he does not need any surgery and after discussion with his caregivers. per consult note    No need for Livevest or ischemic work up given code status (pt unlikely to wear device given his baseline confusion/dementia per EP    He has endstage heart failure. Family should consider palliative/hospice care. Discussed with bedside nurse    NYHA IV    Discussed with patient who is agreeable with plan of care. Thank you for allowing me to participate in the care of your patient.     Albert Silva, RODERICK - CNS, CNS

## 2021-07-29 NOTE — PROGRESS NOTES
Infectious Diseases   Progress Note      Admission Date: 7/24/2021  Hospital Day: Hospital Day: 6   Attending: Elida Hopkins MD  Date of service: 7/29/2021     Chief complaint/ Reason for consult:     · Complicated right diabetic foot infection-MRI was negative for osteomyelitis but showed chronic erosive changes in the right fifth metatarsal head  · History of Providencia and Enterobacter bacteremia  · Essential hypertension  · Peripheral arterial disease    Microbiology:        I have reviewed allavailable micro lab data and cultures    · Blood culture (2/2) - collected on 7/24/2021: Negative so far        Antibiotics and immunizations:       Current antibiotics: All antibiotics and their doses were reviewed by me    Recent Abx Admin                   piperacillin-tazobactam (ZOSYN) 3,375 mg in dextrose 5 % 50 mL IVPB (mini-bag) (mg) 3,375 mg New Bag 07/29/21 0805     3,375 mg New Bag  0300     3,375 mg New Bag 07/28/21 2100     3,375 mg New Bag  1858    linezolid (ZYVOX) tablet 600 mg (mg) 600 mg Given 07/29/21 0802     600 mg Given 07/28/21 2100                  Immunization History: All immunization history was reviewed by me today. Immunization History   Administered Date(s) Administered    Tdap (Boostrix, Adacel) 07/02/2021       Known drug allergies: All allergies were reviewed and updated    No Known Allergies    Social history:     Social History:  All social andepidemiologic history was reviewed and updated by me today as needed. · Tobacco use:   reports that he has never smoked. He has never used smokeless tobacco.  · Alcohol use:   reports previous alcohol use. · Currently lives in: [facility]  ·  has no history on file for drug use.      COVID VACCINATION AND LAB RESULT RECORDS:     Internal Administration   First Dose      Second Dose           Last COVID Lab SARS-CoV-2, NAAT (no units)   Date Value   07/07/2021 Not Detected            Assessment:     The patient is a 79 y.o. 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 with following problems:    · Complicated right diabetic foot infection-MRI was negative for osteomyelitis but showed chronic erosive changes in the right fifth metatarsal head--currently on linezolid and Zosyn  · History of Providencia and Enterobacter bacteremia-blood cultures have been negative during this admission  · Essential hypertension-blood pressure okay  · Peripheral arterial disease  · S/p right leg angiography on 7/26/2021  · Seizure disorder  · Type 2 diabetes mellitus-counseling done  · Coronary artery disease  · Chronic atrial fibrillation-rate controlled  · History of tuberculosis as a child  · Overweight due to excess calorie intake : Body mass index is 26.38 kg/m².-Counseling done  ·       Discussion:      He is afebrile. He is on empiric IV linezolid and IV Zosyn. The patient has decided not to go for right hallux amputation despite podiatry recommendation    Blood cultures have been negative. Serum creatinine is 1.0. Plan:     Diagnostic Workup:    · Continue to follow  fever curve, WBC count and blood cultures. · Continue to monitor blood counts, liver and renal function.     Antimicrobials:    · Will stop empiric linezolid today  · Stop empiric IV Zosyn today  · Right hallux amputation is recommended, however, patient does not want to get it done  · We will put him on a 10-day course of oral Augmentin 500 mg every 8 hour at discharge. MRI was negative for osteomyelitis  · If starts having diarrhea, can stop augmentin early on  · Start oral probiotics twice daily  · Continue close vitals monitoring. · Maintain good glycemic control. · Fall precautions. Aspiration precautions. · Continue to watch for new fever or diarrhea. · DVT prophylaxis. · Discussed all above with patient and RN. · Discussed with Dr. Darwin Mata:    · Continue monitoring for antibiotic toxicity as follows: CBC, CMP   · Continue to watch for following: new or worsening fever, new hypotension, hives, lip swelling and redness or purulence at vascular access sites. I/v access Management:    · Continue to monitor i.v access sites for erythema, induration, discharge or tenderness. · As always, continue efforts to minimize tubes/lines/drains as clinically appropriate to reduce chances of line associated infections. Patient education and counseling:        · The patient was educated in detail about the side-effects of various antibiotics and things to watch for like new rashes, lip swelling, severe reaction, worsening diarrhea, break through fever etc.  · Discussed patient's condition and what to expect. All of the patient's questions were addressed in a satisfactory manner and patient verbalized understanding all instructions. TIME SPENT TODAY:     - Spent over  26 minutes on visit (including interval history, physical exam, review of data including labs, cultures, imaging, development and implementation of treatment plan and coordination of complex care). More than 50 percent of this includes face-to-face time spent with the patient for counseling and coordination of care. Thank you for involving me in the care of your patient. I will continue to follow.  If you have anyadditional questions, please do not hesitate to contact me. Subjective: Interval history: Interval history was obtained from chart review and patient/ RN. He is afebrile. No diarrhea, has ongoing right hallux wound     REVIEW OF SYSTEMS:     Review of Systems   Constitutional: Negative for chills, diaphoresis and fever. HENT: Negative for ear discharge, ear pain, rhinorrhea, sore throat and trouble swallowing. Eyes: Negative for discharge and redness. Respiratory: Negative for cough, shortness of breath and wheezing. Cardiovascular: Negative for chest pain and leg swelling. Gastrointestinal: Negative for abdominal pain, constipation, diarrhea and nausea. Endocrine: Negative for polyuria. Genitourinary: Negative for dysuria, flank pain, frequency, hematuria and urgency. Musculoskeletal: Negative for back pain and myalgias. Skin: Negative for rash. Neurological: Negative for dizziness, seizures and headaches. Hematological: Does not bruise/bleed easily. Psychiatric/Behavioral: Negative for hallucinations and suicidal ideas. All other systems reviewed and are negative. Past Medical History: All past medical history reviewed today.     Past Medical History:   Diagnosis Date    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 with ulceration of other part of lower leg ([address] 3/2/2021    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 ([facility]) 3/2/2021    CHF (congestive heart failure) (H[facility])     Coronary artery disease due to lipid rich plaque     Hypertension     Lower extremity edema 3/2/2021    Non-pressure chronic ulcer of left lower leg with fat layer exposed ([address] 3/2/2021    Non-pressure chronic ulcer of right lower leg with fat layer exposed ([address] 3/2/2021    Peripheral venous insufficiency 3/2/2021    Seizures ([address]     TB (pulmonary tuberculosis)     he says the doctors told him he had TB when he was 15 and he had to have surgery    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 History reviewed. No pertinent surgical history. Family History: All family history was reviewed today. History reviewed. No pertinent family history. Objective:       PHYSICAL EXAM:      Vitals:   Vitals:    07/29/21 0404 07/29/21 0745 07/29/21 1115 07/29/21 1130   BP: 112/70 125/78  101/60   Pulse: 82 82  82   Resp: 16 16  16   Temp: 97.8 °F (36.6 °C) 97.8 °F (36.6 °C)  97.4 °F (36.3 °C)   TempSrc: Axillary Oral  Oral   SpO2: 95% 95%  95%   Weight:       Height:   6' 4\" (1.93 m)        Physical Exam  Vitals and nursing note reviewed. Constitutional:       Appearance: Normal appearance. He is well-developed. HENT:      Head: Normocephalic and atraumatic. Right Ear: External ear normal.      Left Ear: External ear normal.      Nose: Nose normal. No congestion or rhinorrhea. Mouth/Throat:      Mouth: Mucous membranes are moist.      Pharynx: No oropharyngeal exudate or posterior oropharyngeal erythema. Eyes:      General: No scleral icterus. Right eye: No discharge. Left eye: No discharge. Conjunctiva/sclera: Conjunctivae normal.      Pupils: Pupils are equal, round, and reactive to light. Cardiovascular:      Rate and Rhythm: Normal rate and regular rhythm. Pulses: Normal pulses. Heart sounds: No murmur heard. No friction rub. Pulmonary:      Effort: Pulmonary effort is normal. No respiratory distress. Breath sounds: Normal breath sounds. No stridor. No wheezing, rhonchi or rales. Abdominal:      General: Bowel sounds are normal.      Palpations: Abdomen is soft. Tenderness: There is no abdominal tenderness. There is no right CVA tenderness, left CVA tenderness, guarding or rebound. Musculoskeletal:         General: No swelling or tenderness. Normal range of motion.       Cervical back: Normal range of motion and neck supple. No rigidity. No muscular tenderness. Lymphadenopathy:      Cervical: No cervical adenopathy. Skin:     General: Skin is warm and dry. Coloration: Skin is not jaundiced. Findings: No erythema or rash. Comments: Right hallux dorsal side wound improving   Neurological:      General: No focal deficit present. Mental Status: He is alert and oriented to person, place, and time. Mental status is at baseline. Motor: No abnormal muscle tone. Psychiatric:         Mood and Affect: Mood normal.         Behavior: Behavior normal.         Thought Content: Thought content normal.             Lines: All vascular access sites are healthy with no local erythema, discharge or tenderness. Intake and output:    I/O last 3 completed shifts: In: 480 [P.O.:480]  Out: 600 [Urine:600]    Lab Data:   All available labs and old records have been reviewed by me. CBC:  Recent Labs     07/27/21  0509 07/28/21  0458 07/29/21  0531   WBC 6.9 5.5 4.8   RBC 3.64* 3.87* 4.14*   HGB 10.5* 11.2* 11.8*   HCT 32.1* 34.5* 36.8*    196 244   MCV 88.3 89.2 88.9   MCH 28.9 28.9 28.5   MCHC 32.7 32.4 32.1   RDW 15.5* 15.6* 15.9*        BMP:  Recent Labs     07/27/21  0509 07/28/21  0458 07/29/21  0531    139 141   K 4.0 4.5 4.2    104 106   CO2 26 30 27   BUN 24* 21* 21*   CREATININE 1.0 1.0 1.0   CALCIUM 7.7* 8.3 8.2*   GLUCOSE 104* 94 101*        Hepatic Function Panel:   Lab Results   Component Value Date    ALKPHOS 114 07/24/2021    ALT 11 07/24/2021    AST 21 07/24/2021    PROT 6.4 07/24/2021    BILITOT 1.0 07/24/2021    LABALBU 2.8 07/24/2021       CPK:   Lab Results   Component Value Date    CKTOTAL 170 07/01/2021     ESR:   Lab Results   Component Value Date    SEDRATE 65 (H) 07/27/2021     CRP:   Lab Results   Component Value Date    CRP 75.0 (H) 07/27/2021           Imaging: All pertinent images and reports for the current visit were reviewed by me during this visit.     MRI FOOT RIGHT WO CONTRAST   Final Result   Evaluation is limited as the patient could not complete the whole exam.      Within this limitation, there are no definitive findings of acute   osteomyelitis or a drainable abscess. Generalized subcutaneous edema compatible with cellulitis. Suspected cystic versus erosive-like change in the 5th metatarsal head which   is favored to be chronic in nature. XR CHEST PORTABLE   Final Result   Cardiomegaly with no evidence of edema         XR ANKLE RIGHT (2 VIEWS)   Final Result   1. Negative ankle   2. Gangrene of the 1st digit and lateral foot ulcer, with no findings of   osteomyelitis         XR FOOT RIGHT (2 VIEWS)   Final Result   1. Negative ankle   2. Gangrene of the 1st digit and lateral foot ulcer, with no findings of   osteomyelitis             Medications: All current and past medications were reviewed.      lisinopril  2.5 mg Oral Daily    amoxicillin-clavulanate  1 tablet Oral 3 times per day    clopidogrel  75 mg Oral Daily    lactobacillus  1 capsule Oral BID WC    digoxin  125 mcg Oral Daily    sodium chloride flush  5-40 mL Intravenous 2 times per day    mupirocin   Topical Daily    carvedilol  3.125 mg Oral BID WC    levETIRAcetam  500 mg Oral BID    aspirin  81 mg Oral Daily    enoxaparin  1 mg/kg Subcutaneous BID        sodium chloride 100 mL (07/28/21 1138)       Local Anesthetic Custom Mixture, dextrose, sodium chloride flush, sodium chloride, acetaminophen **OR** acetaminophen      Problem list:       Patient Active Problem List   Diagnosis Code    New onset seizure ([facility] R56.9    Delirium R41.0    Acute encephalopathy G93.40    Volume overload E87.70    Acute renal failure ([address] N17.9    Acute combined systolic and diastolic (congestive) hrt fail ([facility]) I50.41    Essential hypertension I10    Anasarca R60.1    Seizure ([address] R56.9    Aspiration into airway T17.908A    Acute respiratory failure with hypoxia ([facility]) J96.01    Dementia without behavioral disturbance ([facility]) F03.90    Seizures ([facility]) R56.9    Syncope and collapse R55    Anemia D64.9    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[facility] 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, F02.80    HTN (hypertension), benign I10    Atrial fibrillation with RVR ([facility]) I48.91    Non-pressure chronic ulcer of right lower leg with fat layer exposed ([facility] L97.912    Non-pressure chronic ulcer of left lower leg with fat layer exposed ([facility] L97.922    Peripheral venous insufficiency I87.2    Lower extremity edema R60.0    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 with ulceration of other part of lower leg ([facility]) I70.248    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 ([facility]) I70.232    Acute metabolic encephalopathy L67.66    Gram negative sepsis ([facility]) A41.50    Cellulitis and abscess of leg L03.119, L02.419    Gram-negative bacteremia R78.81    Lactic acidosis E87.2    Leukopenia D72.819    Need for Tdap vaccination Z23    Thrombocytopenia ([facility]) D69.6    Elevated d-dimer R79.89    Pulmonary vascular congestion R09.89    Moderate malnutrition ([facility]) E44.0    Diabetes education, encounter for Z71.89    Septicemia ([facility]) A41.9    Gangrene of right foot ([facility] I96    Cardiomyopathy ([facility] I42.9    PAF (paroxysmal atrial fibrillation) ([facility]) I48.0    PAD (peripheral artery disease) ([facility]) I73.9    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 Although every effort was made to ensure the accuracy of this automated transcription, some errors in transcription may have occurred inadvertently.  If you may need any clarification, please do not hesitate to contact me through [facility] or at the phone number provided below with my electronic signature. Any pictures or media included in this note were obtained after taking informed verbal consent from the patient and with their approval to include those in the patient's medical record.     Joanna Moss MD, MPH  7/29/2021 , 1:49 PM   [facility] Infectious Disease   [address] [place] [address], [address] [place] [address]  Office: [phone]  Fax: [phone]  Clinic days:  Tuesday & Thursday

## 2021-07-30 NOTE — PROGRESS NOTES
Code violet called on pt. Pt refusing to get his blood sugar checked, and refusing D50 amp. Pt yelling at nursing staff stated \" get me out of here, and let me go. I want to walk out of here. I don't care if I'm on the streets I'll find my way home. You're not going to touch me or stick me anymore. \" pt restrained while nursing staff re-checks blood sugar and gives pt D50. Nursing staff got ahold of daughter to help calm pt down. Pt hooked up to D10@ 100Ml/Hr. MD with new orders @hourly blood sugar checks for 4 hours. Pt blood sugar now at 55. Will re-check. Will monitor.

## 2021-07-30 NOTE — CARE COORDINATION
SIMON  spoke with Taco Gomez at [facility] admissions who stated will review the case and call back.

## 2021-07-30 NOTE — PROGRESS NOTES
This RN re-checked pt BS. Pt BS 35 at this time. Pt ate all of his dinner. Per MAR gave D50 amp. Will re-check BS in 15mins. Will monitor.

## 2021-07-30 NOTE — PROGRESS NOTES
[facility]   Daily Progress Note      Admit Date:  7/24/2021    HPI:    Mr. Rosalind Macedo is an 79year old male with no medical history as he does not go to physicians, new sHF in 2018 (non compliant and no LHC due to dementia)    He is admitted for weakness and infected toe. He has been non compliant with medications and follow up in the office. His cxr shows pulmonary congestion and probnp 35K. He is in in AF. He is a DNR-CCA. Successful recanalization of posterior tibial  Patient refused amputation of right great toe yesterday    Subjective:  Patient is being seen for sHF. There were no acute overnight cardiac events. Agitation, low blood sugars, code violet and geodon given yesterday. No labs, he is sitting up in the bed eating pancakes, potatoes this morning. He has [employer] food as well next to him. He has d10 running at 100/hr. Weight stable at 216 not done since 7/26  No family at bedside. Objective:   BP (!) 133/56   Pulse 80   Temp 97 °F (36.1 °C) (Axillary)   Resp 16   Ht 6' 4\" (1.93 m)   Wt 216 lb 11.4 oz (98.3 kg)   SpO2 90%   BMI 26.38 kg/m²       Intake/Output Summary (Last 24 hours) at 7/30/2021 0853  Last data filed at 7/29/2021 1542  Gross per 24 hour   Intake 300 ml   Output --   Net 300 ml          Physical Exam:  General:  Awake, alert, pleasantly confused in NAD  Skin:  Warm and dry. No unusual bruising or rash  Neck:  Supple. No JVD or carotid bruit appreciated  Chest:  Normal effort.   Clear to auscultation, no wheezes/rhonchi/rales  Cardiovascular:  RRR, S1/S2, no murmur/gallop/rub  Abdomen:  Soft, nontender, +bowel sounds  Extremities:  No edema, right toes black, bilateral wrapped  Neurological: No focal deficits  Psychological: Normal mood and affect      Medications:    lisinopril  2.5 mg Oral Daily    amoxicillin-clavulanate  1 tablet Oral 3 times per day    clopidogrel  75 mg Oral Daily    lactobacillus  1 capsule Oral BID WC    digoxin  125 mcg Oral Daily    sodium chloride flush  5-40 mL Intravenous 2 times per day    mupirocin   Topical Daily    carvedilol  3.125 mg Oral BID WC    levETIRAcetam  500 mg Oral BID    aspirin  81 mg Oral Daily    enoxaparin  1 mg/kg Subcutaneous BID      dextrose 5 % and 0.45 % NaCl      dextrose      sodium chloride 100 mL (07/28/21 1138)       Lab Data:  CBC:   Recent Labs     07/28/21  0458 07/29/21  0531   WBC 5.5 4.8   HGB 11.2* 11.8*    244     BMP:    Recent Labs     07/28/21  0458 07/29/21  0531    141   K 4.5 4.2   CO2 30 27   BUN 21* 21*   CREATININE 1.0 1.0     INR:  No results for input(s): INR in the last 72 hours. BNP:    No results for input(s): PROBNP in the last 72 hours. Diagnostics:  Echo 6/8/2021  Summary   Irregular rhythm   Left ventricular systolic function is reduced with ejection fraction   estimated at 15-20 %. Severe hypokinesis of the inferior, inferoseptal and anterior septal walls   with hypokinesis of the remaining walls. There is mild left ventricular hypertrophy. Left ventricular size is mildly increased. Grade II diastolic dysfunction with elevated filling pressure. Biatrial enlargement. Right ventricular systolic function appears reduced . Aortic valve appears sclerotic but opens adequately. Mild aortic regurgitation is present. Moderate to severe mitral regurgitation. Mild-moderate pulmonic regurgitation present. Moderate tricuspid regurgitation. Systolic pulmonary artery pressure (SPAP) estimated at 62 mmHg (right atrial   pressure 8 mmHg), consistent with severe pulmonary hypertension. IVC size is dilated (>2.1 cm) but collapses > 50% with respiration   consistent with elevated RA pressure (8 mmHg). There is a trivial circumferential pericardial effusion noted. Previous echo done 2/2019 - EF 30% hypokinesis    Echo 2/17/2019  Summary   -The left ventricle is mildly dilated.  Mild concentric left ventricular   hypertrophy Will sign off, please call with any issues    new AF. He has had very poor compliance with f/u in the past.  He has had falls and problems with mobility which makes [facility] also treacherous. He is presently on therapeutic lovenox, but eliquis could be started after we decide that he does not need any surgery and after discussion with his caregivers. per consult note    No need for Livevest or ischemic work up given code status (pt unlikely to wear device given his baseline confusion/dementia per EP    He has endstage heart failure. Family should consider palliative/hospice care. Discussed with bedside nurse    NYHA IV    Discussed with patient who is agreeable with plan of care. Thank you for allowing me to participate in the care of your patient.     Hesham Arzate, APRN - CNS, CNS

## 2021-07-30 NOTE — CARE COORDINATION
i-nexus declined, still awaiting for call back from [facility]. Another referral sent to [facility] as per the spouse's request.  CM spoke with the admissions , stated may be able to accept him when medically stable. Spouse was updated.

## 2021-07-30 NOTE — PROGRESS NOTES
Was called and told patient is being transferred to ICU due to need for frequent blood sugars as patient is on  D10 drip. Patient had blood sugar of 22 last night at 20:53 and was treated appropriately with an amp of D50, eventually was started on D10 gtt. . They have had hourly blood sugars since that time, peaking at 172. Eventually an attempt to change to D5 was made,  which was run at the same rate as the D10 (100 ml/hr) and sugar decreased to 59. Rather than increase the rate of D5 fluids were switched back  to D10. Per hospital policy if a patient is on D10 then sugars need to be checked every hour. I have changed fluids to D5 half normal saline at a rate of 150 ml/hr, In the hopes of preventing a transfer to ICU as we have limited beds and patient is a [facility]. If on this  Drip,sugar drops we can increase to 200 ml/hr. If the patient HAS to go back on D10 then we will move to ICU.

## 2021-07-30 NOTE — PROGRESS NOTES
Bedside report given to [facility]. Pt continued to be upset and agitated during report. Unable to obtain vitals.

## 2021-07-30 NOTE — PROGRESS NOTES
Attempted to call report to ICU. Dr. Winter Yuen took call. Dr. Collette Lai would like for pt to remain on med surg as long as pt remains out of hypoglycemia and manage blood glucose with D5 while appropriate.

## 2021-07-30 NOTE — PROGRESS NOTES
Patient refusing to take PO medication, refusing to allow blood sugar to be checked. Placed call to patient's daughter Rhoda Weinberg, explained that he was refusing care, she said to restraint him and take care of him.

## 2021-07-30 NOTE — PROGRESS NOTES
Pt hypoglycemic and incredibly agitated. Will not allow vital signs. Cannot give meds. Will not eat or drink dinner. Able to give IV dextrose.

## 2021-07-30 NOTE — PROGRESS NOTES
dextrose, Local Anesthetic Custom Mixture, sodium chloride flush, sodium chloride, acetaminophen **OR** acetaminophen    No intake or output data in the 24 hours ending 07/30/21 1927    Physical Exam Performed:    /70   Pulse 82   Temp 97.1 °F (36.2 °C) (Axillary)   Resp 16   Ht 6' 4\" (1.93 m)   Wt 227 lb 4.7 oz (103.1 kg)   SpO2 90%   BMI 27.67 kg/m²     General appearance: No apparent distress, appears stated age and cooperative. HEENT: Pupils equal, round, and reactive to light. Conjunctivae/corneas clear. Neck: Supple, with full range of motion. No jugular venous distention. Trachea midline. Respiratory:  Normal respiratory effort. Clear to auscultation, bilaterally without Rales/Wheezes/Rhonchi. Cardiovascular: Regular rate and rhythm with normal S1/S2 without murmurs, rubs or gallops. Abdomen: Soft, non-tender, non-distended with normal bowel sounds. Musculoskeletal: No clubbing, cyanosis or edema bilaterally. Full range of motion without deformity. Skin: Skin color, texture, turgor normal.  No rashes or lesions. Neurologic:  Neurovascularly intact without any focal sensory/motor deficits. Cranial nerves: II-XII intact, grossly non-focal.  Psychiatric: Alert and oriented, thought content appropriate, normal insight  Capillary Refill: Brisk,< 3 seconds   Peripheral Pulses: +2 palpable, equal bilaterally       Labs:   Recent Labs     07/28/21  0458 07/29/21  0531 07/30/21  0947   WBC 5.5 4.8 5.1   HGB 11.2* 11.8* 12.3*   HCT 34.5* 36.8* 38.5*    244 224     Recent Labs     07/28/21  0458 07/29/21  0531 07/30/21  0947    141 136   K 4.5 4.2 3.9    106 102   CO2 30 27 23   BUN 21* 21* 19   CREATININE 1.0 1.0 0.9   CALCIUM 8.3 8.2* 8.1*     No results for input(s): AST, ALT, BILIDIR, BILITOT, ALKPHOS in the last 72 hours. No results for input(s): INR in the last 72 hours. No results for input(s): [place] in the last 72 hours.     Urinalysis:      Lab Results Component Value Date    NITRU Negative 07/01/2021    WBCUA 1 07/01/2021    RBCUA 4 07/01/2021    BLOODU Negative 07/01/2021    SPECGRAV 1.016 07/01/2021    GLUCOSEU Negative 07/01/2021       Radiology:  MRI FOOT RIGHT WO CONTRAST   Final Result   Evaluation is limited as the patient could not complete the whole exam.      Within this limitation, there are no definitive findings of acute   osteomyelitis or a drainable abscess. Generalized subcutaneous edema compatible with cellulitis. Suspected cystic versus erosive-like change in the 5th metatarsal head which   is favored to be chronic in nature. XR CHEST PORTABLE   Final Result   Cardiomegaly with no evidence of edema         XR ANKLE RIGHT (2 VIEWS)   Final Result   1. Negative ankle   2. Gangrene of the 1st digit and lateral foot ulcer, with no findings of   osteomyelitis         XR FOOT RIGHT (2 VIEWS)   Final Result   1. Negative ankle   2.  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 intake  As per nursing patient is not symptomatic during hypoglycemia episodes  Medications reviewed, not on any medications that would cause hypoglycemia  D5 half NS at 50 cc/h  Accu-Cheks every 4 hours  Insulin proinsulin and C-peptide ordered      Right lower extremity cellulitis in the right hallux gangrene  Switch to p.o. Augmentin by ID   ID following  Refused debridement/amputation  We will get long-term antibiotic recommendations from ID    Sepsis secondary to above  Resolved  Continue antibiotics    Heart failure with reduced ejection fraction  EF 15%  Lasix and ACE inhibitor have been on hold in view of low blood pressures    Normocytic anemia  Iron deficiency  Hemoglobin stable  Only renal failure        DVT Prophylaxis: Lovenox  Diet: ADULT DIET;  Regular  Adult Oral Nutrition Supplement; Standard High Calorie/High Protein Oral Supplement  Code Status: [facility]      Electronically signed by Madhu Mixon MD on 7/30/2021 at 7:27 PM

## 2021-07-30 NOTE — PROGRESS NOTES
Occupational/Physical Therapy  Pt met bedside. Pleasant and talkative, but refusing any activity. Significant encouragement provided, continued to decline. Confusion noted. Will re-attempt as able. If pt to d/c before next session, previous note to serve as d/c summary.      Pantera Urrutia OTR/L [id number]  Thanks, Joi Barros, PT, DPT [id number]

## 2021-07-30 NOTE — PROGRESS NOTES
This RN re-checked pt BS. Pt BS at this time 22. Gave the other half of D50 per MAr. MD made aware. Will re-check sugar in 15mins. Will monitor.

## 2021-07-30 NOTE — PROGRESS NOTES
Attended code violet at bedside. Patient has hx of dementia, attempted to get out of bed, confused. He was hypoglycemic, required multiple amps of bicarb, later started on D10 infusion. Administered geodon 20 mg IM at bedside. Placed in restraints for safety purposes as patient tried to get out of bed. D/w nursing staff about Qhourly accucheck X4.

## 2021-07-30 NOTE — PROGRESS NOTES
Infectious Diseases   Progress Note      Admission Date: 7/24/2021  Hospital Day: Hospital Day: 7   Attending: Beverley Bennett MD  Date of service: 7/30/2021     Chief complaint/ Reason for consult:     · Complicated right diabetic foot infection-MRI was negative for osteomyelitis but showed chronic erosive changes in the right fifth metatarsal head  · History of Providencia and Enterobacter bacteremia  · Essential hypertension  · Peripheral arterial disease    Microbiology:        I have reviewed allavailable micro lab data and cultures    · Blood culture (2/2) - collected on 7/24/2021: Negative so far        Antibiotics and immunizations:       Current antibiotics: All antibiotics and their doses were reviewed by me    Recent Abx Admin                   amoxicillin-clavulanate (AUGMENTIN) 500-125 MG per tablet 1 tablet (tablet) 1 tablet Given 07/30/21 0943     1 tablet Given 07/29/21 2015     1 tablet Given  1414                  Immunization History: All immunization history was reviewed by me today. Immunization History   Administered Date(s) Administered    Tdap (Boostrix, Adacel) 07/02/2021       Known drug allergies: All allergies were reviewed and updated    No Known Allergies    Social history:     Social History:  All social andepidemiologic history was reviewed and updated by me today as needed. · Tobacco use:   reports that he has never smoked. He has never used smokeless tobacco.  · Alcohol use:   reports previous alcohol use. · Currently lives in: [address] [place] [address]  ·  has no history on file for drug use.      COVID VACCINATION AND LAB RESULT RECORDS:     Internal Administration   First Dose      Second Dose           Last COVID Lab SARS-CoV-2, NAAT (no units)   Date Value   07/07/2021 Not Detected            Assessment:     The patient is a 79 y.o. 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 with following problems:    · Complicated right diabetic foot infection-MRI was negative for osteomyelitis but showed chronic erosive changes in the right fifth metatarsal head--currently on Augmentin  · History of Providencia and Enterobacter bacteremia-blood cultures have been negative during this admission  · Essential hypertension-BP okay  · Peripheral arterial disease  · S/p right leg angiography on 7/26/2021  · Seizure disorder  · Type 2 diabetes mellitus--was hypotensive overnight  · Coronary artery disease  · Chronic atrial fibrillation-rate is controlled  · History of tuberculosis as a child  · Overweight due to excess calorie intake : Body mass index is 27.67 kg/m².-Counseling done  ·       Discussion:      The patient is afebrile. I had switched him to oral Augmentin yesterday. His white cell count is 5100. Serum creatinine 0.9. He has been hypoglycemic overnight. Plan:     Diagnostic Workup:      · Continue to follow  fever curve, WBC count and blood cultures. · Continue to monitor blood counts, liver and renal function.     Antimicrobials:    · Recommend total of 10 days of oral Augmentin, if he can tolerate without diarrhea  · Continue oral probiotic twice daily  · If the patient starts having diarrhea, can stop oral Augmentin early on  · Hypoglycemia management per primary  · We will follow up on the culture results and surgical history. Family History: All family history was reviewed today. History reviewed. No pertinent family history. Objective:       PHYSICAL EXAM:      Vitals:   Vitals:    07/29/21 2343 07/30/21 0302 07/30/21 0830 07/30/21 1200   BP: 113/68 112/84 (!) 133/56 120/70   Pulse: 82 82 80 82   Resp: 16 16 16 16   Temp: 97.9 °F (36.6 °C) 97.7 °F (36.5 °C) 97 °F (36.1 °C) 97.1 °F (36.2 °C)   TempSrc: Axillary Axillary Axillary Axillary   SpO2: 90% 90% 90% 90%   Weight:    227 lb 4.7 oz (103.1 kg)   Height:           Physical Exam  Vitals and nursing note reviewed. Constitutional:       Appearance: Normal appearance. He is well-developed. HENT:      Head: Normocephalic and atraumatic. Right Ear: External ear normal.      Left Ear: External ear normal.      Nose: Nose normal. No congestion or rhinorrhea. Mouth/Throat:      Mouth: Mucous membranes are moist.      Pharynx: No oropharyngeal exudate or posterior oropharyngeal erythema. Eyes:      General: No scleral icterus. Right eye: No discharge. Left eye: No discharge. Conjunctiva/sclera: Conjunctivae normal.      Pupils: Pupils are equal, round, and reactive to light. Cardiovascular:      Rate and Rhythm: Normal rate and regular rhythm. Pulses: Normal pulses. Heart sounds: No murmur heard. No friction rub. Pulmonary:      Effort: Pulmonary effort is normal. No respiratory distress. Breath sounds: Normal breath sounds. No stridor. No wheezing, rhonchi or rales. Abdominal:      General: Bowel sounds are normal.      Palpations: Abdomen is soft. Tenderness: There is no abdominal tenderness. There is no right CVA tenderness, left CVA tenderness, guarding or rebound. Musculoskeletal:         General: No swelling or tenderness. Normal range of motion. Cervical back: Normal range of motion and neck supple. No rigidity. No muscular tenderness.    Lymphadenopathy:      Cervical: No cervical adenopathy. Skin:     General: Skin is warm and dry. Coloration: Skin is not jaundiced. Findings: No erythema or rash. Comments: Right hallux wound slowly improving   Neurological:      General: No focal deficit present. Mental Status: He is alert and oriented to person, place, and time. Mental status is at baseline. Motor: No abnormal muscle tone. Psychiatric:         Mood and Affect: Mood normal.         Behavior: Behavior normal.         Thought Content: Thought content normal.               Lines: All vascular access sites are healthy with no local erythema, discharge or tenderness. Intake and output:    I/O last 3 completed shifts: In: 300 [IV Piggyback:300]  Out: -     Lab Data:   All available labs and old records have been reviewed by me. CBC:  Recent Labs     07/28/21 0458 07/29/21  0531 07/30/21  0947   WBC 5.5 4.8 5.1   RBC 3.87* 4.14* 4.31   HGB 11.2* 11.8* 12.3*   HCT 34.5* 36.8* 38.5*    244 224   MCV 89.2 88.9 89.5   MCH 28.9 28.5 28.5   MCHC 32.4 32.1 31.8   RDW 15.6* 15.9* 15.8*        BMP:  Recent Labs     07/28/21 0458 07/28/21 0458 07/29/21  0531 07/29/21  2229 07/30/21  0947     --  141  --  136   K 4.5  --  4.2  --  3.9     --  106  --  102   CO2 30  --  27  --  23   BUN 21*  --  21*  --  19   CREATININE 1.0  --  1.0  --  0.9   CALCIUM 8.3  --  8.2*  --  8.1*   GLUCOSE 94   < > 101* 171* 97    < > = values in this interval not displayed. Hepatic Function Panel:   Lab Results   Component Value Date    ALKPHOS 114 07/24/2021    ALT 11 07/24/2021    AST 21 07/24/2021    PROT 6.4 07/24/2021    BILITOT 1.0 07/24/2021    LABALBU 2.8 07/24/2021       CPK:   Lab Results   Component Value Date    CKTOTAL 170 07/01/2021     ESR:   Lab Results   Component Value Date    SEDRATE 65 (H) 07/27/2021     CRP:   Lab Results   Component Value Date    CRP 75.0 (H) 07/27/2021           Imaging:     All pertinent images and reports for the current visit were reviewed by me during this visit. MRI FOOT RIGHT WO CONTRAST   Final Result   Evaluation is limited as the patient could not complete the whole exam.      Within this limitation, there are no definitive findings of acute   osteomyelitis or a drainable abscess. Generalized subcutaneous edema compatible with cellulitis. Suspected cystic versus erosive-like change in the 5th metatarsal head which   is favored to be chronic in nature. XR CHEST PORTABLE   Final Result   Cardiomegaly with no evidence of edema         XR ANKLE RIGHT (2 VIEWS)   Final Result   1. Negative ankle   2. Gangrene of the 1st digit and lateral foot ulcer, with no findings of   osteomyelitis         XR FOOT RIGHT (2 VIEWS)   Final Result   1. Negative ankle   2. Gangrene of the 1st digit and lateral foot ulcer, with no findings of   osteomyelitis             Medications: All current and past medications were reviewed.      lisinopril  2.5 mg Oral Daily    amoxicillin-clavulanate  1 tablet Oral 3 times per day    clopidogrel  75 mg Oral Daily    lactobacillus  1 capsule Oral BID WC    digoxin  125 mcg Oral Daily    sodium chloride flush  5-40 mL Intravenous 2 times per day    mupirocin   Topical Daily    carvedilol  3.125 mg Oral BID WC    levETIRAcetam  500 mg Oral BID    aspirin  81 mg Oral Daily    enoxaparin  1 mg/kg Subcutaneous BID        dextrose 100 mL/hr (07/30/21 1200)    sodium chloride 100 mL (07/28/21 1138)       OLANZapine, glucose, dextrose, glucagon (rDNA), dextrose, Local Anesthetic Custom Mixture, sodium chloride flush, sodium chloride, acetaminophen **OR** acetaminophen      Problem list:       Patient Active Problem List   Diagnosis Code    New onset seizure ([facility] R56.9    Delirium R41.0    Acute encephalopathy G93.40    Volume overload E87.70    Acute renal failure ([address] N17.9    Acute combined systolic and diastolic (congestive) hrt fail ([address] I50.41    Essential hypertension I10    Anasarca R60.1    Seizure ([address] R56.9    Aspiration into airway T17.908A    Acute respiratory failure with hypoxia ([facility]) J96.01    Dementia without behavioral disturbance ([facility]) F03.90    Seizures ([facility]) R56.9    Syncope and collapse R55    Anemia D64.9    Major neurocognitive disorder due to Alzheimer's disease ([facility]) G30.9, F02.80    HTN (hypertension), benign I10    Atrial fibrillation with RVR ([facility]) I48.91    Non-pressure chronic ulcer of right lower leg with fat layer exposed ([address] L97.912    Non-pressure chronic ulcer of left lower leg with fat layer exposed ([address] L97.922    Peripheral venous insufficiency I87.2    Lower extremity edema R60.0    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 with ulceration of other part of lower leg ([facility]) I70.248    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 ([facility]) I70.232    Acute metabolic encephalopathy X12.89    Gram negative sepsis ([facility]) A41.50    Cellulitis and abscess of leg L03.119, L02.419    Gram-negative bacteremia R78.81    Lactic acidosis E87.2    Leukopenia D72.819    Need for Tdap vaccination Z23    Thrombocytopenia ([facility]) D69.6    Elevated d-dimer R79.89    Pulmonary vascular congestion R09.89    Moderate malnutrition ([facility]) E44.0    Weight loss counseling, encounter for Z71.3    Septicemia ([address] A41.9    Gangrene of right foot ([address] I96    Cardiomyopathy ([address] I42.9    PAF (paroxysmal atrial fibrillation) ([facility]) I48.0    PAD (peripheral artery disease) ([facility]) I73.9    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  Although every effort was made to ensure the accuracy of this automated transcription, some errors in transcription may have occurred inadvertently. If you may need any clarification, please do not hesitate to contact me through EPIC or at the phone number provided below with my electronic signature. Any pictures or media included in this note were obtained after taking informed verbal consent from the patient and with their approval to include those in the patient's medical record.     Kentrell Mejía MD, MPH  7/30/2021 , 12:58 PM   [facility]   [address] [place] [address], [address] [place] [address]  Office: [phone]  Fax: [phone]  Clinic days:  Tuesday & Thursday

## 2021-07-31 NOTE — PROGRESS NOTES
Assessment complete, VSS. Pt is calm with no signs of distress, though he did refuse PO meds. Breathing is regular and unlabored on room air, lung sounds clear. Bowel sounds active. Pitting edema noted to BUE and BLE. BG recheck 75, will spot check through the night for hypoglycemia. Bed alarm active, bed in lowest position.

## 2021-07-31 NOTE — PROGRESS NOTES
Hospitalist Progress Note      PCP: Aidee Graham MD    Date of Admission: 7/24/2021    Chief Complaint: [facility] Course: 79 y. o. male  with PMHx of hypertension, HFrEF(EF 15 to 20%), PAD, CKD, seizures, childhood TB, dementia and BLE venous ulceration was brought into the ED by EMS after they were called to his residence for a lift assist.  EMS found patient to be febrile and his legs look bad and right foot seemed gangrenous.  Of note, patient was recently admitted at [facility] from 7/1/2021--7/7/2021 for lower extremity providentia wound infection and Enterobacter bacteremia and was discharged on levofloxacin.  On admission, patient was tachycardic, febrile with temp max of 101.5.  Initial diagnostic labs were unremarkable except elevated BUN, albumin 2.8, hemoglobin 10.8 (13.8 on 7/7/21).  Right foot x-ray showed gangrene of first digit and lateral foot ulcer with no evidence of osteomyelitis.  Patient was given IV fluids and Vanco and Zosyn in the ED. patient continued on vancomycin and Zosyn. He was scheduled with podiatry for OR for debridement today however patient refused. Subjective: Patient seen and examined at bedside. Patient resting comfortably. Sugars are reasonable.       Medications:  Reviewed    Infusion Medications    dextrose 100 mL/hr (07/31/21 1414)    sodium chloride 100 mL (07/28/21 1138)     Scheduled Medications    lisinopril  2.5 mg Oral Daily    amoxicillin-clavulanate  1 tablet Oral 3 times per day    clopidogrel  75 mg Oral Daily    lactobacillus  1 capsule Oral BID     digoxin  125 mcg Oral Daily    sodium chloride flush  5-40 mL Intravenous 2 times per day    mupirocin   Topical Daily    carvedilol  3.125 mg Oral BID     levETIRAcetam  500 mg Oral BID    aspirin  81 mg Oral Daily    enoxaparin  1 mg/kg Subcutaneous BID     PRN Meds: OLANZapine, glucose, dextrose, glucagon (rDNA), dextrose, Local Anesthetic Custom Mixture, sodium chloride flush, sodium chloride, acetaminophen **OR** acetaminophen      Intake/Output Summary (Last 24 hours) at 7/31/2021 1441  Last data filed at 7/31/2021 1415  Gross per 24 hour   Intake 2429.03 ml   Output 800 ml   Net 1629.03 ml       Physical Exam Performed:    /84   Pulse 118   Temp 97.2 °F (36.2 °C) (Temporal)   Resp 21   Ht 6' 4\" (1.93 m)   Wt 216 lb 7.9 oz (98.2 kg)   SpO2 91%   BMI 26.35 kg/m²     General appearance: No apparent distress, appears stated age and cooperative. HEENT: Pupils equal, round, and reactive to light. Conjunctivae/corneas clear. Neck: Supple, with full range of motion. No jugular venous distention. Trachea midline. Respiratory:  Normal respiratory effort. Clear to auscultation, bilaterally without Rales/Wheezes/Rhonchi. Cardiovascular: Regular rate and rhythm with normal S1/S2 without murmurs, rubs or gallops. Abdomen: Soft, non-tender, non-distended with normal bowel sounds. Musculoskeletal: No clubbing, cyanosis or edema bilaterally. Full range of motion without deformity. Skin: Skin color, texture, turgor normal.  No rashes or lesions. Neurologic:  Neurovascularly intact without any focal sensory/motor deficits. Cranial nerves: II-XII intact, grossly non-focal.  Psychiatric: Alert and oriented, thought content appropriate, normal insight  Capillary Refill: Brisk,< 3 seconds   Peripheral Pulses: +2 palpable, equal bilaterally       Labs:   Recent Labs     07/29/21  0531 07/30/21  0947 07/31/21  0447   WBC 4.8 5.1 6.1   HGB 11.8* 12.3* 12.5*   HCT 36.8* 38.5* 40.5    224 210     Recent Labs     07/29/21  0531 07/30/21  0947    136   K 4.2 3.9    102   CO2 27 23   BUN 21* 19   CREATININE 1.0 0.9   CALCIUM 8.2* 8.1*     No results for input(s): AST, ALT, BILIDIR, BILITOT, ALKPHOS in the last 72 hours. No results for input(s): INR in the last 72 hours. No results for input(s): Nabil Beery in the last 72 hours.     Urinalysis:      Lab Results   Component Value Date    NITRU Negative 07/01/2021    WBCUA 1 07/01/2021    RBCUA 4 07/01/2021    BLOODU Negative 07/01/2021    SPECGRAV 1.016 07/01/2021    GLUCOSEU Negative 07/01/2021       Radiology:  MRI FOOT RIGHT WO CONTRAST   Final Result   Evaluation is limited as the patient could not complete the whole exam.      Within this limitation, there are no definitive findings of acute   osteomyelitis or a drainable abscess. Generalized subcutaneous edema compatible with cellulitis. Suspected cystic versus erosive-like change in the 5th metatarsal head which   is favored to be chronic in nature. XR CHEST PORTABLE   Final Result   Cardiomegaly with no evidence of edema         XR ANKLE RIGHT (2 VIEWS)   Final Result   1. Negative ankle   2. Gangrene of the 1st digit and lateral foot ulcer, with no findings of   osteomyelitis         XR FOOT RIGHT (2 VIEWS)   Final Result   1. Negative ankle   2.  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 intake  As per nursing patient is not symptomatic during hypoglycemia episodes  Medications reviewed, not on any medications that would cause hypoglycemia  D5 half NS at 50 cc/h  Accu-Cheks every 4 hours  Insulin proinsulin and C-peptide ordered      Right lower extremity cellulitis in the right hallux gangrene  Switch to p.o. Augmentin by ID   ID following  Refused debridement/amputation  We will get long-term antibiotic recommendations from ID    Sepsis secondary to above  Resolved  Continue antibiotics    Heart failure with reduced ejection fraction  EF 15%  Lasix and ACE inhibitor have been on hold in view of low blood pressures    Normocytic anemia  Iron deficiency  Hemoglobin stable  Only renal failure        DVT Prophylaxis: Lovenox  Diet: ADULT DIET;  Regular  Adult Oral Nutrition Supplement; Standard High Calorie/High Protein Oral Supplement  Code Status: [facility]      Electronically signed by Luis Michele MD on 7/31/2021 at 2:41 PM

## 2021-08-01 NOTE — PROGRESS NOTES
Assessment complete, VSS. Pt calm and agreeable with nursing care. Breathing is regular and unlabored on room air. Lung sounds clear. PO meds given without issue. Pt bathed, emory care done for large BM, linens changed, mepilex changed. Dressings to legs changed, with betadine, roll gauze, and ACE wrap. Pt repositioned, bed alarm active, bed in lowest position.

## 2021-08-01 NOTE — PROGRESS NOTES
Hospitalist Progress Note      PCP: Maryjane Cronin MD    Date of Admission: 7/24/2021    Chief Complaint: [facility] Course: 79 y. o. male  with PMHx of hypertension, HFrEF(EF 15 to 20%), PAD, CKD, seizures, childhood TB, dementia and BLE venous ulceration was brought into the ED by EMS after they were called to his residence for a lift assist.  EMS found patient to be febrile and his legs look bad and right foot seemed gangrenous.  Of note, patient was recently admitted at [facility] from 7/1/2021--7/7/2021 for lower extremity providentia wound infection and Enterobacter bacteremia and was discharged on levofloxacin.  On admission, patient was tachycardic, febrile with temp max of 101.5.  Initial diagnostic labs were unremarkable except elevated BUN, albumin 2.8, hemoglobin 10.8 (13.8 on 7/7/21).  Right foot x-ray showed gangrene of first digit and lateral foot ulcer with no evidence of osteomyelitis.  Patient was given IV fluids and Vanco and Zosyn in the ED. patient continued on vancomycin and Zosyn. He was scheduled with podiatry for OR for debridement today however patient refused. Subjective: Patient seen and examined at bedside. Patient resting comfortably. Denies any complaints. Patient again with multiple episodes of hypoglycemia last 24 hours.         Medications:  Reviewed    Infusion Medications    dextrose 100 mL/hr (08/01/21 0816)    sodium chloride 100 mL (07/28/21 1138)     Scheduled Medications    lisinopril  2.5 mg Oral Daily    amoxicillin-clavulanate  1 tablet Oral 3 times per day    clopidogrel  75 mg Oral Daily    lactobacillus  1 capsule Oral BID WC    digoxin  125 mcg Oral Daily    sodium chloride flush  5-40 mL Intravenous 2 times per day    mupirocin   Topical Daily    carvedilol  3.125 mg Oral BID WC    levETIRAcetam  500 mg Oral BID    aspirin  81 mg Oral Daily    enoxaparin  1 mg/kg Subcutaneous BID     PRN Meds: OLANZapine, glucose, dextrose, glucagon (rDNA), dextrose, Local Anesthetic Custom Mixture, sodium chloride flush, sodium chloride, acetaminophen **OR** acetaminophen      Intake/Output Summary (Last 24 hours) at 8/1/2021 1416  Last data filed at 8/1/2021 0900  Gross per 24 hour   Intake 2362.52 ml   Output 550 ml   Net 1812.52 ml       Physical Exam Performed:    /73   Pulse 82   Temp 98 °F (36.7 °C) (Temporal)   Resp 20   Ht 6' 4\" (1.93 m)   Wt 229 lb 11.5 oz (104.2 kg)   SpO2 (!) 85%   BMI 27.96 kg/m²     General appearance: No apparent distress, appears stated age and cooperative. HEENT: Pupils equal, round, and reactive to light. Conjunctivae/corneas clear. Neck: Supple, with full range of motion. No jugular venous distention. Trachea midline. Respiratory:  Normal respiratory effort. Clear to auscultation, bilaterally without Rales/Wheezes/Rhonchi. Cardiovascular: Regular rate and rhythm with normal S1/S2 without murmurs, rubs or gallops. Abdomen: Soft, non-tender, non-distended with normal bowel sounds. Musculoskeletal: No clubbing, cyanosis or edema bilaterally. Full range of motion without deformity. Skin: Skin color, texture, turgor normal.  No rashes or lesions. Neurologic:  Neurovascularly intact without any focal sensory/motor deficits. Cranial nerves: II-XII intact, grossly non-focal.  Psychiatric: Alert and oriented, thought content appropriate, normal insight  Capillary Refill: Brisk,< 3 seconds   Peripheral Pulses: +2 palpable, equal bilaterally       Labs:   Recent Labs     07/30/21  0947 07/31/21  0447 08/01/21  0435   WBC 5.1 6.1 5.7   HGB 12.3* 12.5* 12.5*   HCT 38.5* 40.5 38.5*    210 218     Recent Labs     07/30/21  0947 08/01/21  0435    135*   K 3.9 4.5    100   CO2 23 26   BUN 19 17   CREATININE 0.9 0.8   CALCIUM 8.1* 7.8*     No results for input(s): AST, ALT, BILIDIR, BILITOT, ALKPHOS in the last 72 hours. No results for input(s): INR in the last 72 hours.   No results for input(s): CKTOTAL, TROPONINI in the last 72 hours. Urinalysis:      Lab Results   Component Value Date    NITRU Negative 07/01/2021    WBCUA 1 07/01/2021    RBCUA 4 07/01/2021    BLOODU Negative 07/01/2021    SPECGRAV 1.016 07/01/2021    GLUCOSEU Negative 07/01/2021       Radiology:  MRI FOOT RIGHT WO CONTRAST   Final Result   Evaluation is limited as the patient could not complete the whole exam.      Within this limitation, there are no definitive findings of acute   osteomyelitis or a drainable abscess. Generalized subcutaneous edema compatible with cellulitis. Suspected cystic versus erosive-like change in the 5th metatarsal head which   is favored to be chronic in nature. XR CHEST PORTABLE   Final Result   Cardiomegaly with no evidence of edema         XR ANKLE RIGHT (2 VIEWS)   Final Result   1. Negative ankle   2. Gangrene of the 1st digit and lateral foot ulcer, with no findings of   osteomyelitis         XR FOOT RIGHT (2 VIEWS)   Final Result   1. Negative ankle   2.  Gangrene of the 1st digit and lateral foot ulcer, with no findings of   osteomyelitis                 Assessment/Plan:    Active Hospital Problems    Diagnosis     Gangrene of right foot ([facility] Chelsea Lyell     Cardiomyopathy ([facility] [I42.9]     PAF (paroxysmal atrial fibrillation) ([facility] [I48.0]     PAD (peripheral artery disease) (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 [G07.573, L08.9]     Chronic atrial fibrillation (HCC) [I48.20]     History of tuberculosis [Z86.11]     Coronary artery disease due to lipid rich plaque [I25.10, I25.83]     Overweight [E66.3]     Septicemia ([facility] [A41.9]     Diabetes education, encounter for [Z71.89]     Essential hypertension [I10]     Major neurocognitive disorder due to Alzheimer's disease ([address] [G30.9, F02.80]     Dementia without behavioral disturbance ([facility] [F03.90]      Hypoglycemia  Unknown etiology  Patient not on any insulin this admission  He has good p.o. intake  As per nursing patient is not symptomatic during hypoglycemia episodes  Medications reviewed, not on any medications that would cause hypoglycemia  D5 half NS at 50 cc/h  Accu-Cheks every 4 hours  Insulin proinsulin and C-peptide ordered  Endocrinology consulted      Right lower extremity cellulitis in the right hallux gangrene  Switch to p.o. Augmentin by ID   ID following  Refused debridement/amputation  We will get long-term antibiotic recommendations from ID    Sepsis secondary to above  Resolved  Continue antibiotics    Heart failure with reduced ejection fraction  EF 15%  Lasix and ACE inhibitor have been on hold in view of low blood pressures    Normocytic anemia  Iron deficiency  Hemoglobin stable  Only renal failure        DVT Prophylaxis: Lovenox  Diet: ADULT DIET;  Regular  Adult Oral Nutrition Supplement; Standard High Calorie/High Protein Oral Supplement  Code Status: [facility]      Electronically signed by Niki Mcneal MD on 8/1/2021 at 2:16 PM

## 2021-08-02 NOTE — PROGRESS NOTES
Pt b/s 68, pt with leaking PIV, pt refuse RN to start a new PIV, pt refuse Apple juice, Pt also soaked with urine, pt refuse to be cleaned or change bed linens,. Pt dinner at bedside, pt refuse to eat  or drink. Pt \"saying please God get me out of here\". This RN unable to acertain pt mentation pt refuse to answer orientation questions. This Rn place a call to Ayan & Fatmata daughter, who spoke to pt and pt still refuse care, requesting his daughter to come get him out of here. Page sent to Dr Lucy Holden, will continue to monitor.

## 2021-08-02 NOTE — PROGRESS NOTES
Hospitalist Progress Note      PCP: Lorin Cade MD    Date of Admission: 7/24/2021    Chief Complaint: [facility] Course: 79 y. o. male  with PMHx of hypertension, HFrEF(EF 15 to 20%), PAD, CKD, seizures, childhood TB, dementia and BLE venous ulceration was brought into the ED by EMS after they were called to his residence for a lift assist.  EMS found patient to be febrile and his legs look bad and right foot seemed gangrenous.  Of note, patient was recently admitted at [facility] from 7/1/2021--7/7/2021 for lower extremity providentia wound infection and Enterobacter bacteremia and was discharged on levofloxacin.  On admission, patient was tachycardic, febrile with temp max of 101.5.  Initial diagnostic labs were unremarkable except elevated BUN, albumin 2.8, hemoglobin 10.8 (13.8 on 7/7/21).  Right foot x-ray showed gangrene of first digit and lateral foot ulcer with no evidence of osteomyelitis.  Patient was given IV fluids and Vanco and Zosyn in the ED. patient continued on vancomycin and Zosyn. He was scheduled with podiatry for OR for debridement today however patient refused. Subjective: Patient seen and examined at bedside. Patient resting comfortably. Denies any complaints. Patient again with multiple episodes of hypoglycemia last 24 hours.         Medications:  Reviewed    Infusion Medications    dextrose 100 mL/hr (08/01/21 0816)    sodium chloride 100 mL (07/28/21 1138)     Scheduled Medications    lisinopril  2.5 mg Oral Daily    amoxicillin-clavulanate  1 tablet Oral 3 times per day    clopidogrel  75 mg Oral Daily    lactobacillus  1 capsule Oral BID WC    digoxin  125 mcg Oral Daily    sodium chloride flush  5-40 mL Intravenous 2 times per day    mupirocin   Topical Daily    carvedilol  3.125 mg Oral BID WC    levETIRAcetam  500 mg Oral BID    aspirin  81 mg Oral Daily    enoxaparin  1 mg/kg Subcutaneous BID     PRN Meds: OLANZapine, glucose, dextrose, glucagon (rDNA), dextrose, Local Anesthetic Custom Mixture, sodium chloride flush, sodium chloride, acetaminophen **OR** acetaminophen      Intake/Output Summary (Last 24 hours) at 8/2/2021 1900  Last data filed at 8/2/2021 1055  Gross per 24 hour   Intake 70 ml   Output --   Net 70 ml       Physical Exam Performed:    /73   Pulse 82   Temp 98 °F (36.7 °C) (Temporal)   Resp 20   Ht 6' 4\" (1.93 m)   Wt 230 lb 9.6 oz (104.6 kg)   SpO2 (!) 85%   BMI 28.07 kg/m²     General appearance: No apparent distress, appears stated age and cooperative. HEENT: Pupils equal, round, and reactive to light. Conjunctivae/corneas clear. Neck: Supple, with full range of motion. No jugular venous distention. Trachea midline. Respiratory:  Normal respiratory effort. Clear to auscultation, bilaterally without Rales/Wheezes/Rhonchi. Cardiovascular: Regular rate and rhythm with normal S1/S2 without murmurs, rubs or gallops. Abdomen: Soft, non-tender, non-distended with normal bowel sounds. Musculoskeletal: No clubbing, cyanosis or edema bilaterally. Full range of motion without deformity. Skin: Skin color, texture, turgor normal.  No rashes or lesions. Neurologic:  Neurovascularly intact without any focal sensory/motor deficits. Cranial nerves: II-XII intact, grossly non-focal.  Psychiatric: Alert and oriented, thought content appropriate, normal insight  Capillary Refill: Brisk,< 3 seconds   Peripheral Pulses: +2 palpable, equal bilaterally       Labs:   Recent Labs     07/31/21  0447 08/01/21  0435 08/02/21  0418   WBC 6.1 5.7 5.9   HGB 12.5* 12.5* 12.7*   HCT 40.5 38.5* 39.6*    218 197     Recent Labs     08/01/21  0435 08/02/21  0418   * 135*   K 4.5 4.9    103   CO2 26 23   BUN 17 16   CREATININE 0.8 0.7*   CALCIUM 7.8* 8.1*     No results for input(s): AST, ALT, BILIDIR, BILITOT, ALKPHOS in the last 72 hours. No results for input(s): INR in the last 72 hours.   No results for input(s): Jeny Cantrell in the last 72 hours. Urinalysis:      Lab Results   Component Value Date    NITRU Negative 07/01/2021    WBCUA 1 07/01/2021    RBCUA 4 07/01/2021    BLOODU Negative 07/01/2021    SPECGRAV 1.016 07/01/2021    GLUCOSEU Negative 07/01/2021       Radiology:  MRI FOOT RIGHT WO CONTRAST   Final Result   Evaluation is limited as the patient could not complete the whole exam.      Within this limitation, there are no definitive findings of acute   osteomyelitis or a drainable abscess. Generalized subcutaneous edema compatible with cellulitis. Suspected cystic versus erosive-like change in the 5th metatarsal head which   is favored to be chronic in nature. XR CHEST PORTABLE   Final Result   Cardiomegaly with no evidence of edema         XR ANKLE RIGHT (2 VIEWS)   Final Result   1. Negative ankle   2. Gangrene of the 1st digit and lateral foot ulcer, with no findings of   osteomyelitis         XR FOOT RIGHT (2 VIEWS)   Final Result   1. Negative ankle   2.  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 ([facility] [O86.495, L08.9]     Chronic atrial fibrillation (HCC) [I48.20]     History of tuberculosis [Z86.11]     Coronary artery disease due to lipid rich plaque [I25.10, I25.83]     Overweight [E66.3]     Septicemia ([facility] [A41.9]     Diabetes education, encounter for [Z71.89]     Essential hypertension [I10]     Major neurocognitive disorder due to Alzheimer's disease ([facility] [G30.9, F02.80]     Dementia without behavioral disturbance ([facility] [F03.90]      Hypoglycemia  Unknown etiology  Patient not on any insulin this admission  He has good p.o. intake  As per nursing patient is not symptomatic during hypoglycemia episodes  Medications reviewed, not on any medications that would cause hypoglycemia  D5 half NS at 50 cc/h  Accu-Cheks every 4 hours  Insulin proinsulin and C-peptide ordered  Endocrinology consulted      Right lower extremity cellulitis in the right hallux gangrene  Switch to p.o. Augmentin by ID   ID following  Refused debridement/amputation  We will get long-term antibiotic recommendations from ID    Sepsis secondary to above  Resolved  Continue antibiotics    Heart failure with reduced ejection fraction  EF 15%  Lasix and ACE inhibitor have been on hold in view of low blood pressures    Normocytic anemia  Iron deficiency  Hemoglobin stable  Only renal failure        DVT Prophylaxis: Lovenox  Diet: ADULT DIET;  Regular  Adult Oral Nutrition Supplement; Standard High Calorie/High Protein Oral Supplement  Code Status: DNR-CC      Electronically signed by Vashti Schafer MD on 8/2/2021 at 7:00 PM

## 2021-08-02 NOTE — PROGRESS NOTES
Patient refusing AM PO medications at this time. Patient also refusing VS check and repositioning. Patient did allow us to change him after being given 20 of Geodon and resting quietly for a few hours. Patient still does not have IV access at this time. Will continue to monitor.

## 2021-08-02 NOTE — PLAN OF CARE
Problem: Falls - Risk of:  Goal: Will remain free from falls  Description: Will remain free from falls  Outcome: Ongoing  Goal: Absence of physical injury  Description: Absence of physical injury  Outcome: Ongoing     Problem: Skin Integrity:  Goal: Will show no infection signs and symptoms  Description: Will show no infection signs and symptoms  Outcome: Ongoing  Goal: Absence of new skin breakdown  Description: Absence of new skin breakdown  Outcome: Ongoing     Problem: Pain:  Goal: Pain level will decrease  Description: Pain level will decrease  Outcome: Ongoing  Goal: Control of acute pain  Description: Control of acute pain  Outcome: Ongoing  Goal: Control of chronic pain  Description: Control of chronic pain  Outcome: Ongoing     Problem: Nutrition  Goal: Optimal nutrition therapy  Outcome: Ongoing     Problem: Musculor/Skeletal Functional Status  Goal: Highest potential functional level  Outcome: Ongoing  Goal: Absence of falls  Outcome: Ongoing     Problem: Discharge Planning:  Goal: Discharged to appropriate level of care  Description: Discharged to appropriate level of care  Outcome: Ongoing     Problem: Serum Glucose Level - Abnormal:  Goal: Ability to maintain appropriate glucose levels will improve  Description: Ability to maintain appropriate glucose levels will improve  Outcome: Ongoing     Problem: OXYGENATION/RESPIRATORY FUNCTION  Goal: Patient will maintain patent airway  Outcome: Ongoing  Goal: Patient will achieve/maintain normal respiratory rate/effort  Description: Respiratory rate and effort will be within normal limits for the patient  Outcome: Ongoing     Problem: HEMODYNAMIC STATUS  Goal: Patient has stable vital signs and fluid balance  Outcome: Ongoing     Problem: FLUID AND ELECTROLYTE IMBALANCE  Goal: Fluid and electrolyte balance are achieved/maintained  Outcome: Ongoing     Problem: ACTIVITY INTOLERANCE/IMPAIRED MOBILITY  Goal: Mobility/activity is maintained at optimum level for

## 2021-08-03 NOTE — PROGRESS NOTES
D: Patient is confused and states emphatically \"All of you please leave me alone. Call my daughter and she will come and get me and take me to live with all of the kids. If she isn't there just throw me out on the street and the  will take me to her house. \" Declines to allow me to take vital signs or to perform an assessment. Denies pain or discomfort. Refuses call light for assistance but left in his reach on the bedside table. A: Reassured. R: Will continue to monitor.

## 2021-08-03 NOTE — CARE COORDINATION
No word from [facility] regarding ability to accept. [facility] would be able to accept if pt participates w/therapy. Informed therapy of pt's need to be seen to determine if pt is SNF appropriate, vs LTC on dc.   Electronically signed by TAL Caldera on 8/3/2021 at 4:21 PM

## 2021-08-03 NOTE — PLAN OF CARE
Problem: Falls - Risk of:  Goal: Will remain free from falls  Outcome: Ongoing  Note: Education Provided as followed:  \"Family/Patient made aware of the tailored interventions falls plan using the TIPS TOOL.       Problem: HEMODYNAMIC STATUS  Goal: Patient has stable vital signs and fluid balance  Outcome: Ongoing

## 2021-08-03 NOTE — CARE COORDINATION
Left message for admissions at [address] [place] [address] to determine ability to accept.   Electronically signed by TAL Damon on 8/3/2021 at 8:33 AM

## 2021-08-03 NOTE — PROGRESS NOTES
Hospitalist Progress Note      PCP: Abebe Paredes MD    Date of Admission: 7/24/2021    Chief Complaint: [facility] Course: This 79 y. o. male  with PMHx of hypertension, HFrEF(EF 15 to 20%), PAD, CKD, seizures, childhood TB, dementia and BLE venous ulceration was brought into the ED by EMS after they were called to his residence for a lift assist.  EMS found patient to be febrile and his legs look bad and right foot seemed gangrenous.  Of note, patient was recently admitted at [facility] from 7/1/2021--7/7/2021 for lower extremity providentia wound infection and Enterobacter bacteremia and was discharged on levofloxacin.  On admission, patient was tachycardic, febrile with temp max of 101.5.  Initial diagnostic labs were unremarkable except elevated BUN, albumin 2.8, hemoglobin 10.8 (13.8 on 7/7/21).  Right foot x-ray showed gangrene of first digit and lateral foot ulcer with no evidence of osteomyelitis.  Patient was given IV fluids and Vanco and Zosyn in the ED. patient continued on vancomycin and Zosyn. He was scheduled with podiatry for OR for debridement today however patient refused.     Subjective: Patient seen and examined  Overnight event and interval ancillary notes reviewed  Per  RN's report patient refusing antibiotics, labs and vitals  Daughter ZHEN wants everything to be done including PEG tube if needed; palliative care reconsulted      Medications:  Reviewed    Infusion Medications    dextrose 100 mL/hr (08/01/21 0816)    sodium chloride 100 mL (07/28/21 1138)     Scheduled Medications    lisinopril  2.5 mg Oral Daily    amoxicillin-clavulanate  1 tablet Oral 3 times per day    clopidogrel  75 mg Oral Daily    lactobacillus  1 capsule Oral BID     digoxin  125 mcg Oral Daily    sodium chloride flush  5-40 mL Intravenous 2 times per day    mupirocin   Topical Daily    carvedilol  3.125 mg Oral BID     levETIRAcetam  500 mg Oral BID    aspirin  81 mg Oral Daily    enoxaparin  1 mg/kg Subcutaneous BID     PRN Meds: OLANZapine, glucose, dextrose, glucagon (rDNA), dextrose, Local Anesthetic Custom Mixture, sodium chloride flush, sodium chloride, acetaminophen **OR** acetaminophen      Intake/Output Summary (Last 24 hours) at 8/3/2021 0857  Last data filed at 8/3/2021 0615  Gross per 24 hour   Intake 10 ml   Output 225 ml   Net -215 ml       Physical Exam Performed:    /76   Pulse 83   Temp 97.9 °F (36.6 °C) (Axillary)   Resp 17   Ht 6' 4\" (1.93 m)   Wt 230 lb 3.2 oz (104.4 kg)   SpO2 98%   BMI 28.02 kg/m²     General appearance: No apparent distress, appears stated age and cooperative. HEENT: Pupils equal, round, and reactive to light. Conjunctivae/corneas clear. Neck: Supple, with full range of motion. No jugular venous distention. Trachea midline. Respiratory:  Normal respiratory effort. Clear to auscultation, bilaterally without Rales/Wheezes/Rhonchi. Cardiovascular: Regular rate and rhythm with normal S1/S2 without murmurs, rubs or gallops. Abdomen: Soft, non-tender, non-distended with normal bowel sounds. Musculoskeletal: Right foot ulceration and gangrenous; dressing in place  Neurologic:  Neurovascularly intact without any focal sensory/motor deficits. Cranial nerves: II-XII intact, grossly non-focal.  Psychiatric: Alert and oriented, thought content appropriate, normal insight  Capillary Refill: Brisk,< 3 seconds   Peripheral Pulses: +2 palpable, equal bilaterally       Labs:   Recent Labs     08/01/21 0435 08/02/21 0418 08/03/21  0523   WBC 5.7 5.9 6.0   HGB 12.5* 12.7* 11.3*   HCT 38.5* 39.6* 35.4*    197 206     Recent Labs     08/01/21 0435 08/02/21 0418 08/03/21  0523   * 135* 136   K 4.5 4.9 4.8    103 105   CO2 26 23 26   BUN 17 16 19   CREATININE 0.8 0.7* 0.9   CALCIUM 7.8* 8.1* 7.7*     No results for input(s): AST, ALT, BILIDIR, BILITOT, ALKPHOS in the last 72 hours. No results for input(s): INR in the last 72 hours.   No results for input(s): Charlotte Lyons in the last 72 hours. Urinalysis:      Lab Results   Component Value Date    NITRU Negative 07/01/2021    WBCUA 1 07/01/2021    RBCUA 4 07/01/2021    BLOODU Negative 07/01/2021    SPECGRAV 1.016 07/01/2021    GLUCOSEU Negative 07/01/2021       Radiology:  MRI FOOT RIGHT WO CONTRAST   Final Result   Evaluation is limited as the patient could not complete the whole exam.      Within this limitation, there are no definitive findings of acute   osteomyelitis or a drainable abscess. Generalized subcutaneous edema compatible with cellulitis. Suspected cystic versus erosive-like change in the 5th metatarsal head which   is favored to be chronic in nature. XR CHEST PORTABLE   Final Result   Cardiomegaly with no evidence of edema         XR ANKLE RIGHT (2 VIEWS)   Final Result   1. Negative ankle   2. Gangrene of the 1st digit and lateral foot ulcer, with no findings of   osteomyelitis         XR FOOT RIGHT (2 VIEWS)   Final Result   1. Negative ankle   2.  Gangrene of the 1st digit and lateral foot ulcer, with no findings of   osteomyelitis                 Assessment/Plan:    Active Hospital Problems    Diagnosis     Gangrene of right foot ([address] Jalyn Loosen     Cardiomyopathy ([address] [I42.9]     PAF (paroxysmal atrial fibrillation) ([address] [I48.0]     PAD (peripheral artery disease) ([address] [I73.9]     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 ([address] [Z11.526, L08.9]     Chronic atrial fibrillation (HCC) [I48.20]     History of tuberculosis [Z86.11]     Coronary artery disease due to lipid rich plaque [I25.10, I25.83]     Overweight [E66.3]     Septicemia ([address] [A41.9]     Diabetes education, encounter for [Z71.89]     Essential hypertension [I10]     Major neurocognitive disorder due to Alzheimer's disease ([address] [G30.9, F02.80]     Dementia without behavioral disturbance ([address] [F03.90]      Hypoglycemia   Unknown etiology  Patient not on any insulin this admission  He has good p.o. intake  As per nursing patient is not symptomatic during hypoglycemia episodes  Medications reviewed, not on any medications that would cause hypoglycemia  D5 half NS at 50 cc/h  Accu-Cheks every 4 hours  Insulin proinsulin and C-peptide ordered  Endocrinology consulted    Right lower extremity cellulitis in the right hallux gangrene  Switch to p.o. Augmentin by ID   ID following  Refused debridement/amputation  We will get long-term antibiotic recommendations from ID    Sepsis secondary to above  Resolved  Continue antibiotics    Heart failure with reduced ejection fraction  EF 15%  Lasix and ACE inhibitor have been on hold in view of low blood pressures    Normocytic anemia  Iron deficiency  Hemoglobin stable  Only renal failure        DVT Prophylaxis: Lovenox  Diet: ADULT DIET;  Regular  Adult Oral Nutrition Supplement; Standard High Calorie/High Protein Oral Supplement  Code Status: DNR-CC      Electronically signed by Case Lezama MD on 8/3/2021 at 8:57 AM

## 2021-08-03 NOTE — PROGRESS NOTES
Physician Progress Note      PATIENT:               Julian Ceron  CSN #:                  [id number]  :                       1940  ADMIT DATE:       2021 6:34 AM  DISCH DATE:  RESPONDING  PROVIDER #:        Emeka Manley MD          QUERY TEXT:    Pt admitted with Sepsis and has dementia documented. Pt noted to be   aggressive, agitated, and combative. If possible, please document in the   progress notes and discharge summary if you are evaluating and / or treating   any of the following: The medical record reflects the following:  Risk Factors: Dementia  Clinical Indicators:  Nurse Progress note documented \"Code violet called   on pt. Pt refusing to let nursing staff re-check BS. Pt raising voice at staff   stating \"let me go I want to leave. Let me walk out of here onto the streets. I will find somewhere to live and stay. I don't want anyone to poke or bother   me anymore. \" pt restrained by staff d/t being confused, and to let us   re-check sugar, and start D10. BS 55. Placed on D10 @ 100ml//hr. Will re-check   BS Qhourly X4. Will monitor. \"  IM note documented \"Administered geodon   20 mg IM at bedside. Placed in restraints fo  Treatment: geodon 20 mg IM at bedside Restraints, Code violet called  Options provided:  -- Dementia with behavioral disturbance  -- Dementia without behavioral disturbance  -- Other - I will add my own diagnosis  -- Disagree - Not applicable / Not valid  -- Disagree - Clinically unable to determine / Unknown  -- Refer to Clinical Documentation Reviewer    PROVIDER RESPONSE TEXT:    This patient has dementia with behavioral disturbances.     Query created by: Andi Christiansen on 2021 11:54 AM      Electronically signed by:  Emeka Manley MD 8/3/2021 5:18 PM

## 2021-08-03 NOTE — PROGRESS NOTES
Patient is confused, A&O x 2. Refusing vitals this morning, refusing medications, refusing wound care. Bed in low position, call light in reach, bed alarm activated. Doctor Inessa Fernández contacted. Will continue to monitor.

## 2021-08-04 NOTE — CONSULTS
Endocrinology  Thanks for calling  Endocrinology consult is requested for an 79year-old type 2 diabetic  Af. Am man with confusion and sepsis related to gangrene in his toes on the right foot who has refused intervention on his foot. Pres Ill:  History is obtained from the chart and from the patient but patient is not a good historian. He had debridement of chronic right leg and foot diabetic ulcers in wound clinic 6-, at which time he was oriented x 3 but felt to have Alzheimer's dementia. He came to the ER 7-1-2021 with altered mental status and atrial fibrillation; he was taking Xarelto 15 mg twice daily, Synthroid 25 mcg daily Keppra 500 mg twice daily Coreg 6.25 mg twice daily, lisinopril 5 mg daily, lasix 40 mg twice daily vit D 50,000 once per week and using compression stockings below the knee. He was known to hae history of peripheral arterial disease and pulmonary tuberculosis as well as seizures and CHF. He was seen by ID, Dr. Keenan Horvath on 7-2-2021 for positive blood culture for Enterobacterales and treated with Rocephin 1 g IV. He improved symptomatically on antibiotics. Follow up blood cultures grew Providencia rettgeri. He iproved and was apparently discharged to [facility], but came back on 7- with fever to 101.5 deg and evidence of a right foot infection, Dr. Erskine Sandifer of ID felt gangrene of the first R toe and lateral foot uler without evidence of osteomyelitis. By 7- his proBNP was 35,668 and review of old echocardiogram from 2019 showed severe global LV hypokinesis with LVEF estimated 30%. On 7- the posterior tibial was recanalized by vascular surgery,  and combined aspirin and plavix were recommended. ID treated with Zosyn and Zyvox. On 7- the patient declined amputation of his right 1st toe which was felt gangrenous, and hsi daughter, Lul Giles agreed that her father could make his own decision in this regard.  On 7- patient was transferred to ICU for hypoglycemia and treated with D10 for blood sugar down to 22 mg/dl. His insulin was 29.7 and proinsulin 10.2 (increased) on 7-. Blood sugars recently have stabilized 171, 97, 92, 83, 90 7-29 to 8-3-2021 in AM. On 8-3-2021 in PM blood sugar was 107 mg/dl. He has been hypocalcemic down to 7.7 mg/dl but also hypoalbuminemic with serum albumin down to 2.8. on 7-1-2021 his TSH was 5.8 and Free T4 0.9    Past Hx ROS:  Available is in chart, dementia Alzheimers for at least two years, Psychiatry feels also depression present. Details fo pulmonary TB history unavailable. PAD treated in July with PTCA. Long standing DM, lives with family and not well adherent to medical treatment, quite severe CHF noted above. ALso history of atrial fib and mitral valve disease. Exam:  Pulse 76; /89; Resp 16; T 97.6 deg  HEENT: EOM's appear intact, arcus senilis  Neck: No prominent goiter nor dominant thyroid nodule appreciated  Chest: Clear  Heart: S1 and S2  Abd: Nontender  Ext: Right foor bandaged and ACE compression both legs    Assessment:  1. Alzheimer's dementia, confusion exacerbated by exacerbations of medical illness, DNR  2. Atrial fib history, mitral valve disease  3. Severe CHF  4. Sepsis Gram Neg requiring aggressive antibiotic coverage per ID (Zyvox + Zosyn)  5. Gangrene R toe refusing  6. Recent hypoglycemia but improved with D10  7. At risk for adrenal insufficiency with history of TB, electrolytes do not suggest severe arenal insufficiency but hyperkalemia may be obscured by loop diuretics. 8. Mild hypothyroidism history, slight elevation of TSH at his age may be normal, upper limit of TSH up to about 10 in octagenarians. 9. Probably has passie congestion of liver and also NAFLD due to longstanding DM which contributes to hypoglycemia. 10. History of seizure disorder on Keppra so hypoglycemia of increased risk due to increased seizure risk.   11. May be [address] deficient which would also increase risk of hypoglycemia. High proinsulin does raise risk of insulinoma somewhat; however, patient is not in medical condition to pursue pancreatitectomy and insulinoma is so rare that it remains unlikely. Recommend:  1. Low dose, physiologic steroid coverage for recurrent hypoglycemia, Medrol 4 mg AM and 2 mg afternoon reasonable depending on results of serum cortisol and IGF-1.  2. Encourage diet  3. Check Keppra level. 4. May be worth a trial of 5 mcg liothyronine which can have antidepressant effects and may stabilize thyroid function against malabsorption of Synthroid. 5. Broad spectrum antibiotics per ID. 6. Despite revascularization per vascular surgery, gangrenous toe is not likely to heal and more likely to remain a source of recurrent sepsis; when patient is less confused he can hopefully be convinced to allow amputation of the gangrenous toe, which is likely in his best interest.    YASSINE Ross M.D.

## 2021-08-04 NOTE — PLAN OF CARE
Problem: Falls - Risk of:  Goal: Will remain free from falls  Description: Will remain free from falls  Outcome: Ongoing  Goal: Absence of physical injury  Description: Absence of physical injury  Outcome: Ongoing     Problem: Skin Integrity:  Goal: Will show no infection signs and symptoms  Description: Will show no infection signs and symptoms  Outcome: Ongoing  Goal: Absence of new skin breakdown  Description: Absence of new skin breakdown  Outcome: Ongoing     Problem: Pain:  Goal: Pain level will decrease  Description: Pain level will decrease  Outcome: Ongoing  Goal: Control of acute pain  Description: Control of acute pain  Outcome: Ongoing  Goal: Control of chronic pain  Description: Control of chronic pain  Outcome: Ongoing     Problem: Nutrition  Goal: Optimal nutrition therapy  Outcome: Ongoing     Problem: Musculor/Skeletal Functional Status  Goal: Highest potential functional level  Outcome: Ongoing  Goal: Absence of falls  Outcome: Ongoing     Problem: Discharge Planning:  Goal: Discharged to appropriate level of care  Description: Discharged to appropriate level of care  Outcome: Ongoing     Problem: Serum Glucose Level - Abnormal:  Goal: Ability to maintain appropriate glucose levels will improve  Description: Ability to maintain appropriate glucose levels will improve  Outcome: Ongoing     Problem: OXYGENATION/RESPIRATORY FUNCTION  Goal: Patient will maintain patent airway  Outcome: Ongoing  Goal: Patient will achieve/maintain normal respiratory rate/effort  Description: Respiratory rate and effort will be within normal limits for the patient  Outcome: Ongoing     Problem: HEMODYNAMIC STATUS  Goal: Patient has stable vital signs and fluid balance  Outcome: Ongoing     Problem: FLUID AND ELECTROLYTE IMBALANCE  Goal: Fluid and electrolyte balance are achieved/maintained  Outcome: Ongoing     Problem: ACTIVITY INTOLERANCE/IMPAIRED MOBILITY  Goal: Mobility/activity is maintained at optimum level for patient  Outcome: Ongoing

## 2021-08-04 NOTE — PROGRESS NOTES
Hospitalist Progress Note      PCP: Lio Souza MD    Date of Admission: 7/24/2021    Chief Complaint: [facility] Course: This 79 y. o. male  with PMHx of hypertension, HFrEF(EF 15 to 20%), PAD, CKD, seizures, childhood TB, dementia and BLE venous ulceration was brought into the ED by EMS after they were called to his residence for a lift assist.  EMS found patient to be febrile and his legs look bad and right foot seemed gangrenous.  Of note, patient was recently admitted at [facility] from 7/1/2021--7/7/2021 for lower extremity providentia wound infection and Enterobacter bacteremia and was discharged on levofloxacin.  On admission, patient was tachycardic, febrile with temp max of 101.5.  Initial diagnostic labs were unremarkable except elevated BUN, albumin 2.8, hemoglobin 10.8 (13.8 on 7/7/21).  Right foot x-ray showed gangrene of first digit and lateral foot ulcer with no evidence of osteomyelitis.  Patient was given IV fluids and Vanco and Zosyn in the ED. patient continued on vancomycin and Zosyn. He was scheduled with podiatry for OR for debridement today however patient refused.     Subjective:   Patient seen and examined today  Overnight events and interval ancillary notes reviewed  Still refusing amputation; took oral antibiotics today   Blood glucose 80 this morning  Palliative care on board; awaiting discussion with daughter who is POA      Medications:  Reviewed    Infusion Medications    dextrose 100 mL/hr (08/01/21 0816)    sodium chloride 100 mL (07/28/21 1138)     Scheduled Medications    liothyronine  5 mcg Oral Daily    lisinopril  2.5 mg Oral Daily    amoxicillin-clavulanate  1 tablet Oral 3 times per day    clopidogrel  75 mg Oral Daily    lactobacillus  1 capsule Oral BID WC    digoxin  125 mcg Oral Daily    sodium chloride flush  5-40 mL Intravenous 2 times per day    mupirocin   Topical Daily    carvedilol  3.125 mg Oral BID WC    levETIRAcetam  500 mg Oral BID    aspirin  81 mg Oral Daily    enoxaparin  1 mg/kg Subcutaneous BID     PRN Meds: OLANZapine, glucose, dextrose, glucagon (rDNA), dextrose, Local Anesthetic Custom Mixture, sodium chloride flush, sodium chloride, acetaminophen **OR** acetaminophen    No intake or output data in the 24 hours ending 08/04/21 1514    Physical Exam Performed:    /79   Pulse 85   Temp 97.8 °F (36.6 °C) (Oral)   Resp 16   Ht 6' 4\" (1.93 m)   Wt 230 lb 3 oz (104.4 kg)   SpO2 95%   BMI 28.02 kg/m²     General appearance: No apparent distress, appears stated age and cooperative. HEENT: Pupils equal, round, and reactive to light. Conjunctivae/corneas clear. Neck: Supple, with full range of motion. No jugular venous distention. Trachea midline. Respiratory:  Normal respiratory effort. Clear to auscultation, bilaterally without Rales/Wheezes/Rhonchi. Cardiovascular: Regular rate and rhythm with normal S1/S2 without murmurs, rubs or gallops. Abdomen: Soft, non-tender, non-distended with normal bowel sounds. Musculoskeletal: Right foot ulceration and gangrenous; dressing in place  Neurologic:  Neurovascularly intact without any focal sensory/motor deficits. Psychiatric: Alert and oriented, thought content appropriate, normal insight  Capillary Refill: Brisk,< 3 seconds   Peripheral Pulses: +2 palpable, equal bilaterally       Labs:   Recent Labs     08/02/21 0418 08/03/21 0523 08/04/21  0446   WBC 5.9 6.0 5.9   HGB 12.7* 11.3* 10.9*   HCT 39.6* 35.4* 33.6*    206 207     Recent Labs     08/02/21 0418 08/03/21  0523 08/04/21  0446   * 136 138   K 4.9 4.8 4.7    105 106   CO2 23 26 25   BUN 16 19 19   CREATININE 0.7* 0.9 0.9   CALCIUM 8.1* 7.7* 7.8*     No results for input(s): AST, ALT, BILIDIR, BILITOT, ALKPHOS in the last 72 hours. No results for input(s): INR in the last 72 hours. No results for input(s): [place] in the last 72 hours.     Urinalysis:      Lab Results   Component Value Date NITRU Negative 07/01/2021    WBCUA 1 07/01/2021    RBCUA 4 07/01/2021    BLOODU Negative 07/01/2021    SPECGRAV 1.016 07/01/2021    GLUCOSEU Negative 07/01/2021       Radiology:  MRI FOOT RIGHT WO CONTRAST   Final Result   Evaluation is limited as the patient could not complete the whole exam.      Within this limitation, there are no definitive findings of acute   osteomyelitis or a drainable abscess. Generalized subcutaneous edema compatible with cellulitis. Suspected cystic versus erosive-like change in the 5th metatarsal head which   is favored to be chronic in nature. XR CHEST PORTABLE   Final Result   Cardiomegaly with no evidence of edema         XR ANKLE RIGHT (2 VIEWS)   Final Result   1. Negative ankle   2. Gangrene of the 1st digit and lateral foot ulcer, with no findings of   osteomyelitis         XR FOOT RIGHT (2 VIEWS)   Final Result   1. Negative ankle   2.  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 ([address] [W12.729, L08.9]     Chronic atrial fibrillation (HCC) [I48.20]     History of tuberculosis [Z86.11]     Coronary artery disease due to lipid rich plaque [I25.10, I25.83]     Overweight [E66.3]     Septicemia ([address] [A41.9]     Diabetes education, encounter for [Z71.89]     Essential hypertension [I10]     Major neurocognitive disorder due to Alzheimer's disease ([address] [G30.9, F02.80]     Dementia without behavioral disturbance ([address] [F03.90]          Hypoglycemia: Improved with D10; discontinued  Medications reviewed, not on any medications that would cause hypoglycemia  Endocrinology on board;ACTH, IGF, proinsulin and C-peptide ordered  Encourage diet    Mild hypothyroidism: Slightly elevated TSH  Started malissa trial of 5 mcg liothyronine endocrinology    Right lower extremity cellulitis in the right hallux gangrene  refused debridement/amputation   ID on board appreciate their recs   Switch to p.o. Augmentin by ID    Sepsis secondary to above  Continue antibiotics    Heart failure with reduced ejection fraction  EF 15%  Lasix and ACE inhibitor have been on hold in view of low blood pressures    Normocytic anemia: Hemoglobin stable  Iron deficiency: Refusing supplementation      DVT Prophylaxis: Lovenox  Diet: ADULT DIET;  Regular  Adult Oral Nutrition Supplement; Standard High Calorie/High Protein Oral Supplement  Code Status: DNR-CC      Electronically signed by Emeka Manley MD on 8/4/2021 at 3:14 PM

## 2021-08-04 NOTE — PROGRESS NOTES
Occupational Therapy  Facility/Department: [address] ORTHO/NEURO NURSING  Daily Treatment Note  NAME: Payton Greco  : 1940  MRN: [id number]    Date of Service: 2021    Discharge Recommendations: Payton Greco scored a 14/24 on the AM-PAC ADL Inpatient form. Current research shows that an AM-PAC score of 17 or less is typically not associated with a discharge to the patient's home setting. Based on the patient's AM-PAC score and their current ADL deficits, it is recommended that the patient have 3-5 sessions per week of Occupational Therapy at d/c to increase the patient's independence. Please see assessment section for further patient specific details. If patient discharges prior to next session this note will serve as a discharge summary. Please see below for the latest assessment towards goals. OT Equipment Recommendations  Equipment Needed: No  Other: defer to next level of care    Assessment   Performance deficits / Impairments: Decreased functional mobility ; Decreased strength;Decreased safe awareness;Decreased cognition;Decreased endurance;Decreased balance;Decreased high-level IADLs;Decreased ADL status  Assessment: Pt is currently functioning below occupational baseline and demonstrates the deficits listed above, pt would benefit from continued skilled OT services to address these deficits and increase independence, safety, and ease with all occupational pursuits. Pt has shown improvements with bed mobility but continues to require assist with ADL tasks and functional mobility and transfers. Treatment Diagnosis: Decreased ADL status, functional mobility, and functional transfers due to sepsis. Prognosis: Good  OT Education: OT Role;Plan of Care  Patient Education: Educated pt on OT role and plan of care. Continued education for follow through.   Barriers to Learning: cognition  REQUIRES OT FOLLOW UP: Yes  Activity Tolerance  Activity Tolerance: Patient Tolerated treatment well;Treatment limited secondary to decreased cognition  Safety Devices  Type of devices: All fall risk precautions in place;Nurse notified;Gait belt;Bed alarm in place; Patient at risk for falls; Left in bed;Call light within reach (high fall risk)         Patient Diagnosis(es): The primary encounter diagnosis was Gangrene of right foot ([address] A diagnosis of Septicemia ([address] was also pertinent to this visit. 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 with ulceration of other part of lower leg ([address] 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 ([address] CHF (congestive heart failure) ([address] Coronary artery disease due to lipid rich plaque, Hypertension, Lower extremity edema, Non-pressure chronic ulcer of left lower leg with fat layer exposed ([address] Non-pressure chronic ulcer of right lower leg with fat layer exposed ([address] Peripheral venous insufficiency, Seizures ([facility] TB (pulmonary tuberculosis), and 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 has no past surgical history on file. Restrictions  Restrictions/Precautions  Restrictions/Precautions: Fall Risk (HIGH FALL RISK)  Required Braces or Orthoses?: No  Position Activity Restriction  Other position/activity restrictions: 79 y.o. male  with PMHx of hypertension, HFrEF(EF 15 to 20%), PAD, CKD, seizures, childhood TB, dementia and BLE venous ulceration was brought into the ED by EMS after they were called to his residence for a lift assist.  EMS found patient to be febrile and his legs look bad and right foot seemed gangrenous. Of note, patient was recently admitted at [facility] from 7/1/2021--7/7/2021 for lower extremity providentia wound infection and Enterobacter bacteremia and was discharged on levofloxacin. On admission, patient was tachycardic, febrile with temp max of 101.5.   Initial diagnostic labs were unremarkable except elevated BUN, albumin 2.8, hemoglobin 10.8 (13.8 on 7/7/21). Right foot x-ray showed gangrene of first digit and lateral foot ulcer with no evidence of osteomyelitis. Patient was given IV fluids and Vanco and Zosyn in the ED. patient continued on vancomycin and Zosyn. He was scheduled with podiatry for OR for debridement today however patient refused  Subjective   General  Chart Reviewed: Yes  Patient assessed for rehabilitation services?: Yes  Additional Pertinent Hx: PMH: 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 with ulceration of other part of lower leg ([address] (3/2/2021), 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 ([address] (3/2/2021), CHF (congestive heart failure) ([address] Coronary artery disease due to lipid rich plaque, Hypertension, Lower extremity edema (3/2/2021), Non-pressure chronic ulcer of left lower leg with fat layer exposed ([address] (3/2/2021), Non-pressure chronic ulcer of right lower leg with fat layer exposed ([address] (3/2/2021), Peripheral venous insufficiency (3/2/2021), Seizures ([address] TB (pulmonary tuberculosis), and 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 Response to previous treatment: Patient with no complaints from previous session  Family / Caregiver Present: No  Referring Practitioner: Jackie Cordoba MD  Diagnosis: sepsis  Subjective  Subjective: Pt supine in bed, agreeable to therapy. General Comment  Comments: Pt was lethargic and had a difficult time keeping eyes open.       Orientation  Orientation  Orientation Level: Oriented to person  Objective    ADL  Grooming: Setup;Contact guard assistance (seated EOB, assist required for trunk support and slight R lateral lean)  Toileting: Dependent/Total (external male catheter present, pt was incontinent of bowel and required total assist to don/dof brief and perform emory care)        Balance  Sitting Balance: Contact guard assistance  Standing Balance: Dependent/Total (maxA x2)  Standing Balance  Time: ~15-20 sec  Activity: sit<>stand  Comment: use of RW, pt was unsteady with decreased knee control. Bed mobility  Rolling to Left: Minimal assistance (pt required cues for hand placement and increased time, HOB flat)  Rolling to Right: Moderate assistance (trunk and hip control, pt required cues for hand placement and increased time; HOB flat)  Supine to Sit: Maximum assistance (max x1, HOB elevated)  Sit to Supine: Dependent/Total (modA + Estela, HOB flat)  Scooting: Minimal assistance (required cues and increased time)                          Cognition  Overall Cognitive Status: Exceptions  Arousal/Alertness: Delayed responses to stimuli  Following Commands: Follows one step commands with increased time; Follows one step commands with repetition  Attention Span: Attends with cues to redirect  Memory: Decreased recall of recent events;Decreased recall of biographical Information;Decreased short term memory  Safety Judgement: Decreased awareness of need for safety;Decreased awareness of need for assistance  Problem Solving: Decreased awareness of errors;Assistance required to identify errors made;Assistance required to correct errors made;Assistance required to implement solutions  Insights: Decreased awareness of deficits  Initiation: Requires cues for some  Sequencing: Requires cues for some  Cognition Comment: hx of dementia; frequent redirection to task at hand needed. Type of ROM/Therapeutic Exercise  Type of ROM/Therapeutic Exercise: AAROM  Comment: pt peformed shoulder flexion 5x and bicep curls 5x.  Pt required cues to slow down, pt was unable to reach full shoulder flexion, only ~100 degrees                    Plan   Plan  Times per week: 3-5x/wk  Times per day: Daily  Specific instructions for Next Treatment: cotx to maximize safety and function  Current Treatment Recommendations: Strengthening, Balance Training, Functional Mobility Training, Endurance Training, Cognitive Reorientation, Patient/Caregiver Education & Training, Equipment Evaluation, Education, & procurement, Self-Care / ADL, Safety Education & Training  AM-PAC Score        AM-PAC Inpatient Daily Activity Raw Score: 14 (08/04/21 1524)  AM-PAC Inpatient ADL T-Scale Score : 33.39 (08/04/21 1524)  ADL Inpatient CMS 0-100% Score: 59.67 (08/04/21 1524)  ADL Inpatient CMS G-Code Modifier : CK (08/04/21 1524)    Goals  Short term goals  Time Frame for Short term goals: d/c  Short term goal 1: Pt will complete functional transfer to ADL surface with min(A)-ongoing 08/04  Short term goal 2: Pt will complete functional mobility to<>from bathroom with min(A)-ongoing 08/04  Short term goal 3: Pt will complete LB ADLs with min(A)-ongoing 08/04  Short term goal 4: Pt will complete UB ADLs with setup-ongoing 08/04  Short term goal 5: Pt will complete toileting with min(A)-ongoing 08/04  Long term goals  Time Frame for Long term goals : LTG=STG  Patient Goals   Patient goals : pt did not state       Therapy Time   Individual Concurrent Group Co-treatment   Time In       3123   Time Out       1517   Minutes       39        Total Treatment Minutes:  [address] [place], [place]   Therapist directly observed and directed this treatment.   PANCHO Zaragoza/L [id number]

## 2021-08-04 NOTE — PROGRESS NOTES
Physical Therapy  Facility/Department: [address] ORTHO/NEURO NURSING  Daily Treatment Note  NAME: Lorenza Hammer  : 1940  MRN: [id number]    Date of Service: 2021    Discharge Recommendations:David Mobley scored a  on the AM-PAC short mobility form. Current research shows that an AM-PAC score of 17 or less is typically not associated with a discharge to the patient's home setting. Based on the patient's AM-PAC score and their current functional mobility deficits, it is recommended that the patient have 3-5 sessions per week of Physical Therapy at d/c to increase the patient's independence. Please see assessment section for further patient specific details. If patient discharges prior to next session this note will serve as a discharge summary. Please see below for the latest assessment towards goals. PT Equipment Recommendations  Equipment Needed: No  Other: Defer to next level of care; if pt does d/c home he would benefit from a RW for safety with ambulation    Assessment   Body structures, Functions, Activity limitations: Decreased functional mobility ; Decreased balance;Decreased safe awareness;Decreased cognition;Decreased endurance;Decreased strength;Decreased posture  Assessment: Pt demonstrates significant lethargy this date, with increased need for physical assistance with bed mobility. Pt able to participate in PROM and AAROM while supine in bed. Pt coming to sit at EOB but requiring max A for static sitting due to R lateral lean. Pt would benefit from skilled PT services to promote safe return to PLOF.   Treatment Diagnosis: Impaired functional mobility and decreased safety awareness  Prognosis: Fair  History: See below  Exam: Balance, transfers  Clinical Presentation: Evolving  PT Education: Goals;PT Role;Plan of Care;General Safety  Patient Education: Pt verbalized understanding but will require reinforcement due to cognitive deficits  Barriers to Learning: Dementia  REQUIRES PT FOLLOW UP: Yes  Activity Tolerance  Activity Tolerance: Patient limited by cognitive status; Patient limited by fatigue     Patient Diagnosis(es): The primary encounter diagnosis was Gangrene of right foot ([address] A diagnosis of Septicemia ([address] was also pertinent to this visit. 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 has no past surgical history on file. Restrictions  Restrictions/Precautions  Restrictions/Precautions: Fall Risk (HIGH FALL RISK)  Required Braces or Orthoses?: No  Position Activity Restriction  Other position/activity restrictions: 79 y.o. male  with PMHx of hypertension, HFrEF(EF 15 to 20%), PAD, CKD, seizures, childhood TB, dementia and BLE venous ulceration was brought into the ED by EMS after they were called to his residence for a lift assist.  EMS found patient to be febrile and his legs look bad and right foot seemed gangrenous. Of note, patient was recently admitted at [facility] from 7/1/2021--7/7/2021 for lower extremity providentia wound infection and Enterobacter bacteremia and was discharged on levofloxacin. On admission, patient was tachycardic, febrile with temp max of 101.5. Initial diagnostic labs were unremarkable except elevated BUN, albumin 2.8, hemoglobin 10.8 (13.8 on 7/7/21).   Right foot x-ray showed gangrene of first digit and lateral foot ulcer with no evidence of coming to sit at EOB with max A x1 and CGA for sitting balance. Pt completing oral care at EOB with OT. Pt then coming to stand with max A x2, though only able to complete partial stand. Pt then reporting BM while in stance, unable to get pt to [facility]. Pt then assisted with min + Mod A to return to supine. Pt completing rolling B several times to each side with mod A to R and min A to L, for pericare. All needs left within reach. G-Code     OutComes Score                                                     AM-PAC Score  AM-PAC Inpatient Mobility Raw Score : 8 (08/04/21 1220)  AM-PAC Inpatient T-Scale Score : 28.52 (08/04/21 1220)  Mobility Inpatient CMS 0-100% Score: 86.62 (08/04/21 1220)  Mobility Inpatient CMS G-Code Modifier : CM (08/04/21 1220)          Goals  Short term goals  Time Frame for Short term goals: Before discharge  Short term goal 1: Pt will complete bed mobility with CGA  Short term goal 2: Pt will complete sit<>stand with CGA  Short term goal 3: Pt will transfer bed<>chair with LRAD and CGA  Short term goal 4: Pt will ambulate 25 ft with LRAD and CGA  Patient Goals   Patient goals : Pt confused no goal stated   **none met    Plan    Plan  Times per week: 3-5x  Times per day: Daily  Current Treatment Recommendations: Strengthening, Functional Mobility Training, Transfer Training, Balance Training, Endurance Training, Stair training, Gait Training, Safety Education & Training, Patient/Caregiver Education & Training, Home Exercise Program, Equipment Evaluation, Education, & procurement, Neuromuscular Re-education  Safety Devices  Type of devices:  All fall risk precautions in place, Telesitter in use, Nurse notified, Patient at risk for falls, Gait belt, Left in bed, Bed alarm in place, Call light within reach  Restraints  Initially in place: No     Therapy Time   Individual Concurrent Group Co-treatment   Time In 1130         Time Out 1214         Minutes 44         Timed Code Treatment Minutes: 44 Minutes      Second Session Therapy Time:   Individual Concurrent Group Co-treatment   Time In      2872   Time Out      1517   Minutes      39     Timed Code Treatment Minutes:  83    Total Treatment Minutes:  [address] Rankin, PT   Stu [place], DPT, [id number]

## 2021-08-05 NOTE — CARE COORDINATION
No word from [facility] or [facility]-call placed to [facility] x2 today-last message from [facility] yesterday evening stating they may not be able to accept d/t pt being lethargic and refusing vitals and meds. Spoke w/daughter to discuss. Asked for list to be resent-emailed list to daughter.   Electronically signed by TAL Miramontes on 8/5/2021 at 3:43 PM

## 2021-08-05 NOTE — PROGRESS NOTES
Hospitalist Progress Note      PCP: Sebas Frank MD    Date of Admission: 7/24/2021    Chief Complaint: [facility] Course: This 79 y. o. male  with PMHx of hypertension, HFrEF(EF 15 to 20%), PAD, CKD, seizures, childhood TB, dementia and BLE venous ulceration was brought into the ED by EMS after they were called to his residence for a lift assist.  EMS found patient to be febrile and his legs look bad and right foot seemed gangrenous.  Of note, patient was recently admitted at [facility] from 7/1/2021--7/7/2021 for lower extremity providentia wound infection and Enterobacter bacteremia and was discharged on levofloxacin.  On admission, patient was tachycardic, febrile with temp max of 101.5.  Initial diagnostic labs were unremarkable except elevated BUN, albumin 2.8, hemoglobin 10.8 (13.8 on 7/7/21).  Right foot x-ray showed gangrene of first digit and lateral foot ulcer with no evidence of osteomyelitis.  Patient was given IV fluids and Vanco and Zosyn in the ED. patient continued on vancomycin and Zosyn. He was scheduled with podiatry for OR for debridement today however patient refused.     Interval history  Patient seen and examined today  Overnight events interval ancillary notes reviewed  Patient reported that he is feeling better and all he wants to do is eat  Per RN report patient refused meds this morning again  Blood glucose improved after receiving liothyronine   Awaiting for SNF placement       Medications:  Reviewed    Infusion Medications    dextrose 100 mL/hr (08/01/21 0816)    sodium chloride 100 mL (07/28/21 1138)     Scheduled Medications    liothyronine  5 mcg Oral Daily    lisinopril  2.5 mg Oral Daily    amoxicillin-clavulanate  1 tablet Oral 3 times per day    clopidogrel  75 mg Oral Daily    lactobacillus  1 capsule Oral BID WC    digoxin  125 mcg Oral Daily    sodium chloride flush  5-40 mL Intravenous 2 times per day    mupirocin   Topical Daily    carvedilol  3.125 mg Oral BID WC    levETIRAcetam  500 mg Oral BID    aspirin  81 mg Oral Daily    enoxaparin  1 mg/kg Subcutaneous BID     PRN Meds: OLANZapine, glucose, dextrose, glucagon (rDNA), dextrose, Local Anesthetic Custom Mixture, sodium chloride flush, sodium chloride, acetaminophen **OR** acetaminophen    No intake or output data in the 24 hours ending 08/05/21 1227    Physical Exam Performed:    /87   Pulse 62   Temp 98.6 °F (37 °C) (Oral)   Resp 16   Ht 6' 4\" (1.93 m)   Wt 230 lb 3 oz (104.4 kg)   SpO2 95%   BMI 28.02 kg/m²     General appearance: No apparent distress, appears stated age and cooperative. HEENT: Pupils equal, round, and reactive to light. Conjunctivae/corneas clear. Neck: Supple, with full range of motion. No jugular venous distention. Trachea midline. Respiratory:  Normal respiratory effort. Clear to auscultation, bilaterally without Rales/Wheezes/Rhonchi. Cardiovascular: Regular rate and rhythm with normal S1/S2 without murmurs, rubs or gallops. Abdomen: Soft, non-tender, non-distended with normal bowel sounds. Musculoskeletal: Right foot ulceration and gangrenous; dressing in place  Neurologic:  Neurovascularly intact without any focal sensory/motor deficits. Psychiatric: Alert and oriented, thought content appropriate, normal insight  Capillary Refill: Brisk,< 3 seconds   Peripheral Pulses: +2 palpable, equal bilaterally       Labs:   Recent Labs     08/03/21 0523 08/04/21  0446   WBC 6.0 5.9   HGB 11.3* 10.9*   HCT 35.4* 33.6*    207     Recent Labs     08/03/21  0523 08/04/21  0446    138   K 4.8 4.7    106   CO2 26 25   BUN 19 19   CREATININE 0.9 0.9   CALCIUM 7.7* 7.8*     No results for input(s): AST, ALT, BILIDIR, BILITOT, ALKPHOS in the last 72 hours. No results for input(s): INR in the last 72 hours. No results for input(s): Shellia Bugler in the last 72 hours.     Urinalysis:      Lab Results   Component Value Date    NITRU Negative 07/01/2021    WBCUA 1 07/01/2021    RBCUA 4 07/01/2021    BLOODU Negative 07/01/2021    SPECGRAV 1.016 07/01/2021    GLUCOSEU Negative 07/01/2021       Radiology:  MRI FOOT RIGHT WO CONTRAST   Final Result   Evaluation is limited as the patient could not complete the whole exam.      Within this limitation, there are no definitive findings of acute   osteomyelitis or a drainable abscess. Generalized subcutaneous edema compatible with cellulitis. Suspected cystic versus erosive-like change in the 5th metatarsal head which   is favored to be chronic in nature. XR CHEST PORTABLE   Final Result   Cardiomegaly with no evidence of edema         XR ANKLE RIGHT (2 VIEWS)   Final Result   1. Negative ankle   2. Gangrene of the 1st digit and lateral foot ulcer, with no findings of   osteomyelitis         XR FOOT RIGHT (2 VIEWS)   Final Result   1. Negative ankle   2.  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 ([address] [T69.905, L08.9]     Chronic atrial fibrillation (HCC) [I48.20]     History of tuberculosis [Z86.11]     Coronary artery disease due to lipid rich plaque [I25.10, I25.83]     Overweight [E66.3]     Septicemia ([address] [A41.9]     Diabetes education, encounter for [Z71.89]     Essential hypertension [I10]     Major neurocognitive disorder due to Alzheimer's disease ([address] [G30.9, F02.80]     Dementia without behavioral disturbance ([address] [F03.90]          Hypoglycemia: Improved with D10; discontinued  Medications reviewed, not on any medications that would cause hypoglycemia  Endocrinology on board; ACTH, IGF, proinsulin and C-peptide ordered  Encourage diet    Mild hypothyroidism: Slightly elevated TSH  Started on trial of 5 mcg liothyronine endocrinology    Right lower extremity cellulitis in the right hallux gangrene  refused debridement/amputation   ID on board appreciate their recs   Switch to p.o. Augmentin by ID    Sepsis secondary to above  Continue antibiotics    Heart failure with reduced ejection fraction  EF 15%  Lasix and ACE inhibitor have been on hold in view of low blood pressures    Normocytic anemia: Hemoglobin stable  Iron deficiency: Refusing supplementation      DVT Prophylaxis: Lovenox  Diet: ADULT DIET;  Regular  Adult Oral Nutrition Supplement; Standard High Calorie/High Protein Oral Supplement  Code Status: DNR-CC      Electronically signed by Antony Perez MD on 8/5/2021 at 12:27 PM

## 2021-08-05 NOTE — PROGRESS NOTES
Palliative Care: Follow up discussions with daughter Timothy Look today. Reviewed over current status and options towards discharge planning. Patient placement is unresolved. Family would like for SNIF. CM addressing selections and not successful in having patient accepted into program due to poor compliance, and at times verbally agitated history. Patient does not strike out to team. At times refused medication/vital signs. CM sent another list to [facility] to select SNIF again. During our discussion today [facility] selected one [address] to determine if patient could go there. Results pending. CM addressing. Discussed hospice philosophy and as an alternative option when patient is not able to participate with skilled therapies. [facility] agrees for an informational visit only at this time. Per her choice she is selecting [facility]. [facility] agree to reach out to daughter on 8/6/21 to give information and will follow up with Palliative. Palliative continues to follow as needed.

## 2021-08-06 NOTE — PROGRESS NOTES
Pt resting in bed. Sacral wound was assessed by WOC and was packed with sliver AG and covered with outer dressing. Pt mostly sleeping bout roused easily to voice. Assessment completed at this time.

## 2021-08-06 NOTE — PROGRESS NOTES
Nutrition Assessment     Type and Reason for Visit: Reassess    Nutrition Recommendations/Plan:   Monitor for change in status. Re-consult RD if aggressive tx desired     Nutrition Assessment:  Pt remains confused & has been combative with nsg staff on occasion. Po intake varies depending on confusion. Continues to receive Ensure to promote wound healing. Hospice consult pending. Will monitor for change in status. Malnutrition Assessment:  Malnutrition Status: Moderate malnutrition (as previously assessed)    Estimated Daily Nutrient Needs:  Energy (kcal): 1960- 2450 (20-25 kcal/98kg); Weight Used for Energy Requirements:   (con't to use 98kg for est needs)     Protein (g): 120- 138g (1.3-1.5g/92 kg); Weight Used for Protein Requirements:  Ideal        Fluid (ml/day):  ; Weight Used for Fluid Requirements:  1 ml/kcal      Nutrition Related Findings: LBM 8/3 per EMR. +1-+2 edema of extremities; pitting generalized edema      Current Nutrition Therapies:    ADULT DIET;  Regular  Adult Oral Nutrition Supplement; Standard High Calorie/High Protein Oral Supplement    Anthropometric Measures:  · Height: 6' 4\" (193 cm)  · Current Body Wt: 229 lb (103.9 kg)   · BMI: 27.9    Nutrition Diagnosis:   · Increased nutrient needs related to increase demand for energy/nutrients as evidenced by wounds    · Inadequate oral intake related to cognitive or neurological impairment, inadequate protein-energy intake as evidenced by poor intake prior to admission (variable intake related to cognitive status)      Nutrition Interventions:   Food and/or Nutrient Delivery:  Continue Current Diet, Continue Oral Nutrition Supplement  Nutrition Education/Counseling:  Education not indicated   Coordination of Nutrition Care:  Continue to monitor while inpatient    Goals:  advance diet with po intake at least 50% of meals & supplements       Nutrition Monitoring and Evaluation:   Behavioral-Environmental Outcomes:  None Identified Food/Nutrient Intake Outcomes:  Food and Nutrient Intake, Supplement Intake  Physical Signs/Symptoms Outcomes:  Skin, Weight     Discharge Planning:    No discharge needs at this time     Electronically signed by Rajiv Manuel RD, LD on 8/6/21 at 11:40 AM EDT    Contact: [phone]

## 2021-08-06 NOTE — PLAN OF CARE
Problem: Skin Integrity:  Intervention: Wound dressing application  Note: Plan of Care: Daily dressing changes for coccyx wound. Clean wound with quarter strength Dakins solution, apply Santyl to wound bed, pack with saline moistened gauze and cover with foam dressing. OK to reapply if dressing changed due to incontinence.

## 2021-08-06 NOTE — CARE COORDINATION
[facility] met w/pt and daughters to discuss and provide information. Daughters and pt have decided that pt will dc to home w/[facility] services to follow. Equipment can be likely delivered tomorrow. IMM letter presented to daughter.   Electronically signed by TAL Elkins on 8/6/2021 at 1:27 PM

## 2021-08-06 NOTE — PROGRESS NOTES
RN attempted to complete vitals and assessment at this time. Patient very confused and agitated. Patient ripped off his external catheter and urinated all over the bed. Patient stating \"you can't bring any food to a 42 yr old man whats wrong with you? \". RN offered to bring turkey sandwich and snacks to patient but patient refused to eat them. Patient stated \"forget about it I want corn and some real meat. \" RN explained to patient that cafeteria was not open at this time. Several RNs attempted to clean up patient dispose of wet linens and put new dry brief on patient but patient adamantly refusing and starting to punch at RNs. Vitals and assessment deferred at this time. RN administered IM prn zyprexa. Rn's attempted to clean up patient to best of ability. Will continue to monitor.

## 2021-08-06 NOTE — DISCHARGE INSTR - COC
Continuity of Care Form    Patient Name: Johan Cruz   :  1940  MRN:  [id number]    Admit date:  2021  Discharge date:  21      Code Status Order: [facility]   Advance Directives:   [facility] Documentation     Date/Time Healthcare Directive Type of Healthcare Directive Copy in [address] Agent's Name Healthcare Agent's Phone Number    21 1127  Yes, patient has an advance directive for healthcare treatment  --  Yes, copy in chart  --  --  --    21 1241  Yes, patient has an advance directive for healthcare treatment  --  --  --  --  --          Admitting Physician:  Tyesha Nails MD  PCP: Nita Tee MD    Discharging Nurse: [facility] Unit/Room#: [id number]-[id number]/3128-28  Discharging Unit Phone Number: [phone]    Emergency Contact:   Extended Emergency Contact Information  Primary Emergency Contact: Bruna Duarte, [address] Phone: [phone]  Relation: Child  Secondary Emergency Contact: Moshe Campbell  Address: [address], [address] Phone: [phone]  Mobile Phone: [phone]  Relation: Spouse    Past Surgical History:  History reviewed. No pertinent surgical history.     Immunization History:   Immunization History   Administered Date(s) Administered    Tdap (Boostrix, Adacel) 2021       Active Problems:  Patient Active Problem List   Diagnosis Code    New onset seizure ([address] R56.9    Delirium R41.0    Acute encephalopathy G93.40    Volume overload E87.70    Acute renal failure ([address] N17.9    Acute combined systolic and diastolic (congestive) hrt fail (HCC) I50.41    Essential hypertension I10    Anasarca R60.1    Seizure ([address] R56.9    Aspiration into airway T17.908A    Acute respiratory failure with hypoxia ([address] J96.01    Dementia without behavioral disturbance ([address] F03.90    Seizures ([address] R56.9    Syncope and collapse R55    Anemia D64.9    Major neurocognitive disorder due to Alzheimer's disease ([facility] G30.9, F02.80    HTN (hypertension), benign I10    Atrial fibrillation with RVR ([facility]) I48.91    Non-pressure chronic ulcer of right lower leg with fat layer exposed ([facility] L97.912    Non-pressure chronic ulcer of left lower leg with fat layer exposed ([facility] L97.922    Peripheral venous insufficiency I87.2    Lower extremity edema R60.0    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 with ulceration of other part of lower leg ([facility]) I70.248    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 ([facility]) I70.232    Acute metabolic encephalopathy L58.86    Gram negative sepsis ([facility]) A41.50    Cellulitis and abscess of leg L03.119, L02.419    Gram-negative bacteremia R78.81    Lactic acidosis E87.2    Leukopenia D72.819    Need for Tdap vaccination Z23    Thrombocytopenia ([facility]) D69.6    Elevated d-dimer R79.89    Pulmonary vascular congestion R09.89    Moderate malnutrition ([facility]) E44.0    Diabetes education, encounter for Z71.89    Septicemia ([facility]) A41.9    Gangrene of right foot ([facility] I96    Cardiomyopathy ([facility] I42.9    PAF (paroxysmal atrial fibrillation) ([facility]) I48.0    PAD (peripheral artery disease) ([facility]) I73.9    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 ([facility]) E11.628, L08.9    Chronic atrial fibrillation ([facility]) I48.20    History of tuberculosis Z86.11    Coronary artery disease due to lipid rich plaque I25.10, I25.83    Overweight E66.3       Isolation/Infection:   Isolation          No Isolation        Patient Infection Status     Infection Onset Added Last Indicated Last Indicated By Review Planned Expiration Resolved Resolved By    None active    Resolved    COVID-19 07/07/21 07/07/21 07/07/21 COVID-19, Rapid   07/07/21 Darby Wiseman RN          Nurse Assessment:  Last Vital Signs: /82   Pulse 56   Temp 98.2 °F (36.8 °C) (Oral)   Resp 16   Ht 6' 4\" (1.93 m)   Wt 229 lb 6.4 oz (104.1 kg)   SpO2 97%   BMI 27.92 kg/m²     Last documented pain score (0-10 scale): Pain Level: 0  Last Weight:   Wt Readings from Last 1 Encounters:   08/06/21 229 lb 6.4 oz (104.1 kg)     Mental Status:  disoriented    IV Access:  - None    Nursing Mobility/ADLs:  Walking   Assisted  Transfer  Assisted  Bathing  Assisted  Dressing  Assisted  Toileting  Assisted  Feeding  Assisted  Med Admin  Assisted  Med Delivery   whole    Wound Care Documentation and Therapy:  Wound 06/02/21 Leg Right; Lower; Posterior #4 (Active)   Wound Image   07/07/21 1401   Wound Etiology Venous 08/03/21 0615   Dressing Status New dressing applied;Clean;Dry; Intact 08/03/21 0615   Wound Cleansed Betadine/povidone iodine 08/03/21 0615   Dressing/Treatment Ace wrap;Roll gauze 08/03/21 0615   Wound Length (cm) 2 cm 07/07/21 1401   Wound Width (cm) 2 cm 07/07/21 1401   Wound Depth (cm) 0.1 cm 06/28/21 1026   Wound Surface Area (cm^2) 4 cm^2 07/07/21 1401   Change in Wound Size % (l*w) 91.45 07/07/21 1401   Wound Volume (cm^3) 0.168 cm^3 06/28/21 1026   Wound Healing % 96 06/28/21 1026   Post-Procedure Length (cm) 2.4 cm 06/28/21 1040   Post-Procedure Width (cm) 0.7 cm 06/28/21 1040   Post-Procedure Depth (cm) 0.15 cm 06/28/21 1040   Post-Procedure Surface Area (cm^2) 1.68 cm^2 06/28/21 1040   Post-Procedure Volume (cm^3) 0.252 cm^3 06/28/21 1040   Wound Assessment Dry 08/03/21 0615   Drainage Amount None 08/03/21 0615   Drainage Description Serosanguinous 08/03/21 0615   Odor None 08/03/21 0615   Jennifer-wound Assessment Fragile 08/03/21 0615   Margins Defined edges 08/03/21 0615   Wound Thickness Description not for Pressure Injury Partial thickness 08/03/21 0615   Number of days: 65       Wound 07/01/21 Toe (Comment  which one) Anterior;Right DTI on 4th toe (Active)   Wound Image   07/07/21 1401   Wound Etiology Deep tissue/Injury 08/03/21 0615   Dressing Status Clean;Dry; Intact 08/02/21 2200   Wound Cleansed Betadine/povidone iodine 08/02/21 2200   Dressing/Treatment Dry dressing 08/03/21 0615   Wound Length (cm) 1.2 cm 07/07/21 1401   Wound Width (cm) 1.2 cm 07/07/21 1401   Wound Surface Area (cm^2) 1.44 cm^2 07/07/21 1401   Wound Assessment Dry;Eschar dry 08/03/21 0615   Drainage Amount Scant 08/03/21 0615   Drainage Description Sanguinous 08/03/21 0615   Odor None 08/03/21 0615   Jennifer-wound Assessment Cool;Dry/flaky;Fragile 08/03/21 0615   Margins Defined edges 08/03/21 0615   Number of days: 35       Wound 07/01/21 Coccyx (Active)   Wound Image   08/06/21 1301   Wound Etiology Pressure Stage  3 08/06/21 1301   Dressing Status New dressing applied 08/06/21 1301   Wound Cleansed Not Cleansed 08/06/21 1301   Dressing/Treatment Foam;Hydrofiber Ag 08/06/21 1301   Wound Length (cm) 4 cm 08/06/21 1301   Wound Width (cm) 3.5 cm 08/06/21 1301   Wound Depth (cm) 1.3 cm 08/06/21 1301   Wound Surface Area (cm^2) 14 cm^2 08/06/21 1301   Change in Wound Size % (l*w) 50 08/06/21 1301   Wound Volume (cm^3) 18.2 cm^3 08/06/21 1301   Wound Healing % 57 08/06/21 1301   Wound Assessment [place]/red;Slough 08/06/21 1301   Drainage Amount Moderate 08/06/21 0510   Drainage Description Yellow 08/06/21 0510   Odor None 08/06/21 1301   Jennifer-wound Assessment Edematous; Erosion; Maceration 08/06/21 1301   Margins Defined edges 08/04/21 0600   Wound Thickness Description not for Pressure Injury Partial thickness 08/03/21 0615   Number of days: 35        Elimination:  Continence:   · Bowel: No  · Bladder: Yes  Urinary Catheter: None   Colostomy/Ileostomy/Ileal Conduit: no       Date of Last BM: 8/7/21  No intake or output data in the 24 hours ending 08/06/21 1556  No intake/output data recorded. Safety Concerns: At Risk for Falls    Impairments/Disabilities:      wounds, dementia    Nutrition Therapy:  Current Nutrition Therapy:   - Oral Diet:  hospice    Routes of Feeding: Oral  Liquids:  Thin Liquids  Daily Fluid Restriction: no  Last Modified Barium Swallow with Video (Video Swallowing Test): not done    Treatments at the Time of Hospital Discharge:   Respiratory Treatments: none  Oxygen Therapy:  is not on home oxygen therapy. Ventilator:    - No ventilator support    Rehab Therapies: hospice  Weight Bearing Status/Restrictions: No weight bearing restirctions  Other Medical Equipment (for information only, NOT a DME order):  walker and hospital bed  Other Treatments: wound treatments    Patient's personal belongings (please select all that are sent with patient):  clothing    RN SIGNATURE:  Electronically signed by Belinda Leon RN on 8/8/21 at 10:20 AM EDT    CASE MANAGEMENT/SOCIAL WORK SECTION    Inpatient Status Date: ***    Readmission Risk Assessment Score:  Readmission Risk              Risk of Unplanned Readmission:  29           Discharging to Facility/ Agency   · Name:   · Address:  · Phone:  · Fax:    Dialysis Facility (if applicable)   · Name:  · Address:  · Dialysis Schedule:  · Phone:  · Fax:    / signature: {Esignature:[id number]}    PHYSICIAN SECTION    Prognosis: {Prognosis:1156086245}    Condition at Discharge: [address] [place] [address] Patient Condition:[id number]}    Rehab Potential (if transferring to Rehab): {Prognosis:7971727477}    Recommended Labs or Other Treatments After Discharge: ***    Physician Certification: I certify the above information and transfer of Cruz Gilford  is necessary for the continuing treatment of the diagnosis listed and that he requires {Admit to Appropriate Level of Care:28226} for {GREATER/LESS:269873653} 30 days.      Update Admission H&P: {CHP DME Changes in OVZOP:829972787}    PHYSICIAN SIGNATURE:  {Esignature:[id number]}

## 2021-08-06 NOTE — PROGRESS NOTES
Hospitalist Progress Note      PCP: Sierra Blankenship MD    Date of Admission: 7/24/2021    Chief Complaint: [facility] Course: This 79 y. o. male  with PMHx of hypertension, HFrEF(EF 15 to 20%), PAD, CKD, seizures, childhood TB, dementia and BLE venous ulceration was brought into the ED by EMS after they were called to his residence for a lift assist.  EMS found patient to be febrile and his legs look bad and right foot seemed gangrenous.  Of note, patient was recently admitted at [facility] from 7/1/2021--7/7/2021 for lower extremity providentia wound infection and Enterobacter bacteremia and was discharged on levofloxacin.  On admission, patient was tachycardic, febrile with temp max of 101.5.  Initial diagnostic labs were unremarkable except elevated BUN, albumin 2.8, hemoglobin 10.8 (13.8 on 7/7/21).  Right foot x-ray showed gangrene of first digit and lateral foot ulcer with no evidence of osteomyelitis.  Patient was given IV fluids and Vanco and Zosyn in the ED. patient continued on vancomycin and Zosyn. He was scheduled with podiatry for OR for debridement today however patient refused. Interval history  Patient seen and examined today  Overnight events and interval ancillary notes reviewed  Patient stated that he is feeling better and eating well  Afebrile overnight, blood cultures remain negative to date  Blood glucose stable  Pt and family met with hospice today; plan for the patient to be going home with hospice tomorrow.       Medications:  Reviewed    Infusion Medications    dextrose 100 mL/hr (08/01/21 0816)    sodium chloride 100 mL (07/28/21 1138)     Scheduled Medications    liothyronine  5 mcg Oral Daily    lisinopril  2.5 mg Oral Daily    amoxicillin-clavulanate  1 tablet Oral 3 times per day    clopidogrel  75 mg Oral Daily    lactobacillus  1 capsule Oral BID WC    digoxin  125 mcg Oral Daily    sodium chloride flush  5-40 mL Intravenous 2 times per day    mupirocin   Topical Daily    carvedilol  3.125 mg Oral BID     levETIRAcetam  500 mg Oral BID    aspirin  81 mg Oral Daily    enoxaparin  1 mg/kg Subcutaneous BID     PRN Meds: OLANZapine, glucose, dextrose, glucagon (rDNA), dextrose, Local Anesthetic Custom Mixture, sodium chloride flush, sodium chloride, acetaminophen **OR** acetaminophen      Intake/Output Summary (Last 24 hours) at 8/6/2021 0923  Last data filed at 8/5/2021 1003  Gross per 24 hour   Intake 240 ml   Output --   Net 240 ml       Physical Exam Performed:    /82   Pulse 56   Temp 98.2 °F (36.8 °C) (Oral)   Resp 16   Ht 6' 4\" (1.93 m)   Wt 229 lb 6.4 oz (104.1 kg)   SpO2 97%   BMI 27.92 kg/m²     General appearance: No apparent distress, appears stated age and cooperative. HEENT: Pupils equal, round, and reactive to light. Conjunctivae/corneas clear. Neck: Supple, with full range of motion. No jugular venous distention. Trachea midline. Respiratory:  Normal respiratory effort. Clear to auscultation, bilaterally without Rales/Wheezes/Rhonchi. Cardiovascular: Regular rate and rhythm with normal S1/S2 without murmurs, rubs or gallops. Abdomen: Soft, non-tender, non-distended with normal bowel sounds. Musculoskeletal: Right foot ulceration and gangrenous; dressing in place  Neurologic:  Neurovascularly intact without any focal sensory/motor deficits. Psychiatric: Alert and oriented, thought content appropriate, normal insight  Capillary Refill: Brisk,< 3 seconds   Peripheral Pulses: +2 palpable, equal bilaterally       Labs:   Recent Labs     08/04/21  0446   WBC 5.9   HGB 10.9*   HCT 33.6*        Recent Labs     08/04/21  0446 08/06/21  0815    139   K 4.7 5.2*    106   CO2 25 25   BUN 19 21*   CREATININE 0.9 0.9   CALCIUM 7.8* 8.0*     No results for input(s): AST, ALT, BILIDIR, BILITOT, ALKPHOS in the last 72 hours. No results for input(s): INR in the last 72 hours.   No results for input(s): Renetta Dec in the last 72 hours.    Urinalysis:      Lab Results   Component Value Date    NITRU Negative 07/01/2021    WBCUA 1 07/01/2021    RBCUA 4 07/01/2021    BLOODU Negative 07/01/2021    SPECGRAV 1.016 07/01/2021    GLUCOSEU Negative 07/01/2021       Radiology:  MRI FOOT RIGHT WO CONTRAST   Final Result   Evaluation is limited as the patient could not complete the whole exam.      Within this limitation, there are no definitive findings of acute   osteomyelitis or a drainable abscess. Generalized subcutaneous edema compatible with cellulitis. Suspected cystic versus erosive-like change in the 5th metatarsal head which   is favored to be chronic in nature. XR CHEST PORTABLE   Final Result   Cardiomegaly with no evidence of edema         XR ANKLE RIGHT (2 VIEWS)   Final Result   1. Negative ankle   2. Gangrene of the 1st digit and lateral foot ulcer, with no findings of   osteomyelitis         XR FOOT RIGHT (2 VIEWS)   Final Result   1. Negative ankle   2.  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 ([address] [F30.751, L08.9]     Chronic atrial fibrillation (HCC) [I48.20]     History of tuberculosis [Z86.11]     Coronary artery disease due to lipid rich plaque [I25.10, I25.83]     Overweight [E66.3]     Septicemia ([address] [A41.9]     Diabetes education, encounter for [Z71.89]     Essential hypertension [I10]     Major neurocognitive disorder due to Alzheimer's disease ([address] [G30.9, F02.80]     Dementia without behavioral disturbance ([address] [F03.90]          Hypoglycemia: Improved with D10; discontinued  Medications reviewed, not on any medications that would cause hypoglycemia  Endocrinology on board; ACTH, IGF, proinsulin and C-peptide ordered  Encourage diet    Mild hypothyroidism: Slightly elevated TSH  Started on trial of 5 mcg liothyronine endocrinology    Right lower extremity cellulitis in the right hallux gangrene  refused debridement/amputation   ID on board appreciate their recs   Switch to p.o. Augmentin by ID    Sepsis secondary to above  Continue antibiotics    Heart failure with reduced ejection fraction  EF 15%  Lasix and ACE inhibitor have been on hold in view of low blood pressures    Normocytic anemia: Hemoglobin stable  Iron deficiency: Refusing supplementation      DVT Prophylaxis: Lovenox  Diet: ADULT DIET;  Regular  Adult Oral Nutrition Supplement; Standard High Calorie/High Protein Oral Supplement  Code Status: DNR-CC      Electronically signed by Jaymie Ray MD on 8/6/2021 at 9:23 AM

## 2021-08-06 NOTE — PROGRESS NOTES
Pt took PO meds easily and without complaint. Continues to sleep on and off this morning. Denied pain.

## 2021-08-06 NOTE — CARE COORDINATION
[facility] Continence Nurse  Consult Note       NAME:  Rios Ramon  MEDICAL RECORD NUMBER:  [id number]  AGE: 79 y.o. GENDER: male  : 1940  TODAY'S DATE:  2021    Subjective   Reason for WOCN Evaluation and Assessment: wound to andrea Gutiérrez is a 79 y.o. male referred by:   [x] Physician  [x] Nursing  [] Other:     Wound Identification:  Wound Type: pressure  Contributing Factors: poor glucose control, chronic pressure, incontinence of stool and incontinence of urine    Wound History: present on admission  Current Wound Care Treatment:   Foam dressing    Patient Goal of Care:  [x] Wound Healing  [x] Odor Control  [] Palliative Care  [] Pain Control   [] Other:         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 ([address] 3/2/2021    CHF (congestive heart failure) (HCC)     Coronary artery disease due to lipid rich plaque     Hypertension     Lower extremity edema 3/2/2021    Non-pressure chronic ulcer of left lower leg with fat layer exposed ([address] 3/2/2021    Non-pressure chronic ulcer of right lower leg with fat layer exposed ([address] 3/2/2021    Peripheral venous insufficiency 3/2/2021    Seizures ([address]     TB (pulmonary tuberculosis)     he says the doctors told him he had TB when he was 15 and he had to have surgery    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 No pertinent surgical history. FAMILY HISTORY    History reviewed. No pertinent family history.     SOCIAL HISTORY    Social History     Tobacco Use    Smoking status: Never Smoker    Smokeless tobacco: Never Used   Vaping Use    Vaping Use: Never used   Substance Use Topics    Alcohol use: Not Currently    Drug use: Not on file ALLERGIES    No Known Allergies    MEDICATIONS    No current facility-administered medications on file prior to encounter.      Current Outpatient Medications on File Prior to Encounter   Medication Sig Dispense Refill    carvedilol (COREG) 3.125 MG tablet Take 1 tablet by mouth 2 times daily (with meals) 60 tablet 3    aspirin 81 MG EC tablet Take 81 mg by mouth daily      Compression Stockings MISC by Does not apply route Bilateral below knee compression stockings 20-30 mmHg 1 each 0    levothyroxine (SYNTHROID) 25 MCG tablet Take 1 tablet by mouth daily 90 tablet 1    vitamin D (ERGOCALCIFEROL) 1.25 MG (58516 UT) CAPS capsule Take 1 capsule by mouth once a week 4 capsule 5    levETIRAcetam (KEPPRA) 500 MG tablet Take 1 tablet by mouth 2 times daily 180 tablet 3    rivaroxaban (XARELTO) 15 MG TABS tablet Take 1 tablet by mouth 2 times daily (with meals) for 21 days 42 tablet 0    furosemide (LASIX) 40 MG tablet Take 2 tablets by mouth 2 times daily 360 tablet 0       Objective    /82   Pulse 56   Temp 98.2 °F (36.8 °C) (Oral)   Resp 16   Ht 6' 4\" (1.93 m)   Wt 229 lb 6.4 oz (104.1 kg)   SpO2 97%   BMI 27.92 kg/m²     LABS:  WBC:    Lab Results   Component Value Date    WBC 5.9 08/04/2021     H/H:    Lab Results   Component Value Date    HGB 10.9 08/04/2021    HCT 33.6 08/04/2021     PTT:    Lab Results   Component Value Date    APTT 43.7 07/01/2021   [APTT}  PT/INR:    Lab Results   Component Value Date    PROTIME 22.5 07/01/2021    INR 1.94 07/01/2021     HgBA1c:    Lab Results   Component Value Date    LABA1C 6.4 07/30/2021       Assessment   Michel Risk Score: Michel Scale Score: 14    Patient Active Problem List   Diagnosis Code    New onset seizure ([facility] R56.9    Delirium R41.0    Acute encephalopathy G93.40    Volume overload E87.70    Acute renal failure ([facility] N17.9    Acute combined systolic and diastolic (congestive) hrt fail ([facility] I50.41    Essential hypertension I10    Anasarca R60.1    Seizure ([facility] R56.9    Aspiration into airway T17.908A    Acute respiratory failure with hypoxia ([facility]) J96.01    Dementia without behavioral disturbance ([facility]) F03.90    Seizures ([facility]) R56.9    Syncope and collapse R55    Anemia D64.9    Major neurocognitive disorder due to Alzheimer's disease ([facility] G30.9, F02.80    HTN (hypertension), benign I10    Atrial fibrillation with RVR ([facility]) I48.91    Non-pressure chronic ulcer of right lower leg with fat layer exposed ([facility] L97.912    Non-pressure chronic ulcer of left lower leg with fat layer exposed ([facility] L97.922    Peripheral venous insufficiency I87.2    Lower extremity edema R60.0    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 with ulceration of other part of lower leg ([facility]) I70.248    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 ([facility]) I70.232    Acute metabolic encephalopathy H79.21    Gram negative sepsis ([facility]) A41.50    Cellulitis and abscess of leg L03.119, L02.419    Gram-negative bacteremia R78.81    Lactic acidosis E87.2    Leukopenia D72.819    Need for Tdap vaccination Z23    Thrombocytopenia ([facility]) D69.6    Elevated d-dimer R79.89    Pulmonary vascular congestion R09.89    Moderate malnutrition ([facility]) E44.0    Diabetes education, encounter for Z71.89    Septicemia ([facility]) A41.9    Gangrene of right foot ([facility] I96    Cardiomyopathy ([facility] I42.9    PAF (paroxysmal atrial fibrillation) ([facility]) I48.0    PAD (peripheral artery disease) ([facility]) I73.9    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 ([facility]) E11.628, L08.9    Chronic atrial fibrillation ([facility]) I48.20    History of tuberculosis Z86.11    Coronary artery disease due to lipid rich plaque I25.10, I25.83    Overweight E66.3       Measurements:  Wound Image   08/06/21 1301   Wound Etiology Pressure Stage  3 08/06/21 1301   Dressing Status New dressing applied 08/06/21 1301   Wound Cleansed Not Cleansed 08/06/21 1301 Dressing/Treatment Foam;Hydrofiber Ag 08/06/21 1301   Wound Length (cm) 4 cm 08/06/21 1301   Wound Width (cm) 3.5 cm 08/06/21 1301   Wound Depth (cm) 1.3 cm 08/06/21 1301   Wound Surface Area (cm^2) 14 cm^2 08/06/21 1301   Change in Wound Size % (l*w) 50 08/06/21 1301   Wound Volume (cm^3) 18.2 cm^3 08/06/21 1301   Wound Healing % 57 08/06/21 1301   Wound Assessment Cobalt/red;Slough 08/06/21 1301   Drainage Amount Moderate 08/06/21 0510   Drainage Description Yellow 08/06/21 0510   Odor None 08/06/21 1301   Jennifer-wound Assessment Edematous; Erosion; Maceration 08/06/21 1301   Margins Defined edges 08/04/21 0600   Wound Thickness Description not for Pressure Injury Partial thickness 08/03/21 0615   Number of days: 35     Patient seen for wound to coccyx. Wound measured and photographed. Wound measures 4 cm x 3.5 cm x 1.3 cm. Approximately 1 cm undermining in all direction. There is no drainage, no odor and patient has no reports of pain at time of assessment. Wound bed has slough. Will request Santyl for wound with quarter strength Dakins to clean. Change dressing daily. Coccyx wound       Response to treatment:  Well tolerated by patient. Pain Assessment:  Severity:  0 / 10  Quality of pain: N/A  Wound Pain Timing/Severity: none  Premedicated: No    Plan   Plan of Care: Daily dressing changes for coccyx wound. Clean wound with quarter strength Dakins solution, apply Santyl to wound bed, pack with saline moistened gauze and cover with foam dressing. OK to reapply if dressing changed due to incontinence. Specialty Bed Required : Yes   [] Low Air Loss   [] Pressure Redistribution  [] Fluid Immersion  [] Bariatric  [] Total Pressure Relief  [] Other:     Current Diet: ADULT DIET;  Regular  Adult Oral Nutrition Supplement; Standard High Calorie/High Protein Oral Supplement  Dietician consult:  Yes    Discharge Plan:  Placement for patient upon discharge: skilled nursing    Patient appropriate for Outpatient Wound Care Center: Yes    Referrals:  [x]   [x] [facility]  [] Supplies  [] Other    Patient/Caregiver Teaching:  Level of patient/caregiver understanding able to:   [] Indicates understanding       [] Needs reinforcement  [] Unsuccessful      [] Verbal Understanding  [] Demonstrated understanding       [x] No evidence of learning  [] Refused teaching         [] N/A       Electronically signed by  Lavanda Schilder, BSN, RN  Wound/Ostomy Care on 8/6/2021 at 1:21 PM

## 2021-08-06 NOTE — PROGRESS NOTES
Patient calm and quiet at this time. RN attempted to complete vitals and assessment again but upon waking patient, he began to yell out and become combative again. Patient left alone to sleep.

## 2021-08-07 NOTE — PROGRESS NOTES
0745- pt awake and active in bed. Has been refusing care overnight. Did take some PO medications this morning before declaring \" doesn't want to take anything else. \" ate 100% of breakfast tray. 0800- pt increasingly agitated. Asking staff if they \"want to try this old bald headed man. \" Gave zyprexa IM 2.5 mg in left vastus lateralis. Pt refusing to be changed at this time. Will reattempt later. Pt does remain on low air loss specialty mattress. 3091- pt resting in bed calmly. Viewable from RN station. Will continue to monitor.

## 2021-08-07 NOTE — PROGRESS NOTES
Pt resting in bed. Sleeping on and off. Cyclic speech when awake but has not been agitated. Took PO augmentin cooperatively. Will continue to monitor.

## 2021-08-07 NOTE — PROGRESS NOTES
Several attempts made to clean pt and give medications. Pt would not allow this RN to change his brief or the bed pad. Pt refused all care this shift and remained agitated. Tried to talk to pt while he was sitting on the side of the bed. Pt mad no sense and was very agitated. Pt told me to get out. Offered to heat up pt's food and pt told me to \"get out\"   This RN left the pt as her was.  Pt also refused labs this am.   Jj Brooks RN

## 2021-08-07 NOTE — PROGRESS NOTES
Pt confused and is refusing care. Refused vitals, refused assessments of blood glucose and physical assessments and refused all medications. This was first attempt. Will try again later.    Alexa Cee RN

## 2021-08-07 NOTE — CARE COORDINATION
Michelle Lara upheld discharge order-they informed daughter. Informed [facility] of decision. Althea Feliz will coordinate delivery of equipment and schedule transport-will discuss w/sydni Redd. Electronically signed by TAL Pedro on 8/7/2021 at 3:48 PM    ADDENDUM:  Pt will dc tomorrow via [address] [facility] to be delivered between 9-11am tomorrow to pt's home. Pt has no further needs per CM.   Electronically signed by TAL Pedro on 8/7/2021 at 4:20 PM

## 2021-08-07 NOTE — PROGRESS NOTES
Hospitalist Progress Note      PCP: Farooq Lopez MD    Date of Admission: 7/24/2021    Chief Complaint: [facility] Course: This 79 y. o. male  with PMHx of hypertension, HFrEF(EF 15 to 20%), PAD, CKD, seizures, childhood TB, dementia and BLE venous ulceration was brought into the ED by EMS after they were called to his residence for a lift assist.  EMS found patient to be febrile and his legs look bad and right foot seemed gangrenous.  Of note, patient was recently admitted at [facility] from 7/1/2021--7/7/2021 for lower extremity providentia wound infection and Enterobacter bacteremia and was discharged on levofloxacin.  On admission, patient was tachycardic, febrile with temp max of 101.5.  Initial diagnostic labs were unremarkable except elevated BUN, albumin 2.8, hemoglobin 10.8 (13.8 on 7/7/21).  Right foot x-ray showed gangrene of first digit and lateral foot ulcer with no evidence of osteomyelitis.  Patient was given IV fluids and Vanco and Zosyn in the ED. patient continued on vancomycin and Zosyn. He was scheduled with podiatry for OR for debridement today however patient refused.     Interval history  Patient seen and examined today  Overnight events and interval ancillary notes reviewed  Blood glucose stable  Patient in bed resting; reported that he is feeling better and ready to go home  Plan for the patient to be discharged home with hospice tomorrow      Medications:  Reviewed    Infusion Medications    dextrose 100 mL/hr (08/01/21 0816)    sodium chloride 100 mL (07/28/21 1138)     Scheduled Medications    collagenase   Topical Nightly    sodium hypochlorite   Irrigation Nightly    liothyronine  5 mcg Oral Daily    lisinopril  2.5 mg Oral Daily    amoxicillin-clavulanate  1 tablet Oral 3 times per day    clopidogrel  75 mg Oral Daily    lactobacillus  1 capsule Oral BID WC    digoxin  125 mcg Oral Daily    sodium chloride flush  5-40 mL Intravenous 2 times per day    mupirocin Topical Daily    carvedilol  3.125 mg Oral BID     levETIRAcetam  500 mg Oral BID    aspirin  81 mg Oral Daily    enoxaparin  1 mg/kg Subcutaneous BID     PRN Meds: OLANZapine, glucose, dextrose, glucagon (rDNA), dextrose, Local Anesthetic Custom Mixture, sodium chloride flush, sodium chloride, acetaminophen **OR** acetaminophen      Intake/Output Summary (Last 24 hours) at 8/7/2021 1218  Last data filed at 8/6/2021 1607  Gross per 24 hour   Intake 730 ml   Output 800 ml   Net -70 ml       Physical Exam Performed:    BP (!) 151/96   Pulse 83   Temp 97.3 °F (36.3 °C) (Oral)   Resp 16   Ht 6' 4\" (1.93 m)   Wt 229 lb 6.4 oz (104.1 kg)   SpO2 91%   BMI 27.92 kg/m²     General appearance: No apparent distress, appears stated age and cooperative. HEENT: Pupils equal, round, and reactive to light. Conjunctivae/corneas clear. Neck: Supple, with full range of motion. No jugular venous distention. Trachea midline. Respiratory:  Normal respiratory effort. Clear to auscultation, bilaterally without Rales/Wheezes/Rhonchi. Cardiovascular: Regular rate and rhythm with normal S1/S2 without murmurs, rubs or gallops. Abdomen: Soft, non-tender, non-distended with normal bowel sounds. Musculoskeletal: Right foot ulceration and gangrenous; dressing in place  Neurologic:  Neurovascularly intact without any focal sensory/motor deficits. Psychiatric: Alert and oriented, thought content appropriate, normal insight  Capillary Refill: Brisk,< 3 seconds   Peripheral Pulses: +2 palpable, equal bilaterally       Labs:   No results for input(s): WBC, HGB, HCT, PLT in the last 72 hours. Recent Labs     08/06/21  0815      K 5.2*      CO2 25   BUN 21*   CREATININE 0.9   CALCIUM 8.0*     No results for input(s): AST, ALT, BILIDIR, BILITOT, ALKPHOS in the last 72 hours. No results for input(s): INR in the last 72 hours. No results for input(s): Bennie Favre in the last 72 hours.     Urinalysis:      Lab Results Component Value Date    NITRU Negative 07/01/2021    WBCUA 1 07/01/2021    RBCUA 4 07/01/2021    BLOODU Negative 07/01/2021    SPECGRAV 1.016 07/01/2021    GLUCOSEU Negative 07/01/2021       Radiology:  MRI FOOT RIGHT WO CONTRAST   Final Result   Evaluation is limited as the patient could not complete the whole exam.      Within this limitation, there are no definitive findings of acute   osteomyelitis or a drainable abscess. Generalized subcutaneous edema compatible with cellulitis. Suspected cystic versus erosive-like change in the 5th metatarsal head which   is favored to be chronic in nature. XR CHEST PORTABLE   Final Result   Cardiomegaly with no evidence of edema         XR ANKLE RIGHT (2 VIEWS)   Final Result   1. Negative ankle   2. Gangrene of the 1st digit and lateral foot ulcer, with no findings of   osteomyelitis         XR FOOT RIGHT (2 VIEWS)   Final Result   1. Negative ankle   2.  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 ([address] [A19.558, L08.9]     Chronic atrial fibrillation (HCC) [I48.20]     History of tuberculosis [Z86.11]     Coronary artery disease due to lipid rich plaque [I25.10, I25.83]     Overweight [E66.3]     Septicemia ([address] [A41.9]     Diabetes education, encounter for [Z71.89]     Essential hypertension [I10]     Major neurocognitive disorder due to Alzheimer's disease ([address] [G30.9, F02.80]     Dementia without behavioral disturbance ([address] [F03.90]          Hypoglycemia: Improved with D10; discontinued  Medications reviewed, not on any medications that would cause hypoglycemia  Endocrinology on board; ACTH, IGF, proinsulin and C-peptide ordered  Encourage diet    Mild hypothyroidism: Slightly elevated TSH  Started on trial of 5 mcg liothyronine endocrinology    Right lower extremity cellulitis in the right hallux gangrene  refused debridement/amputation   ID on board appreciate their recs   Switch to p.o. Augmentin by ID    Sepsis secondary to above  Continue antibiotics    Heart failure with reduced ejection fraction  EF 15%  Lasix and ACE inhibitor have been on hold in view of low blood pressures    Normocytic anemia: Hemoglobin stable  Iron deficiency: Refusing supplementation      DVT Prophylaxis: Lovenox  Diet: ADULT DIET;  Regular  Adult Oral Nutrition Supplement; Standard High Calorie/High Protein Oral Supplement  Code Status: DNR-CC      Electronically signed by Jameel Mckay MD on 8/7/2021 at 12:18 PM

## 2021-08-07 NOTE — PROGRESS NOTES
o2 sat noted to be 97% on room air. Pt refusing other intervention at this time. Is cooperative and uncooperative with no apparent predictable pattern. Was able at 1000 to bathe pt and removed old soiled linens, replace dressing to coccyx (repacked with santyl and dakins moist gauze secured with sacral border dressing) and replace external catheter.  Pt repeatedly stating just to leave him alone

## 2021-08-08 NOTE — DISCHARGE SUMMARY
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 ([facility] [G68.649, L08.9]     Chronic atrial fibrillation ([facility] [I48.20]     History of tuberculosis [Z86.11]     Coronary artery disease due to lipid rich plaque [I25.10, I25.83]     Overweight [E66.3]     Septicemia ([facility] [A41.9]     Diabetes education, encounter for [Z71.89]     Essential hypertension [I10]     Major neurocognitive disorder due to Alzheimer's disease ([facility] [G30.9, F02.80]     Dementia without behavioral disturbance ([facility] [F03.90]        The patient was seen and examined on day of discharge and this discharge summary is in conjunction with any daily progress note from day of discharge. Hospital Course: This 79 y. o. male  with PMHx of hypertension, HFrEF(EF 15 to 20%), PAD, CKD, seizures, childhood TB, dementia and BLE venous ulceration was brought into the ED by EMS after they were called to his residence for a lift assist.  EMS found patient to be febrile and his legs look bad and right foot seemed gangrenous.  Of note, patient was recently admitted at [facility] from 7/1/2021--7/7/2021 for lower extremity providentia wound infection and Enterobacter bacteremia and was discharged on levofloxacin.      On admission, patient was tachycardic, febrile with temp max of 101.5.  Initial diagnostic labs were unremarkable except elevated BUN, albumin 2.8, hemoglobin 10.8 (13.8 on 7/7/21).   Right foot x-ray showed gangrene of first digit and lateral foot ulcer with no Result   Evaluation is limited as the patient could not complete the whole exam.      Within this limitation, there are no definitive findings of acute   osteomyelitis or a drainable abscess. Generalized subcutaneous edema compatible with cellulitis. Suspected cystic versus erosive-like change in the 5th metatarsal head which   is favored to be chronic in nature. XR CHEST PORTABLE   Final Result   Cardiomegaly with no evidence of edema         XR ANKLE RIGHT (2 VIEWS)   Final Result   1. Negative ankle   2. Gangrene of the 1st digit and lateral foot ulcer, with no findings of   osteomyelitis         XR FOOT RIGHT (2 VIEWS)   Final Result   1. Negative ankle   2.  Gangrene of the 1st digit and lateral foot ulcer, with no findings of   osteomyelitis                Consults:     IP CONSULT TO HOSPITALIST  IP CONSULT TO PODIATRY  IP CONSULT TO CARDIOLOGY  IP CONSULT TO PALLIATIVE CARE  IP CONSULT TO INFECTIOUS DISEASES  IP CONSULT TO VASCULAR SURGERY  IP CONSULT TO CARDIOLOGY  IP CONSULT TO DIETITIAN  IP CONSULT TO PSYCHIATRY  IP CONSULT TO ENDOCRINOLOGY  IP CONSULT TO DIETITIAN  IP CONSULT TO HOSPICE    Disposition: Home hospice    Condition at Discharge: Stable       Discharge Medications:     Discharge Medication List as of 8/8/2021 10:20 AM           Details   amoxicillin-clavulanate (AUGMENTIN) 500-125 MG per tablet Take 1 tablet by mouth every 8 hours for 3 days, Disp-9 tablet, R-0Normal              Details   carvedilol (COREG) 3.125 MG tablet Take 1 tablet by mouth 2 times daily (with meals), Disp-60 tablet, R-3Print      aspirin 81 MG EC tablet Take 81 mg by mouth dailyHistorical Med      Compression Stockings MISC Starting Tue 3/30/2021, Disp-1 each, R-0, PrintBilateral below knee compression stockings 20-30 mmHg      levothyroxine (SYNTHROID) 25 MCG tablet Take 1 tablet by mouth daily, Disp-90 tablet, R-1Normal      vitamin D (ERGOCALCIFEROL) 1.25 MG (17994 UT) CAPS capsule Take 1 capsule by mouth once a week, Disp-4 capsule, R-5Normal      levETIRAcetam (KEPPRA) 500 MG tablet Take 1 tablet by mouth 2 times daily, Disp-180 tablet,R-3Normal             Time Spent on discharge is more than 30 mins in the examination, evaluation, counseling and review of medications and discharge plan. Signed:    Antony Perez MD   8/8/2021      Thank you German Yang MD for the opportunity to be involved in this patient's care. If you have any questions or concerns please feel free to contact me at [phone].

## 2021-08-08 NOTE — CARE COORDINATION
Patient discharged on 8/8/21  to home under hospice care through [facility] . [facility] [employer].   Family aware of the d/c    All discharge needs met per case management

## 2021-08-08 NOTE — PROGRESS NOTES
Pt refusing medications and wound dressing changes, hospice hoping to  patient soon, awaiting for ETA, this RN notified daughter Gravelly Fee of all the above, v/u.

## 2021-08-08 NOTE — PROGRESS NOTES
Hospice transport here to  patient. Data- discharge order received, pt verbalized agreement to discharge, needs for hospice for end of life care and/or new Durable Medical Equipment through [address], [facility] reviewed and signed by MD, to be completed by RN. Action- AVS prepared, discharge instructions prepared and given to transport for hospice nurse, medication information packet given r/t NEW or CHANGED prescriptions, pt verbalized understanding further self-review. D/C instruction summary: Diet- hospice pt allowed pleasure foods, Activity- as tolerated, medications prescriptions to be filled by hospice. Contact information provided to above agencies used. Response- Case Management/ reported faxing completed [facility] and AVS to needed HHC/DME services stated above. Pt belongings gathered, IV removed, pt wore sweater from home, declined pajama pants. Disposition is home with HHC/DME as stated above, transported with transport set up by hospice, no complications. 1. WEIGHT: Admit Weight: 205 lb (93 kg) (07/24/21 9607)        Today  Weight: 226 lb 1.6 oz (102.6 kg) (08/08/21 0600)       2.  O2 SAT.: SpO2: 93 % (08/08/21 0849)

## 2021-08-08 NOTE — PROGRESS NOTES
Shift assessment completed. Medications given per MAR. Patient agitated at times and  tried to get out of bed. Zyprexa given. Camera and bed alarm on. Fall precaution in place. The care plan and education has been reviewed.

## 2021-08-12 NOTE — TELEPHONE ENCOUNTER
[facility] Transitions Initial Follow Up Call    Outreach made within 2 business days of discharge: No    Patient: Pramod Tse Patient : 1940   MRN: [id number]  Reason for Admission: There are no discharge diagnoses documented for the most recent discharge.   Discharge Date: 21       Spoke with: Sudeep wife      Patient placed in hospice

## 2021-08-16 NOTE — PROGRESS NOTES
Physician Progress Note      PATIENT:               Tj Fernandez  CSN #:                  [id number]  :                       1940  ADMIT DATE:       2021 6:34 AM  Bonnie Sanderson DATE:        2021 10:30 AM  RESPONDING  PROVIDER #:        Gómez Gates MD          QUERY TEXT:    Pt admitted with Sepsis. Noted documentation of Pressure ulcer of sacral   region, stage 3 on  by ordered Wound Care RN Consultant. If possible,   please document in progress notes and discharge summary:      The medical record reflects the following:  Risk Factors: DM, Cellulitis  Clinical Indicators:  Wound Care Note documented stage 3 pressure injury to   his coccyx. \"Patient seen for wound to coccyx. Wound measured and   photographed. Wound measures 4 cm x 3.5 cm x 1.3 cm. Approximately 1 cm   undermining in all direction. There is no drainage, no odor and patient has no   reports of pain at time of assessment. Wound bed has slough. Will request   Santyl for wound with quarter strength Dakins to clean. Change dressing   daily. \"  Treatment: Santyl for wound with quarter strength Dakins to clean. Change   dressing daily. Options provided:  -- stage 3 pressure injury to his coccyx confirmed present on admission  -- stage 3 pressure injury to his coccyx confirmed not present on admission  -- stage 3 pressure injury to his coccyx ruled out  -- Other - I will add my own diagnosis  -- Disagree - Not applicable / Not valid  -- Disagree - Clinically unable to determine / Unknown  -- Refer to Clinical Documentation Reviewer    PROVIDER RESPONSE TEXT:    The diagnosis of stage 3 pressure injury to his coccyx was confirmed as   present on admission.     Query created by: Julio C Gilliam on 2021 8:47 AM      Electronically signed by:  Gómez Gates MD 2021 6:24 PM

## 2021-08-26 ENCOUNTER — TELEPHONE (OUTPATIENT)
Dept: FAMILY MEDICINE CLINIC | Age: 81
End: 2021-08-26

## 2021-08-26 NOTE — TELEPHONE ENCOUNTER
----- Message from Suleman Amaya sent at 8/26/2021  3:09 PM EDT -----  Subject: Message to Provider    QUESTIONS  Information for Provider? Patient's daughter, Veronica Dumas, wanted to advised   patient passed away in hospice care 8/24/21.  ---------------------------------------------------------------------------  --------------  Jacqueline PINON  What is the best way for the office to contact you? OK to leave message on   voicemail  Preferred Call Back Phone Number? [phone]  ---------------------------------------------------------------------------  --------------  SCRIPT ANSWERS  Relationship to Patient? Other  Representative Name? DaughterVeronica  Is the Representative on the appropriate HIPAA document in Epic?  Yes
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
## (undated) DEVICE — PADDING CAST W4INXL4YD ST COT RAYON MICROPLEATED HIGHLY

## (undated) DEVICE — DRESSING PETRO W3XL3IN OIL EMUL N ADH GZ KNIT IMPREG CELOS

## (undated) DEVICE — SOLUTION IRRIG 500ML 0.9% SOD CHL USP POUR PLAS BTL

## (undated) DEVICE — SUTURE ETHLN SZ 2-0 L30IN NONABSORBABLE BLK L36MM FSLX 3/8 1674H

## (undated) DEVICE — TRAY PREP DRY W/ PREM GLV 2 APPL 6 SPNG 2 UNDPD 1 OVERWRAP

## (undated) DEVICE — BANDAGE COMPR W6INXL10YD ST M E WHITE/BEIGE

## (undated) DEVICE — HYPODERMIC SAFETY NEEDLE: Brand: MAGELLAN

## (undated) DEVICE — INTENDED FOR TISSUE SEPARATION, AND OTHER PROCEDURES THAT REQUIRE A SHARP SURGICAL BLADE TO PUNCTURE OR CUT.: Brand: BARD-PARKER ® STAINLESS STEEL BLADES

## (undated) DEVICE — GAUZE,PACKING STRIP,IODOFORM,1/2"X5YD,ST: Brand: CURAD

## (undated) DEVICE — BASIC SINGLE BASIN 1-LF: Brand: MEDLINE INDUSTRIES, INC.

## (undated) DEVICE — 3M™ STERI-STRIP™ REINFORCED ADHESIVE SKIN CLOSURES, R1547, 1/2 IN X 4 IN (12 MM X 100 MM), 6 STRIPS/ENVELOPE: Brand: 3M™ STERI-STRIP™

## (undated) DEVICE — GAUZE,SPONGE,4"X4",8PLY,STRL,LF,10/TRAY: Brand: MEDLINE

## (undated) DEVICE — Z INACTIVE USE 2660664 SOLUTION IRRIG 3000ML 0.9% SOD CHL USP UROMATIC PLAS CONT

## (undated) DEVICE — YANKAUER SUCTION INSTRUMENT NO CONTROL VENT, BULB TIP, CLEAR: Brand: YANKAUER

## (undated) DEVICE — Z DISCONTINUED PER MEDLINE TRAY SKIN PREP DRY W/ PREM GLV APPLICATORS GZ TWL OVERWRAP

## (undated) DEVICE — SYRINGE, LUER LOCK, 10ML: Brand: MEDLINE

## (undated) DEVICE — GLOVE ORTHO 8   MSG9480

## (undated) DEVICE — DRAPE,U/ SHT,SPLIT,PLAS,STERIL: Brand: MEDLINE

## (undated) DEVICE — GOWN,AURORA,NONREINF,RAGLAN,XXL,STERILE: Brand: MEDLINE

## (undated) DEVICE — Z CONVERTED USE 2273164 BANDAGE COMPR W4INXL4 1/2YD E EC SGL LAYERED CLP CLSR ECONO

## (undated) DEVICE — BNDG,ELSTC,MATRIX,STRL,4"X5YD,LF,HOOK&LP: Brand: MEDLINE

## (undated) DEVICE — SYRINGE 60ML BULB IRRIG ST LF

## (undated) DEVICE — PRECISION THIN (9.0 X 0.38 X 31.0MM)

## (undated) DEVICE — BANDAGE,GAUZE,BULKEE II,4.5"X4.1YD,STRL: Brand: MEDLINE

## (undated) DEVICE — PACK PROCEDURE SURG EXTREMITY MFFOP CUST

## (undated) DEVICE — SPONGE,LAP,18"X18",DLX,XR,ST,5/PK,40/PK: Brand: MEDLINE

## (undated) DEVICE — T-DRAPE,EXTREMITY,STERILE: Brand: MEDLINE

## (undated) DEVICE — PAD,ABDOMINAL,8"X10",ST,LF: Brand: MEDLINE

## (undated) DEVICE — ELECTRODE PT RET AD L9FT HI MOIST COND ADH HYDRGEL CORDED

## (undated) DEVICE — TUBE IRRIG HNDPC HI FLO TP INTRPULS W/SUCTION TUBE

## (undated) DEVICE — SUTURE COAT VCRL SZ 4-0 L18IN ABSRB UD L19MM PS-2 1/2 CIR J496G

## (undated) DEVICE — NEEDLE HYPO 22GA L1.5IN BLK POLYPR HUB S STL REG BVL STR

## (undated) DEVICE — MEDICINE CUP, GRADUATED, STER: Brand: MEDLINE

## (undated) DEVICE — BANDAGE COMPR W4INXL12FT E DISP ESMARCH EVEN